# Patient Record
Sex: FEMALE | Race: WHITE | NOT HISPANIC OR LATINO | Employment: FULL TIME | URBAN - METROPOLITAN AREA
[De-identification: names, ages, dates, MRNs, and addresses within clinical notes are randomized per-mention and may not be internally consistent; named-entity substitution may affect disease eponyms.]

---

## 2017-07-06 ENCOUNTER — HOSPITAL ENCOUNTER (OUTPATIENT)
Dept: FAMILY MEDICINE CLINIC | Facility: CLINIC | Age: 62
Setting detail: SPECIMEN
Discharge: HOME | End: 2017-07-06
Attending: INTERNAL MEDICINE | Admitting: INTERNAL MEDICINE

## 2017-07-06 LAB
ALBUMIN SERPL-MCNC: 3.6 G/DL (ref 3.5–4.8)
ALBUMIN/GLOB SERPL: 1.2 {RATIO} (ref 1–1.7)
ALP SERPL-CCNC: 65 IU/L (ref 32–91)
ALT SERPL-CCNC: 25 IU/L (ref 14–54)
ANION GAP SERPL CALC-SCNC: 13.3 MMOL/L (ref 10–20)
AST SERPL-CCNC: 22 IU/L (ref 15–41)
BILIRUB SERPL-MCNC: 0.5 MG/DL (ref 0.3–1.2)
BUN SERPL-MCNC: 10 MG/DL (ref 8–20)
BUN/CREAT SERPL: 14.3 (ref 5.4–26.2)
CALCIUM SERPL-MCNC: 9.2 MG/DL (ref 8.9–10.3)
CHLORIDE SERPL-SCNC: 106 MMOL/L (ref 101–111)
CHOLEST SERPL-MCNC: 204 MG/DL
CHOLEST/HDLC SERPL: 4.2 {RATIO}
CONV CO2: 27 MMOL/L (ref 22–32)
CONV LDL CHOLESTEROL DIRECT: 133 MG/DL (ref 0–100)
CONV TOTAL PROTEIN: 6.6 G/DL (ref 6.1–7.9)
CREAT UR-MCNC: 0.7 MG/DL (ref 0.4–1)
GLOBULIN UR ELPH-MCNC: 3 G/DL (ref 2.5–3.8)
GLUCOSE SERPL-MCNC: 102 MG/DL (ref 65–99)
HDLC SERPL-MCNC: 49 MG/DL
LDLC/HDLC SERPL: 2.7 {RATIO}
LIPID INTERPRETATION: ABNORMAL
POTASSIUM SERPL-SCNC: 4.3 MMOL/L (ref 3.6–5.1)
SODIUM SERPL-SCNC: 142 MMOL/L (ref 136–144)
TRIGL SERPL-MCNC: 106 MG/DL
TSH SERPL-ACNC: 3.91 UIU/ML (ref 0.34–5.6)
VLDLC SERPL CALC-MCNC: 23 MG/DL

## 2017-11-29 ENCOUNTER — APPOINTMENT (OUTPATIENT)
Dept: WOMENS IMAGING | Facility: HOSPITAL | Age: 62
End: 2017-11-29

## 2017-11-29 PROCEDURE — 77063 BREAST TOMOSYNTHESIS BI: CPT | Performed by: RADIOLOGY

## 2017-11-29 PROCEDURE — 77067 SCR MAMMO BI INCL CAD: CPT | Performed by: RADIOLOGY

## 2017-12-28 ENCOUNTER — HOSPITAL ENCOUNTER (OUTPATIENT)
Dept: FAMILY MEDICINE CLINIC | Facility: CLINIC | Age: 62
Setting detail: SPECIMEN
Discharge: HOME | End: 2017-12-28
Attending: INTERNAL MEDICINE | Admitting: INTERNAL MEDICINE

## 2017-12-28 LAB
ALBUMIN SERPL-MCNC: 3.7 G/DL (ref 3.5–4.8)
ALBUMIN/GLOB SERPL: 1.3 {RATIO} (ref 1–1.7)
ALP SERPL-CCNC: 71 IU/L (ref 32–91)
ALT SERPL-CCNC: 26 IU/L (ref 14–54)
ANION GAP SERPL CALC-SCNC: 11.5 MMOL/L (ref 10–20)
AST SERPL-CCNC: 21 IU/L (ref 15–41)
BILIRUB SERPL-MCNC: 0.4 MG/DL (ref 0.3–1.2)
BUN SERPL-MCNC: 12 MG/DL (ref 8–20)
BUN/CREAT SERPL: 17.1 (ref 5.4–26.2)
CALCIUM SERPL-MCNC: 9 MG/DL (ref 8.9–10.3)
CHLORIDE SERPL-SCNC: 103 MMOL/L (ref 101–111)
CHOLEST SERPL-MCNC: 233 MG/DL
CHOLEST/HDLC SERPL: 3.9 {RATIO}
CONV CO2: 26 MMOL/L (ref 22–32)
CONV LDL CHOLESTEROL DIRECT: 159 MG/DL (ref 0–100)
CONV TOTAL PROTEIN: 6.5 G/DL (ref 6.1–7.9)
CREAT UR-MCNC: 0.7 MG/DL (ref 0.4–1)
GLOBULIN UR ELPH-MCNC: 2.8 G/DL (ref 2.5–3.8)
GLUCOSE SERPL-MCNC: 96 MG/DL (ref 65–99)
HDLC SERPL-MCNC: 60 MG/DL
LDLC/HDLC SERPL: 2.6 {RATIO}
LIPID INTERPRETATION: ABNORMAL
POTASSIUM SERPL-SCNC: 4.5 MMOL/L (ref 3.6–5.1)
SODIUM SERPL-SCNC: 136 MMOL/L (ref 136–144)
TRIGL SERPL-MCNC: 65 MG/DL
VLDLC SERPL CALC-MCNC: 14.1 MG/DL

## 2018-04-26 ENCOUNTER — HOSPITAL ENCOUNTER (OUTPATIENT)
Dept: FAMILY MEDICINE CLINIC | Facility: CLINIC | Age: 63
Setting detail: SPECIMEN
Discharge: HOME | End: 2018-04-26
Attending: INTERNAL MEDICINE | Admitting: INTERNAL MEDICINE

## 2018-04-26 LAB
ALBUMIN SERPL-MCNC: 3.8 G/DL (ref 3.5–4.8)
ALBUMIN/GLOB SERPL: 1.4 {RATIO} (ref 1–1.7)
ALP SERPL-CCNC: 58 IU/L (ref 32–91)
ALT SERPL-CCNC: 28 IU/L (ref 14–54)
ANION GAP SERPL CALC-SCNC: 10.2 MMOL/L (ref 10–20)
AST SERPL-CCNC: 18 IU/L (ref 15–41)
BASOPHILS # BLD AUTO: 0 10*3/UL (ref 0–0.2)
BASOPHILS NFR BLD AUTO: 1 % (ref 0–2)
BILIRUB SERPL-MCNC: 0.4 MG/DL (ref 0.3–1.2)
BUN SERPL-MCNC: 10 MG/DL (ref 8–20)
BUN/CREAT SERPL: 14.3 (ref 5.4–26.2)
CALCIUM SERPL-MCNC: 9 MG/DL (ref 8.9–10.3)
CHLORIDE SERPL-SCNC: 109 MMOL/L (ref 101–111)
CONV CO2: 26 MMOL/L (ref 22–32)
CONV TOTAL PROTEIN: 6.6 G/DL (ref 6.1–7.9)
CREAT UR-MCNC: 0.7 MG/DL (ref 0.4–1)
DIFFERENTIAL METHOD BLD: (no result)
EOSINOPHIL # BLD AUTO: 0.1 10*3/UL (ref 0–0.3)
EOSINOPHIL # BLD AUTO: 3 % (ref 0–3)
ERYTHROCYTE [DISTWIDTH] IN BLOOD BY AUTOMATED COUNT: 14 % (ref 11.5–14.5)
GLOBULIN UR ELPH-MCNC: 2.8 G/DL (ref 2.5–3.8)
GLUCOSE SERPL-MCNC: 99 MG/DL (ref 65–99)
HCT VFR BLD AUTO: 37.8 % (ref 35–49)
HGB BLD-MCNC: 12.5 G/DL (ref 12–15)
LYMPHOCYTES # BLD AUTO: 1.4 10*3/UL (ref 0.8–4.8)
LYMPHOCYTES NFR BLD AUTO: 28 % (ref 18–42)
MCH RBC QN AUTO: 28 PG (ref 26–32)
MCHC RBC AUTO-ENTMCNC: 33 G/DL (ref 32–36)
MCV RBC AUTO: 84.8 FL (ref 80–94)
MONOCYTES # BLD AUTO: 0.4 10*3/UL (ref 0.1–1.3)
MONOCYTES NFR BLD AUTO: 8 % (ref 2–11)
NEUTROPHILS # BLD AUTO: 3.1 10*3/UL (ref 2.3–8.6)
NEUTROPHILS NFR BLD AUTO: 60 % (ref 50–75)
NRBC BLD AUTO-RTO: 0 /100{WBCS}
NRBC/RBC NFR BLD MANUAL: 0 10*3/UL
PLATELET # BLD AUTO: (no result) 10*3/UL (ref 150–450)
PMV BLD AUTO: 8.4 FL (ref 7.4–10.4)
POTASSIUM SERPL-SCNC: 4.2 MMOL/L (ref 3.6–5.1)
RBC # BLD AUTO: 4.46 10*6/UL (ref 4–5.4)
SODIUM SERPL-SCNC: 141 MMOL/L (ref 136–144)
WBC # BLD AUTO: 5 10*3/UL (ref 4.5–11.5)

## 2018-06-25 ENCOUNTER — HOSPITAL ENCOUNTER (OUTPATIENT)
Dept: FAMILY MEDICINE CLINIC | Facility: CLINIC | Age: 63
Setting detail: SPECIMEN
Discharge: HOME | End: 2018-06-25
Attending: INTERNAL MEDICINE | Admitting: INTERNAL MEDICINE

## 2018-06-25 LAB
ALBUMIN SERPL-MCNC: 4 G/DL (ref 3.5–4.8)
ALBUMIN/GLOB SERPL: 1.4 {RATIO} (ref 1–1.7)
ALP SERPL-CCNC: 75 IU/L (ref 32–91)
ALT SERPL-CCNC: 18 IU/L (ref 14–54)
ANION GAP SERPL CALC-SCNC: 9.1 MMOL/L (ref 10–20)
AST SERPL-CCNC: 20 IU/L (ref 15–41)
BASOPHILS # BLD AUTO: 0 10*3/UL (ref 0–0.2)
BASOPHILS NFR BLD AUTO: 1 % (ref 0–2)
BILIRUB SERPL-MCNC: 0.7 MG/DL (ref 0.3–1.2)
BUN SERPL-MCNC: 13 MG/DL (ref 8–20)
BUN/CREAT SERPL: 16.3 (ref 5.4–26.2)
CALCIUM SERPL-MCNC: 9.1 MG/DL (ref 8.9–10.3)
CHLORIDE SERPL-SCNC: 108 MMOL/L (ref 101–111)
CHOLEST SERPL-MCNC: 251 MG/DL
CHOLEST/HDLC SERPL: 5.1 {RATIO}
CONV CO2: 27 MMOL/L (ref 22–32)
CONV LDL CHOLESTEROL DIRECT: 182 MG/DL (ref 0–100)
CONV TOTAL PROTEIN: 6.8 G/DL (ref 6.1–7.9)
CREAT UR-MCNC: 0.8 MG/DL (ref 0.4–1)
DIFFERENTIAL METHOD BLD: (no result)
EOSINOPHIL # BLD AUTO: 0.1 10*3/UL (ref 0–0.3)
EOSINOPHIL # BLD AUTO: 3 % (ref 0–3)
ERYTHROCYTE [DISTWIDTH] IN BLOOD BY AUTOMATED COUNT: 14 % (ref 11.5–14.5)
GLOBULIN UR ELPH-MCNC: 2.8 G/DL (ref 2.5–3.8)
GLUCOSE SERPL-MCNC: 97 MG/DL (ref 65–99)
HCT VFR BLD AUTO: 41.4 % (ref 35–49)
HDLC SERPL-MCNC: 49 MG/DL
HGB BLD-MCNC: 13.4 G/DL (ref 12–15)
LDLC/HDLC SERPL: 3.7 {RATIO}
LIPID INTERPRETATION: ABNORMAL
LYMPHOCYTES # BLD AUTO: 1.3 10*3/UL (ref 0.8–4.8)
LYMPHOCYTES NFR BLD AUTO: 26 % (ref 18–42)
MCH RBC QN AUTO: 27.4 PG (ref 26–32)
MCHC RBC AUTO-ENTMCNC: 32.4 G/DL (ref 32–36)
MCV RBC AUTO: 84.4 FL (ref 80–94)
MONOCYTES # BLD AUTO: 0.4 10*3/UL (ref 0.1–1.3)
MONOCYTES NFR BLD AUTO: 9 % (ref 2–11)
NEUTROPHILS # BLD AUTO: 3 10*3/UL (ref 2.3–8.6)
NEUTROPHILS NFR BLD AUTO: 61 % (ref 50–75)
NRBC BLD AUTO-RTO: 0 /100{WBCS}
NRBC/RBC NFR BLD MANUAL: 0 10*3/UL
PLATELET # BLD AUTO: 246 10*3/UL (ref 150–450)
PMV BLD AUTO: 8.7 FL (ref 7.4–10.4)
POTASSIUM SERPL-SCNC: 4.1 MMOL/L (ref 3.6–5.1)
RBC # BLD AUTO: 4.9 10*6/UL (ref 4–5.4)
SODIUM SERPL-SCNC: 140 MMOL/L (ref 136–144)
TRIGL SERPL-MCNC: 109 MG/DL
VLDLC SERPL CALC-MCNC: 20.5 MG/DL
WBC # BLD AUTO: 4.8 10*3/UL (ref 4.5–11.5)

## 2018-10-18 ENCOUNTER — HOSPITAL ENCOUNTER (OUTPATIENT)
Dept: OTHER | Facility: HOSPITAL | Age: 63
Setting detail: SPECIMEN
Discharge: HOME | End: 2018-10-18
Attending: INTERNAL MEDICINE | Admitting: INTERNAL MEDICINE

## 2018-10-18 LAB
ALBUMIN SERPL-MCNC: 4.1 G/DL (ref 3.5–4.8)
ALBUMIN/GLOB SERPL: 1.4 {RATIO} (ref 1–1.7)
ALP SERPL-CCNC: 86 IU/L (ref 32–91)
ALT SERPL-CCNC: 19 IU/L (ref 14–54)
ANION GAP SERPL CALC-SCNC: 12.7 MMOL/L (ref 10–20)
AST SERPL-CCNC: 19 IU/L (ref 15–41)
BASOPHILS # BLD AUTO: 0 10*3/UL (ref 0–0.2)
BASOPHILS NFR BLD AUTO: 1 % (ref 0–2)
BILIRUB SERPL-MCNC: 0.9 MG/DL (ref 0.3–1.2)
BUN SERPL-MCNC: 16 MG/DL (ref 8–20)
BUN/CREAT SERPL: 20 (ref 5.4–26.2)
CALCIUM SERPL-MCNC: 8.8 MG/DL (ref 8.9–10.3)
CHLORIDE SERPL-SCNC: 102 MMOL/L (ref 101–111)
CHOLEST SERPL-MCNC: 172 MG/DL
CHOLEST/HDLC SERPL: 2.9 {RATIO}
CONV CO2: 27 MMOL/L (ref 22–32)
CONV LDL CHOLESTEROL DIRECT: 104 MG/DL (ref 0–100)
CONV TOTAL PROTEIN: 7.1 G/DL (ref 6.1–7.9)
CREAT UR-MCNC: 0.8 MG/DL (ref 0.4–1)
DIFFERENTIAL METHOD BLD: (no result)
EOSINOPHIL # BLD AUTO: 0.1 10*3/UL (ref 0–0.3)
EOSINOPHIL # BLD AUTO: 2 % (ref 0–3)
ERYTHROCYTE [DISTWIDTH] IN BLOOD BY AUTOMATED COUNT: 14.5 % (ref 11.5–14.5)
GLOBULIN UR ELPH-MCNC: 3 G/DL (ref 2.5–3.8)
GLUCOSE SERPL-MCNC: 93 MG/DL (ref 65–99)
HCT VFR BLD AUTO: 38.4 % (ref 35–49)
HDLC SERPL-MCNC: 59 MG/DL
HGB BLD-MCNC: 12.8 G/DL (ref 12–15)
LDLC/HDLC SERPL: 1.8 {RATIO}
LIPID INTERPRETATION: ABNORMAL
LYMPHOCYTES # BLD AUTO: 1.4 10*3/UL (ref 0.8–4.8)
LYMPHOCYTES NFR BLD AUTO: 29 % (ref 18–42)
MCH RBC QN AUTO: 28.2 PG (ref 26–32)
MCHC RBC AUTO-ENTMCNC: 33.3 G/DL (ref 32–36)
MCV RBC AUTO: 84.5 FL (ref 80–94)
MONOCYTES # BLD AUTO: 0.4 10*3/UL (ref 0.1–1.3)
MONOCYTES NFR BLD AUTO: 8 % (ref 2–11)
NEUTROPHILS # BLD AUTO: 2.8 10*3/UL (ref 2.3–8.6)
NEUTROPHILS NFR BLD AUTO: 60 % (ref 50–75)
NRBC BLD AUTO-RTO: 0 /100{WBCS}
NRBC/RBC NFR BLD MANUAL: 0 10*3/UL
PLATELET # BLD AUTO: 261 10*3/UL (ref 150–450)
PMV BLD AUTO: 8.9 FL (ref 7.4–10.4)
POTASSIUM SERPL-SCNC: 3.7 MMOL/L (ref 3.6–5.1)
RBC # BLD AUTO: 4.54 10*6/UL (ref 4–5.4)
SODIUM SERPL-SCNC: 138 MMOL/L (ref 136–144)
TRIGL SERPL-MCNC: 73 MG/DL
VLDLC SERPL CALC-MCNC: 8.5 MG/DL
WBC # BLD AUTO: 4.7 10*3/UL (ref 4.5–11.5)

## 2018-12-05 ENCOUNTER — APPOINTMENT (OUTPATIENT)
Dept: WOMENS IMAGING | Facility: HOSPITAL | Age: 63
End: 2018-12-05

## 2018-12-05 PROCEDURE — 77067 SCR MAMMO BI INCL CAD: CPT | Performed by: RADIOLOGY

## 2018-12-05 PROCEDURE — 77063 BREAST TOMOSYNTHESIS BI: CPT | Performed by: RADIOLOGY

## 2019-02-25 ENCOUNTER — HOSPITAL ENCOUNTER (OUTPATIENT)
Dept: FAMILY MEDICINE CLINIC | Facility: CLINIC | Age: 64
Setting detail: SPECIMEN
Discharge: HOME | End: 2019-02-25
Attending: INTERNAL MEDICINE | Admitting: INTERNAL MEDICINE

## 2019-02-25 LAB
ALBUMIN SERPL-MCNC: 3.9 G/DL (ref 3.5–4.8)
ALBUMIN/GLOB SERPL: 1.4 {RATIO} (ref 1–1.7)
ALP SERPL-CCNC: 69 IU/L (ref 32–91)
ALT SERPL-CCNC: 16 IU/L (ref 14–54)
ANION GAP SERPL CALC-SCNC: 15 MMOL/L (ref 10–20)
AST SERPL-CCNC: 21 IU/L (ref 15–41)
BASOPHILS # BLD AUTO: 0.1 10*3/UL (ref 0–0.2)
BASOPHILS NFR BLD AUTO: 1 % (ref 0–2)
BILIRUB SERPL-MCNC: 0.5 MG/DL (ref 0.3–1.2)
BUN SERPL-MCNC: 16 MG/DL (ref 8–20)
BUN/CREAT SERPL: 22.9 (ref 5.4–26.2)
CALCIUM SERPL-MCNC: 8.9 MG/DL (ref 8.9–10.3)
CHLORIDE SERPL-SCNC: 101 MMOL/L (ref 101–111)
CHOLEST SERPL-MCNC: 198 MG/DL
CHOLEST/HDLC SERPL: 4.7 {RATIO}
CONV CO2: 23 MMOL/L (ref 22–32)
CONV LDL CHOLESTEROL DIRECT: 145 MG/DL (ref 0–100)
CONV TOTAL PROTEIN: 6.6 G/DL (ref 6.1–7.9)
CREAT UR-MCNC: 0.7 MG/DL (ref 0.4–1)
DIFFERENTIAL METHOD BLD: (no result)
EOSINOPHIL # BLD AUTO: 0.1 10*3/UL (ref 0–0.3)
EOSINOPHIL # BLD AUTO: 2 % (ref 0–3)
ERYTHROCYTE [DISTWIDTH] IN BLOOD BY AUTOMATED COUNT: 14.2 % (ref 11.5–14.5)
GLOBULIN UR ELPH-MCNC: 2.7 G/DL (ref 2.5–3.8)
GLUCOSE SERPL-MCNC: 94 MG/DL (ref 65–99)
HCT VFR BLD AUTO: 41 % (ref 35–49)
HDLC SERPL-MCNC: 42 MG/DL
HGB BLD-MCNC: 13.7 G/DL (ref 12–15)
LDLC/HDLC SERPL: 3.4 {RATIO}
LIPID INTERPRETATION: ABNORMAL
LYMPHOCYTES # BLD AUTO: 1.5 10*3/UL (ref 0.8–4.8)
LYMPHOCYTES NFR BLD AUTO: 31 % (ref 18–42)
MCH RBC QN AUTO: 29.2 PG (ref 26–32)
MCHC RBC AUTO-ENTMCNC: 33.5 G/DL (ref 32–36)
MCV RBC AUTO: 87.2 FL (ref 80–94)
MONOCYTES # BLD AUTO: 0.4 10*3/UL (ref 0.1–1.3)
MONOCYTES NFR BLD AUTO: 8 % (ref 2–11)
NEUTROPHILS # BLD AUTO: 2.8 10*3/UL (ref 2.3–8.6)
NEUTROPHILS NFR BLD AUTO: 58 % (ref 50–75)
NRBC BLD AUTO-RTO: 0 /100{WBCS}
NRBC/RBC NFR BLD MANUAL: 0 10*3/UL
PLATELET # BLD AUTO: 223 10*3/UL (ref 150–450)
PMV BLD AUTO: 9.6 FL (ref 7.4–10.4)
POTASSIUM SERPL-SCNC: 4 MMOL/L (ref 3.6–5.1)
RBC # BLD AUTO: 4.7 10*6/UL (ref 4–5.4)
SODIUM SERPL-SCNC: 135 MMOL/L (ref 136–144)
TRIGL SERPL-MCNC: 90 MG/DL
VLDLC SERPL CALC-MCNC: 11.1 MG/DL
WBC # BLD AUTO: 4.8 10*3/UL (ref 4.5–11.5)

## 2019-03-12 ENCOUNTER — CONVERSION ENCOUNTER (OUTPATIENT)
Dept: FAMILY MEDICINE CLINIC | Facility: CLINIC | Age: 64
End: 2019-03-12

## 2019-04-09 ENCOUNTER — CONVERSION ENCOUNTER (OUTPATIENT)
Dept: FAMILY MEDICINE CLINIC | Facility: CLINIC | Age: 64
End: 2019-04-09

## 2019-05-29 ENCOUNTER — HOSPITAL ENCOUNTER (OUTPATIENT)
Dept: FAMILY MEDICINE CLINIC | Facility: CLINIC | Age: 64
Setting detail: SPECIMEN
Discharge: HOME | End: 2019-05-29
Attending: INTERNAL MEDICINE | Admitting: INTERNAL MEDICINE

## 2019-05-29 LAB
ALBUMIN SERPL-MCNC: 3.8 G/DL (ref 3.5–4.8)
ALBUMIN/GLOB SERPL: 1.6 {RATIO} (ref 1–1.7)
ALP SERPL-CCNC: 62 IU/L (ref 32–91)
ALT SERPL-CCNC: 26 IU/L (ref 14–54)
ANION GAP SERPL CALC-SCNC: 11.3 MMOL/L (ref 10–20)
AST SERPL-CCNC: 21 IU/L (ref 15–41)
BILIRUB SERPL-MCNC: 0.6 MG/DL (ref 0.3–1.2)
BUN SERPL-MCNC: 12 MG/DL (ref 8–20)
BUN/CREAT SERPL: 17.1 (ref 5.4–26.2)
CALCIUM SERPL-MCNC: 8.9 MG/DL (ref 8.9–10.3)
CHLORIDE SERPL-SCNC: 106 MMOL/L (ref 101–111)
CHOLEST SERPL-MCNC: 191 MG/DL
CHOLEST/HDLC SERPL: 4.8 {RATIO}
CONV CO2: 26 MMOL/L (ref 22–32)
CONV LDL CHOLESTEROL DIRECT: 135 MG/DL (ref 0–100)
CONV TOTAL PROTEIN: 6.2 G/DL (ref 6.1–7.9)
CREAT UR-MCNC: 0.7 MG/DL (ref 0.4–1)
GLOBULIN UR ELPH-MCNC: 2.4 G/DL (ref 2.5–3.8)
GLUCOSE SERPL-MCNC: 93 MG/DL (ref 65–99)
HDLC SERPL-MCNC: 40 MG/DL
LDLC/HDLC SERPL: 3.4 {RATIO}
LIPID INTERPRETATION: ABNORMAL
POTASSIUM SERPL-SCNC: 4.3 MMOL/L (ref 3.6–5.1)
SODIUM SERPL-SCNC: 139 MMOL/L (ref 136–144)
TRIGL SERPL-MCNC: 85 MG/DL
VLDLC SERPL CALC-MCNC: 15.6 MG/DL

## 2019-06-03 ENCOUNTER — HOSPITAL ENCOUNTER (OUTPATIENT)
Dept: FAMILY MEDICINE CLINIC | Facility: CLINIC | Age: 64
Setting detail: SPECIMEN
Discharge: HOME | End: 2019-06-03
Attending: INTERNAL MEDICINE | Admitting: INTERNAL MEDICINE

## 2019-06-03 VITALS — BODY MASS INDEX: 30.69 KG/M2 | WEIGHT: 169.5 LBS | BODY MASS INDEX: 29.55 KG/M2 | WEIGHT: 176 LBS

## 2019-06-03 LAB
ANION GAP SERPL CALC-SCNC: 15.3 MMOL/L (ref 10–20)
BASOPHILS # BLD AUTO: 0 10*3/UL (ref 0–0.2)
BASOPHILS NFR BLD AUTO: 1 % (ref 0–2)
BUN SERPL-MCNC: 11 MG/DL (ref 8–20)
BUN/CREAT SERPL: 13.8 (ref 5.4–26.2)
CALCIUM SERPL-MCNC: 9.1 MG/DL (ref 8.9–10.3)
CHLORIDE SERPL-SCNC: 99 MMOL/L (ref 101–111)
CONV CO2: 24 MMOL/L (ref 22–32)
CREAT UR-MCNC: 0.8 MG/DL (ref 0.4–1)
DIFFERENTIAL METHOD BLD: (no result)
EOSINOPHIL # BLD AUTO: 0.1 10*3/UL (ref 0–0.3)
EOSINOPHIL # BLD AUTO: 2 % (ref 0–3)
ERYTHROCYTE [DISTWIDTH] IN BLOOD BY AUTOMATED COUNT: 13.9 % (ref 11.5–14.5)
FERRITIN SERPL-MCNC: 30 NG/ML (ref 11–307)
FOLATE SERPL-MCNC: >24.8 NG/ML (ref 5.9–24.8)
GLUCOSE SERPL-MCNC: 99 MG/DL (ref 65–99)
HCT VFR BLD AUTO: 40.8 % (ref 35–49)
HGB BLD-MCNC: 14 G/DL (ref 12–15)
LYMPHOCYTES # BLD AUTO: 1.7 10*3/UL (ref 0.8–4.8)
LYMPHOCYTES NFR BLD AUTO: 28 % (ref 18–42)
MAGNESIUM SERPL-MCNC: 1.9 MG/DL (ref 1.8–2.5)
MCH RBC QN AUTO: 30 PG (ref 26–32)
MCHC RBC AUTO-ENTMCNC: 34.4 G/DL (ref 32–36)
MCV RBC AUTO: 87.4 FL (ref 80–94)
MONOCYTES # BLD AUTO: 0.5 10*3/UL (ref 0.1–1.3)
MONOCYTES NFR BLD AUTO: 8 % (ref 2–11)
NEUTROPHILS # BLD AUTO: 3.8 10*3/UL (ref 2.3–8.6)
NEUTROPHILS NFR BLD AUTO: 61 % (ref 50–75)
NRBC BLD AUTO-RTO: 0 /100{WBCS}
NRBC/RBC NFR BLD MANUAL: 0 10*3/UL
PLATELET # BLD AUTO: 244 10*3/UL (ref 150–450)
PMV BLD AUTO: 8.9 FL (ref 7.4–10.4)
POTASSIUM SERPL-SCNC: 4.3 MMOL/L (ref 3.6–5.1)
RBC # BLD AUTO: 4.67 10*6/UL (ref 4–5.4)
SODIUM SERPL-SCNC: 134 MMOL/L (ref 136–144)
T4 FREE SERPL-MCNC: 1.29 NG/DL (ref 0.58–1.64)
TSH SERPL-ACNC: 2.03 UIU/ML (ref 0.34–5.6)
VIT B12 SERPL-MCNC: 1160 PG/ML (ref 180–914)
WBC # BLD AUTO: 6.1 10*3/UL (ref 4.5–11.5)

## 2019-06-06 LAB — HBA1C MFR BLD: 5.3 % (ref 0–5.6)

## 2019-07-31 ENCOUNTER — TELEPHONE (OUTPATIENT)
Dept: FAMILY MEDICINE CLINIC | Facility: CLINIC | Age: 64
End: 2019-07-31

## 2019-07-31 DIAGNOSIS — R55 SYNCOPE, UNSPECIFIED SYNCOPE TYPE: Primary | ICD-10-CM

## 2019-08-13 ENCOUNTER — HOSPITAL ENCOUNTER (OUTPATIENT)
Dept: NUCLEAR MEDICINE | Facility: HOSPITAL | Age: 64
Discharge: HOME | End: 2019-08-13

## 2019-08-13 ENCOUNTER — APPOINTMENT (OUTPATIENT)
Dept: RESPIRATORY THERAPY | Facility: HOSPITAL | Age: 64
End: 2019-08-13

## 2019-08-13 ENCOUNTER — HOSPITAL ENCOUNTER (OUTPATIENT)
Dept: RESPIRATORY THERAPY | Facility: HOSPITAL | Age: 64
Discharge: HOME | End: 2019-08-13

## 2019-08-13 ENCOUNTER — HOSPITAL ENCOUNTER (OUTPATIENT)
Dept: CARDIOLOGY | Facility: HOSPITAL | Age: 64
Discharge: HOME | End: 2019-08-13
Admitting: INTERNAL MEDICINE

## 2019-08-13 VITALS
RESPIRATION RATE: 20 BRPM | HEART RATE: 75 BPM | SYSTOLIC BLOOD PRESSURE: 135 MMHG | WEIGHT: 155 LBS | BODY MASS INDEX: 27.46 KG/M2 | HEIGHT: 63 IN | DIASTOLIC BLOOD PRESSURE: 86 MMHG

## 2019-08-13 DIAGNOSIS — R55 SYNCOPE, UNSPECIFIED SYNCOPE TYPE: ICD-10-CM

## 2019-08-13 PROCEDURE — 0399T HC MYOCARDL STRAIN IMAG QUAN ASSMT PER SESS: CPT

## 2019-08-13 PROCEDURE — 93306 TTE W/DOPPLER COMPLETE: CPT | Performed by: INTERNAL MEDICINE

## 2019-08-13 PROCEDURE — 93225 XTRNL ECG REC<48 HRS REC: CPT

## 2019-08-13 PROCEDURE — 93306 TTE W/DOPPLER COMPLETE: CPT

## 2019-08-13 PROCEDURE — 0 TECHNETIUM SESTAMIBI: Performed by: INTERNAL MEDICINE

## 2019-08-13 PROCEDURE — 93016 CV STRESS TEST SUPVJ ONLY: CPT | Performed by: NURSE PRACTITIONER

## 2019-08-13 PROCEDURE — 78452 HT MUSCLE IMAGE SPECT MULT: CPT

## 2019-08-13 PROCEDURE — A9500 TC99M SESTAMIBI: HCPCS | Performed by: INTERNAL MEDICINE

## 2019-08-13 PROCEDURE — 93017 CV STRESS TEST TRACING ONLY: CPT

## 2019-08-13 RX ADMIN — TECHNETIUM TC 99M SESTAMIBI 1 DOSE: 1 INJECTION INTRAVENOUS at 07:30

## 2019-08-13 RX ADMIN — TECHNETIUM TC 99M SESTAMIBI 1 DOSE: 1 INJECTION INTRAVENOUS at 09:45

## 2019-08-14 LAB
BH CV ECHO MEAS - % IVS THICK: 26.4 %
BH CV ECHO MEAS - % LVPW THICK: -12.2 %
BH CV ECHO MEAS - ACS: 1.9 CM
BH CV ECHO MEAS - AO MAX PG (FULL): 3.3 MMHG
BH CV ECHO MEAS - AO MAX PG: 7.3 MMHG
BH CV ECHO MEAS - AO MEAN PG (FULL): 2.5 MMHG
BH CV ECHO MEAS - AO MEAN PG: 4.2 MMHG
BH CV ECHO MEAS - AO ROOT AREA (BSA CORRECTED): 1.6
BH CV ECHO MEAS - AO ROOT AREA: 5.8 CM^2
BH CV ECHO MEAS - AO ROOT DIAM: 2.7 CM
BH CV ECHO MEAS - AO V2 MAX: 135.3 CM/SEC
BH CV ECHO MEAS - AO V2 MEAN: 98.1 CM/SEC
BH CV ECHO MEAS - AO V2 VTI: 29 CM
BH CV ECHO MEAS - ASC AORTA: 2.8 CM
BH CV ECHO MEAS - AVA(I,A): 1.5 CM^2
BH CV ECHO MEAS - AVA(I,D): 1.5 CM^2
BH CV ECHO MEAS - AVA(V,A): 1.7 CM^2
BH CV ECHO MEAS - AVA(V,D): 1.7 CM^2
BH CV ECHO MEAS - BSA(HAYCOCK): 1.8 M^2
BH CV ECHO MEAS - BSA: 1.7 M^2
BH CV ECHO MEAS - BZI_BMI: 27.5 KILOGRAMS/M^2
BH CV ECHO MEAS - BZI_METRIC_HEIGHT: 160 CM
BH CV ECHO MEAS - BZI_METRIC_WEIGHT: 70.3 KG
BH CV ECHO MEAS - EDV(CUBED): 87.4 ML
BH CV ECHO MEAS - EDV(MOD-SP2): 65.1 ML
BH CV ECHO MEAS - EDV(MOD-SP4): 82.8 ML
BH CV ECHO MEAS - EDV(TEICH): 89.5 ML
BH CV ECHO MEAS - EF(CUBED): 69 %
BH CV ECHO MEAS - EF(MOD-BP): 69 %
BH CV ECHO MEAS - EF(MOD-SP2): 72.2 %
BH CV ECHO MEAS - EF(MOD-SP4): 66.7 %
BH CV ECHO MEAS - EF(TEICH): 60.8 %
BH CV ECHO MEAS - ESV(CUBED): 27.1 ML
BH CV ECHO MEAS - ESV(MOD-SP2): 18.1 ML
BH CV ECHO MEAS - ESV(MOD-SP4): 27.6 ML
BH CV ECHO MEAS - ESV(TEICH): 35.1 ML
BH CV ECHO MEAS - FS: 32.3 %
BH CV ECHO MEAS - IVS/LVPW: 1
BH CV ECHO MEAS - IVSD: 1.2 CM
BH CV ECHO MEAS - IVSS: 1.5 CM
BH CV ECHO MEAS - LA DIMENSION(2D): 3.5 CM
BH CV ECHO MEAS - LV DIASTOLIC VOL/BSA (35-75): 47.7 ML/M^2
BH CV ECHO MEAS - LV MASS(C)D: 182.3 GRAMS
BH CV ECHO MEAS - LV MASS(C)DI: 105 GRAMS/M^2
BH CV ECHO MEAS - LV MASS(C)S: 114 GRAMS
BH CV ECHO MEAS - LV MASS(C)SI: 65.7 GRAMS/M^2
BH CV ECHO MEAS - LV MAX PG: 4.1 MMHG
BH CV ECHO MEAS - LV MEAN PG: 1.6 MMHG
BH CV ECHO MEAS - LV SYSTOLIC VOL/BSA (12-30): 15.9 ML/M^2
BH CV ECHO MEAS - LV V1 MAX: 100.7 CM/SEC
BH CV ECHO MEAS - LV V1 MEAN: 58.5 CM/SEC
BH CV ECHO MEAS - LV V1 VTI: 18.9 CM
BH CV ECHO MEAS - LVIDD: 4.4 CM
BH CV ECHO MEAS - LVIDS: 3 CM
BH CV ECHO MEAS - LVOT AREA: 2.3 CM^2
BH CV ECHO MEAS - LVOT DIAM: 1.7 CM
BH CV ECHO MEAS - LVPWD: 1.1 CM
BH CV ECHO MEAS - LVPWS: 1 CM
BH CV ECHO MEAS - MV A MAX VEL: 70.2 CM/SEC
BH CV ECHO MEAS - MV DEC SLOPE: 290.8 CM/SEC^2
BH CV ECHO MEAS - MV DEC TIME: 0.21 SEC
BH CV ECHO MEAS - MV E MAX VEL: 60.5 CM/SEC
BH CV ECHO MEAS - MV E/A: 0.86
BH CV ECHO MEAS - MV MAX PG: 2.5 MMHG
BH CV ECHO MEAS - MV MEAN PG: 1.3 MMHG
BH CV ECHO MEAS - MV V2 MAX: 79.5 CM/SEC
BH CV ECHO MEAS - MV V2 MEAN: 54.8 CM/SEC
BH CV ECHO MEAS - MV V2 VTI: 18.5 CM
BH CV ECHO MEAS - MVA(VTI): 2.4 CM^2
BH CV ECHO MEAS - PA MAX PG: 2.6 MMHG
BH CV ECHO MEAS - PA MEAN PG: 1.6 MMHG
BH CV ECHO MEAS - PA V2 MAX: 80.1 CM/SEC
BH CV ECHO MEAS - PA V2 MEAN: 61.5 CM/SEC
BH CV ECHO MEAS - PA V2 VTI: 20.4 CM
BH CV ECHO MEAS - PI END-D VEL: 111.5 CM/SEC
BH CV ECHO MEAS - PI MAX PG: 8.4 MMHG
BH CV ECHO MEAS - PI MAX VEL: 145.1 CM/SEC
BH CV ECHO MEAS - PULM DIAS VEL: 61 CM/SEC
BH CV ECHO MEAS - PULM S/D: 0.98
BH CV ECHO MEAS - PULM SYS VEL: 59.8 CM/SEC
BH CV ECHO MEAS - RAP SYSTOLE: 5 MMHG
BH CV ECHO MEAS - RVDD: 3.1 CM
BH CV ECHO MEAS - RVOT AREA: 9.9 CM^2
BH CV ECHO MEAS - RVOT DIAM: 3.5 CM
BH CV ECHO MEAS - RVSP: 21.4 MMHG
BH CV ECHO MEAS - SI(AO): 96.4 ML/M^2
BH CV ECHO MEAS - SI(CUBED): 34.7 ML/M^2
BH CV ECHO MEAS - SI(LVOT): 25.1 ML/M^2
BH CV ECHO MEAS - SI(MOD-SP2): 27.1 ML/M^2
BH CV ECHO MEAS - SI(MOD-SP4): 31.8 ML/M^2
BH CV ECHO MEAS - SI(TEICH): 31.3 ML/M^2
BH CV ECHO MEAS - SV(AO): 167.2 ML
BH CV ECHO MEAS - SV(CUBED): 60.3 ML
BH CV ECHO MEAS - SV(LVOT): 43.5 ML
BH CV ECHO MEAS - SV(MOD-SP2): 47 ML
BH CV ECHO MEAS - SV(MOD-SP4): 55.2 ML
BH CV ECHO MEAS - SV(TEICH): 54.4 ML
BH CV ECHO MEAS - TR MAX VEL: 202.6 CM/SEC
BH CV NUCLEAR PRIOR STUDY: 3
BH CV STRESS BP STAGE 1: NORMAL
BH CV STRESS BP STAGE 2: NORMAL
BH CV STRESS BP STAGE 3: NORMAL
BH CV STRESS DURATION MIN STAGE 1: 3
BH CV STRESS DURATION MIN STAGE 2: 3
BH CV STRESS DURATION MIN STAGE 3: 3
BH CV STRESS DURATION SEC STAGE 1: 0
BH CV STRESS DURATION SEC STAGE 2: 0
BH CV STRESS DURATION SEC STAGE 3: 0
BH CV STRESS GRADE STAGE 1: 10
BH CV STRESS GRADE STAGE 2: 12
BH CV STRESS GRADE STAGE 3: 14
BH CV STRESS HR STAGE 1: 94
BH CV STRESS HR STAGE 2: 105
BH CV STRESS HR STAGE 3: 136
BH CV STRESS METS STAGE 1: 5
BH CV STRESS METS STAGE 2: 7.5
BH CV STRESS METS STAGE 3: 10
BH CV STRESS PROTOCOL 1: NORMAL
BH CV STRESS RECOVERY BP: NORMAL MMHG
BH CV STRESS RECOVERY HR: 95 BPM
BH CV STRESS SPEED STAGE 1: 1.7
BH CV STRESS SPEED STAGE 2: 2.5
BH CV STRESS SPEED STAGE 3: 3.4
BH CV STRESS STAGE 1: 1
BH CV STRESS STAGE 2: 2
BH CV STRESS STAGE 3: 3
BH CV VAS BP RIGHT ARM: NORMAL MMHG
LV EF NUC BP: 64 %
MAXIMAL PREDICTED HEART RATE: 156 BPM
PERCENT MAX PREDICTED HR: 87.18 %
STRESS BASELINE BP: NORMAL MMHG
STRESS BASELINE HR: 83 BPM
STRESS PERCENT HR: 103 %
STRESS POST ESTIMATED WORKLOAD: 10.1 METS
STRESS POST EXERCISE DUR MIN: 8 MIN
STRESS POST EXERCISE DUR SEC: 17 SEC
STRESS POST PEAK BP: NORMAL MMHG
STRESS POST PEAK HR: 136 BPM
STRESS TARGET HR: 133 BPM

## 2019-08-14 PROCEDURE — 93018 CV STRESS TEST I&R ONLY: CPT | Performed by: INTERNAL MEDICINE

## 2019-08-14 PROCEDURE — 78452 HT MUSCLE IMAGE SPECT MULT: CPT | Performed by: INTERNAL MEDICINE

## 2019-08-19 DIAGNOSIS — R94.39 ABNORMAL STRESS TEST: Primary | ICD-10-CM

## 2019-08-21 PROCEDURE — 93227 XTRNL ECG REC<48 HR R&I: CPT | Performed by: INTERNAL MEDICINE

## 2019-08-23 DIAGNOSIS — R94.39 ABNORMAL STRESS TEST: ICD-10-CM

## 2019-08-23 DIAGNOSIS — I48.92 ATRIAL FLUTTER, UNSPECIFIED TYPE (HCC): Primary | ICD-10-CM

## 2019-08-26 ENCOUNTER — TELEPHONE (OUTPATIENT)
Dept: FAMILY MEDICINE CLINIC | Facility: CLINIC | Age: 64
End: 2019-08-26

## 2019-08-26 NOTE — TELEPHONE ENCOUNTER
VM MESSAGE.  DR. FUNEZ'S OFFICE CALLED AND THEY WERE ABLE TO GET PT'S APPT. MOVED UP TO 9/13/19 9:15 AM. IF THIS DOESN'T WORK WITH PATIENT, PLEASE CALL THEM BACK. THE GIRL THERE DID NOT LEAVE HER NAME. THANK YOU.

## 2019-08-29 ENCOUNTER — TELEPHONE (OUTPATIENT)
Dept: FAMILY MEDICINE CLINIC | Facility: CLINIC | Age: 64
End: 2019-08-29

## 2019-09-13 ENCOUNTER — PREP FOR SURGERY (OUTPATIENT)
Dept: OTHER | Facility: HOSPITAL | Age: 64
End: 2019-09-13

## 2019-09-13 ENCOUNTER — OFFICE VISIT (OUTPATIENT)
Dept: CARDIOLOGY | Facility: CLINIC | Age: 64
End: 2019-09-13

## 2019-09-13 VITALS
DIASTOLIC BLOOD PRESSURE: 54 MMHG | SYSTOLIC BLOOD PRESSURE: 153 MMHG | BODY MASS INDEX: 27.07 KG/M2 | WEIGHT: 152.8 LBS | HEART RATE: 79 BPM | OXYGEN SATURATION: 99 % | RESPIRATION RATE: 18 BRPM

## 2019-09-13 DIAGNOSIS — R61 DIAPHORESIS: ICD-10-CM

## 2019-09-13 DIAGNOSIS — R94.39 ABNORMAL STRESS TEST: Primary | ICD-10-CM

## 2019-09-13 DIAGNOSIS — E78.2 MIXED HYPERLIPIDEMIA: ICD-10-CM

## 2019-09-13 DIAGNOSIS — R94.39 ABNORMAL STRESS TEST: ICD-10-CM

## 2019-09-13 DIAGNOSIS — I10 ESSENTIAL HYPERTENSION: ICD-10-CM

## 2019-09-13 DIAGNOSIS — R55 SYNCOPE, UNSPECIFIED SYNCOPE TYPE: Primary | ICD-10-CM

## 2019-09-13 PROBLEM — R53.83 FATIGUE: Status: ACTIVE | Noted: 2019-06-03

## 2019-09-13 PROBLEM — E78.5 DYSLIPIDEMIA: Status: ACTIVE | Noted: 2019-09-13

## 2019-09-13 PROCEDURE — 99204 OFFICE O/P NEW MOD 45 MIN: CPT | Performed by: INTERNAL MEDICINE

## 2019-09-13 RX ORDER — IBUPROFEN 200 MG
1200 CAPSULE ORAL DAILY
COMMUNITY
Start: 2018-04-24 | End: 2021-08-30

## 2019-09-13 RX ORDER — IRON HEME POLYPEPTIDE/FOLIC AC 12-1MG
1 TABLET ORAL DAILY
COMMUNITY
Start: 2011-09-08

## 2019-09-13 RX ORDER — AMLODIPINE BESYLATE 5 MG/1
1 TABLET ORAL EVERY 24 HOURS
COMMUNITY
Start: 2017-09-10 | End: 2019-10-30 | Stop reason: SDUPTHER

## 2019-09-13 RX ORDER — VENLAFAXINE HYDROCHLORIDE 150 MG/1
1 CAPSULE, EXTENDED RELEASE ORAL DAILY
COMMUNITY
Start: 2019-07-30 | End: 2020-10-27

## 2019-09-13 RX ORDER — BIOTIN 1 MG
1000 TABLET ORAL EVERY 24 HOURS
COMMUNITY
Start: 2018-04-24 | End: 2021-04-16

## 2019-09-13 RX ORDER — MINOXIDIL 2 %
2400 SOLUTION, NON-ORAL TOPICAL DAILY
COMMUNITY
Start: 2018-04-24

## 2019-09-13 RX ORDER — CHOLECALCIFEROL (VITAMIN D3) 10 MCG
1 CAPSULE ORAL DAILY
COMMUNITY
Start: 2018-04-24

## 2019-09-13 RX ORDER — LEVOTHYROXINE SODIUM 112 UG/1
1 TABLET ORAL DAILY
COMMUNITY
Start: 2019-08-19 | End: 2019-10-13 | Stop reason: SDUPTHER

## 2019-09-13 RX ORDER — MV-MIN/FOLIC/VIT K/LYCOP/COQ10 200-100MCG
1 CAPSULE ORAL DAILY
COMMUNITY
Start: 2018-04-24

## 2019-09-13 NOTE — PROGRESS NOTES
Cc--recurrent near syncope    SUB    64-year-old very pleasant patient with history of mild essential hypertension, hyperlipidemia, hypothyroidism started having recurrent symptoms of near syncope  She has lost nearly 50 pounds of weight in the last few months  Since last few months he has at least had 3 episodes of near syncope preceded by significant cold diaphoresis  Episode of near syncope was preceded by  abdominal cramping and followed by significant fatigue lasting for several hours  Denied any confusion or urinary incontinence or any unusual angina apart from cold diaphoresis  Underwent noninvasive cardiac work-up showing normal EF and moderate to severe amount of ischemia on stress test and Holter revealing nonsustained atrial arrhythmias  She does complain of taking excessive caffeine  Patient has strong family history of malignant coronary artery disease        Past Medical History:   Diagnosis Date   • Hypertension      Past Surgical History:   Procedure Laterality Date   • HERNIA REPAIR     • TONSILLECTOMY       Family History   Problem Relation Age of Onset   • Supraventricular tachycardia Mother    • Heart attack Father      Social History     Tobacco Use   • Smoking status: Never Smoker   • Smokeless tobacco: Never Used   Substance Use Topics   • Alcohol use: No     Frequency: Never   • Drug use: No       (Not in a hospital admission)  Allergies:  Ace inhibitors    Review of Systems   General: Denies any fatigue  and tiredness  Eyes: No redness  Cardiovascular: No chest pain, no palpitations  Respiratory:   NO shortness of breath  Gastrointestinal: No nausea or vomiting, bleeding  Genitourinary: no hematuria or dysuria  Musculoskeletal: No arthralgia or myalgia  Skin: No rash  Neurologic: No numbness, tingling, positive for syncope  Hematologic/Lymphatic: No abnormal bleeding      Physical Exam  VITALS REVIEWED--blood pressure 153/54, pulse rate 79, respiration 12 times a minute patient is  afebrile    EKG shows sinus rhythm with right axis deviation nonspecific ST-T wave changes heart rate of 76 NV interval 138 QRS of 100 QTc interval of 421 and no prior EKG to compare with an EKG indication includes recurrent syncope    General:      well developed, well nourished, in no acute distress.    Head:      normocephalic and atraumatic.    Eyes:      PERRL/EOM intact, conjunctiva and sclera clear with out nystagmus.    Neck:      no masses, thyromegaly,  trachea central with normal respiratory effort and PMI NON displaced   Lungs:      clear bilaterally to auscultation.    Heart:       Sinus rhythm  without any murmurs gallops or rubs  Msk:      no deformity or scoliosis noted of thoracic or lumbar spine.    Pulses:      pulses normal in all 4 extremities.    Extremities:       no cyanosis or clubbing--trace left pedal edema and trace right pedal edema.    Neurologic:      no focal deficits.   alert oriented x3  Skin:      intact without lesions or rashes.    Psych:      alert and cooperative; normal mood and affect; normal attention span and concentration.          Assessment plan    Recurrent near syncope with syncope  Normal cardiovascular exam and normal ECG  Abnormal stress test suggestive of moderate to large area of ischemia with a very strong family history of coronary artery disease with intermittent cold diaphoresis  Hypertension  Hypothyroidism  Hyperlipidemia      Recommendations    Start aspirin 325 mg a day  Daily patient for left heart catheterization to exclude any significant obstructive coronary artery disease  Symptoms of near syncope are highly consistent with vasovagal symptoms--patient educated about reduction of caffeine intake and increasing oral hydration and nonpharmacological clues to abort vasovagal symptoms educated  Nonsustained atrial arrhythmias could be related to excessive caffeine patient was told to reduce caffeine intake  Follow-up in 4 weeks and left heart  catheterization scheduled

## 2019-09-17 ENCOUNTER — LAB (OUTPATIENT)
Dept: LAB | Facility: HOSPITAL | Age: 64
End: 2019-09-17

## 2019-09-17 ENCOUNTER — HOSPITAL ENCOUNTER (OUTPATIENT)
Dept: GENERAL RADIOLOGY | Facility: HOSPITAL | Age: 64
Discharge: HOME | End: 2019-09-17

## 2019-09-17 ENCOUNTER — HOSPITAL ENCOUNTER (OUTPATIENT)
Dept: CARDIOLOGY | Facility: HOSPITAL | Age: 64
Discharge: HOME | End: 2019-09-17
Admitting: INTERNAL MEDICINE

## 2019-09-17 DIAGNOSIS — R94.39 ABNORMAL STRESS TEST: ICD-10-CM

## 2019-09-17 DIAGNOSIS — R55 SYNCOPE, UNSPECIFIED SYNCOPE TYPE: ICD-10-CM

## 2019-09-17 DIAGNOSIS — R61 DIAPHORESIS: ICD-10-CM

## 2019-09-17 DIAGNOSIS — I10 ESSENTIAL HYPERTENSION: ICD-10-CM

## 2019-09-17 LAB
ALBUMIN SERPL-MCNC: 3.8 G/DL (ref 3.5–4.8)
ALBUMIN/GLOB SERPL: 1.5 G/DL (ref 1–1.7)
ALP SERPL-CCNC: 62 U/L (ref 32–91)
ALT SERPL W P-5'-P-CCNC: 16 U/L (ref 14–54)
ANION GAP SERPL CALCULATED.3IONS-SCNC: 11.4 MMOL/L (ref 5–15)
AST SERPL-CCNC: 18 U/L (ref 15–41)
BASOPHILS # BLD AUTO: 0 10*3/MM3 (ref 0–0.2)
BASOPHILS NFR BLD AUTO: 1.1 % (ref 0–1.5)
BILIRUB SERPL-MCNC: 0.6 MG/DL (ref 0.3–1.2)
BUN BLD-MCNC: 10 MG/DL (ref 8–20)
BUN/CREAT SERPL: 14.3 (ref 5.4–26.2)
CALCIUM SPEC-SCNC: 8.9 MG/DL (ref 8.9–10.3)
CHLORIDE SERPL-SCNC: 104 MMOL/L (ref 101–111)
CO2 SERPL-SCNC: 29 MMOL/L (ref 22–32)
CREAT BLD-MCNC: 0.7 MG/DL (ref 0.4–1)
DEPRECATED RDW RBC AUTO: 41.6 FL (ref 37–54)
EOSINOPHIL # BLD AUTO: 0.1 10*3/MM3 (ref 0–0.4)
EOSINOPHIL NFR BLD AUTO: 2.6 % (ref 0.3–6.2)
ERYTHROCYTE [DISTWIDTH] IN BLOOD BY AUTOMATED COUNT: 13.5 % (ref 12.3–15.4)
GFR SERPL CREATININE-BSD FRML MDRD: 84 ML/MIN/1.73
GLOBULIN UR ELPH-MCNC: 2.6 GM/DL (ref 2.5–3.8)
GLUCOSE BLD-MCNC: 66 MG/DL (ref 65–99)
HCT VFR BLD AUTO: 40.2 % (ref 34–46.6)
HGB BLD-MCNC: 13.5 G/DL (ref 12–15.9)
INR PPP: 0.99 (ref 0.9–1.1)
LYMPHOCYTES # BLD AUTO: 1.3 10*3/MM3 (ref 0.7–3.1)
LYMPHOCYTES NFR BLD AUTO: 31.2 % (ref 19.6–45.3)
MAGNESIUM SERPL-MCNC: 1.9 MG/DL (ref 1.8–2.5)
MCH RBC QN AUTO: 29.4 PG (ref 26.6–33)
MCHC RBC AUTO-ENTMCNC: 33.5 G/DL (ref 31.5–35.7)
MCV RBC AUTO: 87.7 FL (ref 79–97)
MONOCYTES # BLD AUTO: 0.4 10*3/MM3 (ref 0.1–0.9)
MONOCYTES NFR BLD AUTO: 10.2 % (ref 5–12)
NEUTROPHILS # BLD AUTO: 2.3 10*3/MM3 (ref 1.7–7)
NEUTROPHILS NFR BLD AUTO: 54.9 % (ref 42.7–76)
NRBC BLD AUTO-RTO: 0.1 /100 WBC (ref 0–0.2)
PLATELET # BLD AUTO: 231 10*3/MM3 (ref 140–450)
PMV BLD AUTO: 8.6 FL (ref 6–12)
POTASSIUM BLD-SCNC: 4.4 MMOL/L (ref 3.6–5.1)
PROT SERPL-MCNC: 6.4 G/DL (ref 6.1–7.9)
PROTHROMBIN TIME: 10.2 SECONDS (ref 9.6–11.7)
RBC # BLD AUTO: 4.58 10*6/MM3 (ref 3.77–5.28)
SODIUM BLD-SCNC: 140 MMOL/L (ref 136–144)
WBC NRBC COR # BLD: 4.1 10*3/MM3 (ref 3.4–10.8)

## 2019-09-17 PROCEDURE — 80053 COMPREHEN METABOLIC PANEL: CPT

## 2019-09-17 PROCEDURE — 85025 COMPLETE CBC W/AUTO DIFF WBC: CPT

## 2019-09-17 PROCEDURE — 85610 PROTHROMBIN TIME: CPT

## 2019-09-17 PROCEDURE — 36415 COLL VENOUS BLD VENIPUNCTURE: CPT

## 2019-09-17 PROCEDURE — 83735 ASSAY OF MAGNESIUM: CPT

## 2019-09-17 PROCEDURE — 71046 X-RAY EXAM CHEST 2 VIEWS: CPT

## 2019-09-17 PROCEDURE — 93005 ELECTROCARDIOGRAM TRACING: CPT | Performed by: INTERNAL MEDICINE

## 2019-09-18 ENCOUNTER — HOSPITAL ENCOUNTER (OUTPATIENT)
Facility: HOSPITAL | Age: 64
Setting detail: HOSPITAL OUTPATIENT SURGERY
Discharge: HOME | End: 2019-09-18
Attending: INTERNAL MEDICINE | Admitting: INTERNAL MEDICINE

## 2019-09-18 VITALS
WEIGHT: 149.69 LBS | OXYGEN SATURATION: 100 % | RESPIRATION RATE: 16 BRPM | TEMPERATURE: 98.3 F | SYSTOLIC BLOOD PRESSURE: 119 MMHG | BODY MASS INDEX: 25.56 KG/M2 | HEART RATE: 93 BPM | HEIGHT: 64 IN | DIASTOLIC BLOOD PRESSURE: 75 MMHG

## 2019-09-18 DIAGNOSIS — R55 SYNCOPE, UNSPECIFIED SYNCOPE TYPE: ICD-10-CM

## 2019-09-18 DIAGNOSIS — R94.39 ABNORMAL STRESS TEST: ICD-10-CM

## 2019-09-18 DIAGNOSIS — R61 DIAPHORESIS: ICD-10-CM

## 2019-09-18 PROCEDURE — C1894 INTRO/SHEATH, NON-LASER: HCPCS | Performed by: INTERNAL MEDICINE

## 2019-09-18 PROCEDURE — C1769 GUIDE WIRE: HCPCS | Performed by: INTERNAL MEDICINE

## 2019-09-18 PROCEDURE — C1760 CLOSURE DEV, VASC: HCPCS | Performed by: INTERNAL MEDICINE

## 2019-09-18 PROCEDURE — 25010000002 FENTANYL CITRATE (PF) 100 MCG/2ML SOLUTION: Performed by: INTERNAL MEDICINE

## 2019-09-18 PROCEDURE — 93458 L HRT ARTERY/VENTRICLE ANGIO: CPT | Performed by: INTERNAL MEDICINE

## 2019-09-18 PROCEDURE — 0 IOPAMIDOL PER 1 ML: Performed by: INTERNAL MEDICINE

## 2019-09-18 PROCEDURE — 25010000002 MIDAZOLAM PER 1 MG: Performed by: INTERNAL MEDICINE

## 2019-09-18 RX ORDER — FENTANYL CITRATE 50 UG/ML
INJECTION, SOLUTION INTRAMUSCULAR; INTRAVENOUS AS NEEDED
Status: DISCONTINUED | OUTPATIENT
Start: 2019-09-18 | End: 2019-09-18 | Stop reason: HOSPADM

## 2019-09-18 RX ORDER — LIDOCAINE HYDROCHLORIDE 20 MG/ML
INJECTION, SOLUTION INFILTRATION; PERINEURAL AS NEEDED
Status: DISCONTINUED | OUTPATIENT
Start: 2019-09-18 | End: 2019-09-18 | Stop reason: HOSPADM

## 2019-09-18 RX ORDER — SODIUM CHLORIDE 9 MG/ML
30 INJECTION, SOLUTION INTRAVENOUS CONTINUOUS
Status: DISCONTINUED | OUTPATIENT
Start: 2019-09-18 | End: 2019-09-18 | Stop reason: HOSPADM

## 2019-09-18 RX ORDER — MIDAZOLAM HYDROCHLORIDE 1 MG/ML
INJECTION INTRAMUSCULAR; INTRAVENOUS AS NEEDED
Status: DISCONTINUED | OUTPATIENT
Start: 2019-09-18 | End: 2019-09-18 | Stop reason: HOSPADM

## 2019-09-18 RX ORDER — ACETAMINOPHEN 325 MG/1
650 TABLET ORAL EVERY 4 HOURS PRN
Status: DISCONTINUED | OUTPATIENT
Start: 2019-09-18 | End: 2019-09-18 | Stop reason: HOSPADM

## 2019-09-18 RX ADMIN — SODIUM CHLORIDE 30 ML/HR: 900 INJECTION, SOLUTION INTRAVENOUS at 06:52

## 2019-09-20 PROCEDURE — 93010 ELECTROCARDIOGRAM REPORT: CPT | Performed by: INTERNAL MEDICINE

## 2019-10-10 ENCOUNTER — OFFICE VISIT (OUTPATIENT)
Dept: CARDIOLOGY | Facility: CLINIC | Age: 64
End: 2019-10-10

## 2019-10-10 VITALS
WEIGHT: 152.4 LBS | SYSTOLIC BLOOD PRESSURE: 132 MMHG | BODY MASS INDEX: 26.02 KG/M2 | HEIGHT: 64 IN | OXYGEN SATURATION: 98 % | HEART RATE: 78 BPM | RESPIRATION RATE: 18 BRPM | DIASTOLIC BLOOD PRESSURE: 83 MMHG

## 2019-10-10 DIAGNOSIS — R55 SYNCOPE AND COLLAPSE: ICD-10-CM

## 2019-10-10 DIAGNOSIS — E78.2 MIXED HYPERLIPIDEMIA: ICD-10-CM

## 2019-10-10 DIAGNOSIS — I10 ESSENTIAL HYPERTENSION: ICD-10-CM

## 2019-10-10 DIAGNOSIS — I25.10 CORONARY ARTERY DISEASE INVOLVING NATIVE CORONARY ARTERY OF NATIVE HEART WITHOUT ANGINA PECTORIS: Primary | ICD-10-CM

## 2019-10-10 PROCEDURE — 99214 OFFICE O/P EST MOD 30 MIN: CPT | Performed by: INTERNAL MEDICINE

## 2019-10-10 NOTE — PROGRESS NOTES
"Cardiology Office Visit      Encounter Date:  10/10/2019    Patient ID:   Nallely Figueroa is a 64 y.o. female.    Reason For Followup:  Syncope  Coronary artery disease  Hypertension  Hyperlipidemia    Brief Clinical History:  Dear Dr. Colon, Michelle Elmore MD    I had the pleasure of seeing Nallely Figueroa today. As you are well aware, this is a 64 y.o. female presented with recurrent syncope and is scheduled for a cardiac catheterization for abnormal stress test    FINDINGS: Cardiac catheterization September 2019    1. HEMODYNAMICS:  LVEDP 12, /70.     2. LEFT VENTRICULOGRAPHY: EF 60%, no mitral regurgitation.     3. CORONARY ANGIOGRAPHY:      Left main: Normal     LAD: Mid 30 to 40% stenosis after the diagonal bifurcation  Mid to distal long segment  40% stenosis with some intramyocardial bridging     Left circumflex artery: Normal     RCA: Normal     SUMMARY:   Nonobstructive coronary artery disease  LAD with moderate disease with intramyocardial bridging in the mid segment  Normal LV systolic function  Normal wall motion  Normal left side filling pressures     RECOMMENDATIONS:   Aggressive risk factor modification  Medical management for obstructive coronary artery disease    Interval History:  Patient denies any symptoms of chest pain shortness of breath dizziness or syncope    Assessment & Plan    Impressions:  Syncope  Coronary artery disease  Hypertension  Hyperlipidemia  Significant weight loss intentional    Recommendations:  Recheck lipids in 2 months with goal LDL less than 70  Continue aggressive risk factor modification  Cardiac cath results discussed with patient  Continued aggressive risk factor modification  Follow up in office in 3 months  Objective:    Vitals:  Vitals:    10/10/19 1417   BP: 132/83   BP Location: Left arm   Pulse: 78   Resp: 18   SpO2: 98%   Weight: 69.1 kg (152 lb 6.4 oz)   Height: 162.6 cm (64\")       Physical Exam:    General: Alert, cooperative, no distress, appears stated " age  Head:  Normocephalic, atraumatic, mucous membranes moist  Eyes:  Conjunctiva/corneas clear, EOM's intact     Neck:  Supple,  no adenopathy;      Lungs: Clear to auscultation bilaterally, no wheezes rhonchi rales are noted  Chest wall: No tenderness  Heart::  Regular rate and rhythm, S1 and S2 normal, no murmur, rub or gallop  Abdomen: Soft, non-tender, nondistended bowel sounds active  Extremities: No cyanosis, clubbing, or edema  Pulses: 2+ and symmetric all extremities  Skin:  No rashes or lesions  Neuro/psych: A&O x3. CN II through XII are grossly intact with appropriate affect      Allergies:  Allergies   Allergen Reactions   • Ace Inhibitors Cough       Medication Review:     Current Outpatient Medications:   •  amLODIPine (NORVASC) 5 MG tablet, Take 1 tablet by mouth Daily., Disp: , Rfl:   •  aspirin (ECOTRIN) 325 MG EC tablet, Take 1 tablet by mouth Daily., Disp: 90 tablet, Rfl: 3  •  Biotin 1000 MCG tablet, Take 10,000 mcg by mouth Daily., Disp: , Rfl:   •  Calcium 500 MG tablet, Take 1,200 mg by mouth 2 (Two) Times a Day., Disp: , Rfl:   •  levothyroxine (SYNTHROID, LEVOTHROID) 112 MCG tablet, Take 1 tablet by mouth Daily., Disp: , Rfl:   •  Multiple Vitamins-Minerals (DAILY MULTIVITAMIN) capsule, Take 1 tablet by mouth Daily., Disp: , Rfl:   •  Omega-3 Fatty Acids ([employer] FISH OIL) 1000 MG capsule, Take 2,400 mg by mouth Daily., Disp: , Rfl:   •  Probiotic Product ([employer]S PROBIOTIC PEARLS EX ST) capsule, Take 1 tablet by mouth Daily., Disp: , Rfl:   •  venlafaxine XR (EFFEXOR-XR) 150 MG 24 hr capsule, Take 1 capsule by mouth Daily., Disp: , Rfl:   •  vitamin d (DIALYVITE VITAMIN D 5000) 5000 units capsule, Take 1 tablet by mouth Daily., Disp: , Rfl:     Family History:  Family History   Problem Relation Age of Onset   • Supraventricular tachycardia Mother    • Heart attack Father        Past Medical History:  Past Medical History:   Diagnosis Date   • Hypertension        Past surgical History:  Past  Surgical History:   Procedure Laterality Date   • CARDIAC CATHETERIZATION N/A 9/18/2019    Procedure: Left Heart Cath;  Surgeon: Jeronimo Kuhn MD;  Location: [facility] CATH INVASIVE LOCATION;  Service: Cardiovascular   • CARDIAC CATHETERIZATION N/A 9/18/2019    Procedure: Coronary angiography;  Surgeon: Jeronimo Kuhn MD;  Location: [facility] CATH INVASIVE LOCATION;  Service: Cardiovascular   • HERNIA REPAIR     • TONSILLECTOMY         Social History:  Social History     Socioeconomic History   • Marital status:      Spouse name: Not on file   • Number of children: Not on file   • Years of education: Not on file   • Highest education level: Not on file   Tobacco Use   • Smoking status: Never Smoker   • Smokeless tobacco: Never Used   Substance and Sexual Activity   • Alcohol use: No     Frequency: Never   • Drug use: No       Review of Systems:  The following systems were reviewed as they relate to the cardiovascular system: Constitutional, Eyes, ENT, Cardiovascular, Respiratory, Gastrointestinal, Integumentary, Neurological, Psychiatric, Hematologic, Endocrine, Musculoskeletal, and Genitourinary. The pertinent cardiovascular findings are reported above with all other cardiovascular points within those systems being negative.    Diagnostic Study Review:     Current Electrocardiogram:  Procedures      NOTE: The following portions of the patient's history were reviewed and updated this visit as appropriate: allergies, current medications, past family history, past medical history, past social history, past surgical history and problem list.

## 2019-10-14 RX ORDER — LEVOTHYROXINE SODIUM 112 UG/1
TABLET ORAL DAILY
Qty: 90 TABLET | Refills: 1 | Status: SHIPPED | OUTPATIENT
Start: 2019-10-14 | End: 2019-11-26

## 2019-10-31 RX ORDER — AMLODIPINE BESYLATE 5 MG/1
TABLET ORAL EVERY 24 HOURS
Qty: 90 TABLET | Refills: 2 | Status: SHIPPED | OUTPATIENT
Start: 2019-10-31 | End: 2019-11-26

## 2019-11-05 ENCOUNTER — ON CAMPUS - OUTPATIENT (AMBULATORY)
Dept: URBAN - METROPOLITAN AREA HOSPITAL 2 | Facility: HOSPITAL | Age: 64
End: 2019-11-05
Payer: COMMERCIAL

## 2019-11-05 VITALS
SYSTOLIC BLOOD PRESSURE: 138 MMHG | DIASTOLIC BLOOD PRESSURE: 81 MMHG | RESPIRATION RATE: 18 BRPM | OXYGEN SATURATION: 99 % | DIASTOLIC BLOOD PRESSURE: 82 MMHG | DIASTOLIC BLOOD PRESSURE: 77 MMHG | SYSTOLIC BLOOD PRESSURE: 132 MMHG | SYSTOLIC BLOOD PRESSURE: 128 MMHG | SYSTOLIC BLOOD PRESSURE: 130 MMHG | HEART RATE: 81 BPM | SYSTOLIC BLOOD PRESSURE: 141 MMHG | OXYGEN SATURATION: 87 % | HEART RATE: 76 BPM | OXYGEN SATURATION: 100 % | HEART RATE: 80 BPM | HEIGHT: 63 IN | DIASTOLIC BLOOD PRESSURE: 78 MMHG | RESPIRATION RATE: 16 BRPM | HEART RATE: 82 BPM | WEIGHT: 147 LBS | OXYGEN SATURATION: 98 % | SYSTOLIC BLOOD PRESSURE: 143 MMHG | DIASTOLIC BLOOD PRESSURE: 75 MMHG | TEMPERATURE: 98.9 F | SYSTOLIC BLOOD PRESSURE: 125 MMHG

## 2019-11-05 DIAGNOSIS — K64.8 OTHER HEMORRHOIDS: ICD-10-CM

## 2019-11-05 DIAGNOSIS — Z86.010 PERSONAL HISTORY OF COLONIC POLYPS: ICD-10-CM

## 2019-11-05 DIAGNOSIS — K57.30 DIVERTICULOSIS OF LARGE INTESTINE WITHOUT PERFORATION OR ABS: ICD-10-CM

## 2019-11-05 PROCEDURE — 45378 DIAGNOSTIC COLONOSCOPY: CPT | Performed by: INTERNAL MEDICINE

## 2019-11-22 DIAGNOSIS — Z00.00 PREVENTATIVE HEALTH CARE: Primary | ICD-10-CM

## 2019-11-25 ENCOUNTER — LAB (OUTPATIENT)
Dept: FAMILY MEDICINE CLINIC | Facility: CLINIC | Age: 64
End: 2019-11-25

## 2019-11-25 DIAGNOSIS — Z00.00 PREVENTATIVE HEALTH CARE: ICD-10-CM

## 2019-11-25 LAB
25(OH)D3 SERPL-MCNC: 77.8 NG/ML (ref 30–100)
ALBUMIN SERPL-MCNC: 4.3 G/DL (ref 3.5–5.2)
ALBUMIN/GLOB SERPL: 1.6 G/DL
ALP SERPL-CCNC: 71 U/L (ref 39–117)
ALT SERPL W P-5'-P-CCNC: 13 U/L (ref 1–33)
ANION GAP SERPL CALCULATED.3IONS-SCNC: 12 MMOL/L (ref 5–15)
AST SERPL-CCNC: 15 U/L (ref 1–32)
BASOPHILS # BLD AUTO: 0.06 10*3/MM3 (ref 0–0.2)
BASOPHILS NFR BLD AUTO: 1.3 % (ref 0–1.5)
BILIRUB SERPL-MCNC: 0.3 MG/DL (ref 0.2–1.2)
BUN BLD-MCNC: 13 MG/DL (ref 8–23)
BUN/CREAT SERPL: 16.7 (ref 7–25)
CALCIUM SPEC-SCNC: 9.3 MG/DL (ref 8.6–10.5)
CHLORIDE SERPL-SCNC: 106 MMOL/L (ref 98–107)
CHOLEST SERPL-MCNC: 206 MG/DL (ref 0–200)
CO2 SERPL-SCNC: 26 MMOL/L (ref 22–29)
CREAT BLD-MCNC: 0.78 MG/DL (ref 0.57–1)
DEPRECATED RDW RBC AUTO: 39.9 FL (ref 37–54)
EOSINOPHIL # BLD AUTO: 0.12 10*3/MM3 (ref 0–0.4)
EOSINOPHIL NFR BLD AUTO: 2.7 % (ref 0.3–6.2)
ERYTHROCYTE [DISTWIDTH] IN BLOOD BY AUTOMATED COUNT: 12.6 % (ref 12.3–15.4)
GFR SERPL CREATININE-BSD FRML MDRD: 74 ML/MIN/1.73
GLOBULIN UR ELPH-MCNC: 2.7 GM/DL
GLUCOSE BLD-MCNC: 98 MG/DL (ref 65–99)
HCT VFR BLD AUTO: 39.1 % (ref 34–46.6)
HDLC SERPL-MCNC: 52 MG/DL (ref 40–60)
HGB BLD-MCNC: 13.2 G/DL (ref 12–15.9)
IMM GRANULOCYTES # BLD AUTO: 0.01 10*3/MM3 (ref 0–0.05)
IMM GRANULOCYTES NFR BLD AUTO: 0.2 % (ref 0–0.5)
LDLC SERPL CALC-MCNC: 141 MG/DL (ref 0–100)
LDLC/HDLC SERPL: 2.7 {RATIO}
LYMPHOCYTES # BLD AUTO: 1.45 10*3/MM3 (ref 0.7–3.1)
LYMPHOCYTES NFR BLD AUTO: 32.2 % (ref 19.6–45.3)
MCH RBC QN AUTO: 29.4 PG (ref 26.6–33)
MCHC RBC AUTO-ENTMCNC: 33.8 G/DL (ref 31.5–35.7)
MCV RBC AUTO: 87.1 FL (ref 79–97)
MONOCYTES # BLD AUTO: 0.42 10*3/MM3 (ref 0.1–0.9)
MONOCYTES NFR BLD AUTO: 9.3 % (ref 5–12)
NEUTROPHILS # BLD AUTO: 2.45 10*3/MM3 (ref 1.7–7)
NEUTROPHILS NFR BLD AUTO: 54.3 % (ref 42.7–76)
NRBC BLD AUTO-RTO: 0 /100 WBC (ref 0–0.2)
PLATELET # BLD AUTO: 251 10*3/MM3 (ref 140–450)
PMV BLD AUTO: 10.9 FL (ref 6–12)
POTASSIUM BLD-SCNC: 4.4 MMOL/L (ref 3.5–5.2)
PROT SERPL-MCNC: 7 G/DL (ref 6–8.5)
RBC # BLD AUTO: 4.49 10*6/MM3 (ref 3.77–5.28)
SODIUM BLD-SCNC: 144 MMOL/L (ref 136–145)
T4 FREE SERPL-MCNC: 1.43 NG/DL (ref 0.93–1.7)
TRIGL SERPL-MCNC: 67 MG/DL (ref 0–150)
TSH SERPL DL<=0.05 MIU/L-ACNC: 2.22 UIU/ML (ref 0.27–4.2)
VLDLC SERPL-MCNC: 13.4 MG/DL (ref 5–40)
WBC NRBC COR # BLD: 4.51 10*3/MM3 (ref 3.4–10.8)

## 2019-11-25 PROCEDURE — 85025 COMPLETE CBC W/AUTO DIFF WBC: CPT | Performed by: INTERNAL MEDICINE

## 2019-11-25 PROCEDURE — 84439 ASSAY OF FREE THYROXINE: CPT | Performed by: INTERNAL MEDICINE

## 2019-11-25 PROCEDURE — 80053 COMPREHEN METABOLIC PANEL: CPT | Performed by: INTERNAL MEDICINE

## 2019-11-25 PROCEDURE — 80061 LIPID PANEL: CPT | Performed by: INTERNAL MEDICINE

## 2019-11-25 PROCEDURE — 82306 VITAMIN D 25 HYDROXY: CPT | Performed by: INTERNAL MEDICINE

## 2019-11-25 PROCEDURE — 84443 ASSAY THYROID STIM HORMONE: CPT | Performed by: INTERNAL MEDICINE

## 2019-11-26 ENCOUNTER — APPOINTMENT (OUTPATIENT)
Dept: CT IMAGING | Facility: HOSPITAL | Age: 64
End: 2019-11-26

## 2019-11-26 ENCOUNTER — APPOINTMENT (OUTPATIENT)
Dept: GENERAL RADIOLOGY | Facility: HOSPITAL | Age: 64
End: 2019-11-26

## 2019-11-26 ENCOUNTER — HOSPITAL ENCOUNTER (INPATIENT)
Facility: HOSPITAL | Age: 64
LOS: 9 days | Discharge: HOME HEALTH | End: 2019-12-05
Attending: EMERGENCY MEDICINE | Admitting: SURGERY

## 2019-11-26 DIAGNOSIS — K45.8 INTERNAL HERNIA: Primary | ICD-10-CM

## 2019-11-26 DIAGNOSIS — R10.30 LOWER ABDOMINAL PAIN: ICD-10-CM

## 2019-11-26 DIAGNOSIS — Z43.2 ATTENTION TO ILEOSTOMY (HCC): ICD-10-CM

## 2019-11-26 LAB
ALBUMIN SERPL-MCNC: 4.5 G/DL (ref 3.5–5.2)
ALBUMIN/GLOB SERPL: 1.7 G/DL
ALP SERPL-CCNC: 73 U/L (ref 39–117)
ALT SERPL W P-5'-P-CCNC: 14 U/L (ref 1–33)
ANION GAP SERPL CALCULATED.3IONS-SCNC: 12 MMOL/L (ref 5–15)
AST SERPL-CCNC: 16 U/L (ref 1–32)
BACTERIA UR QL AUTO: ABNORMAL /HPF
BASOPHILS # BLD AUTO: 0 10*3/MM3 (ref 0–0.2)
BASOPHILS NFR BLD AUTO: 0.4 % (ref 0–1.5)
BILIRUB SERPL-MCNC: 0.3 MG/DL (ref 0.2–1.2)
BILIRUB UR QL STRIP: NEGATIVE
BUN BLD-MCNC: 11 MG/DL (ref 8–23)
BUN/CREAT SERPL: 15.9 (ref 7–25)
CALCIUM SPEC-SCNC: 9.1 MG/DL (ref 8.6–10.5)
CHLORIDE SERPL-SCNC: 95 MMOL/L (ref 98–107)
CLARITY UR: CLEAR
CO2 SERPL-SCNC: 25 MMOL/L (ref 22–29)
COLOR UR: YELLOW
CREAT BLD-MCNC: 0.69 MG/DL (ref 0.57–1)
DEPRECATED RDW RBC AUTO: 41.1 FL (ref 37–54)
EOSINOPHIL # BLD AUTO: 0 10*3/MM3 (ref 0–0.4)
EOSINOPHIL NFR BLD AUTO: 0.5 % (ref 0.3–6.2)
ERYTHROCYTE [DISTWIDTH] IN BLOOD BY AUTOMATED COUNT: 13.5 % (ref 12.3–15.4)
GFR SERPL CREATININE-BSD FRML MDRD: 86 ML/MIN/1.73
GLOBULIN UR ELPH-MCNC: 2.6 GM/DL
GLUCOSE BLD-MCNC: 135 MG/DL (ref 65–99)
GLUCOSE UR STRIP-MCNC: NEGATIVE MG/DL
HCT VFR BLD AUTO: 36.5 % (ref 34–46.6)
HGB BLD-MCNC: 12.5 G/DL (ref 12–15.9)
HGB UR QL STRIP.AUTO: ABNORMAL
HOLD SPECIMEN: NORMAL
HYALINE CASTS UR QL AUTO: ABNORMAL /LPF
KETONES UR QL STRIP: NEGATIVE
LEUKOCYTE ESTERASE UR QL STRIP.AUTO: ABNORMAL
LIPASE SERPL-CCNC: 20 U/L (ref 13–60)
LYMPHOCYTES # BLD AUTO: 1.2 10*3/MM3 (ref 0.7–3.1)
LYMPHOCYTES NFR BLD AUTO: 14.2 % (ref 19.6–45.3)
MCH RBC QN AUTO: 30 PG (ref 26.6–33)
MCHC RBC AUTO-ENTMCNC: 34.2 G/DL (ref 31.5–35.7)
MCV RBC AUTO: 87.6 FL (ref 79–97)
MONOCYTES # BLD AUTO: 0.6 10*3/MM3 (ref 0.1–0.9)
MONOCYTES NFR BLD AUTO: 6.8 % (ref 5–12)
NEUTROPHILS # BLD AUTO: 6.4 10*3/MM3 (ref 1.7–7)
NEUTROPHILS NFR BLD AUTO: 78.1 % (ref 42.7–76)
NITRITE UR QL STRIP: NEGATIVE
NRBC BLD AUTO-RTO: 0 /100 WBC (ref 0–0.2)
PH UR STRIP.AUTO: 8 [PH] (ref 5–8)
PLATELET # BLD AUTO: 206 10*3/MM3 (ref 140–450)
PMV BLD AUTO: 8.4 FL (ref 6–12)
POTASSIUM BLD-SCNC: 3.5 MMOL/L (ref 3.5–5.2)
PROT SERPL-MCNC: 7.1 G/DL (ref 6–8.5)
PROT UR QL STRIP: NEGATIVE
RBC # BLD AUTO: 4.17 10*6/MM3 (ref 3.77–5.28)
RBC # UR: ABNORMAL /HPF
REF LAB TEST METHOD: ABNORMAL
SODIUM BLD-SCNC: 132 MMOL/L (ref 136–145)
SP GR UR STRIP: 1.01 (ref 1–1.03)
SQUAMOUS #/AREA URNS HPF: ABNORMAL /HPF
UROBILINOGEN UR QL STRIP: ABNORMAL
WBC NRBC COR # BLD: 8.2 10*3/MM3 (ref 3.4–10.8)
WBC UR QL AUTO: ABNORMAL /HPF

## 2019-11-26 PROCEDURE — 25010000002 ONDANSETRON PER 1 MG: Performed by: SURGERY

## 2019-11-26 PROCEDURE — 25010000002 MORPHINE PER 10 MG: Performed by: SURGERY

## 2019-11-26 PROCEDURE — 71045 X-RAY EXAM CHEST 1 VIEW: CPT

## 2019-11-26 PROCEDURE — P9612 CATHETERIZE FOR URINE SPEC: HCPCS

## 2019-11-26 PROCEDURE — 83690 ASSAY OF LIPASE: CPT | Performed by: EMERGENCY MEDICINE

## 2019-11-26 PROCEDURE — 81001 URINALYSIS AUTO W/SCOPE: CPT | Performed by: EMERGENCY MEDICINE

## 2019-11-26 PROCEDURE — 99284 EMERGENCY DEPT VISIT MOD MDM: CPT

## 2019-11-26 PROCEDURE — G0378 HOSPITAL OBSERVATION PER HR: HCPCS

## 2019-11-26 PROCEDURE — 25010000002 ONDANSETRON PER 1 MG: Performed by: EMERGENCY MEDICINE

## 2019-11-26 PROCEDURE — 25010000002 HYDROMORPHONE PER 4 MG: Performed by: EMERGENCY MEDICINE

## 2019-11-26 PROCEDURE — 74176 CT ABD & PELVIS W/O CONTRAST: CPT

## 2019-11-26 PROCEDURE — 85025 COMPLETE CBC W/AUTO DIFF WBC: CPT | Performed by: EMERGENCY MEDICINE

## 2019-11-26 PROCEDURE — 80053 COMPREHEN METABOLIC PANEL: CPT | Performed by: EMERGENCY MEDICINE

## 2019-11-26 RX ORDER — POLYETHYLENE GLYCOL 3350 17 G/17G
17 POWDER, FOR SOLUTION ORAL DAILY
COMMUNITY

## 2019-11-26 RX ORDER — MORPHINE SULFATE 4 MG/ML
4 INJECTION, SOLUTION INTRAMUSCULAR; INTRAVENOUS EVERY 4 HOURS PRN
Status: DISCONTINUED | OUTPATIENT
Start: 2019-11-26 | End: 2019-11-30

## 2019-11-26 RX ORDER — LEVOTHYROXINE SODIUM 112 UG/1
112 TABLET ORAL DAILY
COMMUNITY
End: 2020-04-27 | Stop reason: SDUPTHER

## 2019-11-26 RX ORDER — HYDROMORPHONE HCL 110MG/55ML
0.5 PATIENT CONTROLLED ANALGESIA SYRINGE INTRAVENOUS EVERY 4 HOURS PRN
Status: DISPENSED | OUTPATIENT
Start: 2019-11-26 | End: 2019-11-27

## 2019-11-26 RX ORDER — ASPIRIN 81 MG/1
81 TABLET ORAL DAILY
COMMUNITY

## 2019-11-26 RX ORDER — AMLODIPINE BESYLATE 5 MG/1
5 TABLET ORAL DAILY
COMMUNITY
End: 2020-04-27 | Stop reason: SDUPTHER

## 2019-11-26 RX ORDER — ONDANSETRON 2 MG/ML
4 INJECTION INTRAMUSCULAR; INTRAVENOUS ONCE
Status: COMPLETED | OUTPATIENT
Start: 2019-11-26 | End: 2019-11-26

## 2019-11-26 RX ORDER — ONDANSETRON 2 MG/ML
8 INJECTION INTRAMUSCULAR; INTRAVENOUS EVERY 6 HOURS PRN
Status: DISCONTINUED | OUTPATIENT
Start: 2019-11-26 | End: 2019-12-05 | Stop reason: HOSPADM

## 2019-11-26 RX ORDER — HYDROMORPHONE HCL 110MG/55ML
0.5 PATIENT CONTROLLED ANALGESIA SYRINGE INTRAVENOUS ONCE
Status: COMPLETED | OUTPATIENT
Start: 2019-11-26 | End: 2019-11-26

## 2019-11-26 RX ORDER — DEXTROSE AND SODIUM CHLORIDE 5; .9 G/100ML; G/100ML
125 INJECTION, SOLUTION INTRAVENOUS CONTINUOUS
Status: DISCONTINUED | OUTPATIENT
Start: 2019-11-26 | End: 2019-12-02

## 2019-11-26 RX ORDER — SODIUM CHLORIDE, SODIUM LACTATE, POTASSIUM CHLORIDE, CALCIUM CHLORIDE 600; 310; 30; 20 MG/100ML; MG/100ML; MG/100ML; MG/100ML
150 INJECTION, SOLUTION INTRAVENOUS CONTINUOUS
Status: DISPENSED | OUTPATIENT
Start: 2019-11-26 | End: 2019-11-27

## 2019-11-26 RX ORDER — SODIUM CHLORIDE 0.9 % (FLUSH) 0.9 %
10 SYRINGE (ML) INJECTION AS NEEDED
Status: DISCONTINUED | OUTPATIENT
Start: 2019-11-26 | End: 2019-12-05 | Stop reason: HOSPADM

## 2019-11-26 RX ADMIN — SODIUM CHLORIDE 500 ML: 900 INJECTION, SOLUTION INTRAVENOUS at 20:18

## 2019-11-26 RX ADMIN — SODIUM CHLORIDE, SODIUM LACTATE, POTASSIUM CHLORIDE, AND CALCIUM CHLORIDE 150 ML/HR: .6; .31; .03; .02 INJECTION, SOLUTION INTRAVENOUS at 22:49

## 2019-11-26 RX ADMIN — MORPHINE SULFATE 4 MG: 4 INJECTION INTRAVENOUS at 22:50

## 2019-11-26 RX ADMIN — DEXTROSE MONOHYDRATE AND SODIUM CHLORIDE 125 ML/HR: 5; .9 INJECTION, SOLUTION INTRAVENOUS at 23:41

## 2019-11-26 RX ADMIN — HYDROMORPHONE HYDROCHLORIDE 0.5 MG: 2 INJECTION, SOLUTION INTRAMUSCULAR; INTRAVENOUS; SUBCUTANEOUS at 20:17

## 2019-11-26 RX ADMIN — ONDANSETRON 8 MG: 2 INJECTION INTRAMUSCULAR; INTRAVENOUS at 22:50

## 2019-11-26 RX ADMIN — ONDANSETRON 4 MG: 2 INJECTION INTRAMUSCULAR; INTRAVENOUS at 20:17

## 2019-11-26 RX ADMIN — HYDROMORPHONE HYDROCHLORIDE 0.5 MG: 2 INJECTION, SOLUTION INTRAMUSCULAR; INTRAVENOUS; SUBCUTANEOUS at 21:51

## 2019-11-27 ENCOUNTER — APPOINTMENT (OUTPATIENT)
Dept: CT IMAGING | Facility: HOSPITAL | Age: 64
End: 2019-11-27

## 2019-11-27 LAB
ABO GROUP BLD: NORMAL
ALBUMIN SERPL-MCNC: 4 G/DL (ref 3.5–5.2)
ALBUMIN/GLOB SERPL: 1.6 G/DL
ALP SERPL-CCNC: 68 U/L (ref 39–117)
ALT SERPL W P-5'-P-CCNC: 13 U/L (ref 1–33)
ANION GAP SERPL CALCULATED.3IONS-SCNC: 11 MMOL/L (ref 5–15)
APTT PPP: 27.7 SECONDS (ref 24–31)
AST SERPL-CCNC: 17 U/L (ref 1–32)
BASOPHILS # BLD AUTO: 0 10*3/MM3 (ref 0–0.2)
BASOPHILS NFR BLD AUTO: 0.3 % (ref 0–1.5)
BILIRUB SERPL-MCNC: 0.4 MG/DL (ref 0.2–1.2)
BLD GP AB SCN SERPL QL: NEGATIVE
BUN BLD-MCNC: 6 MG/DL (ref 8–23)
BUN/CREAT SERPL: 11.8 (ref 7–25)
CALCIUM SPEC-SCNC: 8.4 MG/DL (ref 8.6–10.5)
CHLORIDE SERPL-SCNC: 100 MMOL/L (ref 98–107)
CO2 SERPL-SCNC: 26 MMOL/L (ref 22–29)
CREAT BLD-MCNC: 0.51 MG/DL (ref 0.57–1)
DEPRECATED RDW RBC AUTO: 42 FL (ref 37–54)
EOSINOPHIL # BLD AUTO: 0 10*3/MM3 (ref 0–0.4)
EOSINOPHIL NFR BLD AUTO: 0.4 % (ref 0.3–6.2)
ERYTHROCYTE [DISTWIDTH] IN BLOOD BY AUTOMATED COUNT: 13.7 % (ref 12.3–15.4)
GFR SERPL CREATININE-BSD FRML MDRD: 121 ML/MIN/1.73
GLOBULIN UR ELPH-MCNC: 2.5 GM/DL
GLUCOSE BLD-MCNC: 160 MG/DL (ref 65–99)
HCT VFR BLD AUTO: 36 % (ref 34–46.6)
HGB BLD-MCNC: 12.6 G/DL (ref 12–15.9)
INR PPP: 0.99 (ref 0.9–1.1)
LYMPHOCYTES # BLD AUTO: 0.8 10*3/MM3 (ref 0.7–3.1)
LYMPHOCYTES NFR BLD AUTO: 12.4 % (ref 19.6–45.3)
MCH RBC QN AUTO: 30.8 PG (ref 26.6–33)
MCHC RBC AUTO-ENTMCNC: 34.9 G/DL (ref 31.5–35.7)
MCV RBC AUTO: 88.2 FL (ref 79–97)
MONOCYTES # BLD AUTO: 0.3 10*3/MM3 (ref 0.1–0.9)
MONOCYTES NFR BLD AUTO: 5.5 % (ref 5–12)
NEUTROPHILS # BLD AUTO: 5.1 10*3/MM3 (ref 1.7–7)
NEUTROPHILS NFR BLD AUTO: 81.4 % (ref 42.7–76)
NRBC BLD AUTO-RTO: 0 /100 WBC (ref 0–0.2)
PLATELET # BLD AUTO: 191 10*3/MM3 (ref 140–450)
PMV BLD AUTO: 9.5 FL (ref 6–12)
POTASSIUM BLD-SCNC: 3.2 MMOL/L (ref 3.5–5.2)
POTASSIUM BLD-SCNC: 4.2 MMOL/L (ref 3.5–5.2)
PROT SERPL-MCNC: 6.5 G/DL (ref 6–8.5)
PROTHROMBIN TIME: 10.4 SECONDS (ref 9.6–11.7)
RBC # BLD AUTO: 4.08 10*6/MM3 (ref 3.77–5.28)
RH BLD: POSITIVE
SODIUM BLD-SCNC: 137 MMOL/L (ref 136–145)
T&S EXPIRATION DATE: NORMAL
WBC NRBC COR # BLD: 6.3 10*3/MM3 (ref 3.4–10.8)

## 2019-11-27 PROCEDURE — 85610 PROTHROMBIN TIME: CPT | Performed by: SURGERY

## 2019-11-27 PROCEDURE — 0 IOPAMIDOL PER 1 ML: Performed by: SURGERY

## 2019-11-27 PROCEDURE — 86850 RBC ANTIBODY SCREEN: CPT | Performed by: SURGERY

## 2019-11-27 PROCEDURE — 86901 BLOOD TYPING SEROLOGIC RH(D): CPT

## 2019-11-27 PROCEDURE — 85025 COMPLETE CBC W/AUTO DIFF WBC: CPT | Performed by: SURGERY

## 2019-11-27 PROCEDURE — 86900 BLOOD TYPING SEROLOGIC ABO: CPT | Performed by: SURGERY

## 2019-11-27 PROCEDURE — 99223 1ST HOSP IP/OBS HIGH 75: CPT | Performed by: SURGERY

## 2019-11-27 PROCEDURE — 25010000002 HYDROMORPHONE PER 4 MG: Performed by: EMERGENCY MEDICINE

## 2019-11-27 PROCEDURE — 25010000003 POTASSIUM CHLORIDE 10 MEQ/100ML SOLUTION: Performed by: SURGERY

## 2019-11-27 PROCEDURE — 86900 BLOOD TYPING SEROLOGIC ABO: CPT

## 2019-11-27 PROCEDURE — 25010000002 MORPHINE PER 10 MG: Performed by: SURGERY

## 2019-11-27 PROCEDURE — 85730 THROMBOPLASTIN TIME PARTIAL: CPT | Performed by: SURGERY

## 2019-11-27 PROCEDURE — 80053 COMPREHEN METABOLIC PANEL: CPT | Performed by: SURGERY

## 2019-11-27 PROCEDURE — 74177 CT ABD & PELVIS W/CONTRAST: CPT

## 2019-11-27 PROCEDURE — 84132 ASSAY OF SERUM POTASSIUM: CPT | Performed by: SURGERY

## 2019-11-27 PROCEDURE — 86901 BLOOD TYPING SEROLOGIC RH(D): CPT | Performed by: SURGERY

## 2019-11-27 PROCEDURE — G0378 HOSPITAL OBSERVATION PER HR: HCPCS

## 2019-11-27 RX ORDER — VENLAFAXINE HYDROCHLORIDE 75 MG/1
150 CAPSULE, EXTENDED RELEASE ORAL DAILY
Status: DISCONTINUED | OUTPATIENT
Start: 2019-11-27 | End: 2019-12-02

## 2019-11-27 RX ORDER — POTASSIUM CHLORIDE 7.45 MG/ML
10 INJECTION INTRAVENOUS
Status: DISCONTINUED | OUTPATIENT
Start: 2019-11-27 | End: 2019-11-30

## 2019-11-27 RX ORDER — L.ACID,PARA/B.BIFIDUM/S.THERM 8B CELL
1 CAPSULE ORAL DAILY
Status: DISCONTINUED | OUTPATIENT
Start: 2019-11-27 | End: 2019-12-02

## 2019-11-27 RX ORDER — POLYETHYLENE GLYCOL 3350 17 G/17G
17 POWDER, FOR SOLUTION ORAL DAILY
Status: DISCONTINUED | OUTPATIENT
Start: 2019-11-27 | End: 2019-11-27 | Stop reason: SDUPTHER

## 2019-11-27 RX ORDER — POLYETHYLENE GLYCOL 3350 17 G/17G
17 POWDER, FOR SOLUTION ORAL DAILY
Status: DISCONTINUED | OUTPATIENT
Start: 2019-11-27 | End: 2019-12-02

## 2019-11-27 RX ORDER — AMLODIPINE BESYLATE 5 MG/1
5 TABLET ORAL DAILY
Status: DISCONTINUED | OUTPATIENT
Start: 2019-11-27 | End: 2019-12-05 | Stop reason: HOSPADM

## 2019-11-27 RX ORDER — LEVOTHYROXINE SODIUM 112 UG/1
112 TABLET ORAL DAILY
Status: DISCONTINUED | OUTPATIENT
Start: 2019-11-27 | End: 2019-12-02

## 2019-11-27 RX ADMIN — IOPAMIDOL 100 ML: 755 INJECTION, SOLUTION INTRAVENOUS at 23:15

## 2019-11-27 RX ADMIN — VENLAFAXINE HYDROCHLORIDE 150 MG: 75 CAPSULE, EXTENDED RELEASE ORAL at 09:27

## 2019-11-27 RX ADMIN — POTASSIUM CHLORIDE 10 MEQ: 7.46 INJECTION, SOLUTION INTRAVENOUS at 08:24

## 2019-11-27 RX ADMIN — MORPHINE SULFATE 4 MG: 4 INJECTION INTRAVENOUS at 13:33

## 2019-11-27 RX ADMIN — HYDROMORPHONE HYDROCHLORIDE 0.5 MG: 2 INJECTION, SOLUTION INTRAMUSCULAR; INTRAVENOUS; SUBCUTANEOUS at 05:36

## 2019-11-27 RX ADMIN — POLYETHYLENE GLYCOL 3350 17 G: 17 POWDER, FOR SOLUTION ORAL at 09:28

## 2019-11-27 RX ADMIN — POTASSIUM CHLORIDE 10 MEQ: 7.46 INJECTION, SOLUTION INTRAVENOUS at 10:35

## 2019-11-27 RX ADMIN — DEXTROSE MONOHYDRATE AND SODIUM CHLORIDE 125 ML/HR: 5; .9 INJECTION, SOLUTION INTRAVENOUS at 05:39

## 2019-11-27 RX ADMIN — LEVOTHYROXINE SODIUM 112 MCG: 112 TABLET ORAL at 09:27

## 2019-11-27 RX ADMIN — Medication 1 CAPSULE: at 09:27

## 2019-11-27 RX ADMIN — MORPHINE SULFATE 4 MG: 4 INJECTION INTRAVENOUS at 09:27

## 2019-11-27 RX ADMIN — POTASSIUM CHLORIDE 10 MEQ: 7.46 INJECTION, SOLUTION INTRAVENOUS at 09:28

## 2019-11-27 RX ADMIN — AMLODIPINE BESYLATE 5 MG: 5 TABLET ORAL at 09:27

## 2019-11-27 RX ADMIN — MORPHINE SULFATE 4 MG: 4 INJECTION INTRAVENOUS at 02:52

## 2019-11-27 RX ADMIN — DEXTROSE MONOHYDRATE AND SODIUM CHLORIDE 125 ML/HR: 5; .9 INJECTION, SOLUTION INTRAVENOUS at 13:33

## 2019-11-27 RX ADMIN — MORPHINE SULFATE 4 MG: 4 INJECTION INTRAVENOUS at 21:49

## 2019-11-27 RX ADMIN — MORPHINE SULFATE 4 MG: 4 INJECTION INTRAVENOUS at 17:53

## 2019-11-27 RX ADMIN — POTASSIUM CHLORIDE 10 MEQ: 7.46 INJECTION, SOLUTION INTRAVENOUS at 07:03

## 2019-11-28 PROCEDURE — 99232 SBSQ HOSP IP/OBS MODERATE 35: CPT | Performed by: SURGERY

## 2019-11-28 PROCEDURE — 25010000002 MORPHINE PER 10 MG: Performed by: SURGERY

## 2019-11-28 PROCEDURE — G0378 HOSPITAL OBSERVATION PER HR: HCPCS

## 2019-11-28 RX ORDER — OXYCODONE HYDROCHLORIDE 5 MG/1
5 TABLET ORAL EVERY 4 HOURS PRN
Status: DISCONTINUED | OUTPATIENT
Start: 2019-11-28 | End: 2019-11-30

## 2019-11-28 RX ORDER — MAGNESIUM CARB/ALUMINUM HYDROX 105-160MG
296 TABLET,CHEWABLE ORAL ONCE
Status: COMPLETED | OUTPATIENT
Start: 2019-11-28 | End: 2019-11-28

## 2019-11-28 RX ADMIN — Medication 1 CAPSULE: at 09:01

## 2019-11-28 RX ADMIN — AMLODIPINE BESYLATE 5 MG: 5 TABLET ORAL at 09:01

## 2019-11-28 RX ADMIN — OXYCODONE HYDROCHLORIDE 5 MG: 5 TABLET ORAL at 11:27

## 2019-11-28 RX ADMIN — DEXTROSE MONOHYDRATE AND SODIUM CHLORIDE 125 ML/HR: 5; .9 INJECTION, SOLUTION INTRAVENOUS at 16:24

## 2019-11-28 RX ADMIN — MORPHINE SULFATE 4 MG: 4 INJECTION INTRAVENOUS at 05:16

## 2019-11-28 RX ADMIN — Medication 296 ML: at 11:27

## 2019-11-28 RX ADMIN — OXYCODONE HYDROCHLORIDE 5 MG: 5 TABLET ORAL at 20:03

## 2019-11-28 RX ADMIN — OXYCODONE HYDROCHLORIDE 5 MG: 5 TABLET ORAL at 15:58

## 2019-11-28 RX ADMIN — VENLAFAXINE HYDROCHLORIDE 150 MG: 75 CAPSULE, EXTENDED RELEASE ORAL at 09:01

## 2019-11-28 RX ADMIN — MORPHINE SULFATE 4 MG: 4 INJECTION INTRAVENOUS at 16:33

## 2019-11-28 RX ADMIN — DEXTROSE MONOHYDRATE AND SODIUM CHLORIDE 125 ML/HR: 5; .9 INJECTION, SOLUTION INTRAVENOUS at 09:09

## 2019-11-28 RX ADMIN — DEXTROSE MONOHYDRATE AND SODIUM CHLORIDE 125 ML/HR: 5; .9 INJECTION, SOLUTION INTRAVENOUS at 01:14

## 2019-11-28 RX ADMIN — MORPHINE SULFATE 4 MG: 4 INJECTION INTRAVENOUS at 09:00

## 2019-11-28 RX ADMIN — LEVOTHYROXINE SODIUM 112 MCG: 112 TABLET ORAL at 09:01

## 2019-11-28 RX ADMIN — POLYETHYLENE GLYCOL 3350 17 G: 17 POWDER, FOR SOLUTION ORAL at 09:01

## 2019-11-28 RX ADMIN — MORPHINE SULFATE 4 MG: 4 INJECTION INTRAVENOUS at 21:27

## 2019-11-28 RX ADMIN — Medication 10 ML: at 09:01

## 2019-11-29 ENCOUNTER — APPOINTMENT (OUTPATIENT)
Dept: GENERAL RADIOLOGY | Facility: HOSPITAL | Age: 64
End: 2019-11-29

## 2019-11-29 PROCEDURE — G0378 HOSPITAL OBSERVATION PER HR: HCPCS

## 2019-11-29 PROCEDURE — 74018 RADEX ABDOMEN 1 VIEW: CPT

## 2019-11-29 PROCEDURE — 99232 SBSQ HOSP IP/OBS MODERATE 35: CPT | Performed by: SURGERY

## 2019-11-29 PROCEDURE — 25010000002 MORPHINE PER 10 MG: Performed by: SURGERY

## 2019-11-29 RX ORDER — SIMETHICONE 80 MG
80 TABLET,CHEWABLE ORAL
Status: DISCONTINUED | OUTPATIENT
Start: 2019-11-29 | End: 2019-12-02

## 2019-11-29 RX ADMIN — MORPHINE SULFATE 4 MG: 4 INJECTION INTRAVENOUS at 04:08

## 2019-11-29 RX ADMIN — Medication 1 CAPSULE: at 09:24

## 2019-11-29 RX ADMIN — SIMETHICONE CHEW TAB 80 MG 80 MG: 80 TABLET ORAL at 12:48

## 2019-11-29 RX ADMIN — OXYCODONE HYDROCHLORIDE 5 MG: 5 TABLET ORAL at 00:24

## 2019-11-29 RX ADMIN — SIMETHICONE CHEW TAB 80 MG 80 MG: 80 TABLET ORAL at 17:03

## 2019-11-29 RX ADMIN — DEXTROSE MONOHYDRATE AND SODIUM CHLORIDE 125 ML/HR: 5; .9 INJECTION, SOLUTION INTRAVENOUS at 16:08

## 2019-11-29 RX ADMIN — MORPHINE SULFATE 4 MG: 4 INJECTION INTRAVENOUS at 10:19

## 2019-11-29 RX ADMIN — MORPHINE SULFATE 4 MG: 4 INJECTION INTRAVENOUS at 15:32

## 2019-11-29 RX ADMIN — LEVOTHYROXINE SODIUM 112 MCG: 112 TABLET ORAL at 09:24

## 2019-11-29 RX ADMIN — DEXTROSE MONOHYDRATE AND SODIUM CHLORIDE 125 ML/HR: 5; .9 INJECTION, SOLUTION INTRAVENOUS at 23:24

## 2019-11-29 RX ADMIN — SIMETHICONE CHEW TAB 80 MG 80 MG: 80 TABLET ORAL at 20:31

## 2019-11-29 RX ADMIN — VENLAFAXINE HYDROCHLORIDE 150 MG: 75 CAPSULE, EXTENDED RELEASE ORAL at 09:24

## 2019-11-29 RX ADMIN — DEXTROSE MONOHYDRATE AND SODIUM CHLORIDE 125 ML/HR: 5; .9 INJECTION, SOLUTION INTRAVENOUS at 00:24

## 2019-11-29 RX ADMIN — AMLODIPINE BESYLATE 5 MG: 5 TABLET ORAL at 09:24

## 2019-11-30 ENCOUNTER — APPOINTMENT (OUTPATIENT)
Dept: GENERAL RADIOLOGY | Facility: HOSPITAL | Age: 64
End: 2019-11-30

## 2019-11-30 LAB
ANION GAP SERPL CALCULATED.3IONS-SCNC: 8 MMOL/L (ref 5–15)
BASOPHILS # BLD AUTO: 0 10*3/MM3 (ref 0–0.2)
BASOPHILS NFR BLD AUTO: 0.4 % (ref 0–1.5)
BUN BLD-MCNC: 5 MG/DL (ref 8–23)
BUN/CREAT SERPL: 10.9 (ref 7–25)
CALCIUM SPEC-SCNC: 7.4 MG/DL (ref 8.6–10.5)
CHLORIDE SERPL-SCNC: 103 MMOL/L (ref 98–107)
CO2 SERPL-SCNC: 28 MMOL/L (ref 22–29)
CREAT BLD-MCNC: 0.46 MG/DL (ref 0.57–1)
DEPRECATED RDW RBC AUTO: 40.3 FL (ref 37–54)
EOSINOPHIL # BLD AUTO: 0 10*3/MM3 (ref 0–0.4)
EOSINOPHIL NFR BLD AUTO: 0.9 % (ref 0.3–6.2)
ERYTHROCYTE [DISTWIDTH] IN BLOOD BY AUTOMATED COUNT: 13.3 % (ref 12.3–15.4)
GFR SERPL CREATININE-BSD FRML MDRD: 137 ML/MIN/1.73
GLUCOSE BLD-MCNC: 153 MG/DL (ref 65–99)
HCT VFR BLD AUTO: 32.1 % (ref 34–46.6)
HGB BLD-MCNC: 11 G/DL (ref 12–15.9)
LYMPHOCYTES # BLD AUTO: 0.9 10*3/MM3 (ref 0.7–3.1)
LYMPHOCYTES NFR BLD AUTO: 16.2 % (ref 19.6–45.3)
MAGNESIUM SERPL-MCNC: 2 MG/DL (ref 1.6–2.4)
MCH RBC QN AUTO: 29.8 PG (ref 26.6–33)
MCHC RBC AUTO-ENTMCNC: 34.3 G/DL (ref 31.5–35.7)
MCV RBC AUTO: 86.9 FL (ref 79–97)
MONOCYTES # BLD AUTO: 0.6 10*3/MM3 (ref 0.1–0.9)
MONOCYTES NFR BLD AUTO: 10.6 % (ref 5–12)
NEUTROPHILS # BLD AUTO: 4 10*3/MM3 (ref 1.7–7)
NEUTROPHILS NFR BLD AUTO: 71.9 % (ref 42.7–76)
NRBC BLD AUTO-RTO: 0 /100 WBC (ref 0–0.2)
PLATELET # BLD AUTO: 182 10*3/MM3 (ref 140–450)
PMV BLD AUTO: 8.3 FL (ref 6–12)
POTASSIUM BLD-SCNC: 2.9 MMOL/L (ref 3.5–5.2)
RBC # BLD AUTO: 3.69 10*6/MM3 (ref 3.77–5.28)
SODIUM BLD-SCNC: 139 MMOL/L (ref 136–145)
WBC NRBC COR # BLD: 5.6 10*3/MM3 (ref 3.4–10.8)

## 2019-11-30 PROCEDURE — 74018 RADEX ABDOMEN 1 VIEW: CPT

## 2019-11-30 PROCEDURE — 83735 ASSAY OF MAGNESIUM: CPT | Performed by: SURGERY

## 2019-11-30 PROCEDURE — G0378 HOSPITAL OBSERVATION PER HR: HCPCS

## 2019-11-30 PROCEDURE — 99232 SBSQ HOSP IP/OBS MODERATE 35: CPT | Performed by: SURGERY

## 2019-11-30 PROCEDURE — 85025 COMPLETE CBC W/AUTO DIFF WBC: CPT | Performed by: SURGERY

## 2019-11-30 PROCEDURE — 25010000002 METHYLNALTREXONE 12 MG/0.6ML SOLUTION: Performed by: INTERNAL MEDICINE

## 2019-11-30 PROCEDURE — 25010000002 MORPHINE PER 10 MG: Performed by: SURGERY

## 2019-11-30 PROCEDURE — 80048 BASIC METABOLIC PNL TOTAL CA: CPT | Performed by: SURGERY

## 2019-11-30 RX ORDER — POTASSIUM CHLORIDE 1.5 G/1.77G
40 POWDER, FOR SOLUTION ORAL AS NEEDED
Status: DISCONTINUED | OUTPATIENT
Start: 2019-11-30 | End: 2019-12-05 | Stop reason: HOSPADM

## 2019-11-30 RX ORDER — POTASSIUM CHLORIDE 7.45 MG/ML
10 INJECTION INTRAVENOUS
Status: DISCONTINUED | OUTPATIENT
Start: 2019-11-30 | End: 2019-12-05 | Stop reason: HOSPADM

## 2019-11-30 RX ORDER — HYDROCODONE BITARTRATE AND ACETAMINOPHEN 5; 325 MG/1; MG/1
1 TABLET ORAL EVERY 4 HOURS PRN
Status: DISCONTINUED | OUTPATIENT
Start: 2019-11-30 | End: 2019-12-05 | Stop reason: HOSPADM

## 2019-11-30 RX ORDER — BISACODYL 10 MG
10 SUPPOSITORY, RECTAL RECTAL ONCE
Status: COMPLETED | OUTPATIENT
Start: 2019-11-30 | End: 2019-11-30

## 2019-11-30 RX ORDER — POTASSIUM CHLORIDE 20 MEQ/1
40 TABLET, EXTENDED RELEASE ORAL AS NEEDED
Status: DISCONTINUED | OUTPATIENT
Start: 2019-11-30 | End: 2019-12-05 | Stop reason: HOSPADM

## 2019-11-30 RX ADMIN — OXYCODONE HYDROCHLORIDE 5 MG: 5 TABLET ORAL at 14:23

## 2019-11-30 RX ADMIN — DEXTROSE MONOHYDRATE AND SODIUM CHLORIDE 125 ML/HR: 5; .9 INJECTION, SOLUTION INTRAVENOUS at 05:50

## 2019-11-30 RX ADMIN — VENLAFAXINE HYDROCHLORIDE 150 MG: 75 CAPSULE, EXTENDED RELEASE ORAL at 10:21

## 2019-11-30 RX ADMIN — POTASSIUM CHLORIDE 40 MEQ: 1500 TABLET, EXTENDED RELEASE ORAL at 20:43

## 2019-11-30 RX ADMIN — HYDROCODONE BITARTRATE AND ACETAMINOPHEN 1 TABLET: 5; 325 TABLET ORAL at 19:25

## 2019-11-30 RX ADMIN — AMLODIPINE BESYLATE 5 MG: 5 TABLET ORAL at 10:20

## 2019-11-30 RX ADMIN — MORPHINE SULFATE 4 MG: 4 INJECTION INTRAVENOUS at 04:07

## 2019-11-30 RX ADMIN — SIMETHICONE CHEW TAB 80 MG 80 MG: 80 TABLET ORAL at 17:00

## 2019-11-30 RX ADMIN — BISACODYL 10 MG: 10 SUPPOSITORY RECTAL at 17:00

## 2019-11-30 RX ADMIN — METHYLNALTREXONE BROMIDE 12 MG: 12 INJECTION, SOLUTION SUBCUTANEOUS at 18:21

## 2019-11-30 RX ADMIN — Medication 1 CAPSULE: at 10:21

## 2019-11-30 RX ADMIN — POTASSIUM CHLORIDE 40 MEQ: 1500 TABLET, EXTENDED RELEASE ORAL at 17:00

## 2019-11-30 RX ADMIN — SIMETHICONE CHEW TAB 80 MG 80 MG: 80 TABLET ORAL at 20:43

## 2019-11-30 RX ADMIN — SIMETHICONE CHEW TAB 80 MG 80 MG: 80 TABLET ORAL at 13:09

## 2019-11-30 RX ADMIN — SIMETHICONE CHEW TAB 80 MG 80 MG: 80 TABLET ORAL at 07:15

## 2019-11-30 RX ADMIN — OXYCODONE HYDROCHLORIDE 5 MG: 5 TABLET ORAL at 07:14

## 2019-11-30 RX ADMIN — Medication 10 ML: at 10:21

## 2019-11-30 RX ADMIN — DEXTROSE MONOHYDRATE AND SODIUM CHLORIDE 125 ML/HR: 5; .9 INJECTION, SOLUTION INTRAVENOUS at 20:48

## 2019-11-30 RX ADMIN — LEVOTHYROXINE SODIUM 112 MCG: 112 TABLET ORAL at 10:20

## 2019-12-01 ENCOUNTER — INPATIENT HOSPITAL (AMBULATORY)
Dept: URBAN - METROPOLITAN AREA HOSPITAL 84 | Facility: HOSPITAL | Age: 64
End: 2019-12-01
Payer: COMMERCIAL

## 2019-12-01 DIAGNOSIS — K43.9 VENTRAL HERNIA WITHOUT OBSTRUCTION OR GANGRENE: ICD-10-CM

## 2019-12-01 DIAGNOSIS — R10.30 LOWER ABDOMINAL PAIN, UNSPECIFIED: ICD-10-CM

## 2019-12-01 DIAGNOSIS — K59.00 CONSTIPATION, UNSPECIFIED: ICD-10-CM

## 2019-12-01 LAB — POTASSIUM BLD-SCNC: 3.7 MMOL/L (ref 3.5–5.2)

## 2019-12-01 PROCEDURE — 99232 SBSQ HOSP IP/OBS MODERATE 35: CPT | Performed by: SURGERY

## 2019-12-01 PROCEDURE — 25010000002 METHYLNALTREXONE 12 MG/0.6ML SOLUTION: Performed by: INTERNAL MEDICINE

## 2019-12-01 PROCEDURE — 84132 ASSAY OF SERUM POTASSIUM: CPT | Performed by: SURGERY

## 2019-12-01 PROCEDURE — 99222 1ST HOSP IP/OBS MODERATE 55: CPT | Performed by: INTERNAL MEDICINE

## 2019-12-01 RX ORDER — MAGNESIUM CARB/ALUMINUM HYDROX 105-160MG
296 TABLET,CHEWABLE ORAL ONCE
Status: COMPLETED | OUTPATIENT
Start: 2019-12-01 | End: 2019-12-01

## 2019-12-01 RX ORDER — LUBIPROSTONE 24 UG/1
24 CAPSULE ORAL 2 TIMES DAILY WITH MEALS
Status: DISCONTINUED | OUTPATIENT
Start: 2019-12-01 | End: 2019-12-02

## 2019-12-01 RX ADMIN — POTASSIUM CHLORIDE 40 MEQ: 1500 TABLET, EXTENDED RELEASE ORAL at 02:30

## 2019-12-01 RX ADMIN — Medication 296 ML: at 11:24

## 2019-12-01 RX ADMIN — LUBIPROSTONE 24 MCG: 24 CAPSULE, GELATIN COATED ORAL at 11:24

## 2019-12-01 RX ADMIN — HYDROCODONE BITARTRATE AND ACETAMINOPHEN 1 TABLET: 5; 325 TABLET ORAL at 17:52

## 2019-12-01 RX ADMIN — LEVOTHYROXINE SODIUM 112 MCG: 112 TABLET ORAL at 08:13

## 2019-12-01 RX ADMIN — SIMETHICONE CHEW TAB 80 MG 80 MG: 80 TABLET ORAL at 17:49

## 2019-12-01 RX ADMIN — AMLODIPINE BESYLATE 5 MG: 5 TABLET ORAL at 08:13

## 2019-12-01 RX ADMIN — SIMETHICONE CHEW TAB 80 MG 80 MG: 80 TABLET ORAL at 08:13

## 2019-12-01 RX ADMIN — HYDROCODONE BITARTRATE AND ACETAMINOPHEN 1 TABLET: 5; 325 TABLET ORAL at 21:36

## 2019-12-01 RX ADMIN — POLYETHYLENE GLYCOL 3350 17 G: 17 POWDER, FOR SOLUTION ORAL at 08:13

## 2019-12-01 RX ADMIN — LUBIPROSTONE 24 MCG: 24 CAPSULE, GELATIN COATED ORAL at 17:49

## 2019-12-01 RX ADMIN — HYDROCODONE BITARTRATE AND ACETAMINOPHEN 1 TABLET: 5; 325 TABLET ORAL at 11:27

## 2019-12-01 RX ADMIN — DEXTROSE MONOHYDRATE AND SODIUM CHLORIDE 125 ML/HR: 5; .9 INJECTION, SOLUTION INTRAVENOUS at 12:04

## 2019-12-01 RX ADMIN — SIMETHICONE CHEW TAB 80 MG 80 MG: 80 TABLET ORAL at 21:36

## 2019-12-01 RX ADMIN — SIMETHICONE CHEW TAB 80 MG 80 MG: 80 TABLET ORAL at 11:24

## 2019-12-01 RX ADMIN — HYDROCODONE BITARTRATE AND ACETAMINOPHEN 1 TABLET: 5; 325 TABLET ORAL at 04:48

## 2019-12-01 RX ADMIN — VENLAFAXINE HYDROCHLORIDE 150 MG: 75 CAPSULE, EXTENDED RELEASE ORAL at 08:13

## 2019-12-01 RX ADMIN — METHYLNALTREXONE BROMIDE 12 MG: 12 INJECTION, SOLUTION SUBCUTANEOUS at 12:02

## 2019-12-01 RX ADMIN — DEXTROSE MONOHYDRATE AND SODIUM CHLORIDE 125 ML/HR: 5; .9 INJECTION, SOLUTION INTRAVENOUS at 04:17

## 2019-12-01 RX ADMIN — Medication 1 CAPSULE: at 08:12

## 2019-12-01 NOTE — PROGRESS NOTES
GENERAL SURGERY PROGRESS NOTE    12/1/2019   LOS: 0 days   Patient Care Team:  Michelle Colon MD as PCP - General (Internal Medicine)    HPI: Patient admitted with abdominal cramping, and a CT scan which demonstrated ventral incisional hernias x 2 which were fat-containing and a possible internal hernia in the left lower quadrant.      Subjective     Interval History:   Patient reports increased cramping with bowel regimen as prescribed by GI.  Mild nausea without vomiting.    Objective     Vital Signs  Temp:  [98.8 °F (37.1 °C)-99 °F (37.2 °C)] 98.9 °F (37.2 °C)  Heart Rate:  [73-81] 73  Resp:  [16-18] 16  BP: ()/(54-73) 125/73    Physical Exam   Constitutional: She is oriented to person, place, and time. She appears well-developed and well-nourished.   Eyes: EOM are normal. Pupils are equal, round, and reactive to light.   Cardiovascular: Normal rate and regular rhythm.   Pulmonary/Chest: Effort normal and breath sounds normal.   Abdominal: Soft. She exhibits no distension. There is tenderness.   Mildly tender bilateral lower quadrants, no rebound or guarding.   Musculoskeletal: Normal range of motion.   Neurological: She is alert and oriented to person, place, and time.   Skin: Skin is warm and dry.   Psychiatric: She has a normal mood and affect. Her behavior is normal.   Vitals reviewed.       Results Review:    Lab Results (last 24 hours)     Procedure Component Value Units Date/Time    Potassium [[id number]]  (Normal) Collected:  12/01/19 0327    Specimen:  Blood Updated:  12/01/19 0442     Potassium 3.7 mmol/L     Basic Metabolic Panel [[id number]]  (Abnormal) Collected:  11/30/19 1407    Specimen:  Blood Updated:  11/30/19 1501     Glucose 153 mg/dL      BUN 5 mg/dL      Creatinine 0.46 mg/dL      Sodium 139 mmol/L      Potassium 2.9 mmol/L      Comment: Result checked to Eleni        Chloride 103 mmol/L      CO2 28.0 mmol/L      Calcium 7.4 mg/dL      eGFR Non African Amer 137 mL/min/1.73       BUN/Creatinine Ratio 10.9     Anion Gap 8.0 mmol/L     Narrative:       GFR Normal >60  Chronic Kidney Disease <60  Kidney Failure <15    Magnesium [[id number]]  (Normal) Collected:  11/30/19 1407    Specimen:  Blood Updated:  11/30/19 1452     Magnesium 2.0 mg/dL         Imaging Results (Last 24 Hours)     ** No results found for the last 24 hours. **           I have reviewed the above results and noted them below    Medication Review:    Current Facility-Administered Medications:   •  amLODIPine (NORVASC) tablet 5 mg, 5 mg, Oral, Daily, Emily Lr MD, 5 mg at 12/01/19 0813  •  dextrose 5 % and sodium chloride 0.9 % infusion, 125 mL/hr, Intravenous, Continuous, Emily Lr MD, Last Rate: 125 mL/hr at 12/01/19 1204, 125 mL/hr at 12/01/19 1204  •  HYDROcodone-acetaminophen (NORCO) 5-325 MG per tablet 1 tablet, 1 tablet, Oral, Q4H PRN, Omega Tmo MD, 1 tablet at 12/01/19 1127  •  lactobacillus acidophilus (RISAQUAD) capsule 1 capsule, 1 capsule, Oral, Daily, Emily Lr MD, 1 capsule at 12/01/19 0812  •  levothyroxine (SYNTHROID, LEVOTHROID) tablet 112 mcg, 112 mcg, Oral, Daily, Emily Lr MD, 112 mcg at 12/01/19 0813  •  lubiprostone (AMITIZA) capsule 24 mcg, 24 mcg, Oral, BID With Meals, Omega Tom MD, 24 mcg at 12/01/19 1124  •  methylnaltrexone (RELISTOR) injection 12 mg, 12 mg, Subcutaneous, Every Other Day, Omega Tom MD, 12 mg at 12/01/19 1202  •  ondansetron (ZOFRAN) injection 8 mg, 8 mg, Intravenous, Q6H PRN, Emily Lr MD, 8 mg at 11/26/19 2250  •  polyethylene glycol (MIRALAX) powder 17 g, 17 g, Oral, Daily, Emily Lr MD, 17 g at 12/01/19 0813  •  potassium chloride (K-DUR,KLOR-CON) CR tablet 40 mEq, 40 mEq, Oral, PRN, 40 mEq at 12/01/19 0230 **OR** potassium chloride (KLOR-CON) packet 40 mEq, 40 mEq, Oral, PRN **OR** potassium chloride 10 mEq in 100 mL IVPB, 10 mEq, Intravenous, Q1H PRN, Emily Lr MD  •  simethicone (MYLICON) chewable tablet 80 mg, 80 mg,  Oral, 4x Daily AC & at Bedtime, Deandre Mckeon MD, 80 mg at 12/01/19 1124  •  [COMPLETED] Insert peripheral IV, , , Once **AND** sodium chloride 0.9 % flush 10 mL, 10 mL, Intravenous, PRN, Richard Lao MD, 10 mL at 11/30/19 1021  •  venlafaxine XR (EFFEXOR-XR) 24 hr capsule 150 mg, 150 mg, Oral, Daily, Emily Lr MD, 150 mg at 12/01/19 0813    Assessment/Plan     Active Problems:    Internal hernia    GI consult for colonic dysmotility - appreciate recommendations.  Continue bowel regimen  Clear liquid diet  We will discuss case with Dr. Lr, the patient may be able to proceed with surgery once her colon has been prepped.    Plan for disposition: Home once abdominal pain/distention improved and having bowel function.    Deandre Mckeon MD  12/01/19  2:29 PM

## 2019-12-01 NOTE — CONSULTS
"GI CONSULT  NOTE:    Referring Provider: Linda    Chief complaint: Abdominal pain and constipation    Subjective .     History of present illness: 64-year-old woman admitted to the hospital with abdominal pain and constipation.  The patient has a history of ventral hernia repair with mesh.  She has a large upper abdominal ventral hernia which cannot be reduced.  She was admitted to the hospital with suprapubic abdominal pain and cramping which was fairly severe.  She did not have any nausea or vomiting.  She did not have any fever.  She has constipation and reports having approximately 2 bowel movements per week.  She typically takes MiraLAX.  She was admitted to the hospital by Dr. Lr and there was concern that she had adhesions with partial obstruction.  She has been treated conservatively.  However her pain has persisted.  She has been seen by Dr. Mckeon over the weekend.  Gastroenterology was asked to evaluate her for \"colonic dysmotility\".  The patient apparently drank a bottle of magnesium citrate without any significant results.  When the nursing staff contacted me yesterday I ordered Relistor 12 mg subcu x1.  She reports having a small bowel movement with some passage of gas after that.      Endo History:  Colonoscopy Dr. Copeland November 2019    Past Medical History:  Past Medical History:   Diagnosis Date   • Hypertension        Past Surgical History:  Past Surgical History:   Procedure Laterality Date   • CARDIAC CATHETERIZATION N/A 9/18/2019    Procedure: Left Heart Cath;  Surgeon: Jeronimo Kuhn MD;  Location: [facility] INVASIVE LOCATION;  Service: Cardiovascular   • CARDIAC CATHETERIZATION N/A 9/18/2019    Procedure: Coronary angiography;  Surgeon: Jeronimo Kuhn MD;  Location: [facility] INVASIVE LOCATION;  Service: Cardiovascular   • HERNIA REPAIR     • TONSILLECTOMY         Social History:  Social History     Tobacco Use   • Smoking status: Never Smoker   • Smokeless " tobacco: Never Used   Substance Use Topics   • Alcohol use: No     Frequency: Never   • Drug use: No       Family History:  Family History   Problem Relation Age of Onset   • Supraventricular tachycardia Mother    • Heart attack Father        Medications:  Medications Prior to Admission   Medication Sig Dispense Refill Last Dose   • amLODIPine (NORVASC) 5 MG tablet Take 5 mg by mouth Daily.      • aspirin 81 MG EC tablet Take 81 mg by mouth Daily.      • Biotin 1000 MCG tablet Take 1,000 mcg by mouth Daily.   Taking   • calcium (OS-SONNY) 600 MG tablet Take 1,200 mg by mouth Daily.   Taking   • levothyroxine (SYNTHROID, LEVOTHROID) 112 MCG tablet Take 112 mcg by mouth Daily.      • Multiple Vitamins-Minerals (DAILY MULTIVITAMIN) capsule Take 1 tablet by mouth Daily.   Taking   • Omega-3 Fatty Acids ([employer] FISH OIL) 1200 MG capsule Take 2,400 mg by mouth Daily.   Taking   • polyethylene glycol (MIRALAX) packet Take 17 g by mouth Daily.      • Probiotic Product ([employer]S PROBIOTIC PEARLS EX ST) capsule Take 1 tablet by mouth Daily.   Taking   • venlafaxine XR (EFFEXOR-XR) 150 MG 24 hr capsule Take 1 capsule by mouth Daily.   Taking   • vitamin d (DIALYVITE VITAMIN D 5000) 5000 units capsule Take 1 tablet by mouth Daily.   Taking       Scheduled Meds:  amLODIPine 5 mg Oral Daily   lactobacillus acidophilus 1 capsule Oral Daily   levothyroxine 112 mcg Oral Daily   lubiprostone 24 mcg Oral BID With Meals   magnesium citrate 296 mL Oral Once   methylnaltrexone 12 mg Subcutaneous Every Other Day   polyethylene glycol 17 g Oral Daily   simethicone 80 mg Oral 4x Daily AC & at Bedtime   venlafaxine  mg Oral Daily     Continuous Infusions:  dextrose 5 % and sodium chloride 0.9 % 125 mL/hr Last Rate: 125 mL/hr (12/01/19 0410)     PRN Meds:.HYDROcodone-acetaminophen  •  ondansetron  •  potassium chloride **OR** potassium chloride **OR** potassium chloride  •  [COMPLETED] Insert peripheral IV **AND** sodium  chloride    ALLERGIES:  Ace inhibitors    ROS:  Review of Systems    Objective     Vital Signs:   Temp:  [98.8 °F (37.1 °C)-99 °F (37.2 °C)] 98.9 °F (37.2 °C)  Heart Rate:  [73-81] 73  Resp:  [16-18] 16  BP: ()/(54-73) 125/73    Physical Exam:      General Appearance:    Awake and alert, in no acute distress   Head:    Normocephalic, without obvious abnormality, atraumatic   Eyes:            Conjunctivae normal, anicteric sclera   Ears:    Ears appear intact with no abnormalities noted   Throat:   No oral lesions, no thrush, oral mucosa moist   Neck:   No adenopathy, supple, no thyromegaly, no JVD   Lungs:     Clear to auscultation bilaterally, respirations regular, even and unlabored    Heart:    Regular rhythm and normal rate, normal S1 and S2, no            murmur, no gallop, no rub   Chest Wall:    No abnormalities observed   Abdomen:     Tympanitic bowel sounds, soft, non-tender, no rebound or guarding, non-distended, no hepatosplenomegaly; large upper abdomen midline ventral hernia which could not be reduced.  No peritoneal signs   Rectal:     Deferred   Extremities:   Moves all extremities well, no edema, no cyanosis, no             redness   Pulses:   Pulses palpable and equal bilaterally   Skin:   No bleeding, bruising or rash, no jaundice   Lymph nodes:   No palpable adenopathy   Neurologic:   Cranial nerves 2 - 12 grossly intact, no asterixis, sensation intact       Results Review:   I reviewed the patient's labs and imaging.  Lab Results (last 24 hours)     Procedure Component Value Units Date/Time    Potassium [[id number]]  (Normal) Collected:  12/01/19 0327    Specimen:  Blood Updated:  12/01/19 0442     Potassium 3.7 mmol/L     Basic Metabolic Panel [[id number]]  (Abnormal) Collected:  11/30/19 1407    Specimen:  Blood Updated:  11/30/19 1501     Glucose 153 mg/dL      BUN 5 mg/dL      Creatinine 0.46 mg/dL      Sodium 139 mmol/L      Potassium 2.9 mmol/L      Comment: Result checked to Eleni         Chloride 103 mmol/L      CO2 28.0 mmol/L      Calcium 7.4 mg/dL      eGFR Non African Amer 137 mL/min/1.73      BUN/Creatinine Ratio 10.9     Anion Gap 8.0 mmol/L     Narrative:       GFR Normal >60  Chronic Kidney Disease <60  Kidney Failure <15    Magnesium [[id number]]  (Normal) Collected:  11/30/19 1407    Specimen:  Blood Updated:  11/30/19 1452     Magnesium 2.0 mg/dL     CBC & Differential [[id number]] Collected:  11/30/19 1407    Specimen:  Blood Updated:  11/30/19 1427    Narrative:       The following orders were created for panel order CBC & Differential.  Procedure                               Abnormality         Status                     ---------                               -----------         ------                     CBC Auto Differential[[id number]]        Abnormal            Final result                 Please view results for these tests on the individual orders.    CBC Auto Differential [[id number]]  (Abnormal) Collected:  11/30/19 1407    Specimen:  Blood Updated:  11/30/19 1427     WBC 5.60 10*3/mm3      RBC 3.69 10*6/mm3      Hemoglobin 11.0 g/dL      Hematocrit 32.1 %      MCV 86.9 fL      MCH 29.8 pg      MCHC 34.3 g/dL      RDW 13.3 %      RDW-SD 40.3 fl      MPV 8.3 fL      Platelets 182 10*3/mm3      Neutrophil % 71.9 %      Lymphocyte % 16.2 %      Monocyte % 10.6 %      Eosinophil % 0.9 %      Basophil % 0.4 %      Neutrophils, Absolute 4.00 10*3/mm3      Lymphocytes, Absolute 0.90 10*3/mm3      Monocytes, Absolute 0.60 10*3/mm3      Eosinophils, Absolute 0.00 10*3/mm3      Basophils, Absolute 0.00 10*3/mm3      nRBC 0.0 /100 WBC           Imaging Results (Last 24 Hours)     ** No results found for the last 24 hours. **             ASSESSMENT:  1.  Large ventral hernia 2.  Lower abdominal pain possibly secondary to adhesions 3.  Constipation    Active Problems:    Internal hernia       PLAN:  Clear liquid diet.  Relistor.  Amitiza 24 mcg twice daily.  MiraLAX daily.  Magnesium  citrate today.  She will likely need surgery to relieve her problems.    I discussed the patients findings and my recommendations with the patient.  Omega Tom MD  12/01/19  9:50 AM

## 2019-12-01 NOTE — PLAN OF CARE
Problem: Patient Care Overview  Goal: Plan of Care Review  Outcome: Ongoing (interventions implemented as appropriate)    Goal: Individualization and Mutuality  Outcome: Ongoing (interventions implemented as appropriate)    Goal: Discharge Needs Assessment  Outcome: Ongoing (interventions implemented as appropriate)    Goal: Interprofessional Rounds/Family Conf  Outcome: Ongoing (interventions implemented as appropriate)      Problem: Pain, Acute (Adult)  Goal: Acceptable Pain Control/Comfort Level  Outcome: Ongoing (interventions implemented as appropriate)

## 2019-12-01 NOTE — PLAN OF CARE
Problem: Patient Care Overview  Goal: Plan of Care Review  Outcome: Ongoing (interventions implemented as appropriate)   12/01/19 3630   Coping/Psychosocial   Plan of Care Reviewed With patient   Plan of Care Review   Progress no change   OTHER   Outcome Summary patient states she is not feeling well today at all. GI consulted        Problem: Pain, Acute (Adult)  Goal: Acceptable Pain Control/Comfort Level  Outcome: Ongoing (interventions implemented as appropriate)

## 2019-12-02 ENCOUNTER — APPOINTMENT (OUTPATIENT)
Dept: GENERAL RADIOLOGY | Facility: HOSPITAL | Age: 64
End: 2019-12-02

## 2019-12-02 ENCOUNTER — ANESTHESIA EVENT (OUTPATIENT)
Dept: PERIOP | Facility: HOSPITAL | Age: 64
End: 2019-12-02

## 2019-12-02 ENCOUNTER — INPATIENT HOSPITAL (AMBULATORY)
Dept: URBAN - METROPOLITAN AREA HOSPITAL 84 | Facility: HOSPITAL | Age: 64
End: 2019-12-02
Payer: COMMERCIAL

## 2019-12-02 ENCOUNTER — ANESTHESIA (OUTPATIENT)
Dept: PERIOP | Facility: HOSPITAL | Age: 64
End: 2019-12-02

## 2019-12-02 DIAGNOSIS — K59.00 CONSTIPATION, UNSPECIFIED: ICD-10-CM

## 2019-12-02 DIAGNOSIS — R10.30 LOWER ABDOMINAL PAIN, UNSPECIFIED: ICD-10-CM

## 2019-12-02 DIAGNOSIS — K43.9 VENTRAL HERNIA WITHOUT OBSTRUCTION OR GANGRENE: ICD-10-CM

## 2019-12-02 LAB
ABO GROUP BLD: NORMAL
ALBUMIN SERPL-MCNC: 2.9 G/DL (ref 3.5–5.2)
ALBUMIN SERPL-MCNC: 3.4 G/DL (ref 3.5–5.2)
ALBUMIN/GLOB SERPL: 1.4 G/DL
ALBUMIN/GLOB SERPL: 1.5 G/DL
ALP SERPL-CCNC: 44 U/L (ref 39–117)
ALP SERPL-CCNC: 53 U/L (ref 39–117)
ALT SERPL W P-5'-P-CCNC: 9 U/L (ref 1–33)
ALT SERPL W P-5'-P-CCNC: 9 U/L (ref 1–33)
ANION GAP SERPL CALCULATED.3IONS-SCNC: 9 MMOL/L (ref 5–15)
ANION GAP SERPL CALCULATED.3IONS-SCNC: 9 MMOL/L (ref 5–15)
AST SERPL-CCNC: 14 U/L (ref 1–32)
AST SERPL-CCNC: 14 U/L (ref 1–32)
BASOPHILS # BLD AUTO: 0 10*3/MM3 (ref 0–0.2)
BASOPHILS # BLD AUTO: 0 10*3/MM3 (ref 0–0.2)
BASOPHILS NFR BLD AUTO: 0.2 % (ref 0–1.5)
BASOPHILS NFR BLD AUTO: 0.7 % (ref 0–1.5)
BILIRUB SERPL-MCNC: 0.3 MG/DL (ref 0.2–1.2)
BILIRUB SERPL-MCNC: 0.4 MG/DL (ref 0.2–1.2)
BLD GP AB SCN SERPL QL: NEGATIVE
BUN BLD-MCNC: 5 MG/DL (ref 8–23)
BUN BLD-MCNC: 7 MG/DL (ref 8–23)
BUN/CREAT SERPL: 12.8 (ref 7–25)
BUN/CREAT SERPL: 15.9 (ref 7–25)
CALCIUM SPEC-SCNC: 7.7 MG/DL (ref 8.6–10.5)
CALCIUM SPEC-SCNC: 8.1 MG/DL (ref 8.6–10.5)
CHLORIDE SERPL-SCNC: 103 MMOL/L (ref 98–107)
CHLORIDE SERPL-SCNC: 103 MMOL/L (ref 98–107)
CO2 SERPL-SCNC: 26 MMOL/L (ref 22–29)
CO2 SERPL-SCNC: 26 MMOL/L (ref 22–29)
CREAT BLD-MCNC: 0.39 MG/DL (ref 0.57–1)
CREAT BLD-MCNC: 0.44 MG/DL (ref 0.57–1)
D-LACTATE SERPL-SCNC: 0.6 MMOL/L (ref 0.5–2)
DEPRECATED RDW RBC AUTO: 40.7 FL (ref 37–54)
DEPRECATED RDW RBC AUTO: 40.7 FL (ref 37–54)
EOSINOPHIL # BLD AUTO: 0 10*3/MM3 (ref 0–0.4)
EOSINOPHIL # BLD AUTO: 0.1 10*3/MM3 (ref 0–0.4)
EOSINOPHIL NFR BLD AUTO: 0.1 % (ref 0.3–6.2)
EOSINOPHIL NFR BLD AUTO: 1.9 % (ref 0.3–6.2)
ERYTHROCYTE [DISTWIDTH] IN BLOOD BY AUTOMATED COUNT: 13.4 % (ref 12.3–15.4)
ERYTHROCYTE [DISTWIDTH] IN BLOOD BY AUTOMATED COUNT: 13.5 % (ref 12.3–15.4)
GFR SERPL CREATININE-BSD FRML MDRD: 144 ML/MIN/1.73
GFR SERPL CREATININE-BSD FRML MDRD: >150 ML/MIN/1.73
GLOBULIN UR ELPH-MCNC: 1.9 GM/DL
GLOBULIN UR ELPH-MCNC: 2.4 GM/DL
GLUCOSE BLD-MCNC: 107 MG/DL (ref 65–99)
GLUCOSE BLD-MCNC: 149 MG/DL (ref 65–99)
HCT VFR BLD AUTO: 33.6 % (ref 34–46.6)
HCT VFR BLD AUTO: 33.7 % (ref 34–46.6)
HGB BLD-MCNC: 11.7 G/DL (ref 12–15.9)
HGB BLD-MCNC: 11.9 G/DL (ref 12–15.9)
LYMPHOCYTES # BLD AUTO: 0.1 10*3/MM3 (ref 0.7–3.1)
LYMPHOCYTES # BLD AUTO: 0.8 10*3/MM3 (ref 0.7–3.1)
LYMPHOCYTES NFR BLD AUTO: 21.4 % (ref 19.6–45.3)
LYMPHOCYTES NFR BLD AUTO: 6.5 % (ref 19.6–45.3)
MCH RBC QN AUTO: 30.2 PG (ref 26.6–33)
MCH RBC QN AUTO: 30.4 PG (ref 26.6–33)
MCHC RBC AUTO-ENTMCNC: 34.9 G/DL (ref 31.5–35.7)
MCHC RBC AUTO-ENTMCNC: 35.3 G/DL (ref 31.5–35.7)
MCV RBC AUTO: 85.9 FL (ref 79–97)
MCV RBC AUTO: 86.5 FL (ref 79–97)
MONOCYTES # BLD AUTO: 0.2 10*3/MM3 (ref 0.1–0.9)
MONOCYTES # BLD AUTO: 0.4 10*3/MM3 (ref 0.1–0.9)
MONOCYTES NFR BLD AUTO: 10.2 % (ref 5–12)
MONOCYTES NFR BLD AUTO: 11.8 % (ref 5–12)
NEUTROPHILS # BLD AUTO: 1.8 10*3/MM3 (ref 1.7–7)
NEUTROPHILS # BLD AUTO: 2.3 10*3/MM3 (ref 1.7–7)
NEUTROPHILS NFR BLD AUTO: 64.2 % (ref 42.7–76)
NEUTROPHILS NFR BLD AUTO: 83 % (ref 42.7–76)
NRBC BLD AUTO-RTO: 0 /100 WBC (ref 0–0.2)
NRBC BLD AUTO-RTO: 0.1 /100 WBC (ref 0–0.2)
PLATELET # BLD AUTO: 232 10*3/MM3 (ref 140–450)
PLATELET # BLD AUTO: 253 10*3/MM3 (ref 140–450)
PMV BLD AUTO: 8.3 FL (ref 6–12)
PMV BLD AUTO: 8.3 FL (ref 6–12)
POTASSIUM BLD-SCNC: 3 MMOL/L (ref 3.5–5.2)
POTASSIUM BLD-SCNC: 3.4 MMOL/L (ref 3.5–5.2)
PROT SERPL-MCNC: 4.8 G/DL (ref 6–8.5)
PROT SERPL-MCNC: 5.8 G/DL (ref 6–8.5)
RBC # BLD AUTO: 3.89 10*6/MM3 (ref 3.77–5.28)
RBC # BLD AUTO: 3.93 10*6/MM3 (ref 3.77–5.28)
RH BLD: POSITIVE
SODIUM BLD-SCNC: 138 MMOL/L (ref 136–145)
SODIUM BLD-SCNC: 138 MMOL/L (ref 136–145)
T&S EXPIRATION DATE: NORMAL
WBC NRBC COR # BLD: 2.1 10*3/MM3 (ref 3.4–10.8)
WBC NRBC COR # BLD: 3.5 10*3/MM3 (ref 3.4–10.8)

## 2019-12-02 PROCEDURE — 88307 TISSUE EXAM BY PATHOLOGIST: CPT | Performed by: SURGERY

## 2019-12-02 PROCEDURE — 86850 RBC ANTIBODY SCREEN: CPT | Performed by: SURGERY

## 2019-12-02 PROCEDURE — 74018 RADEX ABDOMEN 1 VIEW: CPT

## 2019-12-02 PROCEDURE — 0D1B0Z4 BYPASS ILEUM TO CUTANEOUS, OPEN APPROACH: ICD-10-PCS | Performed by: SURGERY

## 2019-12-02 PROCEDURE — 25010000002 PROPOFOL 10 MG/ML EMULSION: Performed by: ANESTHESIOLOGY

## 2019-12-02 PROCEDURE — 25010000002 MORPHINE PER 10 MG: Performed by: ANESTHESIOLOGY

## 2019-12-02 PROCEDURE — 25010000002 PIPERACILLIN SOD-TAZOBACTAM PER 1 G: Performed by: SURGERY

## 2019-12-02 PROCEDURE — 25010000002 FENTANYL CITRATE (PF) 100 MCG/2ML SOLUTION: Performed by: ANESTHESIOLOGY

## 2019-12-02 PROCEDURE — 25010000002 ONDANSETRON PER 1 MG: Performed by: ANESTHESIOLOGY

## 2019-12-02 PROCEDURE — 25010000002 POTASSIUM PER 2 MEQ: Performed by: ANESTHESIOLOGY

## 2019-12-02 PROCEDURE — 25010000002 NEOSTIGMINE 10 MG/10ML SOLUTION: Performed by: ANESTHESIOLOGY

## 2019-12-02 PROCEDURE — 99233 SBSQ HOSP IP/OBS HIGH 50: CPT | Performed by: SURGERY

## 2019-12-02 PROCEDURE — 25010000002 KETOROLAC TROMETHAMINE PER 15 MG: Performed by: SURGERY

## 2019-12-02 PROCEDURE — 25010000002 HYDROMORPHONE PER 4 MG: Performed by: ANESTHESIOLOGY

## 2019-12-02 PROCEDURE — 86900 BLOOD TYPING SEROLOGIC ABO: CPT | Performed by: SURGERY

## 2019-12-02 PROCEDURE — 71045 X-RAY EXAM CHEST 1 VIEW: CPT

## 2019-12-02 PROCEDURE — 99232 SBSQ HOSP IP/OBS MODERATE 35: CPT | Performed by: NURSE PRACTITIONER

## 2019-12-02 PROCEDURE — 83605 ASSAY OF LACTIC ACID: CPT | Performed by: SURGERY

## 2019-12-02 PROCEDURE — 44141 PARTIAL REMOVAL OF COLON: CPT | Performed by: REGISTERED NURSE

## 2019-12-02 PROCEDURE — 0DTN0ZZ RESECTION OF SIGMOID COLON, OPEN APPROACH: ICD-10-PCS | Performed by: SURGERY

## 2019-12-02 PROCEDURE — 85025 COMPLETE CBC W/AUTO DIFF WBC: CPT | Performed by: SURGERY

## 2019-12-02 PROCEDURE — 80053 COMPREHEN METABOLIC PANEL: CPT | Performed by: SURGERY

## 2019-12-02 PROCEDURE — 44141 PARTIAL REMOVAL OF COLON: CPT | Performed by: SURGERY

## 2019-12-02 PROCEDURE — 25010000003 CEFAZOLIN PER 500 MG: Performed by: ANESTHESIOLOGY

## 2019-12-02 PROCEDURE — 25010000002 DEXAMETHASONE PER 1 MG: Performed by: ANESTHESIOLOGY

## 2019-12-02 PROCEDURE — 86901 BLOOD TYPING SEROLOGIC RH(D): CPT | Performed by: SURGERY

## 2019-12-02 PROCEDURE — 25010000002 MORPHINE PER 10 MG: Performed by: SURGERY

## 2019-12-02 DEVICE — PROXIMATE LINEAR CUTTER RELOAD, BLUE, 75MM
Type: IMPLANTABLE DEVICE | Site: ABDOMEN | Status: FUNCTIONAL
Brand: PROXIMATE

## 2019-12-02 DEVICE — CIRCULAR STAPLER WITH DST SERIES TECHNOLOGY
Type: IMPLANTABLE DEVICE | Site: ABDOMEN | Status: FUNCTIONAL
Brand: EEA

## 2019-12-02 DEVICE — PROXIMATE RELOADABLE LINEAR CUTTER WITH SAFETY LOCK-OUT, 75MM
Type: IMPLANTABLE DEVICE | Site: ABDOMEN | Status: FUNCTIONAL
Brand: PROXIMATE

## 2019-12-02 DEVICE — CONTOUR CURVED CUTTER STAPLER
Type: IMPLANTABLE DEVICE | Site: ABDOMEN | Status: FUNCTIONAL
Brand: CONTOUR

## 2019-12-02 RX ORDER — DEXTROSE MONOHYDRATE 25 G/50ML
12.5 INJECTION, SOLUTION INTRAVENOUS AS NEEDED
Status: DISCONTINUED | OUTPATIENT
Start: 2019-12-02 | End: 2019-12-02

## 2019-12-02 RX ORDER — ONDANSETRON 4 MG/1
4 TABLET, FILM COATED ORAL EVERY 6 HOURS PRN
Status: DISCONTINUED | OUTPATIENT
Start: 2019-12-02 | End: 2019-12-05 | Stop reason: HOSPADM

## 2019-12-02 RX ORDER — SODIUM CHLORIDE 9 MG/ML
100 INJECTION, SOLUTION INTRAVENOUS CONTINUOUS
Status: DISCONTINUED | OUTPATIENT
Start: 2019-12-02 | End: 2019-12-03

## 2019-12-02 RX ORDER — DEXAMETHASONE SODIUM PHOSPHATE 4 MG/ML
INJECTION, SOLUTION INTRA-ARTICULAR; INTRALESIONAL; INTRAMUSCULAR; INTRAVENOUS; SOFT TISSUE AS NEEDED
Status: DISCONTINUED | OUTPATIENT
Start: 2019-12-02 | End: 2019-12-02 | Stop reason: SURG

## 2019-12-02 RX ORDER — HYDROMORPHONE HCL 110MG/55ML
0.5 PATIENT CONTROLLED ANALGESIA SYRINGE INTRAVENOUS
Status: DISCONTINUED | OUTPATIENT
Start: 2019-12-02 | End: 2019-12-04

## 2019-12-02 RX ORDER — LORAZEPAM 2 MG/ML
1 INJECTION INTRAMUSCULAR
Status: DISCONTINUED | OUTPATIENT
Start: 2019-12-02 | End: 2019-12-02

## 2019-12-02 RX ORDER — GLYCOPYRROLATE 0.2 MG/ML
INJECTION INTRAMUSCULAR; INTRAVENOUS AS NEEDED
Status: DISCONTINUED | OUTPATIENT
Start: 2019-12-02 | End: 2019-12-02 | Stop reason: SURG

## 2019-12-02 RX ORDER — POTASSIUM CHLORIDE 400 MEQ/1000ML
INJECTION, SOLUTION INTRAVENOUS AS NEEDED
Status: DISCONTINUED | OUTPATIENT
Start: 2019-12-02 | End: 2019-12-02 | Stop reason: SURG

## 2019-12-02 RX ORDER — ROCURONIUM BROMIDE 10 MG/ML
INJECTION, SOLUTION INTRAVENOUS AS NEEDED
Status: DISCONTINUED | OUTPATIENT
Start: 2019-12-02 | End: 2019-12-02 | Stop reason: SURG

## 2019-12-02 RX ORDER — ONDANSETRON 2 MG/ML
4 INJECTION INTRAMUSCULAR; INTRAVENOUS ONCE AS NEEDED
Status: DISCONTINUED | OUTPATIENT
Start: 2019-12-02 | End: 2019-12-02

## 2019-12-02 RX ORDER — SODIUM CHLORIDE 9 MG/ML
9 INJECTION, SOLUTION INTRAVENOUS CONTINUOUS PRN
Status: DISCONTINUED | OUTPATIENT
Start: 2019-12-02 | End: 2019-12-05 | Stop reason: HOSPADM

## 2019-12-02 RX ORDER — MORPHINE SULFATE 4 MG/ML
4 INJECTION, SOLUTION INTRAMUSCULAR; INTRAVENOUS ONCE
Status: COMPLETED | OUTPATIENT
Start: 2019-12-02 | End: 2019-12-02

## 2019-12-02 RX ORDER — SODIUM CHLORIDE 0.9 % (FLUSH) 0.9 %
3 SYRINGE (ML) INJECTION EVERY 12 HOURS SCHEDULED
Status: DISCONTINUED | OUTPATIENT
Start: 2019-12-02 | End: 2019-12-02

## 2019-12-02 RX ORDER — MORPHINE SULFATE 4 MG/ML
4 INJECTION, SOLUTION INTRAMUSCULAR; INTRAVENOUS
Status: DISCONTINUED | OUTPATIENT
Start: 2019-12-02 | End: 2019-12-04

## 2019-12-02 RX ORDER — NALOXONE HCL 0.4 MG/ML
0.1 VIAL (ML) INJECTION
Status: DISCONTINUED | OUTPATIENT
Start: 2019-12-02 | End: 2019-12-05 | Stop reason: HOSPADM

## 2019-12-02 RX ORDER — NEOSTIGMINE METHYLSULFATE 1 MG/ML
INJECTION, SOLUTION INTRAVENOUS AS NEEDED
Status: DISCONTINUED | OUTPATIENT
Start: 2019-12-02 | End: 2019-12-02 | Stop reason: SURG

## 2019-12-02 RX ORDER — HYDROMORPHONE HCL 110MG/55ML
0.5 PATIENT CONTROLLED ANALGESIA SYRINGE INTRAVENOUS
Status: COMPLETED | OUTPATIENT
Start: 2019-12-02 | End: 2019-12-02

## 2019-12-02 RX ORDER — POTASSIUM CHLORIDE 29.8 MG/ML
20 INJECTION INTRAVENOUS ONCE
Status: COMPLETED | OUTPATIENT
Start: 2019-12-02 | End: 2019-12-02

## 2019-12-02 RX ORDER — SODIUM CHLORIDE 0.9 % (FLUSH) 0.9 %
3-10 SYRINGE (ML) INJECTION AS NEEDED
Status: DISCONTINUED | OUTPATIENT
Start: 2019-12-02 | End: 2019-12-02

## 2019-12-02 RX ORDER — ONDANSETRON 2 MG/ML
INJECTION INTRAMUSCULAR; INTRAVENOUS AS NEEDED
Status: DISCONTINUED | OUTPATIENT
Start: 2019-12-02 | End: 2019-12-02 | Stop reason: SURG

## 2019-12-02 RX ORDER — SODIUM CHLORIDE, SODIUM LACTATE, POTASSIUM CHLORIDE, CALCIUM CHLORIDE 600; 310; 30; 20 MG/100ML; MG/100ML; MG/100ML; MG/100ML
100 INJECTION, SOLUTION INTRAVENOUS CONTINUOUS
Status: DISCONTINUED | OUTPATIENT
Start: 2019-12-02 | End: 2019-12-03

## 2019-12-02 RX ORDER — CEFAZOLIN SODIUM 1 G/3ML
INJECTION, POWDER, FOR SOLUTION INTRAMUSCULAR; INTRAVENOUS AS NEEDED
Status: DISCONTINUED | OUTPATIENT
Start: 2019-12-02 | End: 2019-12-02 | Stop reason: SURG

## 2019-12-02 RX ORDER — KETOROLAC TROMETHAMINE 30 MG/ML
30 INJECTION, SOLUTION INTRAMUSCULAR; INTRAVENOUS EVERY 6 HOURS
Status: DISCONTINUED | OUTPATIENT
Start: 2019-12-02 | End: 2019-12-03

## 2019-12-02 RX ORDER — EPHEDRINE SULFATE/0.9% NACL/PF 25 MG/5 ML
SYRINGE (ML) INTRAVENOUS AS NEEDED
Status: DISCONTINUED | OUTPATIENT
Start: 2019-12-02 | End: 2019-12-02 | Stop reason: SURG

## 2019-12-02 RX ORDER — FAMOTIDINE 20 MG/1
20 TABLET, FILM COATED ORAL 2 TIMES DAILY
Status: DISCONTINUED | OUTPATIENT
Start: 2019-12-02 | End: 2019-12-05 | Stop reason: HOSPADM

## 2019-12-02 RX ORDER — SODIUM CHLORIDE 9 MG/ML
INJECTION, SOLUTION INTRAVENOUS CONTINUOUS PRN
Status: DISCONTINUED | OUTPATIENT
Start: 2019-12-02 | End: 2019-12-02 | Stop reason: SURG

## 2019-12-02 RX ORDER — PHENYLEPHRINE HCL IN 0.9% NACL 0.5 MG/5ML
.5-3 SYRINGE (ML) INTRAVENOUS
Status: DISCONTINUED | OUTPATIENT
Start: 2019-12-02 | End: 2019-12-02

## 2019-12-02 RX ORDER — EPHEDRINE SULFATE 50 MG/ML
5 INJECTION, SOLUTION INTRAVENOUS ONCE AS NEEDED
Status: DISCONTINUED | OUTPATIENT
Start: 2019-12-02 | End: 2019-12-02

## 2019-12-02 RX ORDER — POTASSIUM CHLORIDE 7.45 MG/ML
10 INJECTION INTRAVENOUS
Status: DISCONTINUED | OUTPATIENT
Start: 2019-12-02 | End: 2019-12-02

## 2019-12-02 RX ORDER — PROPOFOL 10 MG/ML
VIAL (ML) INTRAVENOUS AS NEEDED
Status: DISCONTINUED | OUTPATIENT
Start: 2019-12-02 | End: 2019-12-02 | Stop reason: SURG

## 2019-12-02 RX ORDER — ONDANSETRON 2 MG/ML
4 INJECTION INTRAMUSCULAR; INTRAVENOUS EVERY 6 HOURS PRN
Status: DISCONTINUED | OUTPATIENT
Start: 2019-12-02 | End: 2019-12-05 | Stop reason: HOSPADM

## 2019-12-02 RX ORDER — KETAMINE HYDROCHLORIDE 10 MG/ML
INJECTION INTRAMUSCULAR; INTRAVENOUS AS NEEDED
Status: DISCONTINUED | OUTPATIENT
Start: 2019-12-02 | End: 2019-12-02 | Stop reason: SURG

## 2019-12-02 RX ORDER — MORPHINE SULFATE 4 MG/ML
1 INJECTION, SOLUTION INTRAMUSCULAR; INTRAVENOUS
Status: DISCONTINUED | OUTPATIENT
Start: 2019-12-02 | End: 2019-12-02

## 2019-12-02 RX ORDER — HYDROMORPHONE HCL 110MG/55ML
1 PATIENT CONTROLLED ANALGESIA SYRINGE INTRAVENOUS EVERY 4 HOURS PRN
Status: DISCONTINUED | OUTPATIENT
Start: 2019-12-02 | End: 2019-12-04

## 2019-12-02 RX ORDER — FENTANYL CITRATE 50 UG/ML
INJECTION, SOLUTION INTRAMUSCULAR; INTRAVENOUS AS NEEDED
Status: DISCONTINUED | OUTPATIENT
Start: 2019-12-02 | End: 2019-12-02 | Stop reason: SURG

## 2019-12-02 RX ADMIN — Medication 10 ML: at 20:47

## 2019-12-02 RX ADMIN — CEFAZOLIN SODIUM 2 G: 1 INJECTION, POWDER, FOR SOLUTION INTRAMUSCULAR; INTRAVENOUS at 11:50

## 2019-12-02 RX ADMIN — FENTANYL CITRATE 100 MCG: 50 INJECTION, SOLUTION INTRAMUSCULAR; INTRAVENOUS at 11:40

## 2019-12-02 RX ADMIN — KETOROLAC TROMETHAMINE 30 MG: 30 INJECTION, SOLUTION INTRAMUSCULAR at 16:31

## 2019-12-02 RX ADMIN — FENTANYL CITRATE 50 MCG: 50 INJECTION, SOLUTION INTRAMUSCULAR; INTRAVENOUS at 13:20

## 2019-12-02 RX ADMIN — MORPHINE SULFATE 4 MG: 4 INJECTION INTRAVENOUS at 15:44

## 2019-12-02 RX ADMIN — DEXAMETHASONE SODIUM PHOSPHATE 4 MG: 4 INJECTION, SOLUTION INTRAMUSCULAR; INTRAVENOUS at 12:05

## 2019-12-02 RX ADMIN — NEOSTIGMINE METHYLSULFATE 4 MG: 1 INJECTION, SOLUTION INTRAVENOUS at 13:30

## 2019-12-02 RX ADMIN — FAMOTIDINE 20 MG: 20 TABLET, FILM COATED ORAL at 20:46

## 2019-12-02 RX ADMIN — SODIUM CHLORIDE: 0.9 INJECTION, SOLUTION INTRAVENOUS at 11:52

## 2019-12-02 RX ADMIN — PROPOFOL 100 MG: 10 INJECTION, EMULSION INTRAVENOUS at 11:40

## 2019-12-02 RX ADMIN — SODIUM CHLORIDE 100 ML/HR: 900 INJECTION, SOLUTION INTRAVENOUS at 16:20

## 2019-12-02 RX ADMIN — ONDANSETRON 4 MG: 2 INJECTION INTRAMUSCULAR; INTRAVENOUS at 13:05

## 2019-12-02 RX ADMIN — HYDROCODONE BITARTRATE AND ACETAMINOPHEN 1 TABLET: 5; 325 TABLET ORAL at 20:46

## 2019-12-02 RX ADMIN — POTASSIUM CHLORIDE 20 MEQ: 400 INJECTION, SOLUTION INTRAVENOUS at 17:06

## 2019-12-02 RX ADMIN — SODIUM CHLORIDE, SODIUM LACTATE, POTASSIUM CHLORIDE, AND CALCIUM CHLORIDE 150 ML/HR: 600; 310; 30; 20 INJECTION, SOLUTION INTRAVENOUS at 10:28

## 2019-12-02 RX ADMIN — HYDROCODONE BITARTRATE AND ACETAMINOPHEN 1 TABLET: 5; 325 TABLET ORAL at 03:13

## 2019-12-02 RX ADMIN — ROCURONIUM BROMIDE 50 MG: 10 INJECTION, SOLUTION INTRAVENOUS at 11:40

## 2019-12-02 RX ADMIN — Medication 5 MG: at 13:05

## 2019-12-02 RX ADMIN — HYDROMORPHONE HYDROCHLORIDE 0.5 MG: 2 INJECTION, SOLUTION INTRAMUSCULAR; INTRAVENOUS; SUBCUTANEOUS at 14:09

## 2019-12-02 RX ADMIN — HYDROMORPHONE HYDROCHLORIDE 0.5 MG: 2 INJECTION, SOLUTION INTRAMUSCULAR; INTRAVENOUS; SUBCUTANEOUS at 15:00

## 2019-12-02 RX ADMIN — DEXTROSE MONOHYDRATE AND SODIUM CHLORIDE 125 ML/HR: 5; .9 INJECTION, SOLUTION INTRAVENOUS at 02:50

## 2019-12-02 RX ADMIN — KETAMINE HYDROCHLORIDE 10 MG: 10 INJECTION INTRAMUSCULAR; INTRAVENOUS at 12:35

## 2019-12-02 RX ADMIN — MORPHINE SULFATE 4 MG: 4 INJECTION INTRAVENOUS at 09:58

## 2019-12-02 RX ADMIN — KETOROLAC TROMETHAMINE 30 MG: 30 INJECTION, SOLUTION INTRAMUSCULAR at 20:45

## 2019-12-02 RX ADMIN — HYDROMORPHONE HYDROCHLORIDE 0.5 MG: 2 INJECTION, SOLUTION INTRAMUSCULAR; INTRAVENOUS; SUBCUTANEOUS at 14:30

## 2019-12-02 RX ADMIN — Medication 5 MG: at 13:10

## 2019-12-02 RX ADMIN — FENTANYL CITRATE 50 MCG: 50 INJECTION, SOLUTION INTRAMUSCULAR; INTRAVENOUS at 13:35

## 2019-12-02 RX ADMIN — PIPERACILLIN AND TAZOBACTAM 3.38 G: 3; .375 INJECTION, POWDER, LYOPHILIZED, FOR SOLUTION INTRAVENOUS at 20:47

## 2019-12-02 RX ADMIN — SODIUM CHLORIDE, SODIUM LACTATE, POTASSIUM CHLORIDE, AND CALCIUM CHLORIDE: 600; 310; 30; 20 INJECTION, SOLUTION INTRAVENOUS at 13:10

## 2019-12-02 RX ADMIN — ROCURONIUM BROMIDE 20 MG: 10 INJECTION, SOLUTION INTRAVENOUS at 12:35

## 2019-12-02 RX ADMIN — Medication 5 MG: at 13:03

## 2019-12-02 RX ADMIN — POTASSIUM CHLORIDE 20 MEQ: 400 INJECTION, SOLUTION INTRAVENOUS at 11:59

## 2019-12-02 RX ADMIN — HYDROMORPHONE HYDROCHLORIDE 0.5 MG: 2 INJECTION, SOLUTION INTRAMUSCULAR; INTRAVENOUS; SUBCUTANEOUS at 14:45

## 2019-12-02 RX ADMIN — GLYCOPYRROLATE 0.8 MG: 0.2 INJECTION, SOLUTION INTRAMUSCULAR; INTRAVENOUS at 13:30

## 2019-12-02 RX ADMIN — DEXTROSE MONOHYDRATE AND SODIUM CHLORIDE 125 ML/HR: 5; .9 INJECTION, SOLUTION INTRAVENOUS at 03:28

## 2019-12-02 RX ADMIN — KETAMINE HYDROCHLORIDE 20 MG: 10 INJECTION INTRAMUSCULAR; INTRAVENOUS at 11:57

## 2019-12-02 NOTE — PROGRESS NOTES
LOS: 1 day   Patient Care Team:  Michelle Colon MD as PCP - General (Internal Medicine)      Subjective     Subjective: Patient has not had a bowel movement in many days.  Passing small amounts of gas.  Is nauseated especially when she tries drinking liquids.  Denies vomiting.      ROS:   Review of Systems   Constitutional: Negative for chills and fever.   HENT: Negative for congestion and trouble swallowing.    Respiratory: Negative for cough and shortness of breath.    Cardiovascular: Negative for chest pain and palpitations.   Gastrointestinal: Positive for abdominal pain and nausea. Negative for vomiting.   Genitourinary: Negative for dysuria and hematuria.   Musculoskeletal: Positive for back pain. Negative for arthralgias.   Neurological: Positive for weakness. Negative for dizziness.   Psychiatric/Behavioral: Negative for agitation and confusion.        Medication Review:   Scheduled Meds:  amLODIPine 5 mg Oral Daily   methylnaltrexone 12 mg Subcutaneous Every Other Day     Continuous Infusions:  lactated ringers 150 mL/hr     PRN Meds:.HYDROcodone-acetaminophen  •  ondansetron  •  potassium chloride **OR** potassium chloride **OR** potassium chloride  •  [COMPLETED] Insert peripheral IV **AND** sodium chloride      Objective     Vital Signs  Temp:  [98.5 °F (36.9 °C)-98.8 °F (37.1 °C)] 98.5 °F (36.9 °C)  Heart Rate:  [75-87] 87  Resp:  [12-16] 16  BP: (111-137)/(63-75) 137/75    Physical Exam:    General Appearance:    Awake and alert, in no acute distress   Head:    Normocephalic, without obvious abnormality   Eyes:          Conjunctivae normal, anicteric sclera   Ears:    Hearing intact   Throat:   No oral lesions, no thrush, oral mucosa moist   Neck:   No adenopathy, supple, no JVD   Lungs:     Respirations regular, even and unlabored       Abdomen:     Large ventral hernia that is not reducible, pain surrounding hernia site, no rebound or guarding, no hepatosplenomegaly   Rectal:     Deferred    Extremities:   No edema, no cyanosis, no redness   Skin:   No bleeding, bruising or rash, no jaundice   Neurologic:   Cranial nerves 2 - 12 grossly intact, no asterixis, sensation   intact        Results Review:    Lab Results (last 24 hours)     Procedure Component Value Units Date/Time    Comprehensive Metabolic Panel [[id number]]  (Abnormal) Collected:  12/02/19 0832    Specimen:  Blood Updated:  12/02/19 0915     Glucose 107 mg/dL      BUN 5 mg/dL      Creatinine 0.39 mg/dL      Sodium 138 mmol/L      Potassium 3.0 mmol/L      Chloride 103 mmol/L      CO2 26.0 mmol/L      Calcium 8.1 mg/dL      Total Protein 5.8 g/dL      Albumin 3.40 g/dL      ALT (SGPT) 9 U/L      AST (SGOT) 14 U/L      Alkaline Phosphatase 53 U/L      Total Bilirubin 0.3 mg/dL      eGFR Non African Amer >150 mL/min/1.73      Globulin 2.4 gm/dL      A/G Ratio 1.4 g/dL      BUN/Creatinine Ratio 12.8     Anion Gap 9.0 mmol/L     Narrative:       GFR Normal >60  Chronic Kidney Disease <60  Kidney Failure <15    Lactic Acid, Plasma [[id number]]  (Normal) Collected:  12/02/19 0832    Specimen:  Blood Updated:  12/02/19 0906     Lactate 0.6 mmol/L     CBC & Differential [[id number]] Collected:  12/02/19 0832    Specimen:  Blood Updated:  12/02/19 0842    Narrative:       The following orders were created for panel order CBC & Differential.  Procedure                               Abnormality         Status                     ---------                               -----------         ------                     CBC Auto Differential[[id number]]        Abnormal            Final result                 Please view results for these tests on the individual orders.    CBC Auto Differential [[id number]]  (Abnormal) Collected:  12/02/19 0832    Specimen:  Blood Updated:  12/02/19 0842     WBC 3.50 10*3/mm3      RBC 3.93 10*6/mm3      Hemoglobin 11.9 g/dL      Hematocrit 33.7 %      MCV 85.9 fL      MCH 30.4 pg      MCHC 35.3 g/dL      RDW 13.5 %      RDW-SD  40.7 fl      MPV 8.3 fL      Platelets 232 10*3/mm3      Neutrophil % 64.2 %      Lymphocyte % 21.4 %      Monocyte % 11.8 %      Eosinophil % 1.9 %      Basophil % 0.7 %      Neutrophils, Absolute 2.30 10*3/mm3      Lymphocytes, Absolute 0.80 10*3/mm3      Monocytes, Absolute 0.40 10*3/mm3      Eosinophils, Absolute 0.10 10*3/mm3      Basophils, Absolute 0.00 10*3/mm3      nRBC 0.0 /100 WBC           Imaging Results (Last 24 Hours)     ** No results found for the last 24 hours. **            Assessment/Plan     Large Ventral hernia  Hypokalemia  Constipation    Plan:  Patient has not had a bowel movement in many days.  Large ventral hernia is not reducible.  She has been given laxatives and still had no bowel movement.  Has been on clear liquids for many days and is nauseated.  Lactate is normal.  She is scheduled for surgery later this afternoon with Dr. Lr.  Plan to place NG tube to low wall intermittent suction.  Continue IV fluids and supportive care.  Replace potassium.    Rozina Alas, APRN  12/02/19  9:28 AM

## 2019-12-02 NOTE — CONSULTS
Nutrition Services    Patient Name:  Nallely Figueroa  YOB: 1955  MRN: [id number]  Admit Date:  11/26/2019    Comment:    Pt has been NPO/CLD x 4 days. Pt currently NPO for sx. Will cont to monitor for diet advancement and add interventions as clinically indicated.       Reason for Assessment     Row Name           Reason for Assessment    Reason For Assessment NPO/CLD x 4 days        Diagnosis H&P: HTN, hernia repair, constipation     Current Problems: Per Gen sx: incarcerated ventral hernia and no bm, even with bowel prep. She has small bowel obstruction. Will check labs and plan to proceed to OR today for ex lap         Nutrition/Diet History     Row Name           Nutrition/Diet History    Typical Food/Fluid Intake Unable to speak with pt as she was in sx at time of visit      Functional Status Unknown baseline        Food Allergies  NKFA        Factors Affecting Nutritional Intake  SBO          Anthropometrics             Anthropometrics    Height 63 inches     Weight 69.2 kg (152 lb 8.9 oz)    11/26/19- No new wt since admit        Admit Weight    Admit Weight 68.9 kg (151 lb 14.4 oz)    11/26/19        Ideal Body Weight (IBW)    Ideal Body Weight (IBW) (kg) 115#     % Ideal Body Weight 76%        Usual Body Weight (UBW)    Usual Body Weight Unable to determine     % Usual Body Weight Unable to determine     Weight Hx  152# (9/13/19)  155# (8/13/19)   167# (6/3/19)  169# (4/9/19)   176# (3/12/19)  194# (12/3/18)    Noted Significant wt loss of 21% x 1 year        Body Mass Index (BMI)    BMI (kg/m2) 27.02            Labs/Tests/Procedures/Meds                Labs    Pertinent Lab Results Comments Gluc 107 H, K+ 3.0 L, Crt 0.39 L, BUN 5 L, Ca 8.1 L, Alb 3.4 L, Hgb 11.9 L, Hct 33.7 L         Medications    Pertinent Medications Comments  Relistor, LR @ 150 ml/hr, norco prn, diludid prn          Physical Findings                Physical Findings    Overall Physical Appearance Unable to visualize pt at  visit      Gastrointestinal No BM since admit      Tubes NG for decompression      Oral/Mouth Cavity No known chewing or swallowing issues      Skin abd sx incision          Estimated/Assessed Needs              Calculation Measurements    Weight Used For Calculations      Height         KCAL/KG    KCAL/KG               Protein Requirements    Weight Used For Protein Calculations      Est Protein Requirement Amount (gms/kg)      Estimated Protein Requirements (gms/day)         Fluid Requirements    Estimated Fluid Requirements (mL/day)          Nutrition Prescription Ordered                Nutrition Prescription PO    Current PO Diet  NPO     Has been NPO/CLD x 4 days continuously (6 days total of NPO/liquid diet since admission)      Supplement  -- -     Supplement Frequency  -- -        Nutrition Prescription EN    Enteral Route  -- -     Product  -- -     TF Delivery Method  -----     Continuous TF Goal Rate (mL/hr)  -- -     Water flush (mL)   -- -     Water Flush Frequency  -- -        Nutrition Prescription PN    PN Route  -- -     PN Goal Volume (mL)  -- -     Dextrose (Kcal)  -- -     Amino Acid (gm)  -- -     Lipid Concentration (%)  -- -     Lipid Volume (mL)  -- -     Lipid Frequency  -- -         Evaluation of Received Nutrient/Fluid Intake                PO Evaluation    % PO Intake Meals recorded would reflect CLD         EN Evaluation    TF Changes  -- -     TF Residual  -- -     TF Tolerance  -- -        PN Evaluation    Number of Days PN Evaluated  -- -           Problem/Interventions:  Problem 1                Nutrition Diagnoses Problem 1    Problem 1 Inadequate oral intakes r/t SBO and ventral hernia AEB NPO/CLD x 6 days total of admission.                Intervention Goal                Intervention Goal    General Diet to advance          Nutrition Intervention                Nutrition Intervention    RD/Tech Action Continue to monitor and add interventions as clinically indicated           Nutrition Prescription     Row Name           Nutrition Prescription PO    Supplement --     Supplement Frequency --         Nutrition Prescription EN    Enteral Prescription  -- -        Nutrition Prescription PN    Parenteral Prescription  -- -         Education/Evaluation                Monitor/Evaluation    Monitor  diet advancement, PO intakes, labs, BM and skin                    Electronically signed by:  Marley Issa RD  12/02/19 2:11 PM

## 2019-12-02 NOTE — ANESTHESIA PREPROCEDURE EVALUATION
Anesthesia Evaluation     Patient summary reviewed and Nursing notes reviewed   NPO Solid Status: > 8 hours  NPO Liquid Status: > 8 hours           Airway   Mallampati: I  TM distance: >3 FB  Neck ROM: full  No difficulty expected  Dental - normal exam     Pulmonary - negative pulmonary ROS and normal exam   Cardiovascular - normal exam    (+) hypertension, CAD,     ROS comment: Nonobstructive CAD    Neuro/Psych- negative ROS  GI/Hepatic/Renal/Endo    (+)   thyroid problem hypothyroidism    Musculoskeletal (-) negative ROS    Abdominal  - normal exam    Bowel sounds: normal.   Substance History - negative use     OB/GYN negative ob/gyn ROS         Other                      Anesthesia Plan    ASA 4     general     intravenous induction     Anesthetic plan, all risks, benefits, and alternatives have been provided, discussed and informed consent has been obtained with: patient.  Use of blood products discussed with patient  Consented to blood products.

## 2019-12-02 NOTE — PROGRESS NOTES
Continued Stay Note  [facility]     Patient Name: Nallely Figueroa  MRN: [id number]  Today's Date: 12/2/2019    Admit Date: 11/26/2019    Discharge Plan     Row Name 12/02/19 1245       Plan    Plan Comments   Barrier to dc: Patient in surgery today            Estephania Chavez RN

## 2019-12-02 NOTE — PROGRESS NOTES
LOS: 1 day   Patient Care Team:  Michelle Colon MD as PCP - General (Internal Medicine)    Chief Complaint: no bm even with bowel prep, increasing distention  Interval History:   Patient Denies: Vomiting  History taken from: Patient      Objective     Vital Signs  Temp:  [98.5 °F (36.9 °C)-98.8 °F (37.1 °C)] 98.5 °F (36.9 °C)  Heart Rate:  [75-87] 87  Resp:  [12-16] 16  BP: (111-137)/(63-75) 137/75      Physical Exam:   Heart: RR   Lungs:  CTA B   Abd: ventral hernia is larger and incarcerated, abd distention worse   Ext:  No clubbing, cyanosis, edema     Results Review:     I reviewed the patient's new clinical results.    Lab Results (last 24 hours)     ** No results found for the last 24 hours. **          Assessment/Plan:    Internal hernia      64 y.o. female, with incarcerated ventral hernia and no bm, even with bowel prep. She has small bowel obstruction. Will check labs and plan to proceed to OR today for ex lap. I explained with risk of anastomotic leak and bleeding, and small possibility of temporary stoma.       Emily Lr MD  General Surgeon  12/02/19

## 2019-12-02 NOTE — ANESTHESIA PROCEDURE NOTES
Airway  Urgency: elective    Date/Time: 12/2/2019 11:41 AM    General Information and Staff    Patient location during procedure: OR    Indications and Patient Condition  Indications for airway management: airway protection    Preoxygenated: yes  MILS maintained throughout  Mask difficulty assessment: 0 - not attempted    Final Airway Details  Final airway type: endotracheal airway        Successful intubation technique: direct laryngoscopy  Facilitating devices/methods: intubating stylet  Endotracheal tube insertion site: oral  Blade: Kim  Blade size: 2  Cormack-Lehane Classification: grade IIb - view of arytenoids or posterior of glottis only  Placement verified by: chest auscultation and capnometry   Measured from: gums  ETT/EBT to gums (cm): 19  Number of attempts at approach: 1  Assessment: lips, teeth, and gum same as pre-op and atraumatic intubation

## 2019-12-02 NOTE — OP NOTE
LAPAROTOMY EXPLORATORY  Procedure Report    Patient Name:  Nallely Figueroa  YOB: 1955    Date of Surgery:  12/2/2019     Indications: 64-year-old lady has been in the hospital under observation and and has had increasing signs of bowel obstruction.    Pre-op Diagnosis:   Internal hernia [K45.8]       Post-Op Diagnosis Codes:     Closed-loop large bowel obstruction due to adhesions of the left lower quadrant    Procedure/CPT® Codes:      Procedure(s):  sigmoid colectomy with placement of loop ileostomy    Staff:  Surgeon(s):  Emily Lr MD    Assistant: Sheila Blount RNFA    Anesthesia: General    Estimated Blood Loss: <500ml    Implants:    Implant Name Type Inv. Item Serial No.  Lot No. LRB No. Used   RELOAD STPLR LNR CUT PROX 75MM YOMAIRA TCR75 - [id number] Implant RELOAD STPLR LNR CUT PROX 75MM YOMAIRA TCR75  [employer]  DIV OF J AND J [id number] N/A 1   STPLR LNR CUT PROX 75MM YOMAIRA TLC75 - [id number] Implant STPLR LNR CUT PROX 75MM YOMAIRA TLC75  [employer]  DIV OF J AND J [id number] N/A 1   circular stapler      [id number] N/A 1   STPLR CRV CUT CONTRD 3MM YOMAIRA CS40B - [id number] Implant STPLR CRV CUT CONTRD 3MM YOMAIRA [id number]  [employer]  DIV OF J AND J [id number] N/A 1       Specimen:          Specimens     ID Source Type Tests Collected By Collected At Frozen?      A Abdomen, Left Tissue · TISSUE PATHOLOGY EXAM   Emily Lr MD 12/2/19 1240      Description: sigmoid colon               Findings: Closed-loop large bowel obstruction and also very large floppy redundant sigmoid colon or a future volvulus therefore it was resected.  There was also some adhesions of the small bowel to the same location in the left lower quadrant but this was not actually a cause of obstruction.    Complications: None    Description of Procedure: General endotracheal anesthesia was provided and a Alexander catheter was placed.  A midline incision was made.  I did encounter the incarcerated omentum and  the ventral hernia of the epigastric region.  The peritoneal sac was opened sharply and there was no bowel in this hernia.  There was another smaller hernia just inferior to this that contained omentum as well.  I incised through the midline incision peritoneum and I did have to go through some mesh.  I extended the incision down to the suprapubic area.  I was able to locate a loop of small bowel densely adhesed to some mesh in the left lower quadrant.  This was taken down with sharp dissection and there was no enterotomy.  Was also a segment of sigmoid that was adhesed in the left lower quadrant and another segment of sigmoid about 1-1/2 feet distal to this location of the sigmoid adhesed here at the same location and this was causing a closed loop type of obstruction of the sigmoid colon.  Is all taken down with sharp dissection and cautery.  I felt the sigmoid colon was very redundant and also I did see that the cecum was quite dilated likely because of chronic obstruction.  I felt that this long redundant loop of sigmoid colon was at risk of future volvulus and felt like it needed to be resected.  I first made an intentional colotomy in an area of the sigmoid colon first area of obstruction and was able to place a EEA stapling device anvil at this location and brought it out of the colon and then stapled off the colon at this location with a 75 ALANIS stapling device.  I then opened the sigmoid distally to this and inserted the EEA stapling device and was able to create an end-to-end anastomosis with the EEA stapling device.  Then stapled off this portion of the colon with a 75 ALANIS stapling device and then took down the mesentery with the Enseal device.  There were a couple of deserosalizations and I closed these with interrupted 3-0 silk sutures.  During this process there was some spillage of stool from the colon in the field and this was suctioned up as best that we could.    Felt that due to the fact there was  still an enlarged cecum and this was an unprepped colon and this was contaminated that it would be best to perform a diverting loop ileostomy.  Some lysis of adhesions were performed to free up some of the colon and the small bowel and I brought up the loop of ileum in the right lower quadrant through the abdominal wall.  We irrigated the peritoneum with chlorhexidine solution and normal saline.  We changed gown and gloves and closed the fascia with running 1 PDS.  I also closed some of the subcutaneous defect with a running 2-0 Vicryl.  Just before this I irrigated the wound with chlorhexidine solution.  I closed the skin with staples and then later after covering the incision matured the ileostomy with 3-0 Vicryl.      Emily Lr MD     Date: 12/2/2019  Time: 3:59 PM

## 2019-12-03 PROBLEM — Z43.2 ATTENTION TO ILEOSTOMY (HCC): Status: ACTIVE | Noted: 2019-12-03

## 2019-12-03 LAB
ALBUMIN SERPL-MCNC: 2.5 G/DL (ref 3.5–5.2)
ALBUMIN/GLOB SERPL: 1.2 G/DL
ALP SERPL-CCNC: 40 U/L (ref 39–117)
ALT SERPL W P-5'-P-CCNC: 9 U/L (ref 1–33)
ANION GAP SERPL CALCULATED.3IONS-SCNC: 8 MMOL/L (ref 5–15)
AST SERPL-CCNC: 20 U/L (ref 1–32)
BASOPHILS # BLD AUTO: 0 10*3/MM3 (ref 0–0.2)
BASOPHILS NFR BLD AUTO: 0.2 % (ref 0–1.5)
BILIRUB SERPL-MCNC: 0.4 MG/DL (ref 0.2–1.2)
BUN BLD-MCNC: 11 MG/DL (ref 8–23)
BUN/CREAT SERPL: 20.4 (ref 7–25)
CALCIUM SPEC-SCNC: 7.6 MG/DL (ref 8.6–10.5)
CHLORIDE SERPL-SCNC: 100 MMOL/L (ref 98–107)
CO2 SERPL-SCNC: 25 MMOL/L (ref 22–29)
CREAT BLD-MCNC: 0.54 MG/DL (ref 0.57–1)
DEPRECATED RDW RBC AUTO: 42 FL (ref 37–54)
EOSINOPHIL # BLD AUTO: 0 10*3/MM3 (ref 0–0.4)
EOSINOPHIL NFR BLD AUTO: 0.1 % (ref 0.3–6.2)
ERYTHROCYTE [DISTWIDTH] IN BLOOD BY AUTOMATED COUNT: 13.4 % (ref 12.3–15.4)
GFR SERPL CREATININE-BSD FRML MDRD: 114 ML/MIN/1.73
GLOBULIN UR ELPH-MCNC: 2.1 GM/DL
GLUCOSE BLD-MCNC: 103 MG/DL (ref 65–99)
HCT VFR BLD AUTO: 31.6 % (ref 34–46.6)
HGB BLD-MCNC: 10.7 G/DL (ref 12–15.9)
LYMPHOCYTES # BLD AUTO: 0.4 10*3/MM3 (ref 0.7–3.1)
LYMPHOCYTES NFR BLD AUTO: 7.5 % (ref 19.6–45.3)
MCH RBC QN AUTO: 29.9 PG (ref 26.6–33)
MCHC RBC AUTO-ENTMCNC: 33.9 G/DL (ref 31.5–35.7)
MCV RBC AUTO: 88.2 FL (ref 79–97)
MONOCYTES # BLD AUTO: 0.4 10*3/MM3 (ref 0.1–0.9)
MONOCYTES NFR BLD AUTO: 7.3 % (ref 5–12)
NEUTROPHILS # BLD AUTO: 4.8 10*3/MM3 (ref 1.7–7)
NEUTROPHILS NFR BLD AUTO: 84.9 % (ref 42.7–76)
NRBC BLD AUTO-RTO: 0 /100 WBC (ref 0–0.2)
PLATELET # BLD AUTO: 226 10*3/MM3 (ref 140–450)
PMV BLD AUTO: 9 FL (ref 6–12)
POTASSIUM BLD-SCNC: 4.1 MMOL/L (ref 3.5–5.2)
PROT SERPL-MCNC: 4.6 G/DL (ref 6–8.5)
RBC # BLD AUTO: 3.58 10*6/MM3 (ref 3.77–5.28)
SODIUM BLD-SCNC: 133 MMOL/L (ref 136–145)
WBC NRBC COR # BLD: 5.7 10*3/MM3 (ref 3.4–10.8)

## 2019-12-03 PROCEDURE — 99232 SBSQ HOSP IP/OBS MODERATE 35: CPT | Performed by: NURSE PRACTITIONER

## 2019-12-03 PROCEDURE — 80053 COMPREHEN METABOLIC PANEL: CPT | Performed by: SURGERY

## 2019-12-03 PROCEDURE — 25010000002 PIPERACILLIN SOD-TAZOBACTAM PER 1 G: Performed by: SURGERY

## 2019-12-03 PROCEDURE — 25010000002 METHYLNALTREXONE 12 MG/0.6ML SOLUTION: Performed by: SURGERY

## 2019-12-03 PROCEDURE — 85025 COMPLETE CBC W/AUTO DIFF WBC: CPT | Performed by: SURGERY

## 2019-12-03 PROCEDURE — 25010000002 KETOROLAC TROMETHAMINE PER 15 MG: Performed by: SURGERY

## 2019-12-03 PROCEDURE — 25010000002 ENOXAPARIN PER 10 MG: Performed by: SURGERY

## 2019-12-03 PROCEDURE — 99024 POSTOP FOLLOW-UP VISIT: CPT | Performed by: SURGERY

## 2019-12-03 RX ORDER — KETOROLAC TROMETHAMINE 30 MG/ML
15 INJECTION, SOLUTION INTRAMUSCULAR; INTRAVENOUS EVERY 6 HOURS
Status: DISCONTINUED | OUTPATIENT
Start: 2019-12-03 | End: 2019-12-05 | Stop reason: HOSPADM

## 2019-12-03 RX ADMIN — METHYLNALTREXONE BROMIDE 12 MG: 12 INJECTION, SOLUTION SUBCUTANEOUS at 11:22

## 2019-12-03 RX ADMIN — Medication 10 ML: at 10:04

## 2019-12-03 RX ADMIN — SODIUM CHLORIDE 100 ML/HR: 900 INJECTION, SOLUTION INTRAVENOUS at 06:53

## 2019-12-03 RX ADMIN — AMLODIPINE BESYLATE 5 MG: 5 TABLET ORAL at 10:04

## 2019-12-03 RX ADMIN — KETOROLAC TROMETHAMINE 15 MG: 30 INJECTION, SOLUTION INTRAMUSCULAR at 15:56

## 2019-12-03 RX ADMIN — FAMOTIDINE 20 MG: 20 TABLET, FILM COATED ORAL at 20:40

## 2019-12-03 RX ADMIN — KETOROLAC TROMETHAMINE 30 MG: 30 INJECTION, SOLUTION INTRAMUSCULAR at 10:04

## 2019-12-03 RX ADMIN — ENOXAPARIN SODIUM 40 MG: 40 INJECTION SUBCUTANEOUS at 15:56

## 2019-12-03 RX ADMIN — HYDROCODONE BITARTRATE AND ACETAMINOPHEN 1 TABLET: 5; 325 TABLET ORAL at 11:22

## 2019-12-03 RX ADMIN — HYDROCODONE BITARTRATE AND ACETAMINOPHEN 1 TABLET: 5; 325 TABLET ORAL at 20:42

## 2019-12-03 RX ADMIN — HYDROCODONE BITARTRATE AND ACETAMINOPHEN 1 TABLET: 5; 325 TABLET ORAL at 06:54

## 2019-12-03 RX ADMIN — PIPERACILLIN AND TAZOBACTAM 3.38 G: 3; .375 INJECTION, POWDER, LYOPHILIZED, FOR SOLUTION INTRAVENOUS at 05:00

## 2019-12-03 RX ADMIN — HYDROCODONE BITARTRATE AND ACETAMINOPHEN 1 TABLET: 5; 325 TABLET ORAL at 16:05

## 2019-12-03 RX ADMIN — KETOROLAC TROMETHAMINE 30 MG: 30 INJECTION, SOLUTION INTRAMUSCULAR at 05:00

## 2019-12-03 RX ADMIN — FAMOTIDINE 20 MG: 20 TABLET, FILM COATED ORAL at 10:04

## 2019-12-03 NOTE — PLAN OF CARE
Problem: Patient Care Overview  Goal: Plan of Care Review  Outcome: Ongoing (interventions implemented as appropriate)   12/03/19 0039   Coping/Psychosocial   Plan of Care Reviewed With patient   Plan of Care Review   Progress improving       Problem: Pain, Acute (Adult)  Goal: Acceptable Pain Control/Comfort Level  Outcome: Ongoing (interventions implemented as appropriate)   12/03/19 0039   Pain, Acute (Adult)   Acceptable Pain Control/Comfort Level making progress toward outcome

## 2019-12-03 NOTE — PROGRESS NOTES
Ostomy Note  [facility]     Patient Name: Nallely Figueroa  : 1955  MRN: [id number]  Today's Date: 12/3/2019 Room Number: 4132/1      Admit Date: 2019  Attending: Emily Lr MD    Consult Requested By: Dr Lr    Reason For Consult: New ileostomy    Chief Complaint: Patient is postop day 1 status post colectomy with formation of loop ileostomy.    Visit Dx:    ICD-10-CM ICD-9-CM   1. Internal hernia K45.8 553.8   2. Lower abdominal pain R10.30 789.09     Patient Active Problem List   Diagnosis   • Essential hypertension   • Dyslipidemia   • Fatigue   • Syncope   • Abnormal stress test   • Diaphoresis   • Internal hernia   • Attention to ileostomy (CMS/LTAC, located within [facility])       History:   Past Medical History:   Diagnosis Date   • Hypertension      Past Surgical History:   Procedure Laterality Date   • CARDIAC CATHETERIZATION N/A 2019    Procedure: Left Heart Cath;  Surgeon: Jeronimo Kuhn MD;  Location: [facility] CATH INVASIVE LOCATION;  Service: Cardiovascular   • CARDIAC CATHETERIZATION N/A 2019    Procedure: Coronary angiography;  Surgeon: Jeronimo Kuhn MD;  Location: [facility] CATH INVASIVE LOCATION;  Service: Cardiovascular   • HERNIA REPAIR     • TONSILLECTOMY       Social History     Socioeconomic History   • Marital status:      Spouse name: Not on file   • Number of children: Not on file   • Years of education: Not on file   • Highest education level: Not on file   Tobacco Use   • Smoking status: Never Smoker   • Smokeless tobacco: Never Used   Substance and Sexual Activity   • Alcohol use: No     Frequency: Never   • Drug use: No       Allergies:  Allergies   Allergen Reactions   • Ace Inhibitors Cough       Medications:    Current Facility-Administered Medications:   •  amLODIPine (NORVASC) tablet 5 mg, 5 mg, Oral, Daily, Emily Lr MD, 5 mg at 19 1004  •  famotidine (PEPCID) tablet 20 mg, 20 mg, Oral, BID, Emily Lr MD, 20 mg at 19 1004  •   HYDROcodone-acetaminophen (NORCO) 5-325 MG per tablet 1 tablet, 1 tablet, Oral, Q4H PRN, Omega Tom MD, 1 tablet at 12/03/19 0654  •  HYDROmorphone (DILAUDID) injection 0.5 mg, 0.5 mg, Intravenous, Q2H PRN **AND** naloxone (NARCAN) injection 0.1 mg, 0.1 mg, Intravenous, Q5 Min PRN, Emily Lr MD  •  HYDROmorphone (DILAUDID) injection 1 mg, 1 mg, Intravenous, Q4H PRN, Emily Lr MD  •  ketorolac (TORADOL) injection 30 mg, 30 mg, Intravenous, Q6H, Emily Lr MD, 30 mg at 12/03/19 1004  •  lactated ringers infusion, 100 mL/hr, Intravenous, Continuous, Emily Lr MD, Stopped at 12/02/19 1621  •  methylnaltrexone (RELISTOR) injection 12 mg, 12 mg, Subcutaneous, Every Other Day, Emily Lr MD  •  Morphine sulfate (PF) injection 4 mg, 4 mg, Intravenous, Q2H PRN, Emily Lr MD, 4 mg at 12/02/19 0958  •  ondansetron (ZOFRAN) tablet 4 mg, 4 mg, Oral, Q6H PRN **OR** ondansetron (ZOFRAN) injection 4 mg, 4 mg, Intravenous, Q6H PRN, Emily Lr MD  •  ondansetron (ZOFRAN) injection 8 mg, 8 mg, Intravenous, Q6H PRN, Emily Lr MD, 8 mg at 11/26/19 2250  •  potassium chloride (K-DUR,KLOR-CON) CR tablet 40 mEq, 40 mEq, Oral, PRN, 40 mEq at 12/01/19 0230 **OR** potassium chloride (KLOR-CON) packet 40 mEq, 40 mEq, Oral, PRN **OR** potassium chloride 10 mEq in 100 mL IVPB, 10 mEq, Intravenous, Q1H PRN, Emily Lr MD  •  [COMPLETED] Insert peripheral IV, , , Once **AND** sodium chloride 0.9 % flush 10 mL, 10 mL, Intravenous, PRN, Richard Lao MD, 10 mL at 12/03/19 1004  •  sodium chloride 0.9 % infusion, 9 mL/hr, Intravenous, Continuous PRN, Darrin Tubbs MD  •  sodium chloride 0.9 % infusion, 100 mL/hr, Intravenous, Continuous, Emily Lr MD, Last Rate: 100 mL/hr at 12/03/19 0653, 100 mL/hr at 12/03/19 0653    Results Review:  Lab Results (last 48 hours)     Procedure Component Value Units Date/Time    Tissue Pathology Exam [[id number]] Collected:  12/02/19 1240    Specimen:  Tissue from Colon  Updated:  12/03/19 0744    Comprehensive Metabolic Panel [[id number]]  (Abnormal) Collected:  12/03/19 0250    Specimen:  Blood Updated:  12/03/19 0454     Glucose 103 mg/dL      BUN 11 mg/dL      Comment: Result checked         Creatinine 0.54 mg/dL      Sodium 133 mmol/L      Potassium 4.1 mmol/L      Chloride 100 mmol/L      CO2 25.0 mmol/L      Calcium 7.6 mg/dL      Total Protein 4.6 g/dL      Albumin 2.50 g/dL      ALT (SGPT) 9 U/L      AST (SGOT) 20 U/L      Alkaline Phosphatase 40 U/L      Total Bilirubin 0.4 mg/dL      eGFR Non African Amer 114 mL/min/1.73      Globulin 2.1 gm/dL      A/G Ratio 1.2 g/dL      BUN/Creatinine Ratio 20.4     Anion Gap 8.0 mmol/L     Narrative:       GFR Normal >60  Chronic Kidney Disease <60  Kidney Failure <15    CBC & Differential [[id number]] Collected:  12/03/19 0250    Specimen:  Blood Updated:  12/03/19 0426    Narrative:       The following orders were created for panel order CBC & Differential.  Procedure                               Abnormality         Status                     ---------                               -----------         ------                     CBC Auto Differential[[id number]]        Abnormal            Final result                 Please view results for these tests on the individual orders.    CBC Auto Differential [[id number]]  (Abnormal) Collected:  12/03/19 0250    Specimen:  Blood Updated:  12/03/19 0426     WBC 5.70 10*3/mm3      RBC 3.58 10*6/mm3      Hemoglobin 10.7 g/dL      Hematocrit 31.6 %      MCV 88.2 fL      MCH 29.9 pg      MCHC 33.9 g/dL      RDW 13.4 %      RDW-SD 42.0 fl      MPV 9.0 fL      Platelets 226 10*3/mm3      Neutrophil % 84.9 %      Lymphocyte % 7.5 %      Monocyte % 7.3 %      Eosinophil % 0.1 %      Basophil % 0.2 %      Neutrophils, Absolute 4.80 10*3/mm3      Lymphocytes, Absolute 0.40 10*3/mm3      Monocytes, Absolute 0.40 10*3/mm3      Eosinophils, Absolute 0.00 10*3/mm3      Basophils, Absolute 0.00 10*3/mm3       nRBC 0.0 /100 WBC     CBC & Differential [[id number]] Collected:  12/02/19 1653    Specimen:  Blood Updated:  12/02/19 1723    Narrative:       The following orders were created for panel order CBC & Differential.  Procedure                               Abnormality         Status                     ---------                               -----------         ------                     CBC Auto Differential[[id number]]        Abnormal            Final result                 Please view results for these tests on the individual orders.    Comprehensive Metabolic Panel [[id number]]  (Abnormal) Collected:  12/02/19 1653    Specimen:  Blood Updated:  12/02/19 1723     Glucose 149 mg/dL      BUN 7 mg/dL      Creatinine 0.44 mg/dL      Sodium 138 mmol/L      Potassium 3.4 mmol/L      Chloride 103 mmol/L      CO2 26.0 mmol/L      Calcium 7.7 mg/dL      Total Protein 4.8 g/dL      Albumin 2.90 g/dL      ALT (SGPT) 9 U/L      AST (SGOT) 14 U/L      Alkaline Phosphatase 44 U/L      Total Bilirubin 0.4 mg/dL      eGFR Non African Amer 144 mL/min/1.73      Globulin 1.9 gm/dL      A/G Ratio 1.5 g/dL      BUN/Creatinine Ratio 15.9     Anion Gap 9.0 mmol/L     Narrative:       GFR Normal >60  Chronic Kidney Disease <60  Kidney Failure <15    CBC Auto Differential [[id number]]  (Abnormal) Collected:  12/02/19 1653    Specimen:  Blood Updated:  12/02/19 1723     WBC 2.10 10*3/mm3      RBC 3.89 10*6/mm3      Hemoglobin 11.7 g/dL      Hematocrit 33.6 %      MCV 86.5 fL      MCH 30.2 pg      MCHC 34.9 g/dL      RDW 13.4 %      RDW-SD 40.7 fl      MPV 8.3 fL      Platelets 253 10*3/mm3      Neutrophil % 83.0 %      Lymphocyte % 6.5 %      Monocyte % 10.2 %      Eosinophil % 0.1 %      Basophil % 0.2 %      Neutrophils, Absolute 1.80 10*3/mm3      Lymphocytes, Absolute 0.10 10*3/mm3      Monocytes, Absolute 0.20 10*3/mm3      Eosinophils, Absolute 0.00 10*3/mm3      Basophils, Absolute 0.00 10*3/mm3      nRBC 0.1 /100 WBC     Comprehensive  Metabolic Panel [[id number]]  (Abnormal) Collected:  12/02/19 0832    Specimen:  Blood Updated:  12/02/19 0915     Glucose 107 mg/dL      BUN 5 mg/dL      Creatinine 0.39 mg/dL      Sodium 138 mmol/L      Potassium 3.0 mmol/L      Chloride 103 mmol/L      CO2 26.0 mmol/L      Calcium 8.1 mg/dL      Total Protein 5.8 g/dL      Albumin 3.40 g/dL      ALT (SGPT) 9 U/L      AST (SGOT) 14 U/L      Alkaline Phosphatase 53 U/L      Total Bilirubin 0.3 mg/dL      eGFR Non African Amer >150 mL/min/1.73      Globulin 2.4 gm/dL      A/G Ratio 1.4 g/dL      BUN/Creatinine Ratio 12.8     Anion Gap 9.0 mmol/L     Narrative:       GFR Normal >60  Chronic Kidney Disease <60  Kidney Failure <15    Lactic Acid, Plasma [[id number]]  (Normal) Collected:  12/02/19 0832    Specimen:  Blood Updated:  12/02/19 0906     Lactate 0.6 mmol/L     CBC & Differential [[id number]] Collected:  12/02/19 0832    Specimen:  Blood Updated:  12/02/19 0842    Narrative:       The following orders were created for panel order CBC & Differential.  Procedure                               Abnormality         Status                     ---------                               -----------         ------                     CBC Auto Differential[[id number]]        Abnormal            Final result                 Please view results for these tests on the individual orders.    CBC Auto Differential [[id number]]  (Abnormal) Collected:  12/02/19 0832    Specimen:  Blood Updated:  12/02/19 0842     WBC 3.50 10*3/mm3      RBC 3.93 10*6/mm3      Hemoglobin 11.9 g/dL      Hematocrit 33.7 %      MCV 85.9 fL      MCH 30.4 pg      MCHC 35.3 g/dL      RDW 13.5 %      RDW-SD 40.7 fl      MPV 8.3 fL      Platelets 232 10*3/mm3      Neutrophil % 64.2 %      Lymphocyte % 21.4 %      Monocyte % 11.8 %      Eosinophil % 1.9 %      Basophil % 0.7 %      Neutrophils, Absolute 2.30 10*3/mm3      Lymphocytes, Absolute 0.80 10*3/mm3      Monocytes, Absolute 0.40 10*3/mm3      Eosinophils,  Absolute 0.10 10*3/mm3      Basophils, Absolute 0.00 10*3/mm3      nRBC 0.0 /100 WBC         Imaging Results (Last 72 Hours)     Procedure Component Value Units Date/Time    XR Chest 1 View [[id number]] Collected:  12/02/19 1145     Updated:  12/02/19 1148    Narrative:       DATE OF EXAM:  12/2/2019 11:25 AM     PROCEDURE:  XR CHEST 1 VW-     INDICATIONS:  ng tube placement /pre-op; K45.8-Other specified abdominal hernia  without obstruction or gangrene; R10.30-Lower abdominal pain,  unspecified     COMPARISON:  Portable chest x-ray 11/26/2019, KUB 12/02/2019.     TECHNIQUE:   Single radiographic AP view of the chest was obtained.     FINDINGS:  The entire enteric tube is not included, but the tip is included  directed cranially towards the gastroesophageal junction.     There are new patchy bibasilar opacities. No pneumothorax is identified.  Cardiomediastinal contours are likely stable, allowing for differences  in technique.        Impression:       Tip of the enteric tube is directed cranially towards the  gastroesophageal junction. I called the result to the patient's nurse  Amalia at 11:45 AM on 12/02/2019.     New patchy bibasilar opacities, likely subsegmental atelectasis.     Electronically Signed By-Isabel Banda On:12/2/2019 11:46 AM  This report was finalized on [id number] by  Isabel Banda, .    XR Abdomen KUB [[id number]] Collected:  12/02/19 1140     Updated:  12/02/19 1147    Narrative:       DATE OF EXAM:  12/2/2019 11:24 AM     PROCEDURE:  XR ABDOMEN KUB-     INDICATIONS:  ng tube placement; K45.8-Other specified abdominal hernia without  obstruction or gangrene; R10.30-Lower abdominal pain, unspecified     COMPARISON:  KUB 11/30/2019, CT abdomen and pelvis 11/27/2019     TECHNIQUE:   Radiographic view(s) of the abdomen obtained.     FINDINGS:  The distal enteric tube is looped in the stomach with the tip directed  cranially towards the gastroesophageal junction. Included upper abdomen  shows  persistently distended colon.       Impression:       1.The distal enteric tube is looped in the stomach with the tip directed  cranially towards the gastroesophageal junction. I called the result to  the patient's nurse Amalia at 11:45 AM on 12/02/2019.  2.Persistently distended colon in the included upper abdomen.     Electronically Signed By-Isabel Banda On:12/2/2019 11:45 AM  This report was finalized on [id number] by  Isabel Banda, .          Review of Systems:  Review of Systems   Constitutional: Negative for chills and fever.   Respiratory: Negative for cough and shortness of breath.    Cardiovascular: Negative for chest pain.   Gastrointestinal: Positive for diarrhea. Negative for nausea and vomiting.        Ostomy   Genitourinary: Negative for dysuria.   Psychiatric/Behavioral: Negative.        Physical Assessment:  Right lower quadrant ileostomy, the stoma is red moist and budded with a ConvaTec bar in place.  The pouch is intact.  There is a large amount of thin green stool in the pouch.    Comprehensive teaching initiated with patient regarding the ileostomy.  Written information left to the bedside as well as ostomy products left to the bedside.      Recommendation and Plan  Right lower quadrant ileostomy stoma is viable and functioning.  We will plan to change the appliance on postop day 2 and will continue to work with patient on education and management.    MARAL Escobedo   12/3/2019   11:13 AM

## 2019-12-03 NOTE — PROGRESS NOTES
Tolerating pain on oral pain meds  Alexander out    Vitals:    12/03/19 0300 12/03/19 0600 12/03/19 1055 12/03/19 1507   BP: 109/66 135/73 99/65 112/72   BP Location:   Right arm Right arm   Patient Position:   Sitting Lying   Pulse: 94 92 90 90   Resp: 20 18 15 14   Temp: 97.6 °F (36.4 °C) 99.1 °F (37.3 °C) 98.5 °F (36.9 °C) 98.3 °F (36.8 °C)   TempSrc:   Oral Oral   SpO2: 93% 95% 93% 97%   Weight:       Height:           No distress  Lungs clear  Stoma with good output, YOMI SS  Ext no edema      Labs reviewed      A/p    Pod 1 sigmoid colectomy with anastomosis and diverting loop ileostomy  -doing well, has small rectal bm and good ileostomy output  -will start lovenox, pepcid

## 2019-12-04 LAB
ALBUMIN SERPL-MCNC: 2.5 G/DL (ref 3.5–5.2)
ALBUMIN/GLOB SERPL: 1.1 G/DL
ALP SERPL-CCNC: 59 U/L (ref 39–117)
ALT SERPL W P-5'-P-CCNC: 11 U/L (ref 1–33)
ANION GAP SERPL CALCULATED.3IONS-SCNC: 8 MMOL/L (ref 5–15)
AST SERPL-CCNC: 15 U/L (ref 1–32)
BASOPHILS # BLD AUTO: 0 10*3/MM3 (ref 0–0.2)
BASOPHILS NFR BLD AUTO: 0.5 % (ref 0–1.5)
BILIRUB SERPL-MCNC: 0.4 MG/DL (ref 0.2–1.2)
BUN BLD-MCNC: 14 MG/DL (ref 8–23)
BUN/CREAT SERPL: 20 (ref 7–25)
CALCIUM SPEC-SCNC: 7.9 MG/DL (ref 8.6–10.5)
CHLORIDE SERPL-SCNC: 98 MMOL/L (ref 98–107)
CO2 SERPL-SCNC: 24 MMOL/L (ref 22–29)
CREAT BLD-MCNC: 0.7 MG/DL (ref 0.57–1)
DEPRECATED RDW RBC AUTO: 40.3 FL (ref 37–54)
EOSINOPHIL # BLD AUTO: 0.1 10*3/MM3 (ref 0–0.4)
EOSINOPHIL NFR BLD AUTO: 1.9 % (ref 0.3–6.2)
ERYTHROCYTE [DISTWIDTH] IN BLOOD BY AUTOMATED COUNT: 13.3 % (ref 12.3–15.4)
GFR SERPL CREATININE-BSD FRML MDRD: 84 ML/MIN/1.73
GLOBULIN UR ELPH-MCNC: 2.2 GM/DL
GLUCOSE BLD-MCNC: 99 MG/DL (ref 65–99)
HCT VFR BLD AUTO: 28.9 % (ref 34–46.6)
HGB BLD-MCNC: 9.9 G/DL (ref 12–15.9)
LAB AP CASE REPORT: NORMAL
LYMPHOCYTES # BLD AUTO: 0.5 10*3/MM3 (ref 0.7–3.1)
LYMPHOCYTES NFR BLD AUTO: 9.8 % (ref 19.6–45.3)
MCH RBC QN AUTO: 29.6 PG (ref 26.6–33)
MCHC RBC AUTO-ENTMCNC: 34.4 G/DL (ref 31.5–35.7)
MCV RBC AUTO: 86 FL (ref 79–97)
MONOCYTES # BLD AUTO: 0.3 10*3/MM3 (ref 0.1–0.9)
MONOCYTES NFR BLD AUTO: 5.9 % (ref 5–12)
NEUTROPHILS # BLD AUTO: 4 10*3/MM3 (ref 1.7–7)
NEUTROPHILS NFR BLD AUTO: 81.9 % (ref 42.7–76)
NRBC BLD AUTO-RTO: 0 /100 WBC (ref 0–0.2)
PATH REPORT.FINAL DX SPEC: NORMAL
PATH REPORT.GROSS SPEC: NORMAL
PLATELET # BLD AUTO: 238 10*3/MM3 (ref 140–450)
PMV BLD AUTO: 8.7 FL (ref 6–12)
POTASSIUM BLD-SCNC: 4.1 MMOL/L (ref 3.5–5.2)
PROT SERPL-MCNC: 4.7 G/DL (ref 6–8.5)
RBC # BLD AUTO: 3.36 10*6/MM3 (ref 3.77–5.28)
SODIUM BLD-SCNC: 130 MMOL/L (ref 136–145)
WBC NRBC COR # BLD: 4.9 10*3/MM3 (ref 3.4–10.8)

## 2019-12-04 PROCEDURE — 25010000002 ENOXAPARIN PER 10 MG: Performed by: SURGERY

## 2019-12-04 PROCEDURE — 80053 COMPREHEN METABOLIC PANEL: CPT | Performed by: SURGERY

## 2019-12-04 PROCEDURE — 85025 COMPLETE CBC W/AUTO DIFF WBC: CPT | Performed by: SURGERY

## 2019-12-04 PROCEDURE — 25010000002 KETOROLAC TROMETHAMINE PER 15 MG: Performed by: SURGERY

## 2019-12-04 RX ORDER — MORPHINE SULFATE 4 MG/ML
2 INJECTION, SOLUTION INTRAMUSCULAR; INTRAVENOUS EVERY 4 HOURS PRN
Status: DISCONTINUED | OUTPATIENT
Start: 2019-12-04 | End: 2019-12-05 | Stop reason: HOSPADM

## 2019-12-04 RX ADMIN — KETOROLAC TROMETHAMINE 15 MG: 30 INJECTION, SOLUTION INTRAMUSCULAR at 15:46

## 2019-12-04 RX ADMIN — ENOXAPARIN SODIUM 40 MG: 40 INJECTION SUBCUTANEOUS at 15:47

## 2019-12-04 RX ADMIN — FAMOTIDINE 20 MG: 20 TABLET, FILM COATED ORAL at 20:56

## 2019-12-04 RX ADMIN — KETOROLAC TROMETHAMINE 15 MG: 30 INJECTION, SOLUTION INTRAMUSCULAR at 03:57

## 2019-12-04 RX ADMIN — HYDROCODONE BITARTRATE AND ACETAMINOPHEN 1 TABLET: 5; 325 TABLET ORAL at 15:55

## 2019-12-04 RX ADMIN — KETOROLAC TROMETHAMINE 15 MG: 30 INJECTION, SOLUTION INTRAMUSCULAR at 21:37

## 2019-12-04 RX ADMIN — FAMOTIDINE 20 MG: 20 TABLET, FILM COATED ORAL at 08:44

## 2019-12-04 RX ADMIN — HYDROCODONE BITARTRATE AND ACETAMINOPHEN 1 TABLET: 5; 325 TABLET ORAL at 20:56

## 2019-12-04 RX ADMIN — AMLODIPINE BESYLATE 5 MG: 5 TABLET ORAL at 08:43

## 2019-12-04 RX ADMIN — HYDROCODONE BITARTRATE AND ACETAMINOPHEN 1 TABLET: 5; 325 TABLET ORAL at 08:51

## 2019-12-04 RX ADMIN — KETOROLAC TROMETHAMINE 15 MG: 30 INJECTION, SOLUTION INTRAMUSCULAR at 10:05

## 2019-12-04 RX ADMIN — HYDROCODONE BITARTRATE AND ACETAMINOPHEN 1 TABLET: 5; 325 TABLET ORAL at 03:58

## 2019-12-04 NOTE — ANESTHESIA POSTPROCEDURE EVALUATION
Patient: Nallely Figueroa    Procedure Summary     Date:  12/02/19 Room / Location:  [facility] OR [address] [place] [address] MAIN OR    Anesthesia Start:  1136 Anesthesia Stop:  1355    Procedure:  sigmoid colectomy with placement of loop ileostomy (N/A Abdomen) Diagnosis:       Internal hernia      (Internal hernia [K45.8])    Surgeon:  Emily Lr MD Provider:  Eloy Young MD    Anesthesia Type:  general ASA Status:  4          Anesthesia Type: general  Last vitals  BP   107/65 (12/03/19 2220)   Temp   98.5 °F (36.9 °C) (12/03/19 2220)   Pulse   97 (12/03/19 2220)   Resp   14 (12/03/19 2220)     SpO2   95 % (12/03/19 2220)     Post Anesthesia Care and Evaluation    Patient location during evaluation: bedside  Patient participation: complete - patient participated  Level of consciousness: sleepy but conscious  Pain score: 0  Pain management: adequate  Airway patency: patent  Anesthetic complications: No anesthetic complications  PONV Status: none  Cardiovascular status: acceptable  Respiratory status: acceptable  Hydration status: acceptable

## 2019-12-04 NOTE — NURSING NOTE
Wound/ostomy nurse saw patient today for ileostomy teaching.  ( see LDA for stoma assessment) Pouch removed and placed in 1 piece appliance with adapt barrier ring.  Pt has been emptying bag on her own per report.  She was engaged in teaching - no family at bedside.  Discussed basic care including emptying/changing bag and also discussed diet/ fluids.  Also discussed complications such as dehydration and food blockage.

## 2019-12-04 NOTE — CONSULTS
Nutrition Services    Patient Name:  Nallely Figueroa  YOB: 1955  MRN: [id number]  Admit Date:  11/26/2019    Comment:    Ordering one boost plus daily post sx to promote calorie/protein intakes.     Reason for Assessment     Row Name           Reason for Assessment    Reason For Assessment 12/4: Follow up protocol     12/2: NPO/CLD x 4 days        Diagnosis H&P: HTN, hernia repair, constipation     Current Problems: Per Gen sx: incarcerated ventral hernia and no bm, even with bowel prep. She has small bowel obstruction.     POD #2 sigmoid colectomy with anastomosis and diverting loop ileostomy         Nutrition/Diet History     Row Name           Nutrition/Diet History    Typical Food/Fluid Intake 12/4: Visited pt who reports an intentional weight loss of 60# x 1 year. Pt has been using the HMR program. Pt is happy about the weight loss and states has been maintaining her CBW of 145-150# for 5-6 months.     Post sx pt reports her appetite is slow to return. Her diet was just upgraded to solids today. She is interested in ONS until she is fully able to eat again.     12/2: Unable to speak with pt as she was in sx at time of visit        Functional Status Unknown baseline        Food Allergies  NKFA        Factors Affecting Nutritional Intake  recent bowel sx          Anthropometrics             Anthropometrics    Height 63 inches       Weight 69.2 kg (152 lb 8.9 oz)    11/26/19- No new wt since admit        Admit Weight    Admit Weight 68.9 kg (151 lb 14.4 oz)    11/26/19        Ideal Body Weight (IBW)    Ideal Body Weight (IBW) (kg) 115#     % Ideal Body Weight 76%        Usual Body Weight (UBW)    Usual Body Weight Unable to determine     % Usual Body Weight Unable to determine     Weight Hx  152# (9/13/19)  155# (8/13/19)   167# (6/3/19)  169# (4/9/19)   176# (3/12/19)  194# (12/3/18)    Noted Significant wt loss of 21% x 1 year        Body Mass Index (BMI)    BMI (kg/m2) 27.02             Labs/Tests/Procedures/Meds                Labs    Pertinent Lab Results Comments Gluc 130 L, Ca 7.9 L, Alb 2.9 L, Hgb 9.9 L, Hct 28.9 L         Medications    Pertinent Medications Comments Pepcid, Relistor          Physical Findings                Physical Findings    Overall Physical Appearance 12/4: Visually pt appears appropriate for her reported wt loss. Noted lots of loose skin on upper arms. NFPE not completed as pt states her wt loss was intentional.     12/2: Unable to visualize pt at visit        Gastrointestinal Ileostomy output 1775 ml 12/3       Tubes None at this time        Oral/Mouth Cavity No known chewing or swallowing issues        Skin abd sx incision          Estimated/Assessed Needs              Calculation Measurements    Weight Used For Calculations      Height         KCAL/KG    KCAL/KG               Protein Requirements    Weight Used For Protein Calculations      Est Protein Requirement Amount (gms/kg)      Estimated Protein Requirements (gms/day)         Fluid Requirements    Estimated Fluid Requirements (mL/day)          Nutrition Prescription Ordered                Nutrition Prescription PO    Current PO Diet  Regular      Supplement  -- -     Supplement Frequency  -- -        Nutrition Prescription EN    Enteral Route  -- -     Product  -- -     TF Delivery Method  -----     Continuous TF Goal Rate (mL/hr)  -- -     Water flush (mL)   -- -     Water Flush Frequency  -- -        Nutrition Prescription PN    PN Route  -- -     PN Goal Volume (mL)  -- -     Dextrose (Kcal)  -- -     Amino Acid (gm)  -- -     Lipid Concentration (%)  -- -     Lipid Volume (mL)  -- -     Lipid Frequency  -- -         Evaluation of Received Nutrient/Fluid Intake                PO Evaluation    % PO Intake No solid meals documented yet as diet was just upgraded     Previously NPO/Liquid diet x 5 days          EN Evaluation    TF Changes  -- -     TF Residual  -- -     TF Tolerance  -- -        PN  Evaluation    Number of Days PN Evaluated  -- -           Problem/Interventions:  Problem 1                Nutrition Diagnoses Problem 1    Problem 1 Inadequate oral intakes r/t SBO and ventral hernia AEB NPO/CLD x 5 days total of admission.                Intervention Goal                Intervention Goal    General Pt will eat >50% of meal and accept ONS          Nutrition Intervention                Nutrition Intervention    RD/Tech Action Starting ONS          Nutrition Prescription     Row Name           Nutrition Prescription PO    Supplement Boost Plus (likes a variety of flavors)      Supplement Frequency q daily         Nutrition Prescription EN    Enteral Prescription  -- -        Nutrition Prescription PN    Parenteral Prescription  -- -         Education/Evaluation                Monitor/Evaluation    Monitor  diet advancement, PO intakes, labs, BM and skin                    Electronically signed by:  Marley Issa RD  12/04/19 2:42 PM

## 2019-12-04 NOTE — PROGRESS NOTES
Continued Stay Note   [facility]     Patient Name: Nallely Figueroa  MRN: [id number]  Today's Date: 12/4/2019    Admit Date: 11/26/2019    Discharge Plan     Row Name 12/04/19 1302       Plan    Plan  [facility] home [facility] accepted patient.     Row Name 12/04/19 1013       Plan    Plan  Home with [facility]-referral pending acceptance    Provided post acute provider list?  Yes    Post Acute Provider Lists  Home Health    Post Acuite Provider List  Delivered    Delivered To  Patient    Method of Delivery  In person    Patient/Family in Agreement with Plan  yes    Plan Comments  Spoke with patient at bedside. List of home healths given and CMS compare given to patient. [facility] chosen by patient and referral made to [facility] at [facility]. DCP sent and orders received     [phone]  Estephania Chavez RN

## 2019-12-04 NOTE — DISCHARGE PLACEMENT REQUEST
"Nallely Jones (64 y.o. Female)     Date of Birth Social Security Number Address Home Phone MRN    1955  [address]  [place] [address] [phone] [id number]    Anabaptist Marital Status          Sabianist        Admission Date Admission Type Admitting Provider Attending Provider Department, Room/Bed    11/26/19 Emergency Emily Lr MD Gore, Lanny, MD [facility] SURGICAL INPATIENT, 4132/1    Discharge Date Discharge Disposition Discharge Destination                       Attending Provider:  Emily Lr MD    Allergies:  Ace Inhibitors    Isolation:  None   Infection:  None   Code Status:  CPR    Ht:  160 cm (63\")   Wt:  69.2 kg (152 lb 8.9 oz)    Admission Cmt:  None   Principal Problem:  None                Active Insurance as of 11/26/2019     Primary Coverage     Payor Plan Insurance Group Employer/Plan Group    [facility] [id number]     Payor Plan Address Payor Plan Phone Number Payor Plan Fax Number Effective Dates    [address]   11/1/2018 - None Entered    [facility] [id number]       Subscriber Name Subscriber Birth Date Member ID       NALLELY JONES 1955 [id number]                 Emergency Contacts      (Rel.) Home Phone Work Phone Mobile Phone    TONYA JONES (Son) [phone] -- --              "

## 2019-12-05 ENCOUNTER — TELEPHONE (OUTPATIENT)
Dept: FAMILY MEDICINE CLINIC | Facility: CLINIC | Age: 64
End: 2019-12-05

## 2019-12-05 VITALS
WEIGHT: 152.56 LBS | RESPIRATION RATE: 20 BRPM | SYSTOLIC BLOOD PRESSURE: 125 MMHG | OXYGEN SATURATION: 95 % | DIASTOLIC BLOOD PRESSURE: 69 MMHG | HEART RATE: 78 BPM | HEIGHT: 63 IN | TEMPERATURE: 98.7 F | BODY MASS INDEX: 27.03 KG/M2

## 2019-12-05 LAB
ALBUMIN SERPL-MCNC: 2.3 G/DL (ref 3.5–5.2)
ALBUMIN/GLOB SERPL: 0.9 G/DL
ALP SERPL-CCNC: 59 U/L (ref 39–117)
ALT SERPL W P-5'-P-CCNC: 14 U/L (ref 1–33)
ANION GAP SERPL CALCULATED.3IONS-SCNC: 10 MMOL/L (ref 5–15)
AST SERPL-CCNC: 14 U/L (ref 1–32)
BASOPHILS # BLD AUTO: 0 10*3/MM3 (ref 0–0.2)
BASOPHILS NFR BLD AUTO: 0.4 % (ref 0–1.5)
BILIRUB SERPL-MCNC: 0.2 MG/DL (ref 0.2–1.2)
BUN BLD-MCNC: 10 MG/DL (ref 8–23)
BUN/CREAT SERPL: 18.2 (ref 7–25)
CALCIUM SPEC-SCNC: 8.1 MG/DL (ref 8.6–10.5)
CHLORIDE SERPL-SCNC: 102 MMOL/L (ref 98–107)
CO2 SERPL-SCNC: 25 MMOL/L (ref 22–29)
CREAT BLD-MCNC: 0.55 MG/DL (ref 0.57–1)
DEPRECATED RDW RBC AUTO: 41.6 FL (ref 37–54)
EOSINOPHIL # BLD AUTO: 0.1 10*3/MM3 (ref 0–0.4)
EOSINOPHIL NFR BLD AUTO: 3 % (ref 0.3–6.2)
ERYTHROCYTE [DISTWIDTH] IN BLOOD BY AUTOMATED COUNT: 13.5 % (ref 12.3–15.4)
GFR SERPL CREATININE-BSD FRML MDRD: 111 ML/MIN/1.73
GLOBULIN UR ELPH-MCNC: 2.7 GM/DL
GLUCOSE BLD-MCNC: 121 MG/DL (ref 65–99)
HCT VFR BLD AUTO: 29.1 % (ref 34–46.6)
HGB BLD-MCNC: 9.8 G/DL (ref 12–15.9)
LYMPHOCYTES # BLD AUTO: 0.8 10*3/MM3 (ref 0.7–3.1)
LYMPHOCYTES NFR BLD AUTO: 15.9 % (ref 19.6–45.3)
MCH RBC QN AUTO: 29.7 PG (ref 26.6–33)
MCHC RBC AUTO-ENTMCNC: 33.8 G/DL (ref 31.5–35.7)
MCV RBC AUTO: 87.9 FL (ref 79–97)
MONOCYTES # BLD AUTO: 0.5 10*3/MM3 (ref 0.1–0.9)
MONOCYTES NFR BLD AUTO: 9.3 % (ref 5–12)
NEUTROPHILS # BLD AUTO: 3.5 10*3/MM3 (ref 1.7–7)
NEUTROPHILS NFR BLD AUTO: 71.4 % (ref 42.7–76)
NRBC BLD AUTO-RTO: 0 /100 WBC (ref 0–0.2)
PLATELET # BLD AUTO: 243 10*3/MM3 (ref 140–450)
PMV BLD AUTO: 8.5 FL (ref 6–12)
POTASSIUM BLD-SCNC: 4.6 MMOL/L (ref 3.5–5.2)
PROT SERPL-MCNC: 5 G/DL (ref 6–8.5)
RBC # BLD AUTO: 3.3 10*6/MM3 (ref 3.77–5.28)
SODIUM BLD-SCNC: 137 MMOL/L (ref 136–145)
WBC NRBC COR # BLD: 4.9 10*3/MM3 (ref 3.4–10.8)

## 2019-12-05 PROCEDURE — 25010000002 KETOROLAC TROMETHAMINE PER 15 MG: Performed by: SURGERY

## 2019-12-05 PROCEDURE — 80053 COMPREHEN METABOLIC PANEL: CPT | Performed by: SURGERY

## 2019-12-05 PROCEDURE — 99024 POSTOP FOLLOW-UP VISIT: CPT | Performed by: SURGERY

## 2019-12-05 PROCEDURE — 25010000002 METHYLNALTREXONE 12 MG/0.6ML SOLUTION: Performed by: SURGERY

## 2019-12-05 PROCEDURE — 85025 COMPLETE CBC W/AUTO DIFF WBC: CPT | Performed by: SURGERY

## 2019-12-05 PROCEDURE — 25010000002 ENOXAPARIN PER 10 MG: Performed by: SURGERY

## 2019-12-05 RX ORDER — ONDANSETRON 8 MG/1
8 TABLET, ORALLY DISINTEGRATING ORAL EVERY 8 HOURS PRN
Qty: 30 TABLET | Refills: 5 | Status: SHIPPED | OUTPATIENT
Start: 2019-12-05 | End: 2020-02-10 | Stop reason: SDUPTHER

## 2019-12-05 RX ORDER — HYDROCODONE BITARTRATE AND ACETAMINOPHEN 5; 325 MG/1; MG/1
1 TABLET ORAL EVERY 4 HOURS PRN
Qty: 30 TABLET | Refills: 0 | Status: SHIPPED | OUTPATIENT
Start: 2019-12-05 | End: 2019-12-12

## 2019-12-05 RX ORDER — VENLAFAXINE HYDROCHLORIDE 75 MG/1
150 CAPSULE, EXTENDED RELEASE ORAL DAILY
Status: DISCONTINUED | OUTPATIENT
Start: 2019-12-05 | End: 2019-12-05 | Stop reason: HOSPADM

## 2019-12-05 RX ORDER — LEVOTHYROXINE SODIUM 112 UG/1
112 TABLET ORAL
Status: DISCONTINUED | OUTPATIENT
Start: 2019-12-05 | End: 2019-12-05 | Stop reason: HOSPADM

## 2019-12-05 RX ADMIN — HYDROCODONE BITARTRATE AND ACETAMINOPHEN 1 TABLET: 5; 325 TABLET ORAL at 03:02

## 2019-12-05 RX ADMIN — LEVOTHYROXINE SODIUM 112 MCG: 112 TABLET ORAL at 09:00

## 2019-12-05 RX ADMIN — ENOXAPARIN SODIUM 40 MG: 40 INJECTION SUBCUTANEOUS at 15:19

## 2019-12-05 RX ADMIN — HYDROCODONE BITARTRATE AND ACETAMINOPHEN 1 TABLET: 5; 325 TABLET ORAL at 09:15

## 2019-12-05 RX ADMIN — KETOROLAC TROMETHAMINE 15 MG: 30 INJECTION, SOLUTION INTRAMUSCULAR at 09:00

## 2019-12-05 RX ADMIN — AMLODIPINE BESYLATE 5 MG: 5 TABLET ORAL at 09:00

## 2019-12-05 RX ADMIN — FAMOTIDINE 20 MG: 20 TABLET, FILM COATED ORAL at 09:00

## 2019-12-05 RX ADMIN — KETOROLAC TROMETHAMINE 15 MG: 30 INJECTION, SOLUTION INTRAMUSCULAR at 03:02

## 2019-12-05 RX ADMIN — METHYLNALTREXONE BROMIDE 12 MG: 12 INJECTION, SOLUTION SUBCUTANEOUS at 09:00

## 2019-12-05 RX ADMIN — HYDROCODONE BITARTRATE AND ACETAMINOPHEN 1 TABLET: 5; 325 TABLET ORAL at 15:19

## 2019-12-05 RX ADMIN — VENLAFAXINE HYDROCHLORIDE 150 MG: 75 CAPSULE, EXTENDED RELEASE ORAL at 09:00

## 2019-12-05 RX ADMIN — KETOROLAC TROMETHAMINE 15 MG: 30 INJECTION, SOLUTION INTRAMUSCULAR at 15:19

## 2019-12-05 NOTE — PLAN OF CARE
Problem: Patient Care Overview  Goal: Individualization and Mutuality  Outcome: Outcome(s) achieved Date Met: 12/05/19    Goal: Discharge Needs Assessment  Outcome: Outcome(s) achieved Date Met: 12/05/19    Goal: Interprofessional Rounds/Family Conf  Outcome: Outcome(s) achieved Date Met: 12/05/19

## 2019-12-05 NOTE — DISCHARGE INSTRUCTIONS
Ok to shower Saturday    Dc drain prior to dc home    Call for any increasing pain or signs of infection    Home health to assist with ostomy bag care    My office one week from Monday

## 2019-12-05 NOTE — NURSING NOTE
Seen by ostomy nurse today for teaching. Patient doing well- sitting up in chair states she has just walked the hallway.  She reports poor apetite but is eating.  No problems with ileostomy through the night except for question about sounds of flatus.   Also reports Bowel movement through rectum.  Pt was reassured that this is normal and  Okay.  Education today focused on diet and fluids and potential risk and complication of dehydration.

## 2019-12-06 ENCOUNTER — READMISSION MANAGEMENT (OUTPATIENT)
Dept: CALL CENTER | Facility: HOSPITAL | Age: 64
End: 2019-12-06

## 2019-12-06 ENCOUNTER — TRANSITIONAL CARE MANAGEMENT TELEPHONE ENCOUNTER (OUTPATIENT)
Dept: FAMILY MEDICINE CLINIC | Facility: CLINIC | Age: 64
End: 2019-12-06

## 2019-12-06 NOTE — OUTREACH NOTE
Prep Survey      Responses   Facility patient discharged from?  [facility]   Is patient eligible?  Yes   Discharge diagnosis  abd pain and distention, sp sigmoid colectomy w/ anastomsis and diverting loop ileosomy   Does the patient have one of the following disease processes/diagnoses(primary or secondary)?  General Surgery   Does the patient have Home health ordered?  Yes   What is the Home health agency?   [facility]   Is there a DME ordered?  No   Prep survey completed?  Yes          Marielos Gardner RN

## 2019-12-06 NOTE — PROGRESS NOTES
Case Management Discharge Note      Final Note: home with A [facility]    Provided post acute provider list?: Yes  Post Acute Provider Lists: Home Health  Post Acuite Provider List: Delivered  Delivered To: Patient  Method of Delivery: In person          Home Medical Care - Selection Complete      Service Provider Request Status Selected Services Address Phone Number Fax Number    [facility] Services [address] [address] [phone]       Estephania Chavez RN 12/4/2019 1005    New stoma.                   Final Discharge Disposition Code: 06 - home with home health care

## 2019-12-06 NOTE — DISCHARGE SUMMARY
Date of Discharge:  12/5/2019    Discharge Diagnosis: large bowel obstruction    Presenting Problem/History of Present Illness  Active Hospital Problems    Diagnosis  POA   • Attention to ileostomy (CMS/[facility]) [Z43.2]  Not Applicable   • Internal hernia [K45.8]  Yes      Resolved Hospital Problems   No resolved problems to display.           Hospital Course  Patient is a 64 y.o. female presented with increasing abdominal pain and distention. She had a history of multiple ventral hernia repairs. She was initially observed and thought to have a component of constipation due to colonic inertia but then it became evident she had mechanical obstruction. She was taken to the OR and a sigmoid colectomy with anastomosis and diverting loop ileostomy was performed. She recovered well after surgery and was dc'd home on POD 3.      Procedures Performed    Procedure(s):  sigmoid colectomy with placement of loop ileostomy  -------------------       Consults:   Consults     Date and Time Order Name Status Description    11/30/2019 1509 Inpatient Gastroenterology Consult Completed     11/26/2019 1332 Surgery (on-call MD unless specified) Completed           Pertinent Test Results:     Condition on Discharge:      Vital Signs  Temp:  [98.7 °F (37.1 °C)-98.9 °F (37.2 °C)] 98.7 °F (37.1 °C)  Heart Rate:  [78-90] 78  Resp:  [16-20] 20  BP: (125-129)/(69-74) 125/69    Physical Exam:     General Appearance:    Alert, cooperative, in no acute distress   Head:    Normocephalic, without obvious abnormality, atraumatic   Eyes:            Lids and lashes normal, conjunctivae and sclerae normal, no   icterus, no pallor, corneas clear, PERRLA   Ears:    Ears appear intact with no abnormalities noted   Throat:   No oral lesions, no thrush, oral mucosa moist   Neck:   No adenopathy, supple, trachea midline, no thyromegaly, no     carotid bruit, no JVD   Back:     No kyphosis present, no scoliosis present, no skin lesions,       erythema or scars,  no tenderness to percussion or                   palpation,   range of motion normal   Lungs:     Clear to auscultation,respirations regular, even and                   unlabored    Heart:    Regular rhythm and normal rate, normal S1 and S2, no            murmur, no gallop, no rub, no click   Breast Exam:    Deferred   Abdomen:     Normal bowel sounds, no masses, no organomegaly, soft        non-tender, non-distended, no guarding, no rebound                 tenderness   Genitalia:    Deferred   Extremities:   Moves all extremities well, no edema, no cyanosis, no              redness   Pulses:   Pulses palpable and equal bilaterally   Skin:   No bleeding, bruising or rash   Lymph nodes:   No palpable adenopathy   Neurologic:   Cranial nerves 2 - 12 grossly intact, sensation intact, DTR        present and equal bilaterally       Discharge Disposition  Home or Self Care    Discharge Medications     Discharge Medications      New Medications      Instructions Start Date   HYDROcodone-acetaminophen 5-325 MG per tablet  Commonly known as:  NORCO   1 tablet, Oral, Every 4 Hours PRN      ondansetron ODT 8 MG disintegrating tablet  Commonly known as:  ZOFRAN ODT   8 mg, Oral, Every 8 Hours PRN         Continue These Medications      Instructions Start Date   amLODIPine 5 MG tablet  Commonly known as:  NORVASC   5 mg, Oral, Daily      aspirin 81 MG EC tablet   81 mg, Oral, Daily      Biotin 1000 MCG tablet   1,000 mcg, Oral, Every 24 Hours      calcium 600 MG tablet  Commonly known as:  OS-SONNY   1,200 mg, Oral, Daily      [employer] FISH OIL 1200 MG capsule   2,400 mg, Oral, Daily      [employer] PROBIOTIC PEARLS EX ST capsule   1 tablet, Oral, Daily      DAILY MULTIVITAMIN capsule   1 tablet, Oral, Daily      DIALYVITE VITAMIN D 5000 125 MCG (5000 UT) capsule  Generic drug:  vitamin d   1 tablet, Oral, Daily      levothyroxine 112 MCG tablet  Commonly known as:  SYNTHROID, LEVOTHROID   112 mcg, Oral, Daily      polyethylene glycol  packet  Commonly known as:  MIRALAX   17 g, Oral, Daily      venlafaxine  MG 24 hr capsule  Commonly known as:  EFFEXOR-XR   1 capsule, Oral, Daily             Discharge Diet:     Activity at Discharge:     Follow-up Appointments  Future Appointments   Date Time Provider Department Center   12/16/2019  9:30 AM Oli Lr MD [facility] None     Additional Instructions for the Follow-ups that You Need to Schedule     Ambulatory Referral to Home Health   As directed      Face to Face Visit Date:  12/4/2019    Follow-up provider for Plan of Care?:  I will be treating the patient on an ongoing basis.  Please send me the Plan of Care for signature.    Follow-up provider:  OLI LR [[id number]]    Reason/Clinical Findings:  new stoma    Describe mobility limitations that make leaving home difficult:  cant leave home without taxing effort    Nursing/Therapeutic Services Requested:  Other ([facility] to treat and eval. New stoma)    Frequency:  1 Week 1               Test Results Pending at Discharge       Oli Lr MD  12/06/19  5:46 AM    Time:

## 2019-12-09 ENCOUNTER — READMISSION MANAGEMENT (OUTPATIENT)
Dept: CALL CENTER | Facility: HOSPITAL | Age: 64
End: 2019-12-09

## 2019-12-09 NOTE — OUTREACH NOTE
General Surgery Week 1 Survey      Responses   Facility patient discharged from?  [facility]   Does the patient have one of the following disease processes/diagnoses(primary or secondary)?  General Surgery   Is there a successful TCM telephone encounter documented?  Yes          Bhavana Mcleod LPN

## 2019-12-11 DIAGNOSIS — R52 PAIN: Primary | ICD-10-CM

## 2019-12-11 RX ORDER — HYDROCODONE BITARTRATE AND ACETAMINOPHEN 5; 325 MG/1; MG/1
1 TABLET ORAL EVERY 8 HOURS PRN
Qty: 15 TABLET | Refills: 0 | Status: SHIPPED | OUTPATIENT
Start: 2019-12-11 | End: 2019-12-12

## 2019-12-12 ENCOUNTER — READMISSION MANAGEMENT (OUTPATIENT)
Dept: CALL CENTER | Facility: HOSPITAL | Age: 64
End: 2019-12-12

## 2019-12-12 DIAGNOSIS — R52 PAIN: Primary | ICD-10-CM

## 2019-12-12 RX ORDER — HYDROCODONE BITARTRATE AND ACETAMINOPHEN 5; 325 MG/1; MG/1
1 TABLET ORAL EVERY 8 HOURS PRN
Qty: 15 TABLET | Refills: 0 | Status: SHIPPED | OUTPATIENT
Start: 2019-12-12 | End: 2020-02-10 | Stop reason: SDUPTHER

## 2019-12-12 NOTE — OUTREACH NOTE
General Surgery Week 2 Survey      Responses   Facility patient discharged from?  [facility]   Does the patient have one of the following disease processes/diagnoses(primary or secondary)?  General Surgery   Week 2 attempt successful?  No   Rescheduled  Revoked   Revoke  Decline to participate [NO ANSWER, LEFT VM]          Gracie Guerrero LPN

## 2019-12-13 ENCOUNTER — HOSPITAL ENCOUNTER (INPATIENT)
Facility: HOSPITAL | Age: 64
LOS: 3 days | Discharge: HOME HEALTH | End: 2019-12-16
Attending: SURGERY | Admitting: SURGERY

## 2019-12-13 ENCOUNTER — OFFICE VISIT (OUTPATIENT)
Dept: BARIATRICS/WEIGHT MGMT | Facility: CLINIC | Age: 64
End: 2019-12-13

## 2019-12-13 ENCOUNTER — ANESTHESIA EVENT (OUTPATIENT)
Dept: PERIOP | Facility: HOSPITAL | Age: 64
End: 2019-12-13

## 2019-12-13 ENCOUNTER — ANESTHESIA (OUTPATIENT)
Dept: PERIOP | Facility: HOSPITAL | Age: 64
End: 2019-12-13

## 2019-12-13 ENCOUNTER — HOSPITAL ENCOUNTER (OUTPATIENT)
Dept: CT IMAGING | Facility: HOSPITAL | Age: 64
Discharge: HOME | End: 2019-12-13

## 2019-12-13 VITALS
HEART RATE: 93 BPM | DIASTOLIC BLOOD PRESSURE: 71 MMHG | BODY MASS INDEX: 25.27 KG/M2 | HEIGHT: 63 IN | WEIGHT: 142.6 LBS | TEMPERATURE: 98 F | OXYGEN SATURATION: 100 % | SYSTOLIC BLOOD PRESSURE: 147 MMHG

## 2019-12-13 DIAGNOSIS — K43.9 VENTRAL HERNIA WITHOUT OBSTRUCTION OR GANGRENE: Primary | ICD-10-CM

## 2019-12-13 DIAGNOSIS — L02.91 ABSCESS: ICD-10-CM

## 2019-12-13 DIAGNOSIS — Z98.890 H/O ILEOSTOMY: ICD-10-CM

## 2019-12-13 DIAGNOSIS — K43.9 VENTRAL HERNIA WITHOUT OBSTRUCTION OR GANGRENE: ICD-10-CM

## 2019-12-13 DIAGNOSIS — L08.9 WOUND INFECTION: Primary | ICD-10-CM

## 2019-12-13 DIAGNOSIS — T14.8XXA WOUND INFECTION: Primary | ICD-10-CM

## 2019-12-13 LAB
ABO GROUP BLD: NORMAL
ALBUMIN SERPL-MCNC: 3.2 G/DL (ref 3.5–5.2)
ALBUMIN SERPL-MCNC: 3.6 G/DL (ref 3.5–5.2)
ALBUMIN/GLOB SERPL: 1 G/DL
ALBUMIN/GLOB SERPL: 1.1 G/DL
ALP SERPL-CCNC: 61 U/L (ref 39–117)
ALP SERPL-CCNC: 66 U/L (ref 39–117)
ALT SERPL W P-5'-P-CCNC: 13 U/L (ref 1–33)
ALT SERPL W P-5'-P-CCNC: 17 U/L (ref 1–33)
ANION GAP SERPL CALCULATED.3IONS-SCNC: 12 MMOL/L (ref 5–15)
ANION GAP SERPL CALCULATED.3IONS-SCNC: 15 MMOL/L (ref 5–15)
AST SERPL-CCNC: 14 U/L (ref 1–32)
AST SERPL-CCNC: 14 U/L (ref 1–32)
BASOPHILS # BLD AUTO: 0.1 10*3/MM3 (ref 0–0.2)
BASOPHILS # BLD AUTO: 0.1 10*3/MM3 (ref 0–0.2)
BASOPHILS NFR BLD AUTO: 0.4 % (ref 0–1.5)
BASOPHILS NFR BLD AUTO: 0.4 % (ref 0–1.5)
BILIRUB SERPL-MCNC: 0.2 MG/DL (ref 0.2–1.2)
BILIRUB SERPL-MCNC: <0.2 MG/DL (ref 0.2–1.2)
BLD GP AB SCN SERPL QL: NEGATIVE
BUN BLD-MCNC: 6 MG/DL (ref 8–23)
BUN BLD-MCNC: 6 MG/DL (ref 8–23)
BUN/CREAT SERPL: 12.2 (ref 7–25)
BUN/CREAT SERPL: 12.2 (ref 7–25)
CALCIUM SPEC-SCNC: 8.4 MG/DL (ref 8.6–10.5)
CALCIUM SPEC-SCNC: 8.9 MG/DL (ref 8.6–10.5)
CHLORIDE SERPL-SCNC: 95 MMOL/L (ref 98–107)
CHLORIDE SERPL-SCNC: 99 MMOL/L (ref 98–107)
CO2 SERPL-SCNC: 24 MMOL/L (ref 22–29)
CO2 SERPL-SCNC: 24 MMOL/L (ref 22–29)
CREAT BLD-MCNC: 0.49 MG/DL (ref 0.57–1)
CREAT BLD-MCNC: 0.49 MG/DL (ref 0.57–1)
DEPRECATED RDW RBC AUTO: 42 FL (ref 37–54)
DEPRECATED RDW RBC AUTO: 42.4 FL (ref 37–54)
EOSINOPHIL # BLD AUTO: 0.1 10*3/MM3 (ref 0–0.4)
EOSINOPHIL # BLD AUTO: 0.1 10*3/MM3 (ref 0–0.4)
EOSINOPHIL NFR BLD AUTO: 0.5 % (ref 0.3–6.2)
EOSINOPHIL NFR BLD AUTO: 0.8 % (ref 0.3–6.2)
ERYTHROCYTE [DISTWIDTH] IN BLOOD BY AUTOMATED COUNT: 13.9 % (ref 12.3–15.4)
ERYTHROCYTE [DISTWIDTH] IN BLOOD BY AUTOMATED COUNT: 14.2 % (ref 12.3–15.4)
GFR SERPL CREATININE-BSD FRML MDRD: 127 ML/MIN/1.73
GFR SERPL CREATININE-BSD FRML MDRD: 127 ML/MIN/1.73
GLOBULIN UR ELPH-MCNC: 3.2 GM/DL
GLOBULIN UR ELPH-MCNC: 3.2 GM/DL
GLUCOSE BLD-MCNC: 106 MG/DL (ref 65–99)
GLUCOSE BLD-MCNC: 114 MG/DL (ref 65–99)
HCT VFR BLD AUTO: 28.8 % (ref 34–46.6)
HCT VFR BLD AUTO: 30.5 % (ref 34–46.6)
HGB BLD-MCNC: 10.1 G/DL (ref 12–15.9)
HGB BLD-MCNC: 9.6 G/DL (ref 12–15.9)
HOLD SPECIMEN: NORMAL
LYMPHOCYTES # BLD AUTO: 0.6 10*3/MM3 (ref 0.7–3.1)
LYMPHOCYTES # BLD AUTO: 1.3 10*3/MM3 (ref 0.7–3.1)
LYMPHOCYTES NFR BLD AUTO: 11.5 % (ref 19.6–45.3)
LYMPHOCYTES NFR BLD AUTO: 3.9 % (ref 19.6–45.3)
MCH RBC QN AUTO: 28 PG (ref 26.6–33)
MCH RBC QN AUTO: 28.2 PG (ref 26.6–33)
MCHC RBC AUTO-ENTMCNC: 33.2 G/DL (ref 31.5–35.7)
MCHC RBC AUTO-ENTMCNC: 33.3 G/DL (ref 31.5–35.7)
MCV RBC AUTO: 84.3 FL (ref 79–97)
MCV RBC AUTO: 84.7 FL (ref 79–97)
MONOCYTES # BLD AUTO: 0.4 10*3/MM3 (ref 0.1–0.9)
MONOCYTES # BLD AUTO: 0.9 10*3/MM3 (ref 0.1–0.9)
MONOCYTES NFR BLD AUTO: 2.9 % (ref 5–12)
MONOCYTES NFR BLD AUTO: 7.6 % (ref 5–12)
NEUTROPHILS # BLD AUTO: 13.1 10*3/MM3 (ref 1.7–7)
NEUTROPHILS # BLD AUTO: 9.1 10*3/MM3 (ref 1.7–7)
NEUTROPHILS NFR BLD AUTO: 79.7 % (ref 42.7–76)
NEUTROPHILS NFR BLD AUTO: 92.3 % (ref 42.7–76)
NRBC BLD AUTO-RTO: 0 /100 WBC (ref 0–0.2)
NRBC BLD AUTO-RTO: 0 /100 WBC (ref 0–0.2)
PLATELET # BLD AUTO: 828 10*3/MM3 (ref 140–450)
PLATELET # BLD AUTO: 896 10*3/MM3 (ref 140–450)
PMV BLD AUTO: 6.7 FL (ref 6–12)
PMV BLD AUTO: 6.7 FL (ref 6–12)
POTASSIUM BLD-SCNC: 3.9 MMOL/L (ref 3.5–5.2)
POTASSIUM BLD-SCNC: 4 MMOL/L (ref 3.5–5.2)
PROT SERPL-MCNC: 6.4 G/DL (ref 6–8.5)
PROT SERPL-MCNC: 6.8 G/DL (ref 6–8.5)
RBC # BLD AUTO: 3.41 10*6/MM3 (ref 3.77–5.28)
RBC # BLD AUTO: 3.6 10*6/MM3 (ref 3.77–5.28)
RH BLD: POSITIVE
SODIUM BLD-SCNC: 134 MMOL/L (ref 136–145)
SODIUM BLD-SCNC: 135 MMOL/L (ref 136–145)
T&S EXPIRATION DATE: NORMAL
WBC NRBC COR # BLD: 11.5 10*3/MM3 (ref 3.4–10.8)
WBC NRBC COR # BLD: 14.1 10*3/MM3 (ref 3.4–10.8)

## 2019-12-13 PROCEDURE — 87075 CULTR BACTERIA EXCEPT BLOOD: CPT | Performed by: SURGERY

## 2019-12-13 PROCEDURE — 86850 RBC ANTIBODY SCREEN: CPT | Performed by: SURGERY

## 2019-12-13 PROCEDURE — 74176 CT ABD & PELVIS W/O CONTRAST: CPT

## 2019-12-13 PROCEDURE — 49002 REOPENING OF ABDOMEN: CPT | Performed by: SURGERY

## 2019-12-13 PROCEDURE — 25010000002 LEVOFLOXACIN PER 250 MG: Performed by: SURGERY

## 2019-12-13 PROCEDURE — S0260 H&P FOR SURGERY: HCPCS | Performed by: SURGERY

## 2019-12-13 PROCEDURE — 25010000002 DEXAMETHASONE PER 1 MG: Performed by: ANESTHESIOLOGY

## 2019-12-13 PROCEDURE — 87186 SC STD MICRODIL/AGAR DIL: CPT | Performed by: SURGERY

## 2019-12-13 PROCEDURE — 87070 CULTURE OTHR SPECIMN AEROBIC: CPT | Performed by: SURGERY

## 2019-12-13 PROCEDURE — 85025 COMPLETE CBC W/AUTO DIFF WBC: CPT | Performed by: SURGERY

## 2019-12-13 PROCEDURE — 0DJ60ZZ INSPECTION OF STOMACH, OPEN APPROACH: ICD-10-PCS | Performed by: SURGERY

## 2019-12-13 PROCEDURE — 99024 POSTOP FOLLOW-UP VISIT: CPT | Performed by: SURGERY

## 2019-12-13 PROCEDURE — 86901 BLOOD TYPING SEROLOGIC RH(D): CPT | Performed by: SURGERY

## 2019-12-13 PROCEDURE — 25010000002 ONDANSETRON PER 1 MG: Performed by: ANESTHESIOLOGY

## 2019-12-13 PROCEDURE — 80053 COMPREHEN METABOLIC PANEL: CPT | Performed by: SURGERY

## 2019-12-13 PROCEDURE — 86900 BLOOD TYPING SEROLOGIC ABO: CPT | Performed by: SURGERY

## 2019-12-13 PROCEDURE — 87205 SMEAR GRAM STAIN: CPT | Performed by: SURGERY

## 2019-12-13 PROCEDURE — 25010000002 FENTANYL CITRATE (PF) 100 MCG/2ML SOLUTION: Performed by: ANESTHESIOLOGY

## 2019-12-13 PROCEDURE — 25010000002 PROPOFOL 10 MG/ML EMULSION: Performed by: ANESTHESIOLOGY

## 2019-12-13 RX ORDER — HYDROCODONE BITARTRATE AND ACETAMINOPHEN 5; 325 MG/1; MG/1
1 TABLET ORAL ONCE AS NEEDED
Status: DISCONTINUED | OUTPATIENT
Start: 2019-12-13 | End: 2019-12-13 | Stop reason: HOSPADM

## 2019-12-13 RX ORDER — ASPIRIN 81 MG/1
81 TABLET ORAL DAILY
Status: DISCONTINUED | OUTPATIENT
Start: 2019-12-14 | End: 2019-12-16 | Stop reason: HOSPADM

## 2019-12-13 RX ORDER — SODIUM CHLORIDE 9 MG/ML
100 INJECTION, SOLUTION INTRAVENOUS CONTINUOUS
Status: DISCONTINUED | OUTPATIENT
Start: 2019-12-13 | End: 2019-12-16 | Stop reason: HOSPADM

## 2019-12-13 RX ORDER — KETOROLAC TROMETHAMINE 15 MG/ML
15 INJECTION, SOLUTION INTRAMUSCULAR; INTRAVENOUS EVERY 6 HOURS PRN
Status: DISCONTINUED | OUTPATIENT
Start: 2019-12-13 | End: 2019-12-13 | Stop reason: HOSPADM

## 2019-12-13 RX ORDER — PROMETHAZINE HYDROCHLORIDE 25 MG/ML
12.5 INJECTION, SOLUTION INTRAMUSCULAR; INTRAVENOUS ONCE AS NEEDED
Status: DISCONTINUED | OUTPATIENT
Start: 2019-12-13 | End: 2019-12-13 | Stop reason: HOSPADM

## 2019-12-13 RX ORDER — ONDANSETRON 2 MG/ML
4 INJECTION INTRAMUSCULAR; INTRAVENOUS EVERY 6 HOURS PRN
Status: DISCONTINUED | OUTPATIENT
Start: 2019-12-13 | End: 2019-12-16 | Stop reason: HOSPADM

## 2019-12-13 RX ORDER — AMLODIPINE BESYLATE 5 MG/1
5 TABLET ORAL DAILY
Status: DISCONTINUED | OUTPATIENT
Start: 2019-12-14 | End: 2019-12-16 | Stop reason: HOSPADM

## 2019-12-13 RX ORDER — ONDANSETRON 4 MG/1
8 TABLET, ORALLY DISINTEGRATING ORAL EVERY 8 HOURS PRN
Status: DISCONTINUED | OUTPATIENT
Start: 2019-12-13 | End: 2019-12-16 | Stop reason: HOSPADM

## 2019-12-13 RX ORDER — LIDOCAINE HYDROCHLORIDE 20 MG/ML
INJECTION, SOLUTION EPIDURAL; INFILTRATION; INTRACAUDAL; PERINEURAL AS NEEDED
Status: DISCONTINUED | OUTPATIENT
Start: 2019-12-13 | End: 2019-12-13

## 2019-12-13 RX ORDER — ONDANSETRON 2 MG/ML
4 INJECTION INTRAMUSCULAR; INTRAVENOUS ONCE AS NEEDED
Status: DISCONTINUED | OUTPATIENT
Start: 2019-12-13 | End: 2019-12-13 | Stop reason: HOSPADM

## 2019-12-13 RX ORDER — ACETAMINOPHEN 325 MG/1
650 TABLET ORAL ONCE AS NEEDED
Status: DISCONTINUED | OUTPATIENT
Start: 2019-12-13 | End: 2019-12-13 | Stop reason: HOSPADM

## 2019-12-13 RX ORDER — HYDROCODONE BITARTRATE AND ACETAMINOPHEN 5; 325 MG/1; MG/1
1 TABLET ORAL EVERY 8 HOURS PRN
Status: DISCONTINUED | OUTPATIENT
Start: 2019-12-13 | End: 2019-12-16 | Stop reason: HOSPADM

## 2019-12-13 RX ORDER — VENLAFAXINE HYDROCHLORIDE 75 MG/1
150 CAPSULE, EXTENDED RELEASE ORAL DAILY
Status: DISCONTINUED | OUTPATIENT
Start: 2019-12-14 | End: 2019-12-16 | Stop reason: HOSPADM

## 2019-12-13 RX ORDER — SODIUM CHLORIDE 0.9 % (FLUSH) 0.9 %
10 SYRINGE (ML) INJECTION EVERY 12 HOURS SCHEDULED
Status: DISCONTINUED | OUTPATIENT
Start: 2019-12-13 | End: 2019-12-13 | Stop reason: HOSPADM

## 2019-12-13 RX ORDER — MEPERIDINE HYDROCHLORIDE 25 MG/ML
12.5 INJECTION INTRAMUSCULAR; INTRAVENOUS; SUBCUTANEOUS
Status: DISCONTINUED | OUTPATIENT
Start: 2019-12-13 | End: 2019-12-13 | Stop reason: HOSPADM

## 2019-12-13 RX ORDER — L.ACID,PARA/B.BIFIDUM/S.THERM 8B CELL
1 CAPSULE ORAL DAILY
Status: DISCONTINUED | OUTPATIENT
Start: 2019-12-14 | End: 2019-12-16 | Stop reason: HOSPADM

## 2019-12-13 RX ORDER — LIDOCAINE HYDROCHLORIDE 20 MG/ML
INJECTION, SOLUTION EPIDURAL; INFILTRATION; INTRACAUDAL; PERINEURAL AS NEEDED
Status: DISCONTINUED | OUTPATIENT
Start: 2019-12-13 | End: 2019-12-13 | Stop reason: SURG

## 2019-12-13 RX ORDER — POLYETHYLENE GLYCOL 3350 17 G/17G
17 POWDER, FOR SOLUTION ORAL DAILY
Status: DISCONTINUED | OUTPATIENT
Start: 2019-12-14 | End: 2019-12-16 | Stop reason: HOSPADM

## 2019-12-13 RX ORDER — PHENYLEPHRINE HCL IN 0.9% NACL 0.5 MG/5ML
SYRINGE (ML) INTRAVENOUS AS NEEDED
Status: DISCONTINUED | OUTPATIENT
Start: 2019-12-13 | End: 2019-12-13 | Stop reason: SURG

## 2019-12-13 RX ORDER — NEOSTIGMINE METHYLSULFATE 5 MG/5 ML
SYRINGE (ML) INTRAVENOUS AS NEEDED
Status: DISCONTINUED | OUTPATIENT
Start: 2019-12-13 | End: 2019-12-13 | Stop reason: SURG

## 2019-12-13 RX ORDER — LEVOTHYROXINE SODIUM 112 UG/1
112 TABLET ORAL DAILY
Status: DISCONTINUED | OUTPATIENT
Start: 2019-12-14 | End: 2019-12-16 | Stop reason: HOSPADM

## 2019-12-13 RX ORDER — HYDROMORPHONE HCL 110MG/55ML
0.5 PATIENT CONTROLLED ANALGESIA SYRINGE INTRAVENOUS
Status: DISCONTINUED | OUTPATIENT
Start: 2019-12-13 | End: 2019-12-16 | Stop reason: HOSPADM

## 2019-12-13 RX ORDER — DEXAMETHASONE SODIUM PHOSPHATE 4 MG/ML
INJECTION, SOLUTION INTRA-ARTICULAR; INTRALESIONAL; INTRAMUSCULAR; INTRAVENOUS; SOFT TISSUE AS NEEDED
Status: DISCONTINUED | OUTPATIENT
Start: 2019-12-13 | End: 2019-12-13 | Stop reason: SURG

## 2019-12-13 RX ORDER — MORPHINE SULFATE 4 MG/ML
4 INJECTION, SOLUTION INTRAMUSCULAR; INTRAVENOUS
Status: DISCONTINUED | OUTPATIENT
Start: 2019-12-13 | End: 2019-12-16 | Stop reason: HOSPADM

## 2019-12-13 RX ORDER — PROPOFOL 10 MG/ML
VIAL (ML) INTRAVENOUS AS NEEDED
Status: DISCONTINUED | OUTPATIENT
Start: 2019-12-13 | End: 2019-12-13 | Stop reason: SURG

## 2019-12-13 RX ORDER — GLYCOPYRROLATE 1 MG/5 ML
SYRINGE (ML) INTRAVENOUS AS NEEDED
Status: DISCONTINUED | OUTPATIENT
Start: 2019-12-13 | End: 2019-12-13 | Stop reason: SURG

## 2019-12-13 RX ORDER — HYDROMORPHONE HCL 110MG/55ML
0.5 PATIENT CONTROLLED ANALGESIA SYRINGE INTRAVENOUS
Status: DISCONTINUED | OUTPATIENT
Start: 2019-12-13 | End: 2019-12-13 | Stop reason: HOSPADM

## 2019-12-13 RX ORDER — MORPHINE SULFATE 4 MG/ML
2 INJECTION, SOLUTION INTRAMUSCULAR; INTRAVENOUS
Status: DISCONTINUED | OUTPATIENT
Start: 2019-12-13 | End: 2019-12-13 | Stop reason: HOSPADM

## 2019-12-13 RX ORDER — FENTANYL CITRATE 50 UG/ML
INJECTION, SOLUTION INTRAMUSCULAR; INTRAVENOUS AS NEEDED
Status: DISCONTINUED | OUTPATIENT
Start: 2019-12-13 | End: 2019-12-13 | Stop reason: SURG

## 2019-12-13 RX ORDER — LABETALOL HYDROCHLORIDE 5 MG/ML
5 INJECTION, SOLUTION INTRAVENOUS
Status: DISCONTINUED | OUTPATIENT
Start: 2019-12-13 | End: 2019-12-13 | Stop reason: HOSPADM

## 2019-12-13 RX ORDER — ONDANSETRON 2 MG/ML
INJECTION INTRAMUSCULAR; INTRAVENOUS AS NEEDED
Status: DISCONTINUED | OUTPATIENT
Start: 2019-12-13 | End: 2019-12-13 | Stop reason: SURG

## 2019-12-13 RX ORDER — NALOXONE HCL 0.4 MG/ML
0.1 VIAL (ML) INJECTION
Status: DISCONTINUED | OUTPATIENT
Start: 2019-12-13 | End: 2019-12-16 | Stop reason: HOSPADM

## 2019-12-13 RX ORDER — PROMETHAZINE HYDROCHLORIDE 25 MG/1
25 TABLET ORAL ONCE AS NEEDED
Status: DISCONTINUED | OUTPATIENT
Start: 2019-12-13 | End: 2019-12-13 | Stop reason: HOSPADM

## 2019-12-13 RX ORDER — EPHEDRINE SULFATE 50 MG/ML
5 INJECTION, SOLUTION INTRAVENOUS ONCE AS NEEDED
Status: DISCONTINUED | OUTPATIENT
Start: 2019-12-13 | End: 2019-12-13 | Stop reason: HOSPADM

## 2019-12-13 RX ORDER — FLUMAZENIL 0.1 MG/ML
0.5 INJECTION INTRAVENOUS AS NEEDED
Status: DISCONTINUED | OUTPATIENT
Start: 2019-12-13 | End: 2019-12-13 | Stop reason: HOSPADM

## 2019-12-13 RX ORDER — HYDRALAZINE HYDROCHLORIDE 20 MG/ML
5 INJECTION INTRAMUSCULAR; INTRAVENOUS
Status: DISCONTINUED | OUTPATIENT
Start: 2019-12-13 | End: 2019-12-13 | Stop reason: HOSPADM

## 2019-12-13 RX ORDER — OXYCODONE HYDROCHLORIDE 5 MG/1
10 TABLET ORAL EVERY 4 HOURS PRN
Status: DISCONTINUED | OUTPATIENT
Start: 2019-12-13 | End: 2019-12-16 | Stop reason: HOSPADM

## 2019-12-13 RX ORDER — LORAZEPAM 2 MG/ML
0.5 INJECTION INTRAMUSCULAR
Status: DISCONTINUED | OUTPATIENT
Start: 2019-12-13 | End: 2019-12-13 | Stop reason: HOSPADM

## 2019-12-13 RX ORDER — LEVOFLOXACIN 5 MG/ML
750 INJECTION, SOLUTION INTRAVENOUS EVERY 24 HOURS
Status: DISCONTINUED | OUTPATIENT
Start: 2019-12-13 | End: 2019-12-16

## 2019-12-13 RX ORDER — ONDANSETRON 4 MG/1
4 TABLET, FILM COATED ORAL EVERY 6 HOURS PRN
Status: DISCONTINUED | OUTPATIENT
Start: 2019-12-13 | End: 2019-12-16 | Stop reason: HOSPADM

## 2019-12-13 RX ORDER — SODIUM CHLORIDE, SODIUM LACTATE, POTASSIUM CHLORIDE, CALCIUM CHLORIDE 600; 310; 30; 20 MG/100ML; MG/100ML; MG/100ML; MG/100ML
9 INJECTION, SOLUTION INTRAVENOUS CONTINUOUS PRN
Status: DISCONTINUED | OUTPATIENT
Start: 2019-12-13 | End: 2019-12-16 | Stop reason: HOSPADM

## 2019-12-13 RX ORDER — PROMETHAZINE HYDROCHLORIDE 25 MG/1
25 TABLET ORAL ONCE AS NEEDED
Status: DISCONTINUED | OUTPATIENT
Start: 2019-12-13 | End: 2019-12-13 | Stop reason: SDUPTHER

## 2019-12-13 RX ORDER — PROMETHAZINE HYDROCHLORIDE 25 MG/ML
6.25 INJECTION, SOLUTION INTRAMUSCULAR; INTRAVENOUS ONCE AS NEEDED
Status: DISCONTINUED | OUTPATIENT
Start: 2019-12-13 | End: 2019-12-13 | Stop reason: SDUPTHER

## 2019-12-13 RX ORDER — FAMOTIDINE 20 MG/1
20 TABLET, FILM COATED ORAL 2 TIMES DAILY
Status: DISCONTINUED | OUTPATIENT
Start: 2019-12-13 | End: 2019-12-16 | Stop reason: HOSPADM

## 2019-12-13 RX ORDER — ROCURONIUM BROMIDE 10 MG/ML
INJECTION, SOLUTION INTRAVENOUS AS NEEDED
Status: DISCONTINUED | OUTPATIENT
Start: 2019-12-13 | End: 2019-12-13 | Stop reason: SURG

## 2019-12-13 RX ORDER — ACETAMINOPHEN 650 MG/1
650 SUPPOSITORY RECTAL ONCE AS NEEDED
Status: DISCONTINUED | OUTPATIENT
Start: 2019-12-13 | End: 2019-12-13 | Stop reason: HOSPADM

## 2019-12-13 RX ORDER — IPRATROPIUM BROMIDE AND ALBUTEROL SULFATE 2.5; .5 MG/3ML; MG/3ML
3 SOLUTION RESPIRATORY (INHALATION) ONCE AS NEEDED
Status: DISCONTINUED | OUTPATIENT
Start: 2019-12-13 | End: 2019-12-13 | Stop reason: HOSPADM

## 2019-12-13 RX ORDER — NALOXONE HCL 0.4 MG/ML
0.4 VIAL (ML) INJECTION AS NEEDED
Status: DISCONTINUED | OUTPATIENT
Start: 2019-12-13 | End: 2019-12-13 | Stop reason: HOSPADM

## 2019-12-13 RX ORDER — PROMETHAZINE HYDROCHLORIDE 25 MG/1
25 SUPPOSITORY RECTAL ONCE AS NEEDED
Status: DISCONTINUED | OUTPATIENT
Start: 2019-12-13 | End: 2019-12-13 | Stop reason: SDUPTHER

## 2019-12-13 RX ORDER — SODIUM CHLORIDE 0.9 % (FLUSH) 0.9 %
10 SYRINGE (ML) INJECTION AS NEEDED
Status: DISCONTINUED | OUTPATIENT
Start: 2019-12-13 | End: 2019-12-13 | Stop reason: HOSPADM

## 2019-12-13 RX ORDER — DIPHENHYDRAMINE HYDROCHLORIDE 50 MG/ML
12.5 INJECTION INTRAMUSCULAR; INTRAVENOUS
Status: DISCONTINUED | OUTPATIENT
Start: 2019-12-13 | End: 2019-12-13 | Stop reason: HOSPADM

## 2019-12-13 RX ORDER — PROMETHAZINE HYDROCHLORIDE 25 MG/1
25 SUPPOSITORY RECTAL ONCE AS NEEDED
Status: DISCONTINUED | OUTPATIENT
Start: 2019-12-13 | End: 2019-12-13 | Stop reason: HOSPADM

## 2019-12-13 RX ORDER — NALOXONE HCL 0.4 MG/ML
0.4 VIAL (ML) INJECTION
Status: DISCONTINUED | OUTPATIENT
Start: 2019-12-13 | End: 2019-12-13 | Stop reason: SDUPTHER

## 2019-12-13 RX ADMIN — Medication 3 MG: at 14:58

## 2019-12-13 RX ADMIN — FENTANYL CITRATE 50 MCG: 50 INJECTION, SOLUTION INTRAMUSCULAR; INTRAVENOUS at 14:38

## 2019-12-13 RX ADMIN — ROCURONIUM BROMIDE 50 MG: 10 INJECTION, SOLUTION INTRAVENOUS at 14:16

## 2019-12-13 RX ADMIN — PHENYLEPHRINE HYDROCHLORIDE 100 MCG: 10 INJECTION INTRAVENOUS at 14:27

## 2019-12-13 RX ADMIN — CEFAZOLIN SODIUM 2 G: 1 INJECTION, POWDER, FOR SOLUTION INTRAMUSCULAR; INTRAVENOUS at 14:26

## 2019-12-13 RX ADMIN — DEXAMETHASONE SODIUM PHOSPHATE 4 MG: 4 INJECTION, SOLUTION INTRAMUSCULAR; INTRAVENOUS at 14:31

## 2019-12-13 RX ADMIN — ONDANSETRON 4 MG: 2 INJECTION INTRAMUSCULAR; INTRAVENOUS at 14:58

## 2019-12-13 RX ADMIN — LEVOFLOXACIN 750 MG: 5 INJECTION, SOLUTION INTRAVENOUS at 17:52

## 2019-12-13 RX ADMIN — Medication 0.6 MG: at 14:58

## 2019-12-13 RX ADMIN — LIDOCAINE HYDROCHLORIDE 50 MG: 20 INJECTION, SOLUTION EPIDURAL; INFILTRATION; INTRACAUDAL; PERINEURAL at 14:15

## 2019-12-13 RX ADMIN — FENTANYL CITRATE 50 MCG: 50 INJECTION, SOLUTION INTRAMUSCULAR; INTRAVENOUS at 14:13

## 2019-12-13 RX ADMIN — Medication 10 ML: at 15:30

## 2019-12-13 RX ADMIN — METRONIDAZOLE 500 MG: 500 INJECTION, SOLUTION INTRAVENOUS at 14:25

## 2019-12-13 RX ADMIN — SODIUM CHLORIDE 100 ML/HR: 900 INJECTION, SOLUTION INTRAVENOUS at 17:52

## 2019-12-13 RX ADMIN — FAMOTIDINE 20 MG: 20 TABLET, FILM COATED ORAL at 20:09

## 2019-12-13 RX ADMIN — HYDROCODONE BITARTRATE AND ACETAMINOPHEN 1 TABLET: 5; 325 TABLET ORAL at 19:52

## 2019-12-13 RX ADMIN — PROPOFOL 120 MG: 10 INJECTION, EMULSION INTRAVENOUS at 14:15

## 2019-12-13 NOTE — ANESTHESIA POSTPROCEDURE EVALUATION
Patient: Nallely Figueroa    Procedure Summary     Date:  12/13/19 Room / Location:  [facility] OR 08 / [facility] MAIN OR    Anesthesia Start:  1413 Anesthesia Stop:  1513    Procedure:  LAPAROTOMY EXPLORATORY (N/A Abdomen) Diagnosis:      Surgeon:  Emily Lr MD Provider:  Garfield Tsai DO    Anesthesia Type:  general ASA Status:  3 - Emergent          Anesthesia Type: general    Vitals  Vitals Value Taken Time   /66 12/13/2019  3:13 PM   Temp 97.8 °F (36.6 °C) 12/13/2019  3:13 PM   Pulse 100 12/13/2019  3:13 PM   Resp 10 12/13/2019  3:13 PM   SpO2 100 % 12/13/2019  3:13 PM           Post Anesthesia Care and Evaluation    Patient location during evaluation: PACU  Patient participation: complete - patient participated  Level of consciousness: awake  Pain scale: See nurse's notes for pain score.  Pain management: adequate  Airway patency: patent  Anesthetic complications: No anesthetic complications  PONV Status: none  Cardiovascular status: acceptable  Respiratory status: acceptable  Hydration status: acceptable    Comments: Patient seen and examined postoperatively; vital signs stable; SpO2 greater than or equal to 90%; cardiopulmonary status stable; nausea/vomiting adequately controlled; pain adequately controlled; no apparent anesthesia complications; patient discharged from anesthesia care when discharge criteria were met

## 2019-12-13 NOTE — NURSING NOTE
Wound care sent home wound vac paperwork to [facility].  RX placed on chart for MD signature.

## 2019-12-13 NOTE — OP NOTE
LAPAROTOMY EXPLORATORY  Procedure Report    Patient Name:  Nallely Figueroa  YOB: 1955    Date of Surgery:  12/13/2019     Indications: This is a 64-year-old lady who 2 weeks ago underwent sigmoid colectomy with diverting loop ileostomy for large bowel obstruction due to adhesions to her mesh..  She has a history of ventral hernia repair and has mesh that I did cut through during the procedure.  There was also some contamination during the procedure due to the large bowel obstruction.  Today she came to the office complaining of swelling and redness of her incision and CT scan demonstrated subcutaneous abscess of the midline wound.  There was a small bubble underneath the fascia on CT scan and I suspected a degree of subfascial abscess.    Pre-op Diagnosis:   Subcutaneous abscess and subfascial abscess       Post-Op Diagnosis Codes:  Same    Procedure/CPT® Codes:      Procedure(s):  LAPAROTOMY EXPLORATORY    Staff:  Surgeon(s):  Emily Lr MD         Anesthesia: General    Estimated Blood Loss: minimal    Implants:    Nothing was implanted during the procedure    Specimen:          Specimens     ID Source Type Tests Collected By Collected At Frozen?      1 Abdominal Wall Wound · ANAEROBIC CULTURE  · WOUND CULTURE   Emily Lr MD 12/13/19 [id number]      Description: abdominal wall abcess              Findings: Subcutaneous and subfascial abscess.  No evidence of stool in the wound.  Was not able to explore much in the peritoneal cavity due to the dense adhesions in this patient who is 2 weeks post laparotomy.    Complications: None    Description of Procedure: General no tracheal anesthesia was provided and a Alexander catheter was placed.  Abdomen was prepped and draped and the staples were removed.  The incision was opened and there was pus that was cultured and suctioned away.  I was able to remove the PDS suture of the fascia and midline mesh and I opened the wound about 7 cm.  I attempted to then dissect  towards the left side towards the anastomosis which was in the left lower quadrant however the tissues were so fused together that I felt that further dissection would only result in an enterotomy and I also felt that since there was no smell of stool or the appearance of stool that some kind of leak of the anastomosis was very unlikely.  The patient also already had a diverting loop ileostomy.  Therefore I quickly aborted this effort and decided to irrigate the wound thoroughly.  I used a Pulsavac with bacitracin irrigation and irrigated the wound and mesh and fascia very thoroughly.  I then reclosed the midline incision with 1 PDS.  I then partially closed some of the skin with staples but left it partially open and packed with normal saline gauze in 2 different areas one just above and one just below the umbilicus.      Emily Lr MD     Date: 12/13/2019  Time: 3:06 PM

## 2019-12-13 NOTE — PROGRESS NOTES
S/p recent laparotomy, now has incarcerated ventral hernia on exam      Will get stat CT scan, likely return to OR

## 2019-12-13 NOTE — PLAN OF CARE
Patient is resting in bed, family at bedside, pain is tolerable, Brittanie with wound care to place orders for wound vac, RN to place vac when delivered.

## 2019-12-13 NOTE — ANESTHESIA PREPROCEDURE EVALUATION
Anesthesia Evaluation     Patient summary reviewed and Nursing notes reviewed   NPO Solid Status: > 6 hours  NPO Liquid Status: > 6 hours           Airway   Mallampati: II  TM distance: >3 FB  Neck ROM: full  No difficulty expected  Dental - normal exam     Pulmonary - negative pulmonary ROS and normal exam    breath sounds clear to auscultation  Cardiovascular - normal exam    ECG reviewed  Rhythm: regular  Rate: normal    (+) hypertension,       Neuro/Psych  (+) syncope,     GI/Hepatic/Renal/Endo - negative ROS     Musculoskeletal (-) negative ROS    Abdominal  - normal exam    Abdomen: soft.  Bowel sounds: normal.   Substance History - negative use     OB/GYN negative ob/gyn ROS         Other - negative ROS                       Anesthesia Plan    ASA 3 - emergent     general     intravenous induction     Anesthetic plan, all risks, benefits, and alternatives have been provided, discussed and informed consent has been obtained with: patient.  Use of blood products discussed with patient .

## 2019-12-13 NOTE — ANESTHESIA PROCEDURE NOTES
Airway  Urgency: elective    Date/Time: 12/13/2019 2:18 PM  Difficult airway    General Information and Staff    Patient location during procedure: OR    Indications and Patient Condition    Preoxygenated: yes  MILS maintained throughout  Mask difficulty assessment: 1 - vent by mask    Final Airway Details  Final airway type: endotracheal airway      Successful airway: ETT    Successful intubation technique: direct laryngoscopy  Endotracheal tube insertion site: oral  Blade: Kim  Blade size: 3  ETT size (mm): 7.0  Cormack-Lehane Classification: grade I - full view of glottis  Placement verified by: chest auscultation and capnometry   Measured from: gums  ETT/EBT to gums (cm): 20  Number of attempts at approach: 1  Assessment: lips, teeth, and gum same as pre-op and atraumatic intubation

## 2019-12-13 NOTE — H&P
HISTORY AND PHYSICAL      Patient Care Team:  Michelle Colon MD as PCP - General (Internal Medicine)    Chief complaint wound pain and redness    Subjective     Patient is a 64 y.o. female presents with 1 day history of increasing pain and redness from her incision.  She is 2 weeks status post exploratory laparotomy with partial colectomy and anastomosis and diverting loop ileostomy.  She has a history of multiple abdominal wall hernias.  She does have mesh placed from previous operations..  I saw her in my office today and she had a palpable lump of her midline incision with some erythema and tenderness.  I ordered a stat CT scan and a CT scan shows that there is gas and fluid in the subcutaneous space of the midline incision.  There is also a few gas bubbles just beneath the fascia at that level.  She has had anastomosis of the descending colon on the left side and there may be some fluid just adjacent to it but it is difficult to tell for sure on the noncontrasted CT if there is any evidence of leak.    Review of Systems   Pertinent items are noted in HPI    History  Past Medical History:   Diagnosis Date   • Hypertension      Past Surgical History:   Procedure Laterality Date   • CARDIAC CATHETERIZATION N/A 9/18/2019    Procedure: Left Heart Cath;  Surgeon: Jeronimo Kuhn MD;  Location: [facility] CATH INVASIVE LOCATION;  Service: Cardiovascular   • CARDIAC CATHETERIZATION N/A 9/18/2019    Procedure: Coronary angiography;  Surgeon: Jeronimo Kuhn MD;  Location: [facility] CATH INVASIVE LOCATION;  Service: Cardiovascular   • COLON SURGERY  12/06/2019    sigmoid colectomy with placement of loop ileostomy   • HERNIA REPAIR     • TONSILLECTOMY       Family History   Problem Relation Age of Onset   • Supraventricular tachycardia Mother    • Heart attack Father      Social History     Tobacco Use   • Smoking status: Never Smoker   • Smokeless tobacco: Never Used   Substance Use Topics   • Alcohol use:  No     Frequency: Never   • Drug use: No     No medications prior to admission.     Allergies:  Ace inhibitors    Objective     Vital Signs  Temp:  [98 °F (36.7 °C)] 98 °F (36.7 °C)  Heart Rate:  [93] 93  BP: (147)/(71) 147/71    Physical Exam:      General Appearance:    Alert, cooperative, in no acute distress   Head:    Normocephalic, without obvious abnormality, atraumatic   Eyes:            Lids and lashes normal, conjunctivae and sclerae normal, no   icterus, no pallor, corneas clear, PERRLA   Ears:    Ears appear intact with no abnormalities noted   Throat:   No oral lesions, no thrush, oral mucosa moist   Neck:   No adenopathy, supple, trachea midline, no thyromegaly, no   carotid bruit, no JVD   Back:     No kyphosis present, no scoliosis present, no skin lesions,      erythema or scars, no tenderness to percussion or                   palpation,   range of motion normal   Lungs:     Clear to auscultation,respirations regular, even and                  unlabored    Heart:    Regular rhythm and normal rate, normal S1 and S2, no            murmur, no gallop, no rub, no click   Chest Wall:    No abnormalities observed   Abdomen:    Erythema of the periumbilical region and incision.  6 cm palpable lump of this midline incision and is very tender.   Rectal:     Deferred   Extremities:   Moves all extremities well, no edema, no cyanosis, no             redness   Pulses:   Pulses palpable and equal bilaterally   Skin:   No bleeding, bruising or rash   Lymph nodes:   No palpable adenopathy   Neurologic:   Cranial nerves 2 - 12 grossly intact, sensation intact, DTR       present and equal bilaterally     Lab Results (last 24 hours)     ** No results found for the last 24 hours. **          Imaging Results (Last 24 Hours)     ** No results found for the last 24 hours. **          Results Review:    I reviewed the patient's new clinical results.  I reviewed the patient's new imaging results and agree with the  interpretation.    Assessment/Plan     Subcutaneous abscess of the midline incision.  We will proceed to the operating room to open the skin and wash this area.  While I am there I will go ahead and perform exploratory laparotomy and see if I can double check the colonic anastomosis and staple lines to ensure there is no leaking from this area.  It may need to be drained.  Emily Lr MD  12/13/19  1:25 PM

## 2019-12-14 LAB
ALBUMIN SERPL-MCNC: 2.8 G/DL (ref 3.5–5.2)
ALBUMIN/GLOB SERPL: 0.9 G/DL
ALP SERPL-CCNC: 57 U/L (ref 39–117)
ALT SERPL W P-5'-P-CCNC: 13 U/L (ref 1–33)
ANION GAP SERPL CALCULATED.3IONS-SCNC: 10 MMOL/L (ref 5–15)
AST SERPL-CCNC: 16 U/L (ref 1–32)
BASOPHILS # BLD AUTO: 0 10*3/MM3 (ref 0–0.2)
BASOPHILS NFR BLD AUTO: 0.2 % (ref 0–1.5)
BILIRUB SERPL-MCNC: <0.2 MG/DL (ref 0.2–1.2)
BUN BLD-MCNC: 6 MG/DL (ref 8–23)
BUN/CREAT SERPL: 12.8 (ref 7–25)
CALCIUM SPEC-SCNC: 8.1 MG/DL (ref 8.6–10.5)
CHLORIDE SERPL-SCNC: 102 MMOL/L (ref 98–107)
CO2 SERPL-SCNC: 23 MMOL/L (ref 22–29)
CREAT BLD-MCNC: 0.47 MG/DL (ref 0.57–1)
DEPRECATED RDW RBC AUTO: 44.2 FL (ref 37–54)
EOSINOPHIL # BLD AUTO: 0 10*3/MM3 (ref 0–0.4)
EOSINOPHIL NFR BLD AUTO: 0.1 % (ref 0.3–6.2)
ERYTHROCYTE [DISTWIDTH] IN BLOOD BY AUTOMATED COUNT: 14.2 % (ref 12.3–15.4)
GFR SERPL CREATININE-BSD FRML MDRD: 133 ML/MIN/1.73
GLOBULIN UR ELPH-MCNC: 3.2 GM/DL
GLUCOSE BLD-MCNC: 132 MG/DL (ref 65–99)
HCT VFR BLD AUTO: 28.2 % (ref 34–46.6)
HGB BLD-MCNC: 9.3 G/DL (ref 12–15.9)
LYMPHOCYTES # BLD AUTO: 0.6 10*3/MM3 (ref 0.7–3.1)
LYMPHOCYTES NFR BLD AUTO: 5.8 % (ref 19.6–45.3)
MCH RBC QN AUTO: 28.6 PG (ref 26.6–33)
MCHC RBC AUTO-ENTMCNC: 32.9 G/DL (ref 31.5–35.7)
MCV RBC AUTO: 86.9 FL (ref 79–97)
MONOCYTES # BLD AUTO: 0.8 10*3/MM3 (ref 0.1–0.9)
MONOCYTES NFR BLD AUTO: 8.1 % (ref 5–12)
NEUTROPHILS # BLD AUTO: 8.3 10*3/MM3 (ref 1.7–7)
NEUTROPHILS NFR BLD AUTO: 85.8 % (ref 42.7–76)
NRBC BLD AUTO-RTO: 0.1 /100 WBC (ref 0–0.2)
PLATELET # BLD AUTO: 723 10*3/MM3 (ref 140–450)
PMV BLD AUTO: 6.5 FL (ref 6–12)
POTASSIUM BLD-SCNC: 4.7 MMOL/L (ref 3.5–5.2)
PROT SERPL-MCNC: 6 G/DL (ref 6–8.5)
RBC # BLD AUTO: 3.25 10*6/MM3 (ref 3.77–5.28)
SODIUM BLD-SCNC: 135 MMOL/L (ref 136–145)
WBC NRBC COR # BLD: 9.6 10*3/MM3 (ref 3.4–10.8)

## 2019-12-14 PROCEDURE — 85025 COMPLETE CBC W/AUTO DIFF WBC: CPT | Performed by: SURGERY

## 2019-12-14 PROCEDURE — 25010000002 ENOXAPARIN PER 10 MG: Performed by: SURGERY

## 2019-12-14 PROCEDURE — 25010000002 MORPHINE PER 10 MG: Performed by: SURGERY

## 2019-12-14 PROCEDURE — 80053 COMPREHEN METABOLIC PANEL: CPT | Performed by: SURGERY

## 2019-12-14 PROCEDURE — 25010000002 LEVOFLOXACIN PER 250 MG: Performed by: SURGERY

## 2019-12-14 RX ADMIN — ASPIRIN 81 MG: 81 TABLET, DELAYED RELEASE ORAL at 08:49

## 2019-12-14 RX ADMIN — METRONIDAZOLE 500 MG: 500 INJECTION, SOLUTION INTRAVENOUS at 16:33

## 2019-12-14 RX ADMIN — MORPHINE SULFATE 4 MG: 4 INJECTION INTRAVENOUS at 12:18

## 2019-12-14 RX ADMIN — AMLODIPINE BESYLATE 5 MG: 5 TABLET ORAL at 08:49

## 2019-12-14 RX ADMIN — LEVOTHYROXINE SODIUM 112 MCG: 112 TABLET ORAL at 05:54

## 2019-12-14 RX ADMIN — LEVOFLOXACIN 750 MG: 5 INJECTION, SOLUTION INTRAVENOUS at 18:15

## 2019-12-14 RX ADMIN — METRONIDAZOLE 500 MG: 500 INJECTION, SOLUTION INTRAVENOUS at 00:51

## 2019-12-14 RX ADMIN — HYDROCODONE BITARTRATE AND ACETAMINOPHEN 1 TABLET: 5; 325 TABLET ORAL at 07:14

## 2019-12-14 RX ADMIN — HYDROCODONE BITARTRATE AND ACETAMINOPHEN 1 TABLET: 5; 325 TABLET ORAL at 20:30

## 2019-12-14 RX ADMIN — SODIUM CHLORIDE 100 ML/HR: 900 INJECTION, SOLUTION INTRAVENOUS at 06:25

## 2019-12-14 RX ADMIN — SODIUM CHLORIDE 100 ML/HR: 900 INJECTION, SOLUTION INTRAVENOUS at 18:15

## 2019-12-14 RX ADMIN — METRONIDAZOLE 500 MG: 500 INJECTION, SOLUTION INTRAVENOUS at 07:14

## 2019-12-14 RX ADMIN — DAKIN'S SOLUTION 0.125% (QUARTER STRENGTH): 0.12 SOLUTION at 12:18

## 2019-12-14 RX ADMIN — FAMOTIDINE 20 MG: 20 TABLET, FILM COATED ORAL at 08:49

## 2019-12-14 RX ADMIN — FAMOTIDINE 20 MG: 20 TABLET, FILM COATED ORAL at 20:30

## 2019-12-14 RX ADMIN — ENOXAPARIN SODIUM 40 MG: 40 INJECTION SUBCUTANEOUS at 16:33

## 2019-12-14 RX ADMIN — Medication 1 CAPSULE: at 08:49

## 2019-12-14 RX ADMIN — VENLAFAXINE HYDROCHLORIDE 150 MG: 75 CAPSULE, EXTENDED RELEASE ORAL at 08:49

## 2019-12-14 RX ADMIN — POLYETHYLENE GLYCOL 3350 17 G: 17 POWDER, FOR SOLUTION ORAL at 08:48

## 2019-12-14 NOTE — PLAN OF CARE
Patient doing great, dressing change was done, no purulent drainage, wound vac should be delivered tomorrow per .

## 2019-12-14 NOTE — PROGRESS NOTES
General Surgery Progress Note     LOS: 1 day   Patient Care Team:  Michelle Colon MD as PCP - General (Internal Medicine)    Subjective     Chief complaint: Postoperative day 1 status post exploratory laparotomy and drainage of subcutaneous and subfascial abscess    Interval History:   No complaints    Objective     Vital Signs  Temp:  [97.8 °F (36.6 °C)-98.7 °F (37.1 °C)] 98.3 °F (36.8 °C)  Heart Rate:  [] 84  Resp:  [10-19] 16  BP: (108-142)/(54-95) 116/68    Physical Exam   Constitutional: She appears well-developed and well-nourished. No distress.   HENT:   Head: Normocephalic and atraumatic.   Eyes: EOM are normal.   Cardiovascular: Normal rate.   Pulmonary/Chest: Effort normal. No respiratory distress.   Abdominal:   Ostomy functioning.  2 midline abdominal wounds are without exudate or surrounding erythema.   Neurological: She is alert. No cranial nerve deficit.   Skin: Skin is warm and dry.   Psychiatric: She has a normal mood and affect. Her behavior is normal.   Vitals reviewed.           Results Review:    Lab Results (last 24 hours)     Procedure Component Value Units Date/Time    Wound Culture - Wound, Abdominal Wall [[id number]]  (Abnormal) Collected:  12/13/19 1438    Specimen:  Wound from Abdominal Wall Updated:  12/14/19 0917     Wound Culture Moderate growth (3+) Gram Negative Bacilli     Gram Stain Moderate (3+) WBCs observed - predominantly neutrophils      Few (2+) Gram positive bacilli    Comprehensive Metabolic Panel [[id number]]  (Abnormal) Collected:  12/14/19 0327    Specimen:  Blood Updated:  12/14/19 0414     Glucose 132 mg/dL      BUN 6 mg/dL      Creatinine 0.47 mg/dL      Sodium 135 mmol/L      Potassium 4.7 mmol/L      Chloride 102 mmol/L      CO2 23.0 mmol/L      Calcium 8.1 mg/dL      Total Protein 6.0 g/dL      Albumin 2.80 g/dL      ALT (SGPT) 13 U/L      AST (SGOT) 16 U/L      Alkaline Phosphatase 57 U/L      Total Bilirubin <0.2 mg/dL      eGFR Non  Amer 133  mL/min/1.73      Globulin 3.2 gm/dL      A/G Ratio 0.9 g/dL      BUN/Creatinine Ratio 12.8     Anion Gap 10.0 mmol/L     Narrative:       GFR Normal >60  Chronic Kidney Disease <60  Kidney Failure <15      CBC & Differential [[id number]] Collected:  12/14/19 0327    Specimen:  Blood Updated:  12/14/19 0401    Narrative:       The following orders were created for panel order CBC & Differential.  Procedure                               Abnormality         Status                     ---------                               -----------         ------                     CBC Auto Differential[[id number]]        Abnormal            Final result                 Please view results for these tests on the individual orders.    CBC Auto Differential [[id number]]  (Abnormal) Collected:  12/14/19 0327    Specimen:  Blood Updated:  12/14/19 0401     WBC 9.60 10*3/mm3      RBC 3.25 10*6/mm3      Hemoglobin 9.3 g/dL      Hematocrit 28.2 %      MCV 86.9 fL      MCH 28.6 pg      MCHC 32.9 g/dL      RDW 14.2 %      RDW-SD 44.2 fl      MPV 6.5 fL      Platelets 723 10*3/mm3      Neutrophil % 85.8 %      Lymphocyte % 5.8 %      Monocyte % 8.1 %      Eosinophil % 0.1 %      Basophil % 0.2 %      Neutrophils, Absolute 8.30 10*3/mm3      Lymphocytes, Absolute 0.60 10*3/mm3      Monocytes, Absolute 0.80 10*3/mm3      Eosinophils, Absolute 0.00 10*3/mm3      Basophils, Absolute 0.00 10*3/mm3      nRBC 0.1 /100 WBC     Comprehensive Metabolic Panel [[id number]]  (Abnormal) Collected:  12/13/19 1641    Specimen:  Blood Updated:  12/13/19 1751     Glucose 114 mg/dL      BUN 6 mg/dL      Creatinine 0.49 mg/dL      Sodium 135 mmol/L      Potassium 4.0 mmol/L      Chloride 99 mmol/L      CO2 24.0 mmol/L      Calcium 8.4 mg/dL      Total Protein 6.4 g/dL      Albumin 3.20 g/dL      ALT (SGPT) 13 U/L      AST (SGOT) 14 U/L      Alkaline Phosphatase 61 U/L      Total Bilirubin <0.2 mg/dL      eGFR Non African Amer 127 mL/min/1.73      Globulin 3.2 gm/dL       A/G Ratio 1.0 g/dL      BUN/Creatinine Ratio 12.2     Anion Gap 12.0 mmol/L     Narrative:       GFR Normal >60  Chronic Kidney Disease <60  Kidney Failure <15      CBC & Differential [[id number]] Collected:  12/13/19 1641    Specimen:  Blood Updated:  12/13/19 1725    Narrative:       The following orders were created for panel order CBC & Differential.  Procedure                               Abnormality         Status                     ---------                               -----------         ------                     CBC Auto Differential[[id number]]        Abnormal            Final result                 Please view results for these tests on the individual orders.    CBC Auto Differential [[id number]]  (Abnormal) Collected:  12/13/19 1641    Specimen:  Blood Updated:  12/13/19 1725     WBC 14.10 10*3/mm3      RBC 3.41 10*6/mm3      Hemoglobin 9.6 g/dL      Hematocrit 28.8 %      MCV 84.3 fL      MCH 28.0 pg      MCHC 33.3 g/dL      RDW 14.2 %      RDW-SD 42.4 fl      MPV 6.7 fL      Platelets 828 10*3/mm3      Neutrophil % 92.3 %      Lymphocyte % 3.9 %      Monocyte % 2.9 %      Eosinophil % 0.5 %      Basophil % 0.4 %      Neutrophils, Absolute 13.10 10*3/mm3      Lymphocytes, Absolute 0.60 10*3/mm3      Monocytes, Absolute 0.40 10*3/mm3      Eosinophils, Absolute 0.10 10*3/mm3      Basophils, Absolute 0.10 10*3/mm3      nRBC 0.0 /100 WBC     Anaerobic Culture - Wound, Abdominal Wall [[id number]] Collected:  12/13/19 1438    Specimen:  Wound from Abdominal Wall Updated:  12/13/19 1527    Extra Tubes [[id number]] Collected:  12/13/19 1408    Specimen:  Blood, Venous Line Updated:  12/13/19 1515    Narrative:       The following orders were created for panel order Extra Tubes.  Procedure                               Abnormality         Status                     ---------                               -----------         ------                     [facility][[id number]]                                    Final result                 Please view results for these tests on the individual orders.    [facility] [[id number]] Collected:  12/13/19 1408    Specimen:  Blood Updated:  12/13/19 1515     Extra Tube Hold for add-ons.     Comment: Auto resulted.       Comprehensive Metabolic Panel [[id number]]  (Abnormal) Collected:  12/13/19 1406    Specimen:  Blood Updated:  12/13/19 1459     Glucose 106 mg/dL      BUN 6 mg/dL      Creatinine 0.49 mg/dL      Sodium 134 mmol/L      Potassium 3.9 mmol/L      Chloride 95 mmol/L      CO2 24.0 mmol/L      Calcium 8.9 mg/dL      Total Protein 6.8 g/dL      Albumin 3.60 g/dL      ALT (SGPT) 17 U/L      AST (SGOT) 14 U/L      Alkaline Phosphatase 66 U/L      Total Bilirubin 0.2 mg/dL      eGFR Non African Amer 127 mL/min/1.73      Globulin 3.2 gm/dL      A/G Ratio 1.1 g/dL      BUN/Creatinine Ratio 12.2     Anion Gap 15.0 mmol/L     Narrative:       GFR Normal >60  Chronic Kidney Disease <60  Kidney Failure <15      CBC & Differential [[id number]] Collected:  12/13/19 1406    Specimen:  Blood Updated:  12/13/19 1412    Narrative:       The following orders were created for panel order CBC & Differential.  Procedure                               Abnormality         Status                     ---------                               -----------         ------                     CBC Auto Differential[[id number]]        Abnormal            Final result                 Please view results for these tests on the individual orders.    CBC Auto Differential [[id number]]  (Abnormal) Collected:  12/13/19 1406    Specimen:  Blood Updated:  12/13/19 1412     WBC 11.50 10*3/mm3      RBC 3.60 10*6/mm3      Hemoglobin 10.1 g/dL      Hematocrit 30.5 %      MCV 84.7 fL      MCH 28.2 pg      MCHC 33.2 g/dL      RDW 13.9 %      RDW-SD 42.0 fl      MPV 6.7 fL      Platelets 896 10*3/mm3      Neutrophil % 79.7 %      Lymphocyte % 11.5 %      Monocyte % 7.6 %      Eosinophil % 0.8 %      Basophil % 0.4 %       Neutrophils, Absolute 9.10 10*3/mm3      Lymphocytes, Absolute 1.30 10*3/mm3      Monocytes, Absolute 0.90 10*3/mm3      Eosinophils, Absolute 0.10 10*3/mm3      Basophils, Absolute 0.10 10*3/mm3      nRBC 0.0 /100 WBC         Imaging Results (Last 24 Hours)     ** No results found for the last 24 hours. **          I reviewed the patient's new clinical results.    Medication Review:    Current Facility-Administered Medications:   •  amLODIPine (NORVASC) tablet 5 mg, 5 mg, Oral, Daily, Emily Lr MD, 5 mg at 12/14/19 0849  •  aspirin EC tablet 81 mg, 81 mg, Oral, Daily, Emily Lr MD, 81 mg at 12/14/19 0849  •  enoxaparin (LOVENOX) syringe 40 mg, 40 mg, Subcutaneous, Daily, Emily Lr MD  •  famotidine (PEPCID) tablet 20 mg, 20 mg, Oral, BID, Emily Lr MD, 20 mg at 12/14/19 0849  •  HYDROcodone-acetaminophen (NORCO) 5-325 MG per tablet 1 tablet, 1 tablet, Oral, Q8H PRN, Emily Lr MD, 1 tablet at 12/14/19 0714  •  HYDROmorphone (DILAUDID) injection 0.5 mg, 0.5 mg, Intravenous, Q2H PRN **AND** naloxone (NARCAN) injection 0.1 mg, 0.1 mg, Intravenous, Q5 Min PRN, Emily Lr MD  •  lactated ringers infusion, 9 mL/hr, Intravenous, Continuous PRN, Emily Lr MD  •  lactobacillus acidophilus (RISAQUAD) capsule 1 capsule, 1 capsule, Oral, Daily, Emily Lr MD, 1 capsule at 12/14/19 0849  •  levoFLOXacin (LEVAQUIN) 750 mg/150 mL D5W (premix) (LEVAQUIN) 750 mg, 750 mg, Intravenous, Q24H, Emily Lr MD, 750 mg at 12/13/19 1752  •  levothyroxine (SYNTHROID, LEVOTHROID) tablet 112 mcg, 112 mcg, Oral, Daily, Emily Lr MD, 112 mcg at 12/14/19 0554  •  metroNIDAZOLE (FLAGYL) 500 mg/100mL IVPB, 500 mg, Intravenous, Q8H, Emily Lr MD, 500 mg at 12/14/19 0714  •  Morphine sulfate (PF) injection 4 mg, 4 mg, Intravenous, Q2H PRN **AND** [DISCONTINUED] naloxone (NARCAN) injection 0.4 mg, 0.4 mg, Intravenous, Q5 Min PRN, Emily Lr MD  •  ondansetron (ZOFRAN) tablet 4 mg, 4 mg, Oral, Q6H PRN **OR**  ondansetron (ZOFRAN) injection 4 mg, 4 mg, Intravenous, Q6H PRN, Emily Lr MD  •  ondansetron ODT (ZOFRAN-ODT) disintegrating tablet 8 mg, 8 mg, Oral, Q8H PRN, Emily Lr MD  •  oxyCODONE (ROXICODONE) immediate release tablet 10 mg, 10 mg, Oral, Q4H PRN, Emily Lr MD  •  polyethylene glycol (MIRALAX) powder 17 g, 17 g, Oral, Daily, Emily Lr MD, 17 g at 12/14/19 0848  •  sodium chloride 0.9 % infusion, 100 mL/hr, Intravenous, Continuous, Emily Lr MD, Last Rate: 100 mL/hr at 12/14/19 0625, 100 mL/hr at 12/14/19 0625  •  sodium hypochlorite (DAKIN'S) 0.125 % topical solution 0.125% (quarter strength), , Topical, Q24H, Emily Lr MD  •  venlafaxine XR (EFFEXOR-XR) 24 hr capsule 150 mg, 150 mg, Oral, Daily, Emily Lr MD, 150 mg at 12/14/19 0849    Assessment/Plan     Active Problems:    H/O ileostomy      Continue Dakin's dressings to two abdominal wounds today  Consider wound VAC tomorrow    Plan for disposition: Continue to monitor    Note scribed for Dr. Valerie Mayorga PA-C  12/14/19  11:50 AM

## 2019-12-14 NOTE — PROGRESS NOTES
Discharge Planning Assessment  [facility]     Patient Name: Nallely Figueroa  MRN: [id number]  Today's Date: 12/14/2019    Admit Date: 12/13/2019    Discharge Needs Assessment     Row Name 12/14/19 4837       Living Environment    Lives With  parent(s)    Current Living Arrangements  home/apartment/condo    Caregiving Concerns  pt states that she lives with her mother and does not have a caregiver until 12/16/19. Could find another family member to stay with if absolutely necessary    Family Caregiver if Needed  parent(s)    Quality of Family Relationships  helpful;involved;supportive       Resource/Environmental Concerns    Resource/Environmental Concerns  none    Transportation Concerns  car, none       Transition Planning    Patient/Family Anticipates Transition to  home with help/services    Patient/Family Anticipated Services at Transition  home health care Current with [facility] for wound/ostomy care    Transportation Anticipated  family or friend will provide       Discharge Needs Assessment    Readmission Within the Last 30 Days  other (see comments) post-op abscess    Equipment Currently Used at Home  none    Equipment Needed After Discharge  wound care supplies;other (see comments) wound vac    Outpatient/Agency/Support Group Needs  homecare agency    Discharge Facility/Level of Care Needs  home with home health    Discharge Coordination/Progress  DC plan: Current with [facility] for wound/ ostomy care. Resumption of care order entered. Order faxed to [facility] for home wound vac by CHRISTIANO. Awaiting delivery. Expected arrival of [employer] wound vac 12/15/19 per Kenneth.         Discharge Plan     Row Name 12/14/19 7957       Plan    Plan  DC plan: Current with [facility] for wound/ ostomy care. Resumption of care order entered. Order faxed to [facility] for home wound vac by CHRISTIANO. Awaiting delivery. Expected arrival of [employer] wound vac 12/15/19 per Kenneth.           Demographic Summary     Row Name 12/14/19 7101       General Information     Admission Type  inpatient    Arrived From  emergency department    Referral Source  nursing    Reason for Consult  discharge planning    Preferred Language  English       Contact Information    Contact Information Obtained for             Name,   Lacey ROJO RN    Phone,   [phone]          Lacey Castro RN

## 2019-12-15 LAB
ALBUMIN SERPL-MCNC: 2.9 G/DL (ref 3.5–5.2)
ALBUMIN/GLOB SERPL: 1 G/DL
ALP SERPL-CCNC: 51 U/L (ref 39–117)
ALT SERPL W P-5'-P-CCNC: 11 U/L (ref 1–33)
ANION GAP SERPL CALCULATED.3IONS-SCNC: 9 MMOL/L (ref 5–15)
AST SERPL-CCNC: 10 U/L (ref 1–32)
BACTERIA SPEC AEROBE CULT: ABNORMAL
BASOPHILS # BLD AUTO: 0.1 10*3/MM3 (ref 0–0.2)
BASOPHILS NFR BLD AUTO: 0.9 % (ref 0–1.5)
BILIRUB SERPL-MCNC: <0.2 MG/DL (ref 0.2–1.2)
BUN BLD-MCNC: 10 MG/DL (ref 8–23)
BUN/CREAT SERPL: 21.7 (ref 7–25)
CALCIUM SPEC-SCNC: 8 MG/DL (ref 8.6–10.5)
CHLORIDE SERPL-SCNC: 105 MMOL/L (ref 98–107)
CO2 SERPL-SCNC: 25 MMOL/L (ref 22–29)
CREAT BLD-MCNC: 0.46 MG/DL (ref 0.57–1)
DEPRECATED RDW RBC AUTO: 43.3 FL (ref 37–54)
EOSINOPHIL # BLD AUTO: 0.1 10*3/MM3 (ref 0–0.4)
EOSINOPHIL NFR BLD AUTO: 1.8 % (ref 0.3–6.2)
ERYTHROCYTE [DISTWIDTH] IN BLOOD BY AUTOMATED COUNT: 14.3 % (ref 12.3–15.4)
GFR SERPL CREATININE-BSD FRML MDRD: 137 ML/MIN/1.73
GLOBULIN UR ELPH-MCNC: 2.8 GM/DL
GLUCOSE BLD-MCNC: 114 MG/DL (ref 65–99)
GRAM STN SPEC: ABNORMAL
GRAM STN SPEC: ABNORMAL
HCT VFR BLD AUTO: 26.9 % (ref 34–46.6)
HGB BLD-MCNC: 9 G/DL (ref 12–15.9)
LYMPHOCYTES # BLD AUTO: 1.2 10*3/MM3 (ref 0.7–3.1)
LYMPHOCYTES NFR BLD AUTO: 16.2 % (ref 19.6–45.3)
MCH RBC QN AUTO: 28.1 PG (ref 26.6–33)
MCHC RBC AUTO-ENTMCNC: 33.3 G/DL (ref 31.5–35.7)
MCV RBC AUTO: 84.3 FL (ref 79–97)
MONOCYTES # BLD AUTO: 1 10*3/MM3 (ref 0.1–0.9)
MONOCYTES NFR BLD AUTO: 12.8 % (ref 5–12)
NEUTROPHILS # BLD AUTO: 5.2 10*3/MM3 (ref 1.7–7)
NEUTROPHILS NFR BLD AUTO: 68.3 % (ref 42.7–76)
NRBC BLD AUTO-RTO: 0 /100 WBC (ref 0–0.2)
PLATELET # BLD AUTO: 768 10*3/MM3 (ref 140–450)
PMV BLD AUTO: 6.4 FL (ref 6–12)
POTASSIUM BLD-SCNC: 4.2 MMOL/L (ref 3.5–5.2)
PROT SERPL-MCNC: 5.7 G/DL (ref 6–8.5)
RBC # BLD AUTO: 3.2 10*6/MM3 (ref 3.77–5.28)
SODIUM BLD-SCNC: 139 MMOL/L (ref 136–145)
WBC NRBC COR # BLD: 7.7 10*3/MM3 (ref 3.4–10.8)

## 2019-12-15 PROCEDURE — 80053 COMPREHEN METABOLIC PANEL: CPT | Performed by: SURGERY

## 2019-12-15 PROCEDURE — 25010000002 ENOXAPARIN PER 10 MG: Performed by: SURGERY

## 2019-12-15 PROCEDURE — 85025 COMPLETE CBC W/AUTO DIFF WBC: CPT | Performed by: SURGERY

## 2019-12-15 RX ADMIN — AMLODIPINE BESYLATE 5 MG: 5 TABLET ORAL at 08:35

## 2019-12-15 RX ADMIN — FAMOTIDINE 20 MG: 20 TABLET, FILM COATED ORAL at 21:04

## 2019-12-15 RX ADMIN — Medication 1 CAPSULE: at 08:35

## 2019-12-15 RX ADMIN — VENLAFAXINE HYDROCHLORIDE 150 MG: 75 CAPSULE, EXTENDED RELEASE ORAL at 08:35

## 2019-12-15 RX ADMIN — LEVOTHYROXINE SODIUM 112 MCG: 112 TABLET ORAL at 05:36

## 2019-12-15 RX ADMIN — ENOXAPARIN SODIUM 40 MG: 40 INJECTION SUBCUTANEOUS at 16:19

## 2019-12-15 RX ADMIN — FAMOTIDINE 20 MG: 20 TABLET, FILM COATED ORAL at 08:35

## 2019-12-15 RX ADMIN — ASPIRIN 81 MG: 81 TABLET, DELAYED RELEASE ORAL at 08:35

## 2019-12-15 RX ADMIN — HYDROCODONE BITARTRATE AND ACETAMINOPHEN 1 TABLET: 5; 325 TABLET ORAL at 08:35

## 2019-12-15 RX ADMIN — POLYETHYLENE GLYCOL 3350 17 G: 17 POWDER, FOR SOLUTION ORAL at 08:35

## 2019-12-15 RX ADMIN — HYDROCODONE BITARTRATE AND ACETAMINOPHEN 1 TABLET: 5; 325 TABLET ORAL at 16:18

## 2019-12-15 NOTE — PROGRESS NOTES
General Surgery Progress Note     LOS: 2 days   Patient Care Team:  Michelle Colon MD as PCP - General (Internal Medicine)    Subjective     Chief complaint: Status post exploratory laparotomy and drainage of subcutaneous and subfascial abscess    Interval History:   Has a low appetite today  Wound VAC is in place    Objective     Vital Signs  Temp:  [98 °F (36.7 °C)-99.2 °F (37.3 °C)] 98.2 °F (36.8 °C)  Heart Rate:  [78-93] 78  Resp:  [14-17] 17  BP: (103-139)/(57-79) 117/71    Physical Exam   Constitutional: She appears well-developed and well-nourished. No distress.   HENT:   Head: Normocephalic and atraumatic.   Eyes: EOM are normal.   Neck: Normal range of motion.   Cardiovascular: Normal rate.   Good color; no pallor or cyanosis   Pulmonary/Chest: Effort normal. No respiratory distress.   Neurological: She is alert. No cranial nerve deficit.   Skin: Skin is dry.   Psychiatric: She has a normal mood and affect. Her behavior is normal.   Vitals reviewed.           Results Review:    Lab Results (last 24 hours)     Procedure Component Value Units Date/Time    Wound Culture - Wound, Abdominal Wall [[id number]]  (Abnormal)  (Susceptibility) Collected:  12/13/19 1438    Specimen:  Wound from Abdominal Wall Updated:  12/15/19 0832     Wound Culture Moderate growth (3+) Klebsiella pneumoniae ssp pneumoniae     Gram Stain Moderate (3+) WBCs observed - predominantly neutrophils      Few (2+) Gram positive bacilli    Susceptibility      Klebsiella pneumoniae ssp pneumoniae     JAVI     Ampicillin Resistant     Ampicillin + Sulbactam Susceptible     Cefepime Susceptible     Ceftazidime Susceptible     Ceftriaxone Susceptible     Gentamicin Susceptible     Levofloxacin Susceptible     Piperacillin + Tazobactam Susceptible     Tetracycline Susceptible     Trimethoprim + Sulfamethoxazole Susceptible                Susceptibility Comments     Klebsiella pneumoniae ssp pneumoniae    Cefazolin sensitivity will not be  reported for Enterobacteriaceae in non-urine isolates. If cefazolin is preferred, please call the microbiology lab to request an E-test.             Comprehensive Metabolic Panel [[id number]]  (Abnormal) Collected:  12/15/19 0343    Specimen:  Blood Updated:  12/15/19 0417     Glucose 114 mg/dL      BUN 10 mg/dL      Creatinine 0.46 mg/dL      Sodium 139 mmol/L      Potassium 4.2 mmol/L      Chloride 105 mmol/L      CO2 25.0 mmol/L      Calcium 8.0 mg/dL      Total Protein 5.7 g/dL      Albumin 2.90 g/dL      ALT (SGPT) 11 U/L      AST (SGOT) 10 U/L      Alkaline Phosphatase 51 U/L      Total Bilirubin <0.2 mg/dL      eGFR Non African Amer 137 mL/min/1.73      Globulin 2.8 gm/dL      A/G Ratio 1.0 g/dL      BUN/Creatinine Ratio 21.7     Anion Gap 9.0 mmol/L     Narrative:       GFR Normal >60  Chronic Kidney Disease <60  Kidney Failure <15      CBC & Differential [[id number]] Collected:  12/15/19 0343    Specimen:  Blood Updated:  12/15/19 0359    Narrative:       The following orders were created for panel order CBC & Differential.  Procedure                               Abnormality         Status                     ---------                               -----------         ------                     CBC Auto Differential[[id number]]        Abnormal            Final result                 Please view results for these tests on the individual orders.    CBC Auto Differential [[id number]]  (Abnormal) Collected:  12/15/19 0343    Specimen:  Blood Updated:  12/15/19 0359     WBC 7.70 10*3/mm3      RBC 3.20 10*6/mm3      Hemoglobin 9.0 g/dL      Hematocrit 26.9 %      MCV 84.3 fL      MCH 28.1 pg      MCHC 33.3 g/dL      RDW 14.3 %      RDW-SD 43.3 fl      MPV 6.4 fL      Platelets 768 10*3/mm3      Neutrophil % 68.3 %      Lymphocyte % 16.2 %      Monocyte % 12.8 %      Eosinophil % 1.8 %      Basophil % 0.9 %      Neutrophils, Absolute 5.20 10*3/mm3      Lymphocytes, Absolute 1.20 10*3/mm3      Monocytes, Absolute 1.00  10*3/mm3      Eosinophils, Absolute 0.10 10*3/mm3      Basophils, Absolute 0.10 10*3/mm3      nRBC 0.0 /100 WBC         Imaging Results (Last 24 Hours)     ** No results found for the last 24 hours. **          I reviewed the patient's new clinical results.    Medication Review:    Current Facility-Administered Medications:   •  amLODIPine (NORVASC) tablet 5 mg, 5 mg, Oral, Daily, Emily Lr MD, 5 mg at 12/15/19 0835  •  aspirin EC tablet 81 mg, 81 mg, Oral, Daily, Emily Lr MD, 81 mg at 12/15/19 0835  •  enoxaparin (LOVENOX) syringe 40 mg, 40 mg, Subcutaneous, Daily, Emily Lr MD, 40 mg at 12/14/19 1633  •  famotidine (PEPCID) tablet 20 mg, 20 mg, Oral, BID, Emily Lr MD, 20 mg at 12/15/19 0835  •  HYDROcodone-acetaminophen (NORCO) 5-325 MG per tablet 1 tablet, 1 tablet, Oral, Q8H PRN, Emily Lr MD, 1 tablet at 12/15/19 0835  •  HYDROmorphone (DILAUDID) injection 0.5 mg, 0.5 mg, Intravenous, Q2H PRN **AND** naloxone (NARCAN) injection 0.1 mg, 0.1 mg, Intravenous, Q5 Min PRN, Emily Lr MD  •  lactated ringers infusion, 9 mL/hr, Intravenous, Continuous PRN, Emily Lr MD  •  lactobacillus acidophilus (RISAQUAD) capsule 1 capsule, 1 capsule, Oral, Daily, Emily Lr MD, 1 capsule at 12/15/19 0835  •  levoFLOXacin (LEVAQUIN) 750 mg/150 mL D5W (premix) (LEVAQUIN) 750 mg, 750 mg, Intravenous, Q24H, Emily Lr MD, 750 mg at 12/14/19 1815  •  levothyroxine (SYNTHROID, LEVOTHROID) tablet 112 mcg, 112 mcg, Oral, Daily, Emily Lr MD, 112 mcg at 12/15/19 0536  •  Morphine sulfate (PF) injection 4 mg, 4 mg, Intravenous, Q2H PRN, 4 mg at 12/14/19 1218 **AND** [DISCONTINUED] naloxone (NARCAN) injection 0.4 mg, 0.4 mg, Intravenous, Q5 Min PRN, Emily Lr MD  •  ondansetron (ZOFRAN) tablet 4 mg, 4 mg, Oral, Q6H PRN **OR** ondansetron (ZOFRAN) injection 4 mg, 4 mg, Intravenous, Q6H PRN, Emily Lr MD  •  ondansetron ODT (ZOFRAN-ODT) disintegrating tablet 8 mg, 8 mg, Oral, Q8H PRN, Emily Lr MD  •   oxyCODONE (ROXICODONE) immediate release tablet 10 mg, 10 mg, Oral, Q4H PRN, Emily Lr MD  •  polyethylene glycol (MIRALAX) powder 17 g, 17 g, Oral, Daily, Emily Lr MD, 17 g at 12/15/19 0835  •  sodium chloride 0.9 % infusion, 100 mL/hr, Intravenous, Continuous, Emily Lr MD, Last Rate: 100 mL/hr at 12/14/19 1815, 100 mL/hr at 12/14/19 1815  •  sodium hypochlorite (DAKIN'S) 0.125 % topical solution 0.125% (quarter strength), , Topical, Q24H, Emily Lr MD  •  venlafaxine XR (EFFEXOR-XR) 24 hr capsule 150 mg, 150 mg, Oral, Daily, Emily Lr MD, 150 mg at 12/15/19 0835    Assessment/Plan     Active Problems:    H/O ileostomy      Continue wound VAC  Surgically stable    Plan for disposition: Likely tomorrow per Dr. Lr    Note scribed for Dr. Valerie Mayorga PA-C  12/15/19  3:03 PM

## 2019-12-15 NOTE — PROGRESS NOTES
Case Management/Social Work    Patient Name:  Nallely Figueroa  YOB: 1955  MRN: [id number]  Admit Date:  12/13/2019      DC plan: Current with [facility] for wound/ostomy care. Resumption of care order entered. Order faxed to [facility] for home wound vac by WOCN.     Called [facility] for ETA on wound vac. Reference # [id number].  S/W delivery service at 1300.  currently delivering in [place]. Latest arrive window is 3-4 hours. Updated primary nurse on expected delivery time.      Electronically signed by:  Lacey Castro RN  12/15/19 5:33 PM

## 2019-12-16 ENCOUNTER — TELEPHONE (OUTPATIENT)
Dept: FAMILY MEDICINE CLINIC | Facility: CLINIC | Age: 64
End: 2019-12-16

## 2019-12-16 VITALS
RESPIRATION RATE: 14 BRPM | HEIGHT: 63 IN | OXYGEN SATURATION: 95 % | TEMPERATURE: 98.6 F | WEIGHT: 140.65 LBS | SYSTOLIC BLOOD PRESSURE: 127 MMHG | BODY MASS INDEX: 24.92 KG/M2 | DIASTOLIC BLOOD PRESSURE: 75 MMHG | HEART RATE: 98 BPM

## 2019-12-16 LAB
ALBUMIN SERPL-MCNC: 3.1 G/DL (ref 3.5–5.2)
ALBUMIN/GLOB SERPL: 1 G/DL
ALP SERPL-CCNC: 53 U/L (ref 39–117)
ALT SERPL W P-5'-P-CCNC: 7 U/L (ref 1–33)
ANION GAP SERPL CALCULATED.3IONS-SCNC: 11 MMOL/L (ref 5–15)
AST SERPL-CCNC: 13 U/L (ref 1–32)
BACTERIA SPEC ANAEROBE CULT: ABNORMAL
BASOPHILS # BLD AUTO: 0.1 10*3/MM3 (ref 0–0.2)
BASOPHILS NFR BLD AUTO: 1.2 % (ref 0–1.5)
BILIRUB SERPL-MCNC: <0.2 MG/DL (ref 0.2–1.2)
BUN BLD-MCNC: 11 MG/DL (ref 8–23)
BUN/CREAT SERPL: 20.8 (ref 7–25)
CALCIUM SPEC-SCNC: 8.9 MG/DL (ref 8.6–10.5)
CHLORIDE SERPL-SCNC: 102 MMOL/L (ref 98–107)
CO2 SERPL-SCNC: 27 MMOL/L (ref 22–29)
CREAT BLD-MCNC: 0.53 MG/DL (ref 0.57–1)
DEPRECATED RDW RBC AUTO: 44.6 FL (ref 37–54)
EOSINOPHIL # BLD AUTO: 0.2 10*3/MM3 (ref 0–0.4)
EOSINOPHIL NFR BLD AUTO: 2.9 % (ref 0.3–6.2)
ERYTHROCYTE [DISTWIDTH] IN BLOOD BY AUTOMATED COUNT: 14.5 % (ref 12.3–15.4)
GFR SERPL CREATININE-BSD FRML MDRD: 116 ML/MIN/1.73
GLOBULIN UR ELPH-MCNC: 3.1 GM/DL
GLUCOSE BLD-MCNC: 131 MG/DL (ref 65–99)
HCT VFR BLD AUTO: 29.1 % (ref 34–46.6)
HGB BLD-MCNC: 9.7 G/DL (ref 12–15.9)
LYMPHOCYTES # BLD AUTO: 1.1 10*3/MM3 (ref 0.7–3.1)
LYMPHOCYTES NFR BLD AUTO: 14.8 % (ref 19.6–45.3)
MCH RBC QN AUTO: 28.4 PG (ref 26.6–33)
MCHC RBC AUTO-ENTMCNC: 33.2 G/DL (ref 31.5–35.7)
MCV RBC AUTO: 85.4 FL (ref 79–97)
MONOCYTES # BLD AUTO: 1 10*3/MM3 (ref 0.1–0.9)
MONOCYTES NFR BLD AUTO: 13.6 % (ref 5–12)
NEUTROPHILS # BLD AUTO: 5 10*3/MM3 (ref 1.7–7)
NEUTROPHILS NFR BLD AUTO: 67.5 % (ref 42.7–76)
NRBC BLD AUTO-RTO: 0.1 /100 WBC (ref 0–0.2)
PLATELET # BLD AUTO: 865 10*3/MM3 (ref 140–450)
PMV BLD AUTO: 6.9 FL (ref 6–12)
POTASSIUM BLD-SCNC: 4.5 MMOL/L (ref 3.5–5.2)
PROT SERPL-MCNC: 6.2 G/DL (ref 6–8.5)
RBC # BLD AUTO: 3.41 10*6/MM3 (ref 3.77–5.28)
SODIUM BLD-SCNC: 140 MMOL/L (ref 136–145)
WBC NRBC COR # BLD: 7.4 10*3/MM3 (ref 3.4–10.8)

## 2019-12-16 PROCEDURE — 99024 POSTOP FOLLOW-UP VISIT: CPT | Performed by: SURGERY

## 2019-12-16 PROCEDURE — 80053 COMPREHEN METABOLIC PANEL: CPT | Performed by: SURGERY

## 2019-12-16 PROCEDURE — 85025 COMPLETE CBC W/AUTO DIFF WBC: CPT | Performed by: SURGERY

## 2019-12-16 RX ORDER — LEVOFLOXACIN 750 MG/1
750 TABLET ORAL EVERY 24 HOURS
Status: DISCONTINUED | OUTPATIENT
Start: 2019-12-16 | End: 2019-12-16 | Stop reason: HOSPADM

## 2019-12-16 RX ORDER — LEVOFLOXACIN 750 MG/1
750 TABLET ORAL DAILY
Qty: 21 TABLET | Refills: 3 | Status: SHIPPED | OUTPATIENT
Start: 2019-12-16 | End: 2020-02-11

## 2019-12-16 RX ADMIN — LEVOFLOXACIN 750 MG: 750 TABLET, FILM COATED ORAL at 10:27

## 2019-12-16 RX ADMIN — Medication 1 CAPSULE: at 09:04

## 2019-12-16 RX ADMIN — LEVOTHYROXINE SODIUM 112 MCG: 112 TABLET ORAL at 04:44

## 2019-12-16 RX ADMIN — POLYETHYLENE GLYCOL 3350 17 G: 17 POWDER, FOR SOLUTION ORAL at 09:05

## 2019-12-16 RX ADMIN — OXYCODONE HYDROCHLORIDE 10 MG: 5 TABLET ORAL at 11:39

## 2019-12-16 RX ADMIN — ASPIRIN 81 MG: 81 TABLET, DELAYED RELEASE ORAL at 09:05

## 2019-12-16 RX ADMIN — FAMOTIDINE 20 MG: 20 TABLET, FILM COATED ORAL at 09:05

## 2019-12-16 RX ADMIN — VENLAFAXINE HYDROCHLORIDE 150 MG: 75 CAPSULE, EXTENDED RELEASE ORAL at 09:05

## 2019-12-16 RX ADMIN — AMLODIPINE BESYLATE 5 MG: 5 TABLET ORAL at 09:05

## 2019-12-16 RX ADMIN — HYDROCODONE BITARTRATE AND ACETAMINOPHEN 1 TABLET: 5; 325 TABLET ORAL at 05:51

## 2019-12-16 NOTE — DISCHARGE INSTRUCTIONS
Home health: Change wound VAC 3 times per week. 125 mm Hg.  Use either Vaseline gauze or Mepitel or some other type of barrier between the sponge in the wound.  Call me for any concerns about wound infection.  Call me for any significant odor in the wound.    My office in 1 week.  Please bring supplies so that I may change the wound VAC myself.    Prescription for antibiotic has been called into the pharmacy.

## 2019-12-16 NOTE — NURSING NOTE
Valerie was called about abx needing changed to P.O   no new orders are present, will cont to monitor

## 2019-12-16 NOTE — NURSING NOTE
Bedside RN reports wound vac was placed yeserday.  Pt is most likely going home today. Home wound vac at bedside per chart/per nursing.

## 2019-12-16 NOTE — TELEPHONE ENCOUNTER
Spoke with pt, she states she is pretty wore out and can not come today, she states it will probably have to be after [place] before she comes in. She has appt with Dr. Lr on Monday. I asked if there was anything she needed at this time but she said she was fine.

## 2019-12-16 NOTE — TELEPHONE ENCOUNTER
Ok - please see if she can come in on 1/2 at 4pm. I know it's outside the 2 week window, but I don't see anything else right now. Thanks!

## 2019-12-16 NOTE — DISCHARGE SUMMARY
Date of Discharge:  12/16/2019    Discharge Diagnosis: Wound infection    Presenting Problem/History of Present Illness  Active Hospital Problems    Diagnosis  POA   • H/O ileostomy [Z98.890]  Not Applicable      Resolved Hospital Problems   No resolved problems to display.           Hospital Course  Patient is a 64 y.o. female presented with subcutaneous abscess.  She had had a CT scan that demonstrated this subcutaneous abscess.  She previously underwent sigmoid colectomy and during that operation she had a large bowel obstruction and there was contamination during the procedure.  She also had a loop ileostomy.  She was admitted and underwent exploratory laparotomy and I drain the abscess.  She was monitored over the next few days and a wound VAC was applied.  She be discharged home with wound VAC changes 3 times a week by [facility] health care.  Wound culture positive for Klebsiella that was sensitive to all antibiotics tested except for ampicillin.  To be discharged on Levaquin 750 mg for 3 weeks at least.  I plan to see her in my office in 1 week to assess the wound.  She has a ventral hernia mesh in the wound and this could present difficulty in getting the wound to completely heal..      Procedures Performed    Procedure(s):  LAPAROTOMY EXPLORATORY, DRAINAGE OF SUBCUTANEOUS AND SUBFASCIALABCESS  -------------------       Consults:   Consults     Date and Time Order Name Status Description    11/30/2019 1509 Inpatient Gastroenterology Consult Completed     11/26/2019 7052 Surgery (on-call MD unless specified) Completed           Pertinent Test Results: microbiology: wound culture: positive    Condition on Discharge:  good    Vital Signs  Temp:  [98 °F (36.7 °C)-98.6 °F (37 °C)] 98.6 °F (37 °C)  Heart Rate:  [78-98] 98  Resp:  [12-17] 14  BP: (110-139)/(69-76) 127/75    Physical Exam:   Wound vac in place, stoma viable    Discharge Disposition  Home-Health Care Svc    Discharge Medications     Discharge  Medications      New Medications      Instructions Start Date   levoFLOXacin 750 MG tablet  Commonly known as:  LEVAQUIN   750 mg, Oral, Daily         Continue These Medications      Instructions Start Date   amLODIPine 5 MG tablet  Commonly known as:  NORVASC   5 mg, Oral, Daily      aspirin 81 MG EC tablet   81 mg, Oral, Daily      Biotin 1000 MCG tablet   1,000 mcg, Oral, Every 24 Hours      calcium 600 MG tablet  Commonly known as:  OS-SONNY   1,200 mg, Oral, Daily      [employer] FISH OIL 1200 MG capsule   2,400 mg, Oral, Daily      [employer] PROBIOTIC PEARLS EX ST capsule   1 tablet, Oral, Daily      DAILY MULTIVITAMIN capsule   1 tablet, Oral, Daily      DIALYVITE VITAMIN D 5000 125 MCG (5000 UT) capsule  Generic drug:  vitamin d   1 tablet, Oral, Daily      HYDROcodone-acetaminophen 5-325 MG per tablet  Commonly known as:  NORCO   1 tablet, Oral, Every 8 Hours PRN      levothyroxine 112 MCG tablet  Commonly known as:  SYNTHROID, LEVOTHROID   112 mcg, Oral, Daily      ondansetron ODT 8 MG disintegrating tablet  Commonly known as:  ZOFRAN ODT   8 mg, Oral, Every 8 Hours PRN      polyethylene glycol packet  Commonly known as:  MIRALAX   17 g, Oral, Daily      venlafaxine  MG 24 hr capsule  Commonly known as:  EFFEXOR-XR   1 capsule, Oral, Daily             Discharge Diet:     Activity at Discharge:     Follow-up Appointments  No future appointments.  Additional Instructions for the Follow-ups that You Need to Schedule     Ambulatory Referral to Home Health   As directed      Pt current with [facility]. Resumption of care order    Order Comments:  Pt current with [facility]. Resumption of care order     Face to Face Visit Date:  12/14/2019    Follow-up provider for Plan of Care?:  I treated the patient in an acute care facility and will not continue treatment after discharge.    Follow-up provider:  CRIS DERAS [[id number]]    Reason/Clinical Findings:  wound vac, stoma care    Describe mobility limitations that make  leaving home difficult:  wound vac, weakness    Nursing/Therapeutic Services Requested:  Skilled Nursing    Skilled nursing orders:  Ostomy instruction Wound care dressing/changes    Frequency:  1 Week 1         Obtain informed consent   As directed      Informed Consent Given For:  Exploratory laparotomy         Chlorhexidine Skin Prep   Dec 18, 2019      Chlorhexidine Skin Prep and Instructions For All Patients Having A Procedure Requiring an Outward Incision if Not Allergic. If Allergic, Give Antibacterial Skin Wipes and Instructions. Do Not Use For Facial Cases or on Any Mucus Membranes.    Order Comments:  Chlorhexidine Skin Prep and Instructions For All Patients Having A Procedure Requiring an Outward Incision if Not Allergic. If Allergic, Give Antibacterial Skin Wipes and Instructions. Do Not Use For Facial Cases or on Any Mucus Membranes.                Test Results Pending at Discharge   Order Current Status    Anaerobic Culture - Wound, Abdominal Wall In process           Emily Lr MD  12/16/19  9:11 AM    Time:

## 2019-12-17 ENCOUNTER — READMISSION MANAGEMENT (OUTPATIENT)
Dept: CALL CENTER | Facility: HOSPITAL | Age: 64
End: 2019-12-17

## 2019-12-17 NOTE — OUTREACH NOTE
Prep Survey      Responses   Facility patient discharged from?  [facility]   Is patient eligible?  Yes   Discharge diagnosis  Wound infection,    LAPAROTOMY EXPLORATORY, DRAINAGE OF SUBCUTANEOUS AND SUBFASCIALABCESS   Does the patient have one of the following disease processes/diagnoses(primary or secondary)?  General Surgery   Does the patient have Home health ordered?  Yes   What is the Home health agency?   [facility]    Is there a DME ordered?  Yes   What DME was ordered?  [employer] for home wound vac   Prep survey completed?  Yes          Eladia Bell RN

## 2019-12-18 ENCOUNTER — READMISSION MANAGEMENT (OUTPATIENT)
Dept: CALL CENTER | Facility: HOSPITAL | Age: 64
End: 2019-12-18

## 2019-12-18 NOTE — OUTREACH NOTE
General Surgery Week 1 Survey      Responses   Facility patient discharged from?  [facility]   Does the patient have one of the following disease processes/diagnoses(primary or secondary)?  General Surgery   Is there a successful TCM telephone encounter documented?  No   Week 1 attempt successful?  Yes   Call start time  1623   Call end time  1626   Meds reviewed with patient/caregiver?  Yes   Is the patient having any side effects they believe may be caused by any medication additions or changes?  No   Does the patient have all medications related to this admission filled (includes all antibiotics, pain medications, etc.)  Yes   Is the patient taking all medications as directed (includes completed medication regime)?  Yes   Does the patient have a follow up appointment scheduled with their surgeon?  Yes   Has the patient kept scheduled appointments due by today?  N/A   What is the Home health agency?   [facility]    Has home health visited the patient within 72 hours of discharge?  Yes   Did the patient receive a copy of their discharge instructions?  Yes   Nursing interventions  Reviewed instructions with patient   What is the patient's perception of their health status since discharge?  Improving   Nursing interventions  Nurse provided patient education   Is the patient /caregiver able to teach back basic post-op care?  Continue use of incentive spirometry at least 1 week post discharge, Practice 'cough and deep breath', Drive as instructed by MD in discharge instructions, Take showers only when approved by MD-sponge bathe until then, No tub bath, swimming, or hot tub until instructed by MD, Keep incision areas clean,dry and protected, Do not remove steri-strips, Lifting as instructed by MD in discharge instructions   Is the patient/caregiver able to teach back signs and symptoms of incisional infection?  Increased redness, swelling or pain at the incisonal site, Increased drainage or bleeding, Incisional warmth, Pus or  odor from incision, Fever   Is the patient/caregiver able to teach back steps to recovery at home?  Set small, achievable goals for return to baseline health, Rest and rebuild strength, gradually increase activity, Make a list of questions for surgeon's appointment   Is the patient/caregiver able to teach back the hierarchy of who to call/visit for symptoms/problems? PCP, Specialist, Home health nurse, Urgent Care, ED, 911  Yes   Week 1 call completed?  Yes   Revoked  No further contact(revokes)-requires comment          Gracie Guerrero LPN

## 2019-12-20 PROBLEM — Z00.00 ENCOUNTER FOR GENERAL ADULT MEDICAL EXAMINATION WITHOUT ABNORMAL FINDINGS: Status: ACTIVE | Noted: 2019-12-20

## 2019-12-20 PROBLEM — G47.33 SLEEP APNEA, OBSTRUCTIVE: Status: ACTIVE | Noted: 2019-12-20

## 2019-12-20 PROBLEM — E55.9 VITAMIN D DEFICIENCY: Status: ACTIVE | Noted: 2019-12-20

## 2019-12-20 PROBLEM — R55 SYNCOPE: Status: ACTIVE | Noted: 2019-06-03

## 2019-12-20 PROBLEM — Z87.898 HISTORY OF BREAST PROBLEM: Status: ACTIVE | Noted: 2019-12-20

## 2019-12-20 PROBLEM — K43.9 VENTRAL HERNIA: Status: ACTIVE | Noted: 2018-04-30

## 2019-12-20 PROBLEM — F32.A DEPRESSION: Status: ACTIVE | Noted: 2019-12-20

## 2019-12-20 PROBLEM — M25.50 ARTHRALGIA: Status: ACTIVE | Noted: 2017-01-06

## 2019-12-20 PROBLEM — K56.609 SMALL BOWEL OBSTRUCTION (HCC): Status: ACTIVE | Noted: 2018-04-30

## 2019-12-20 PROBLEM — R73.9 HYPERGLYCEMIA: Status: ACTIVE | Noted: 2017-10-12

## 2019-12-20 PROBLEM — R63.5 WEIGHT GAIN: Status: ACTIVE | Noted: 2017-08-15

## 2019-12-20 PROBLEM — G56.00 CARPAL TUNNEL SYNDROME: Status: ACTIVE | Noted: 2017-01-06

## 2019-12-20 PROBLEM — E66.9 OBESITY WITH BODY MASS INDEX 30 OR GREATER: Status: ACTIVE | Noted: 2018-04-30

## 2019-12-23 ENCOUNTER — OFFICE VISIT (OUTPATIENT)
Dept: BARIATRICS/WEIGHT MGMT | Facility: CLINIC | Age: 64
End: 2019-12-23

## 2019-12-23 VITALS
BODY MASS INDEX: 24.63 KG/M2 | OXYGEN SATURATION: 98 % | SYSTOLIC BLOOD PRESSURE: 123 MMHG | DIASTOLIC BLOOD PRESSURE: 82 MMHG | RESPIRATION RATE: 16 BRPM | WEIGHT: 139 LBS | HEIGHT: 63 IN | HEART RATE: 88 BPM | TEMPERATURE: 98.6 F

## 2019-12-23 DIAGNOSIS — K43.9 VENTRAL HERNIA WITHOUT OBSTRUCTION OR GANGRENE: Primary | ICD-10-CM

## 2019-12-23 PROCEDURE — 99024 POSTOP FOLLOW-UP VISIT: CPT | Performed by: SURGERY

## 2019-12-23 RX ORDER — DIPHENOXYLATE HYDROCHLORIDE AND ATROPINE SULFATE 2.5; .025 MG/1; MG/1
1 TABLET ORAL 4 TIMES DAILY PRN
Qty: 60 TABLET | Refills: 5 | Status: SHIPPED | OUTPATIENT
Start: 2019-12-23 | End: 2020-02-08 | Stop reason: HOSPADM

## 2019-12-30 ENCOUNTER — LAB REQUISITION (OUTPATIENT)
Dept: LAB | Facility: HOSPITAL | Age: 64
End: 2019-12-30

## 2019-12-30 ENCOUNTER — OFFICE VISIT (OUTPATIENT)
Dept: BARIATRICS/WEIGHT MGMT | Facility: CLINIC | Age: 64
End: 2019-12-30

## 2019-12-30 VITALS
WEIGHT: 140.2 LBS | DIASTOLIC BLOOD PRESSURE: 66 MMHG | HEIGHT: 63 IN | SYSTOLIC BLOOD PRESSURE: 125 MMHG | HEART RATE: 96 BPM | BODY MASS INDEX: 24.84 KG/M2 | OXYGEN SATURATION: 99 % | RESPIRATION RATE: 16 BRPM | TEMPERATURE: 98.2 F

## 2019-12-30 DIAGNOSIS — K43.9 VENTRAL HERNIA WITHOUT OBSTRUCTION OR GANGRENE: Primary | ICD-10-CM

## 2019-12-30 DIAGNOSIS — K65.1 PERITONEAL ABSCESS (HCC): ICD-10-CM

## 2019-12-30 LAB
BASOPHILS # BLD AUTO: 0.07 10*3/MM3 (ref 0–0.2)
BASOPHILS NFR BLD AUTO: 1 % (ref 0–1.5)
DEPRECATED RDW RBC AUTO: 40.3 FL (ref 37–54)
EOSINOPHIL # BLD AUTO: 0.22 10*3/MM3 (ref 0–0.4)
EOSINOPHIL NFR BLD AUTO: 3.2 % (ref 0.3–6.2)
ERYTHROCYTE [DISTWIDTH] IN BLOOD BY AUTOMATED COUNT: 13.3 % (ref 12.3–15.4)
HCT VFR BLD AUTO: 31.2 % (ref 34–46.6)
HGB BLD-MCNC: 9.9 G/DL (ref 12–15.9)
IMM GRANULOCYTES # BLD AUTO: 0.03 10*3/MM3 (ref 0–0.05)
IMM GRANULOCYTES NFR BLD AUTO: 0.4 % (ref 0–0.5)
LYMPHOCYTES # BLD AUTO: 1.49 10*3/MM3 (ref 0.7–3.1)
LYMPHOCYTES NFR BLD AUTO: 21.8 % (ref 19.6–45.3)
MCH RBC QN AUTO: 26.5 PG (ref 26.6–33)
MCHC RBC AUTO-ENTMCNC: 31.7 G/DL (ref 31.5–35.7)
MCV RBC AUTO: 83.6 FL (ref 79–97)
MONOCYTES # BLD AUTO: 0.74 10*3/MM3 (ref 0.1–0.9)
MONOCYTES NFR BLD AUTO: 10.8 % (ref 5–12)
NEUTROPHILS # BLD AUTO: 4.29 10*3/MM3 (ref 1.7–7)
NEUTROPHILS NFR BLD AUTO: 62.8 % (ref 42.7–76)
NRBC BLD AUTO-RTO: 0 /100 WBC (ref 0–0.2)
PLATELET # BLD AUTO: 376 10*3/MM3 (ref 140–450)
PMV BLD AUTO: 9.8 FL (ref 6–12)
RBC # BLD AUTO: 3.73 10*6/MM3 (ref 3.77–5.28)
WBC NRBC COR # BLD: 6.84 10*3/MM3 (ref 3.4–10.8)

## 2019-12-30 PROCEDURE — 83540 ASSAY OF IRON: CPT | Performed by: SURGERY

## 2019-12-30 PROCEDURE — 83921 ORGANIC ACID SINGLE QUANT: CPT | Performed by: SURGERY

## 2019-12-30 PROCEDURE — 85025 COMPLETE CBC W/AUTO DIFF WBC: CPT | Performed by: SURGERY

## 2019-12-30 PROCEDURE — 99024 POSTOP FOLLOW-UP VISIT: CPT | Performed by: SURGERY

## 2019-12-30 PROCEDURE — 84134 ASSAY OF PREALBUMIN: CPT | Performed by: SURGERY

## 2019-12-30 PROCEDURE — 80053 COMPREHEN METABOLIC PANEL: CPT | Performed by: SURGERY

## 2019-12-31 LAB
ALBUMIN SERPL-MCNC: 3.9 G/DL (ref 3.5–5.2)
ALBUMIN/GLOB SERPL: 1.3 G/DL
ALP SERPL-CCNC: 66 U/L (ref 39–117)
ALT SERPL W P-5'-P-CCNC: 7 U/L (ref 1–33)
ANION GAP SERPL CALCULATED.3IONS-SCNC: 12.1 MMOL/L (ref 5–15)
AST SERPL-CCNC: 11 U/L (ref 1–32)
BILIRUB SERPL-MCNC: <0.2 MG/DL (ref 0.2–1.2)
BUN BLD-MCNC: 21 MG/DL (ref 8–23)
BUN/CREAT SERPL: 30.9 (ref 7–25)
CALCIUM SPEC-SCNC: 8.8 MG/DL (ref 8.6–10.5)
CHLORIDE SERPL-SCNC: 101 MMOL/L (ref 98–107)
CO2 SERPL-SCNC: 23.9 MMOL/L (ref 22–29)
CREAT BLD-MCNC: 0.68 MG/DL (ref 0.57–1)
GFR SERPL CREATININE-BSD FRML MDRD: 87 ML/MIN/1.73
GLOBULIN UR ELPH-MCNC: 3 GM/DL
GLUCOSE BLD-MCNC: 93 MG/DL (ref 65–99)
IRON 24H UR-MRATE: 34 MCG/DL (ref 37–145)
POTASSIUM BLD-SCNC: 4.3 MMOL/L (ref 3.5–5.2)
PREALB SERPL-MCNC: 24.8 MG/DL (ref 20–40)
PROT SERPL-MCNC: 6.9 G/DL (ref 6–8.5)
SODIUM BLD-SCNC: 137 MMOL/L (ref 136–145)

## 2020-01-02 ENCOUNTER — OFFICE VISIT (OUTPATIENT)
Dept: FAMILY MEDICINE CLINIC | Facility: CLINIC | Age: 65
End: 2020-01-02

## 2020-01-02 VITALS
TEMPERATURE: 98.5 F | RESPIRATION RATE: 20 BRPM | HEART RATE: 96 BPM | BODY MASS INDEX: 25.48 KG/M2 | WEIGHT: 143.8 LBS | OXYGEN SATURATION: 99 % | SYSTOLIC BLOOD PRESSURE: 120 MMHG | HEIGHT: 63 IN | DIASTOLIC BLOOD PRESSURE: 76 MMHG

## 2020-01-02 DIAGNOSIS — Z98.890 H/O ILEOSTOMY: ICD-10-CM

## 2020-01-02 DIAGNOSIS — K56.609 SMALL BOWEL OBSTRUCTION (HCC): Primary | ICD-10-CM

## 2020-01-02 DIAGNOSIS — L02.211 CUTANEOUS ABSCESS OF ABDOMINAL WALL: ICD-10-CM

## 2020-01-02 DIAGNOSIS — F32.9 REACTIVE DEPRESSION: ICD-10-CM

## 2020-01-02 PROCEDURE — 99215 OFFICE O/P EST HI 40 MIN: CPT | Performed by: INTERNAL MEDICINE

## 2020-01-02 RX ORDER — FERROUS SULFATE 325(65) MG
325 TABLET ORAL DAILY
Qty: 30 TABLET | Refills: 1 | Status: SHIPPED | OUTPATIENT
Start: 2020-01-02 | End: 2020-06-09

## 2020-01-02 NOTE — PROGRESS NOTES
Transitional Care Follow Up Visit    Chief Complaint   Patient presents with   • Transitional Care Management     Wound infection       Subjective     History of Present Illness     Nallely Figueroa is a 64 y.o. female who presents for a transitional care management visit from recent stay at [facility] .  Within 48 business hours after discharge our office contacted her via telephone to coordinate her care and needs.  I reviewed and discussed the details of that call along with the discharge summary, hospital problems, inpatient lab results, inpatient diagnostic studies, and consultation reports with Nallely.     Date of TCM Phone Call 12/6/2019   Hospital [facility]   Date of Discharge 12/5/2019   Discharge Disposition Home or Self Care   readmitted 12/13-12/16      Risk for Readmission (LACE) No data recorded    Course During Hospital Stay(Reviewed from Discharge Summary Note)      Admission Diagnosis/es: subcutaneous abscess of midline incision    Discharge Diagnosis/es: Wound infection    Hospital Course: Patient is a 64 y.o. female presented with subcutaneous abscess.  She had had a CT scan that demonstrated this subcutaneous abscess.  She previously underwent sigmoid colectomy and during that operation she had a large bowel obstruction and there was contamination during the procedure.  She also had a loop ileostomy.  She was admitted and underwent exploratory laparotomy and I drain the abscess.  She was monitored over the next few days and a wound VAC was applied.  She be discharged home with wound VAC changes 3 times a week by [facility].  Wound culture positive for Klebsiella that was sensitive to all antibiotics tested except for ampicillin.  To be discharged on Levaquin 750 mg for 3 weeks at least.  I plan to see her in my office in 1 week to assess the wound.  She has a ventral hernia mesh in the wound and this could present difficulty in getting the wound to completely heal..   "    Hospital Consultants: none    Scheduled  Or Suggested Follow ups: gen surg, pcp    Pending Test Results: none    Current outpatient and discharge medications have been reconciled for the patient.  Reviewed by: Michelle Colon MD      Review of Systems   Constitutional: Negative for activity change, fatigue and fever.   HENT: Negative for congestion, sinus pain and sore throat.    Eyes: Negative for pain and discharge.   Respiratory: Negative for cough and shortness of breath.    Cardiovascular: Negative for chest pain and palpitations.   Gastrointestinal: Negative for abdominal pain, diarrhea, nausea and vomiting.   Endocrine: Negative for polydipsia, polyphagia and polyuria.   Genitourinary: Negative for dysuria and hematuria.   Musculoskeletal: Negative for arthralgias and myalgias.   Skin: Negative for rash and wound.   Neurological: Negative for dizziness, weakness and headaches.   Hematological: Negative for adenopathy. Does not bruise/bleed easily.   Psychiatric/Behavioral: Positive for dysphoric mood. The patient is not nervous/anxious.        Objective     /76 (BP Location: Left arm, Patient Position: Sitting, Cuff Size: Adult)   Pulse 96   Temp 98.5 °F (36.9 °C) (Oral)   Resp 20   Ht 160 cm (63\")   Wt 65.2 kg (143 lb 12.8 oz)   SpO2 99%   BMI 25.47 kg/m²     Physical Exam   Constitutional: She is oriented to person, place, and time. She appears well-developed and well-nourished. No distress.   HENT:   Head: Normocephalic and atraumatic.   Mouth/Throat: Oropharynx is clear and moist.   Eyes: Conjunctivae and EOM are normal. No scleral icterus.   Neck: Normal range of motion. Neck supple.   Cardiovascular: Normal rate, regular rhythm and normal heart sounds. Exam reveals no gallop and no friction rub.   No murmur heard.  Pulmonary/Chest: Effort normal and breath sounds normal. No respiratory distress. She has no wheezes. She has no rales.   Abdominal: Soft. Bowel sounds are normal. There " "is no tenderness. There is no guarding.   Musculoskeletal: Normal range of motion. She exhibits no edema or deformity.   Lymphadenopathy:     She has no cervical adenopathy.   Neurological: She is alert and oriented to person, place, and time. No cranial nerve deficit. She exhibits normal muscle tone.   Skin: Skin is warm and dry. No rash noted. She is not diaphoretic.   Psychiatric: She has a normal mood and affect. Her behavior is normal.   Vitals reviewed.      Hospital Data Reviewed:    Lab Results   Component Value Date    BUN 21 12/30/2019    CREATININE 0.68 12/30/2019    EGFRIFNONA 87 12/30/2019     12/30/2019    K 4.3 12/30/2019     12/30/2019    CALCIUM 8.8 12/30/2019    ALBUMIN 3.90 12/30/2019    BILITOT <0.2 (L) 12/30/2019    ALKPHOS 66 12/30/2019    AST 11 12/30/2019    ALT 7 12/30/2019    TRIG 67 11/25/2019    HDL 52 11/25/2019    VLDL 13.4 11/25/2019     (H) 11/25/2019    LDLHDL 2.70 11/25/2019    WBC 6.84 12/30/2019    RBC 3.73 (L) 12/30/2019    HCT 31.2 (L) 12/30/2019    MCV 83.6 12/30/2019    MCH 26.5 (L) 12/30/2019    TSH 2.220 11/25/2019    FREET4 1.43 11/25/2019    INR 0.99 11/27/2019    XSLT70RN 77.8 11/25/2019      Assessment/Plan     Ct Abdomen Pelvis Without Contrast    Addendum Date: 12/13/2019    Addendum: There is a voice recognition/transcription air within the body of the report. The second line of the second paragraph should read, \"Lobulated air-fluid collections are seen in the anterior abdominal wall subcutaneous fat in the laparotomy bed.\"  Electronically Signed By-Dr. Kellen Reyes MD On:12/13/2019 2:05 PM This report was finalized on [id number] by Dr. Kellen Reyes MD.    Result Date: 12/13/2019  Impression:   1. Previous ventral abdominal hernia repair. The superior margin of the mesh material is now discontiguous. 2. Stable irregular air-fluid collections are seen in the subcutaneous fat of the ventral abdominal wall deep to the laparotomy bed. This could " represent post surgical seroma. Infected fluid not excluded. 3. Features suggestive of mild loss postoperative ileus. No evidence of high-grade bowel obstruction. 4. The right lower quadrant ileostomy has been created. There is a small parastomal hernia containing nonincarcerated bowel. 5. Previous left lower quadrant ventral hernia appears intact and unchanged. 6. Epigastric midline abdominal hernia containing a sidewall of the liver. The hernia sac size appears stable since the prior study.    Electronically Signed By-Dr. Kellen Reyes MD On:12/13/2019 12:41 PM This report was finalized on [id number] by Dr. Kellen Reyes MD.      Assessment/Plan     Diagnoses and all orders for this visit:    1. Small bowel obstruction (CMS/HCC) (Primary)    2. H/O ileostomy    3. Cutaneous abscess of abdominal wall    4. Reactive depression    Other orders  -     ferrous sulfate 325 (65 FE) MG tablet; Take 1 tablet by mouth Daily.  Dispense: 30 tablet; Refill: 1    overall pt is doing ok in terms of abd pain, n/v  Lost a lot of weight; appetite is coming back  Denies much pain  Does have some worsening depression because of being in and out of hospital  As well as having trouble with ileostomy bag - doesn't stay on  Plan for reanastomosis in feb  Labs drawn by gen surg show pt is anemic, presumably from surgery and poor nutrition from being npo, etc.  Reviewed and discussed records from hospitalization in early dec as well as most recent admission  Will send in Rx for iron - discussed that if that causes nausea, cannot tolerate, can try otc slow release iron instead      I spent 45 total minutes caring for Nallely today.  Greater than 50% of this time involved counseling and/or coordination of care as documented in the note above      No follow-ups on file.

## 2020-01-03 LAB
Lab: NORMAL
METHYLMALONATE SERPL-SCNC: 144 NMOL/L (ref 0–378)

## 2020-01-05 NOTE — PROGRESS NOTES
S/p sigmoid colectomy with ileostomy, still using vac for wound    Less leaking      Wound is now very small and superficial  Stoma viable, less skin inflammation      A/p    Will stop wound vac  Plan to reverse ileostomy in 5 weeks

## 2020-01-06 ENCOUNTER — OFFICE VISIT (OUTPATIENT)
Dept: BARIATRICS/WEIGHT MGMT | Facility: CLINIC | Age: 65
End: 2020-01-06

## 2020-01-06 VITALS
BODY MASS INDEX: 25.48 KG/M2 | SYSTOLIC BLOOD PRESSURE: 131 MMHG | WEIGHT: 143.8 LBS | HEIGHT: 63 IN | HEART RATE: 91 BPM | RESPIRATION RATE: 16 BRPM | DIASTOLIC BLOOD PRESSURE: 81 MMHG | OXYGEN SATURATION: 98 %

## 2020-01-06 DIAGNOSIS — Z98.890 H/O ILEOSTOMY: Primary | ICD-10-CM

## 2020-01-06 DIAGNOSIS — S31.109S OPEN WOUND OF ANTERIOR ABDOMINAL WALL, SEQUELA: ICD-10-CM

## 2020-01-06 PROCEDURE — 87070 CULTURE OTHR SPECIMN AEROBIC: CPT | Performed by: SURGERY

## 2020-01-06 PROCEDURE — 99024 POSTOP FOLLOW-UP VISIT: CPT | Performed by: SURGERY

## 2020-01-06 PROCEDURE — 87205 SMEAR GRAM STAIN: CPT | Performed by: SURGERY

## 2020-01-06 NOTE — PROGRESS NOTES
S/p ileostomy and sigmoid colectomy    Had drainage from wound      Exam    There is a sinus tract at superior incision, there is some small amount of purulent drainage and I did culture this.    Assessment and plan  We will resume wound VAC and await culture.  Based on culture will resume antibiotics.  She has mesh in the base of this wound and it could take several weeks of antibiotic therapy.  She will need her ileostomy reversed on February 6 and we will reassess the wound just prior to that to see if there is any wound debridement that needs to be done at the same time.   (Carlita Coma Scale) 4= 13-15

## 2020-01-10 LAB
BACTERIA SPEC AEROBE CULT: NORMAL
GRAM STN SPEC: NORMAL
GRAM STN SPEC: NORMAL

## 2020-01-27 ENCOUNTER — OFFICE VISIT (OUTPATIENT)
Dept: BARIATRICS/WEIGHT MGMT | Facility: CLINIC | Age: 65
End: 2020-01-27

## 2020-01-27 VITALS
RESPIRATION RATE: 16 BRPM | SYSTOLIC BLOOD PRESSURE: 120 MMHG | BODY MASS INDEX: 26.05 KG/M2 | HEART RATE: 93 BPM | TEMPERATURE: 97.5 F | WEIGHT: 147 LBS | HEIGHT: 63 IN | OXYGEN SATURATION: 97 % | DIASTOLIC BLOOD PRESSURE: 68 MMHG

## 2020-01-27 DIAGNOSIS — Z98.890 H/O ILEOSTOMY: Primary | ICD-10-CM

## 2020-01-27 DIAGNOSIS — S31.109S OPEN WOUND OF ANTERIOR ABDOMINAL WALL, SEQUELA: ICD-10-CM

## 2020-01-27 PROCEDURE — 87070 CULTURE OTHR SPECIMN AEROBIC: CPT | Performed by: SURGERY

## 2020-01-27 PROCEDURE — 87205 SMEAR GRAM STAIN: CPT | Performed by: SURGERY

## 2020-01-27 PROCEDURE — 99024 POSTOP FOLLOW-UP VISIT: CPT | Performed by: SURGERY

## 2020-01-27 RX ORDER — SODIUM HYPOCHLORITE 2.5 MG/ML
SOLUTION TOPICAL ONCE
Qty: 473 ML | Refills: 5 | Status: SHIPPED | OUTPATIENT
Start: 2020-01-27 | End: 2020-01-27

## 2020-01-27 RX ORDER — ASCORBIC ACID 500 MG
1000 TABLET ORAL DAILY
COMMUNITY

## 2020-01-29 LAB
BACTERIA SPEC AEROBE CULT: NORMAL
GRAM STN SPEC: NORMAL
GRAM STN SPEC: NORMAL

## 2020-01-30 ENCOUNTER — RESULTS ENCOUNTER (OUTPATIENT)
Dept: BARIATRICS/WEIGHT MGMT | Facility: CLINIC | Age: 65
End: 2020-01-30

## 2020-01-30 DIAGNOSIS — K43.9 VENTRAL HERNIA WITHOUT OBSTRUCTION OR GANGRENE: ICD-10-CM

## 2020-02-03 ENCOUNTER — APPOINTMENT (OUTPATIENT)
Dept: PREADMISSION TESTING | Facility: HOSPITAL | Age: 65
End: 2020-02-03

## 2020-02-03 VITALS
OXYGEN SATURATION: 99 % | DIASTOLIC BLOOD PRESSURE: 70 MMHG | HEIGHT: 63 IN | HEART RATE: 90 BPM | BODY MASS INDEX: 25.73 KG/M2 | SYSTOLIC BLOOD PRESSURE: 118 MMHG | WEIGHT: 145.2 LBS

## 2020-02-03 DIAGNOSIS — Z98.890 H/O ILEOSTOMY: ICD-10-CM

## 2020-02-03 DIAGNOSIS — K43.9 VENTRAL HERNIA WITHOUT OBSTRUCTION OR GANGRENE: ICD-10-CM

## 2020-02-03 LAB
ABO GROUP BLD: NORMAL
ALBUMIN SERPL-MCNC: 4.5 G/DL (ref 3.5–5.2)
ALBUMIN/GLOB SERPL: 1.6 G/DL
ALP SERPL-CCNC: 84 U/L (ref 39–117)
ALT SERPL W P-5'-P-CCNC: 8 U/L (ref 1–33)
ANION GAP SERPL CALCULATED.3IONS-SCNC: 15 MMOL/L (ref 5–15)
APTT PPP: 23.7 SECONDS (ref 24–31)
AST SERPL-CCNC: 14 U/L (ref 1–32)
BASOPHILS # BLD AUTO: 0 10*3/MM3 (ref 0–0.2)
BASOPHILS NFR BLD AUTO: 0.9 % (ref 0–1.5)
BILIRUB SERPL-MCNC: 0.3 MG/DL (ref 0.2–1.2)
BLD GP AB SCN SERPL QL: NEGATIVE
BUN BLD-MCNC: 15 MG/DL (ref 8–23)
BUN/CREAT SERPL: 21.7 (ref 7–25)
CALCIUM SPEC-SCNC: 9.3 MG/DL (ref 8.6–10.5)
CHLORIDE SERPL-SCNC: 99 MMOL/L (ref 98–107)
CO2 SERPL-SCNC: 23 MMOL/L (ref 22–29)
CREAT BLD-MCNC: 0.69 MG/DL (ref 0.57–1)
DEPRECATED RDW RBC AUTO: 45.5 FL (ref 37–54)
EOSINOPHIL # BLD AUTO: 0.2 10*3/MM3 (ref 0–0.4)
EOSINOPHIL NFR BLD AUTO: 2.8 % (ref 0.3–6.2)
ERYTHROCYTE [DISTWIDTH] IN BLOOD BY AUTOMATED COUNT: 15.4 % (ref 12.3–15.4)
GFR SERPL CREATININE-BSD FRML MDRD: 86 ML/MIN/1.73
GLOBULIN UR ELPH-MCNC: 2.8 GM/DL
GLUCOSE BLD-MCNC: 94 MG/DL (ref 65–99)
HCT VFR BLD AUTO: 37.5 % (ref 34–46.6)
HGB BLD-MCNC: 12 G/DL (ref 12–15.9)
INR PPP: 1.06 (ref 0.9–1.1)
IRON 24H UR-MRATE: 46 MCG/DL (ref 37–145)
LYMPHOCYTES # BLD AUTO: 1.6 10*3/MM3 (ref 0.7–3.1)
LYMPHOCYTES NFR BLD AUTO: 29.8 % (ref 19.6–45.3)
MCH RBC QN AUTO: 26.1 PG (ref 26.6–33)
MCHC RBC AUTO-ENTMCNC: 32.1 G/DL (ref 31.5–35.7)
MCV RBC AUTO: 81.3 FL (ref 79–97)
MONOCYTES # BLD AUTO: 0.4 10*3/MM3 (ref 0.1–0.9)
MONOCYTES NFR BLD AUTO: 8.2 % (ref 5–12)
NEUTROPHILS # BLD AUTO: 3.1 10*3/MM3 (ref 1.7–7)
NEUTROPHILS NFR BLD AUTO: 58.3 % (ref 42.7–76)
NRBC BLD AUTO-RTO: 0.2 /100 WBC (ref 0–0.2)
PLATELET # BLD AUTO: 235 10*3/MM3 (ref 140–450)
PMV BLD AUTO: 7.8 FL (ref 6–12)
POTASSIUM BLD-SCNC: 4.2 MMOL/L (ref 3.5–5.2)
PREALB SERPL-MCNC: 23.3 MG/DL (ref 20–40)
PROT SERPL-MCNC: 7.3 G/DL (ref 6–8.5)
PROTHROMBIN TIME: 11 SECONDS (ref 9.6–11.7)
RBC # BLD AUTO: 4.61 10*6/MM3 (ref 3.77–5.28)
RH BLD: POSITIVE
SODIUM BLD-SCNC: 137 MMOL/L (ref 136–145)
T&S EXPIRATION DATE: NORMAL
WBC NRBC COR # BLD: 5.3 10*3/MM3 (ref 3.4–10.8)

## 2020-02-03 PROCEDURE — 86850 RBC ANTIBODY SCREEN: CPT | Performed by: SURGERY

## 2020-02-03 PROCEDURE — 36415 COLL VENOUS BLD VENIPUNCTURE: CPT

## 2020-02-03 PROCEDURE — 86900 BLOOD TYPING SEROLOGIC ABO: CPT | Performed by: SURGERY

## 2020-02-03 PROCEDURE — 80053 COMPREHEN METABOLIC PANEL: CPT | Performed by: SURGERY

## 2020-02-03 PROCEDURE — 84425 ASSAY OF VITAMIN B-1: CPT | Performed by: SURGERY

## 2020-02-03 PROCEDURE — 85730 THROMBOPLASTIN TIME PARTIAL: CPT | Performed by: SURGERY

## 2020-02-03 PROCEDURE — 86901 BLOOD TYPING SEROLOGIC RH(D): CPT | Performed by: SURGERY

## 2020-02-03 PROCEDURE — 93010 ELECTROCARDIOGRAM REPORT: CPT | Performed by: INTERNAL MEDICINE

## 2020-02-03 PROCEDURE — 85610 PROTHROMBIN TIME: CPT | Performed by: SURGERY

## 2020-02-03 PROCEDURE — 83921 ORGANIC ACID SINGLE QUANT: CPT | Performed by: SURGERY

## 2020-02-03 PROCEDURE — 83540 ASSAY OF IRON: CPT | Performed by: SURGERY

## 2020-02-03 PROCEDURE — 85025 COMPLETE CBC W/AUTO DIFF WBC: CPT | Performed by: SURGERY

## 2020-02-03 PROCEDURE — 84134 ASSAY OF PREALBUMIN: CPT | Performed by: SURGERY

## 2020-02-03 PROCEDURE — 93005 ELECTROCARDIOGRAM TRACING: CPT

## 2020-02-05 ENCOUNTER — ANESTHESIA EVENT (OUTPATIENT)
Dept: PERIOP | Facility: HOSPITAL | Age: 65
End: 2020-02-05

## 2020-02-06 ENCOUNTER — ANESTHESIA (OUTPATIENT)
Dept: PERIOP | Facility: HOSPITAL | Age: 65
End: 2020-02-06

## 2020-02-06 ENCOUNTER — HOSPITAL ENCOUNTER (INPATIENT)
Facility: HOSPITAL | Age: 65
LOS: 2 days | Discharge: HOME HEALTH | End: 2020-02-08
Attending: SURGERY | Admitting: SURGERY

## 2020-02-06 DIAGNOSIS — K43.9 VENTRAL HERNIA WITHOUT OBSTRUCTION OR GANGRENE: ICD-10-CM

## 2020-02-06 DIAGNOSIS — Z98.890 H/O ILEOSTOMY: ICD-10-CM

## 2020-02-06 LAB
Lab: NORMAL
METHYLMALONATE SERPL-SCNC: 146 NMOL/L (ref 0–378)

## 2020-02-06 PROCEDURE — 87075 CULTR BACTERIA EXCEPT BLOOD: CPT | Performed by: SURGERY

## 2020-02-06 PROCEDURE — 25010000002 ONDANSETRON PER 1 MG: Performed by: ANESTHESIOLOGIST ASSISTANT

## 2020-02-06 PROCEDURE — 0DJD8ZZ INSPECTION OF LOWER INTESTINAL TRACT, VIA NATURAL OR ARTIFICIAL OPENING ENDOSCOPIC: ICD-10-PCS | Performed by: SURGERY

## 2020-02-06 PROCEDURE — 25010000002 KETOROLAC TROMETHAMINE PER 15 MG: Performed by: SURGERY

## 2020-02-06 PROCEDURE — C9290 INJ, BUPIVACAINE LIPOSOME: HCPCS | Performed by: SURGERY

## 2020-02-06 PROCEDURE — 25010000002 FENTANYL CITRATE (PF) 100 MCG/2ML SOLUTION: Performed by: ANESTHESIOLOGIST ASSISTANT

## 2020-02-06 PROCEDURE — 87186 SC STD MICRODIL/AGAR DIL: CPT | Performed by: SURGERY

## 2020-02-06 PROCEDURE — 87076 CULTURE ANAEROBE IDENT EACH: CPT | Performed by: SURGERY

## 2020-02-06 PROCEDURE — 0DBB0ZZ EXCISION OF ILEUM, OPEN APPROACH: ICD-10-PCS | Performed by: SURGERY

## 2020-02-06 PROCEDURE — 25010000002 METOCLOPRAMIDE PER 10 MG: Performed by: SURGERY

## 2020-02-06 PROCEDURE — 0WPF0JZ REMOVAL OF SYNTHETIC SUBSTITUTE FROM ABDOMINAL WALL, OPEN APPROACH: ICD-10-PCS | Performed by: SURGERY

## 2020-02-06 PROCEDURE — 25010000002 CEFAZOLIN PER 500 MG: Performed by: SURGERY

## 2020-02-06 PROCEDURE — 87070 CULTURE OTHR SPECIMN AEROBIC: CPT | Performed by: SURGERY

## 2020-02-06 PROCEDURE — 44625 REPAIR BOWEL OPENING: CPT | Performed by: SURGERY

## 2020-02-06 PROCEDURE — 25010000003 BUPIVACAINE LIPOSOME 1.3 % SUSPENSION: Performed by: SURGERY

## 2020-02-06 PROCEDURE — 25010000002 MORPHINE PER 10 MG: Performed by: SURGERY

## 2020-02-06 PROCEDURE — S0260 H&P FOR SURGERY: HCPCS | Performed by: SURGERY

## 2020-02-06 PROCEDURE — 87205 SMEAR GRAM STAIN: CPT | Performed by: SURGERY

## 2020-02-06 PROCEDURE — 25010000002 PROPOFOL 200 MG/20ML EMULSION: Performed by: ANESTHESIOLOGIST ASSISTANT

## 2020-02-06 PROCEDURE — 25010000002 MORPHINE PER 10 MG: Performed by: ANESTHESIOLOGIST ASSISTANT

## 2020-02-06 PROCEDURE — 25010000002 DEXAMETHASONE PER 1 MG: Performed by: ANESTHESIOLOGIST ASSISTANT

## 2020-02-06 PROCEDURE — 25010000002 MIDAZOLAM PER 1 MG: Performed by: ANESTHESIOLOGIST ASSISTANT

## 2020-02-06 PROCEDURE — 87185 SC STD ENZYME DETCJ PER NZM: CPT | Performed by: SURGERY

## 2020-02-06 DEVICE — PROXIMATE RELOADABLE LINEAR CUTTER WITH SAFETY LOCK-OUT, 75MM
Type: IMPLANTABLE DEVICE | Status: FUNCTIONAL
Brand: PROXIMATE

## 2020-02-06 DEVICE — PROXIMATE LINEAR CUTTER RELOAD, BLUE, 75MM
Type: IMPLANTABLE DEVICE | Status: FUNCTIONAL
Brand: PROXIMATE

## 2020-02-06 RX ORDER — HYDROMORPHONE HCL 110MG/55ML
0.2 PATIENT CONTROLLED ANALGESIA SYRINGE INTRAVENOUS
Status: DISCONTINUED | OUTPATIENT
Start: 2020-02-06 | End: 2020-02-06 | Stop reason: HOSPADM

## 2020-02-06 RX ORDER — LABETALOL HYDROCHLORIDE 5 MG/ML
5 INJECTION, SOLUTION INTRAVENOUS
Status: DISCONTINUED | OUTPATIENT
Start: 2020-02-06 | End: 2020-02-06 | Stop reason: HOSPADM

## 2020-02-06 RX ORDER — DEXAMETHASONE SODIUM PHOSPHATE 4 MG/ML
INJECTION, SOLUTION INTRA-ARTICULAR; INTRALESIONAL; INTRAMUSCULAR; INTRAVENOUS; SOFT TISSUE AS NEEDED
Status: DISCONTINUED | OUTPATIENT
Start: 2020-02-06 | End: 2020-02-06 | Stop reason: SURG

## 2020-02-06 RX ORDER — METOCLOPRAMIDE HYDROCHLORIDE 5 MG/ML
10 INJECTION INTRAMUSCULAR; INTRAVENOUS EVERY 6 HOURS
Status: DISCONTINUED | OUTPATIENT
Start: 2020-02-06 | End: 2020-02-08 | Stop reason: HOSPADM

## 2020-02-06 RX ORDER — KETOROLAC TROMETHAMINE 30 MG/ML
30 INJECTION, SOLUTION INTRAMUSCULAR; INTRAVENOUS EVERY 6 HOURS
Status: DISCONTINUED | OUTPATIENT
Start: 2020-02-06 | End: 2020-02-08 | Stop reason: HOSPADM

## 2020-02-06 RX ORDER — OXYCODONE HYDROCHLORIDE 5 MG/1
5 TABLET ORAL EVERY 4 HOURS PRN
Status: DISCONTINUED | OUTPATIENT
Start: 2020-02-06 | End: 2020-02-08 | Stop reason: HOSPADM

## 2020-02-06 RX ORDER — FLUMAZENIL 0.1 MG/ML
0.1 INJECTION INTRAVENOUS AS NEEDED
Status: DISCONTINUED | OUTPATIENT
Start: 2020-02-06 | End: 2020-02-06 | Stop reason: HOSPADM

## 2020-02-06 RX ORDER — ACETAMINOPHEN 325 MG/1
650 TABLET ORAL ONCE AS NEEDED
Status: DISCONTINUED | OUTPATIENT
Start: 2020-02-06 | End: 2020-02-06 | Stop reason: HOSPADM

## 2020-02-06 RX ORDER — DIPHENHYDRAMINE HYDROCHLORIDE 50 MG/ML
12.5 INJECTION INTRAMUSCULAR; INTRAVENOUS
Status: DISCONTINUED | OUTPATIENT
Start: 2020-02-06 | End: 2020-02-06 | Stop reason: HOSPADM

## 2020-02-06 RX ORDER — ONDANSETRON 4 MG/1
8 TABLET, ORALLY DISINTEGRATING ORAL EVERY 8 HOURS PRN
Status: DISCONTINUED | OUTPATIENT
Start: 2020-02-06 | End: 2020-02-08 | Stop reason: HOSPADM

## 2020-02-06 RX ORDER — NALOXONE HCL 0.4 MG/ML
0.4 VIAL (ML) INJECTION
Status: DISCONTINUED | OUTPATIENT
Start: 2020-02-06 | End: 2020-02-08 | Stop reason: HOSPADM

## 2020-02-06 RX ORDER — L.ACID,PARA/B.BIFIDUM/S.THERM 8B CELL
1 CAPSULE ORAL DAILY
Status: DISCONTINUED | OUTPATIENT
Start: 2020-02-07 | End: 2020-02-08 | Stop reason: HOSPADM

## 2020-02-06 RX ORDER — PROMETHAZINE HYDROCHLORIDE 25 MG/1
25 TABLET ORAL ONCE AS NEEDED
Status: DISCONTINUED | OUTPATIENT
Start: 2020-02-06 | End: 2020-02-06 | Stop reason: HOSPADM

## 2020-02-06 RX ORDER — EPHEDRINE SULFATE 50 MG/ML
INJECTION INTRAVENOUS AS NEEDED
Status: DISCONTINUED | OUTPATIENT
Start: 2020-02-06 | End: 2020-02-06 | Stop reason: SURG

## 2020-02-06 RX ORDER — ASPIRIN 81 MG/1
81 TABLET ORAL DAILY
Status: DISCONTINUED | OUTPATIENT
Start: 2020-02-07 | End: 2020-02-08 | Stop reason: HOSPADM

## 2020-02-06 RX ORDER — PANTOPRAZOLE SODIUM 40 MG/1
40 TABLET, DELAYED RELEASE ORAL
Status: DISCONTINUED | OUTPATIENT
Start: 2020-02-06 | End: 2020-02-08 | Stop reason: HOSPADM

## 2020-02-06 RX ORDER — NALOXONE HCL 0.4 MG/ML
0.4 VIAL (ML) INJECTION AS NEEDED
Status: DISCONTINUED | OUTPATIENT
Start: 2020-02-06 | End: 2020-02-06 | Stop reason: HOSPADM

## 2020-02-06 RX ORDER — SODIUM CHLORIDE 9 MG/ML
100 INJECTION, SOLUTION INTRAVENOUS CONTINUOUS
Status: DISCONTINUED | OUTPATIENT
Start: 2020-02-06 | End: 2020-02-07

## 2020-02-06 RX ORDER — AMLODIPINE BESYLATE 5 MG/1
5 TABLET ORAL DAILY
Status: DISCONTINUED | OUTPATIENT
Start: 2020-02-07 | End: 2020-02-08 | Stop reason: HOSPADM

## 2020-02-06 RX ORDER — PHENYLEPHRINE HCL IN 0.9% NACL 0.5 MG/5ML
SYRINGE (ML) INTRAVENOUS AS NEEDED
Status: DISCONTINUED | OUTPATIENT
Start: 2020-02-06 | End: 2020-02-06 | Stop reason: SURG

## 2020-02-06 RX ORDER — MORPHINE SULFATE 4 MG/ML
INJECTION, SOLUTION INTRAMUSCULAR; INTRAVENOUS AS NEEDED
Status: DISCONTINUED | OUTPATIENT
Start: 2020-02-06 | End: 2020-02-06 | Stop reason: SURG

## 2020-02-06 RX ORDER — LIDOCAINE HYDROCHLORIDE 10 MG/ML
INJECTION, SOLUTION EPIDURAL; INFILTRATION; INTRACAUDAL; PERINEURAL AS NEEDED
Status: DISCONTINUED | OUTPATIENT
Start: 2020-02-06 | End: 2020-02-06 | Stop reason: SURG

## 2020-02-06 RX ORDER — PROMETHAZINE HYDROCHLORIDE 25 MG/ML
6.25 INJECTION, SOLUTION INTRAMUSCULAR; INTRAVENOUS ONCE AS NEEDED
Status: DISCONTINUED | OUTPATIENT
Start: 2020-02-06 | End: 2020-02-06 | Stop reason: HOSPADM

## 2020-02-06 RX ORDER — MIDAZOLAM HYDROCHLORIDE 1 MG/ML
INJECTION INTRAMUSCULAR; INTRAVENOUS AS NEEDED
Status: DISCONTINUED | OUTPATIENT
Start: 2020-02-06 | End: 2020-02-06 | Stop reason: SURG

## 2020-02-06 RX ORDER — IPRATROPIUM BROMIDE AND ALBUTEROL SULFATE 2.5; .5 MG/3ML; MG/3ML
3 SOLUTION RESPIRATORY (INHALATION) ONCE AS NEEDED
Status: DISCONTINUED | OUTPATIENT
Start: 2020-02-06 | End: 2020-02-06 | Stop reason: HOSPADM

## 2020-02-06 RX ORDER — BUPIVACAINE HYDROCHLORIDE 2.5 MG/ML
INJECTION, SOLUTION INFILTRATION; PERINEURAL AS NEEDED
Status: DISCONTINUED | OUTPATIENT
Start: 2020-02-06 | End: 2020-02-06 | Stop reason: HOSPADM

## 2020-02-06 RX ORDER — POLYETHYLENE GLYCOL 3350 17 G/17G
17 POWDER, FOR SOLUTION ORAL DAILY
Status: DISCONTINUED | OUTPATIENT
Start: 2020-02-07 | End: 2020-02-08 | Stop reason: HOSPADM

## 2020-02-06 RX ORDER — DOCUSATE SODIUM 100 MG/1
100 CAPSULE, LIQUID FILLED ORAL 2 TIMES DAILY PRN
Status: DISCONTINUED | OUTPATIENT
Start: 2020-02-06 | End: 2020-02-08 | Stop reason: HOSPADM

## 2020-02-06 RX ORDER — LEVOTHYROXINE SODIUM 112 UG/1
112 TABLET ORAL
Status: DISCONTINUED | OUTPATIENT
Start: 2020-02-07 | End: 2020-02-08 | Stop reason: HOSPADM

## 2020-02-06 RX ORDER — SODIUM CHLORIDE 0.9 % (FLUSH) 0.9 %
10 SYRINGE (ML) INJECTION AS NEEDED
Status: DISCONTINUED | OUTPATIENT
Start: 2020-02-06 | End: 2020-02-06 | Stop reason: HOSPADM

## 2020-02-06 RX ORDER — ONDANSETRON 2 MG/ML
INJECTION INTRAMUSCULAR; INTRAVENOUS AS NEEDED
Status: DISCONTINUED | OUTPATIENT
Start: 2020-02-06 | End: 2020-02-06 | Stop reason: SURG

## 2020-02-06 RX ORDER — ONDANSETRON 2 MG/ML
4 INJECTION INTRAMUSCULAR; INTRAVENOUS ONCE AS NEEDED
Status: DISCONTINUED | OUTPATIENT
Start: 2020-02-06 | End: 2020-02-06 | Stop reason: HOSPADM

## 2020-02-06 RX ORDER — ONDANSETRON 4 MG/1
4 TABLET, FILM COATED ORAL EVERY 6 HOURS PRN
Status: DISCONTINUED | OUTPATIENT
Start: 2020-02-06 | End: 2020-02-08 | Stop reason: HOSPADM

## 2020-02-06 RX ORDER — SODIUM CHLORIDE 0.9 % (FLUSH) 0.9 %
10 SYRINGE (ML) INJECTION EVERY 12 HOURS SCHEDULED
Status: DISCONTINUED | OUTPATIENT
Start: 2020-02-06 | End: 2020-02-06 | Stop reason: HOSPADM

## 2020-02-06 RX ORDER — HYDROCODONE BITARTRATE AND ACETAMINOPHEN 5; 325 MG/1; MG/1
1 TABLET ORAL ONCE AS NEEDED
Status: DISCONTINUED | OUTPATIENT
Start: 2020-02-06 | End: 2020-02-06 | Stop reason: HOSPADM

## 2020-02-06 RX ORDER — CLINDAMYCIN PHOSPHATE 600 MG/50ML
600 INJECTION, SOLUTION INTRAVENOUS EVERY 8 HOURS
Status: DISCONTINUED | OUTPATIENT
Start: 2020-02-06 | End: 2020-02-08 | Stop reason: HOSPADM

## 2020-02-06 RX ORDER — FENTANYL CITRATE 50 UG/ML
25 INJECTION, SOLUTION INTRAMUSCULAR; INTRAVENOUS
Status: DISCONTINUED | OUTPATIENT
Start: 2020-02-06 | End: 2020-02-06 | Stop reason: HOSPADM

## 2020-02-06 RX ORDER — PROPOFOL 10 MG/ML
INJECTION, EMULSION INTRAVENOUS AS NEEDED
Status: DISCONTINUED | OUTPATIENT
Start: 2020-02-06 | End: 2020-02-06 | Stop reason: SURG

## 2020-02-06 RX ORDER — VENLAFAXINE HYDROCHLORIDE 75 MG/1
150 CAPSULE, EXTENDED RELEASE ORAL DAILY
Status: DISCONTINUED | OUTPATIENT
Start: 2020-02-07 | End: 2020-02-08 | Stop reason: HOSPADM

## 2020-02-06 RX ORDER — FENTANYL CITRATE 50 UG/ML
INJECTION, SOLUTION INTRAMUSCULAR; INTRAVENOUS AS NEEDED
Status: DISCONTINUED | OUTPATIENT
Start: 2020-02-06 | End: 2020-02-06 | Stop reason: SURG

## 2020-02-06 RX ORDER — ONDANSETRON 2 MG/ML
4 INJECTION INTRAMUSCULAR; INTRAVENOUS EVERY 6 HOURS PRN
Status: DISCONTINUED | OUTPATIENT
Start: 2020-02-06 | End: 2020-02-08 | Stop reason: HOSPADM

## 2020-02-06 RX ORDER — ROCURONIUM BROMIDE 10 MG/ML
INJECTION, SOLUTION INTRAVENOUS AS NEEDED
Status: DISCONTINUED | OUTPATIENT
Start: 2020-02-06 | End: 2020-02-06 | Stop reason: SURG

## 2020-02-06 RX ORDER — HYDROCODONE BITARTRATE AND ACETAMINOPHEN 5; 325 MG/1; MG/1
1 TABLET ORAL EVERY 8 HOURS PRN
Status: DISCONTINUED | OUTPATIENT
Start: 2020-02-06 | End: 2020-02-08 | Stop reason: HOSPADM

## 2020-02-06 RX ORDER — MEPERIDINE HYDROCHLORIDE 25 MG/ML
12.5 INJECTION INTRAMUSCULAR; INTRAVENOUS; SUBCUTANEOUS
Status: DISCONTINUED | OUTPATIENT
Start: 2020-02-06 | End: 2020-02-06 | Stop reason: HOSPADM

## 2020-02-06 RX ORDER — SODIUM CHLORIDE, SODIUM LACTATE, POTASSIUM CHLORIDE, CALCIUM CHLORIDE 600; 310; 30; 20 MG/100ML; MG/100ML; MG/100ML; MG/100ML
INJECTION, SOLUTION INTRAVENOUS CONTINUOUS PRN
Status: DISCONTINUED | OUTPATIENT
Start: 2020-02-06 | End: 2020-02-06 | Stop reason: SURG

## 2020-02-06 RX ORDER — HYDROCODONE BITARTRATE AND ACETAMINOPHEN 7.5; 325 MG/1; MG/1
2 TABLET ORAL EVERY 4 HOURS PRN
Status: DISCONTINUED | OUTPATIENT
Start: 2020-02-06 | End: 2020-02-06 | Stop reason: HOSPADM

## 2020-02-06 RX ORDER — ACETAMINOPHEN 650 MG/1
650 SUPPOSITORY RECTAL ONCE AS NEEDED
Status: DISCONTINUED | OUTPATIENT
Start: 2020-02-06 | End: 2020-02-06 | Stop reason: HOSPADM

## 2020-02-06 RX ORDER — GLYCOPYRROLATE 0.2 MG/ML
INJECTION INTRAMUSCULAR; INTRAVENOUS AS NEEDED
Status: DISCONTINUED | OUTPATIENT
Start: 2020-02-06 | End: 2020-02-06 | Stop reason: SURG

## 2020-02-06 RX ORDER — NEOSTIGMINE METHYLSULFATE 5 MG/5 ML
SYRINGE (ML) INTRAVENOUS AS NEEDED
Status: DISCONTINUED | OUTPATIENT
Start: 2020-02-06 | End: 2020-02-06 | Stop reason: SURG

## 2020-02-06 RX ORDER — MORPHINE SULFATE 4 MG/ML
4 INJECTION, SOLUTION INTRAMUSCULAR; INTRAVENOUS
Status: DISCONTINUED | OUTPATIENT
Start: 2020-02-06 | End: 2020-02-08 | Stop reason: HOSPADM

## 2020-02-06 RX ORDER — PROMETHAZINE HYDROCHLORIDE 25 MG/1
25 SUPPOSITORY RECTAL ONCE AS NEEDED
Status: DISCONTINUED | OUTPATIENT
Start: 2020-02-06 | End: 2020-02-06 | Stop reason: HOSPADM

## 2020-02-06 RX ORDER — ASCORBIC ACID 500 MG
1000 TABLET ORAL DAILY
Status: DISCONTINUED | OUTPATIENT
Start: 2020-02-07 | End: 2020-02-08 | Stop reason: HOSPADM

## 2020-02-06 RX ORDER — SODIUM CHLORIDE 9 MG/ML
75 INJECTION, SOLUTION INTRAVENOUS CONTINUOUS PRN
Status: DISCONTINUED | OUTPATIENT
Start: 2020-02-06 | End: 2020-02-08 | Stop reason: HOSPADM

## 2020-02-06 RX ADMIN — METOCLOPRAMIDE 10 MG: 5 INJECTION, SOLUTION INTRAMUSCULAR; INTRAVENOUS at 23:23

## 2020-02-06 RX ADMIN — MIDAZOLAM 2 MG: 1 INJECTION INTRAMUSCULAR; INTRAVENOUS at 07:21

## 2020-02-06 RX ADMIN — PHENYLEPHRINE HYDROCHLORIDE 200 MCG: 10 INJECTION INTRAVENOUS at 07:50

## 2020-02-06 RX ADMIN — FENTANYL CITRATE 50 MCG: 50 INJECTION, SOLUTION INTRAMUSCULAR; INTRAVENOUS at 08:49

## 2020-02-06 RX ADMIN — ROCURONIUM BROMIDE 5 MG: 10 INJECTION, SOLUTION INTRAVENOUS at 08:51

## 2020-02-06 RX ADMIN — PHENYLEPHRINE HYDROCHLORIDE 100 MCG: 10 INJECTION INTRAVENOUS at 07:29

## 2020-02-06 RX ADMIN — FENTANYL CITRATE 50 MCG: 50 INJECTION, SOLUTION INTRAMUSCULAR; INTRAVENOUS at 08:22

## 2020-02-06 RX ADMIN — MORPHINE SULFATE 4 MG: 4 INJECTION INTRAVENOUS at 09:05

## 2020-02-06 RX ADMIN — KETOROLAC TROMETHAMINE 30 MG: 30 INJECTION, SOLUTION INTRAMUSCULAR at 16:50

## 2020-02-06 RX ADMIN — METOCLOPRAMIDE 10 MG: 5 INJECTION, SOLUTION INTRAMUSCULAR; INTRAVENOUS at 16:50

## 2020-02-06 RX ADMIN — FENTANYL CITRATE 100 MCG: 50 INJECTION, SOLUTION INTRAMUSCULAR; INTRAVENOUS at 07:24

## 2020-02-06 RX ADMIN — PHENYLEPHRINE HYDROCHLORIDE 200 MCG: 10 INJECTION INTRAVENOUS at 08:09

## 2020-02-06 RX ADMIN — EPHEDRINE SULFATE 10 MG: 50 INJECTION, SOLUTION INTRAVENOUS at 08:09

## 2020-02-06 RX ADMIN — ROCURONIUM BROMIDE 40 MG: 10 INJECTION, SOLUTION INTRAVENOUS at 07:24

## 2020-02-06 RX ADMIN — CLINDAMYCIN PHOSPHATE 600 MG: 600 INJECTION, SOLUTION INTRAVENOUS at 23:23

## 2020-02-06 RX ADMIN — SODIUM CHLORIDE 100 ML/HR: 900 INJECTION, SOLUTION INTRAVENOUS at 20:07

## 2020-02-06 RX ADMIN — CLINDAMYCIN PHOSPHATE 600 MG: 600 INJECTION, SOLUTION INTRAVENOUS at 14:50

## 2020-02-06 RX ADMIN — MORPHINE SULFATE 4 MG: 4 INJECTION INTRAVENOUS at 11:04

## 2020-02-06 RX ADMIN — LIDOCAINE HYDROCHLORIDE 50 MG: 10 INJECTION, SOLUTION EPIDURAL; INFILTRATION; INTRACAUDAL; PERINEURAL at 07:24

## 2020-02-06 RX ADMIN — OXYCODONE HYDROCHLORIDE 5 MG: 5 TABLET ORAL at 20:14

## 2020-02-06 RX ADMIN — PANTOPRAZOLE SODIUM 40 MG: 40 TABLET, DELAYED RELEASE ORAL at 11:24

## 2020-02-06 RX ADMIN — KETOROLAC TROMETHAMINE 30 MG: 30 INJECTION, SOLUTION INTRAMUSCULAR at 11:24

## 2020-02-06 RX ADMIN — OXYCODONE HYDROCHLORIDE 5 MG: 5 TABLET ORAL at 11:31

## 2020-02-06 RX ADMIN — METOCLOPRAMIDE 10 MG: 5 INJECTION, SOLUTION INTRAMUSCULAR; INTRAVENOUS at 11:24

## 2020-02-06 RX ADMIN — ONDANSETRON 4 MG: 2 INJECTION INTRAMUSCULAR; INTRAVENOUS at 08:27

## 2020-02-06 RX ADMIN — METRONIDAZOLE 500 MG: 500 INJECTION, SOLUTION INTRAVENOUS at 07:36

## 2020-02-06 RX ADMIN — DEXAMETHASONE SODIUM PHOSPHATE 4 MG: 4 INJECTION, SOLUTION INTRAMUSCULAR; INTRAVENOUS at 07:35

## 2020-02-06 RX ADMIN — Medication 3 MG: at 09:19

## 2020-02-06 RX ADMIN — SODIUM CHLORIDE 9 ML/HR: 900 INJECTION, SOLUTION INTRAVENOUS at 06:42

## 2020-02-06 RX ADMIN — PHENYLEPHRINE HYDROCHLORIDE 100 MCG: 10 INJECTION INTRAVENOUS at 07:40

## 2020-02-06 RX ADMIN — EPHEDRINE SULFATE 10 MG: 50 INJECTION, SOLUTION INTRAVENOUS at 07:50

## 2020-02-06 RX ADMIN — GLYCOPYRROLATE 0.4 MG: 0.2 INJECTION, SOLUTION INTRAMUSCULAR; INTRAVENOUS at 09:19

## 2020-02-06 RX ADMIN — CEFAZOLIN SODIUM 2 G: 10 INJECTION, POWDER, FOR SOLUTION INTRAVENOUS at 07:22

## 2020-02-06 RX ADMIN — PROPOFOL 200 MG: 10 INJECTION, EMULSION INTRAVENOUS at 07:24

## 2020-02-06 RX ADMIN — SODIUM CHLORIDE 100 ML/HR: 900 INJECTION, SOLUTION INTRAVENOUS at 11:41

## 2020-02-06 RX ADMIN — KETOROLAC TROMETHAMINE 30 MG: 30 INJECTION, SOLUTION INTRAMUSCULAR at 23:23

## 2020-02-06 RX ADMIN — ROCURONIUM BROMIDE 10 MG: 10 INJECTION, SOLUTION INTRAVENOUS at 08:21

## 2020-02-06 RX ADMIN — PHENYLEPHRINE HYDROCHLORIDE 100 MCG: 10 INJECTION INTRAVENOUS at 07:44

## 2020-02-06 RX ADMIN — SODIUM CHLORIDE, SODIUM LACTATE, POTASSIUM CHLORIDE, AND CALCIUM CHLORIDE: .6; .31; .03; .02 INJECTION, SOLUTION INTRAVENOUS at 08:23

## 2020-02-06 RX ADMIN — PHENYLEPHRINE HYDROCHLORIDE 100 MCG: 10 INJECTION INTRAVENOUS at 07:35

## 2020-02-06 NOTE — ANESTHESIA PREPROCEDURE EVALUATION
Anesthesia Evaluation     Patient summary reviewed and Nursing notes reviewed   NPO Solid Status: > 6 hours  NPO Liquid Status: > 6 hours           Airway   Mallampati: II  TM distance: >3 FB  Neck ROM: full  No difficulty expected  Dental - normal exam     Pulmonary - normal exam    breath sounds clear to auscultation  (+) sleep apnea,   Cardiovascular - normal exam    ECG reviewed  Rhythm: regular  Rate: normal    (+) hypertension, hyperlipidemia,       Neuro/Psych  (+) syncope, numbness, psychiatric history,     GI/Hepatic/Renal/Endo    (+)  GERD,      Musculoskeletal (-) negative ROS    Abdominal  - normal exam    Abdomen: soft.  Bowel sounds: normal.   Substance History - negative use     OB/GYN negative ob/gyn ROS         Other - negative ROS                       Anesthesia Plan    ASA 3     general     intravenous induction     Anesthetic plan, all risks, benefits, and alternatives have been provided, discussed and informed consent has been obtained with: patient.  Use of blood products discussed with patient .   Plan discussed with [facility].

## 2020-02-06 NOTE — ANESTHESIA POSTPROCEDURE EVALUATION
Patient: Nallely Figueroa    Procedure Summary     Date:  02/06/20 Room / Location:  [facility] OR  / [facility] MAIN OR    Anesthesia Start:  0720 Anesthesia Stop:  0933    Procedures:       ILEOSTOMY TAKEDOWN, WOUND DEBRIDEMENT WITH MESH REMOVAL (N/A )      SIGMOIDOSCOPY RIGID (N/A Anus) Diagnosis:       H/O ileostomy      (H/O ileostomy [Z98.890])    Surgeon:  Emily Lr MD Provider:  Rodrigo Coronel MD    Anesthesia Type:  general ASA Status:  3          Anesthesia Type: general    Vitals  Vitals Value Taken Time   /62 2/6/2020 10:09 AM   Temp 97.4 °F (36.3 °C) 2/6/2020 10:09 AM   Pulse 72 2/6/2020 10:09 AM   Resp 18 2/6/2020 10:09 AM   SpO2 97 % 2/6/2020 10:09 AM   Vitals shown include unvalidated device data.        Post Anesthesia Care and Evaluation    Patient location during evaluation: bedside  Patient participation: complete - patient participated  Level of consciousness: awake and alert  Pain score: 1  Pain management: adequate  Airway patency: patent  Anesthetic complications: No anesthetic complications  PONV Status: none  Cardiovascular status: acceptable  Respiratory status: acceptable  Hydration status: acceptable  Post Neuraxial Block status: Motor and sensory function returned to baseline

## 2020-02-06 NOTE — H&P
HISTORY AND PHYSICAL      Patient Care Team:  Michelle Colon MD as PCP - General (Internal Medicine)    Chief complaint ileostomy and open abdominal wound    Subjective     Patient is a 64 y.o. female presents with ileostomy and open abdominal wound.  A few months ago she had a large bowel obstruction due to chronic diverticulitis and underwent sigmoid colectomy with anastomosis and diverting loop ileostomy.  During that operation there was spillage of stool and she later developed an abdominal wound infection and also had a history of ventral hernia repair with mesh.  There is a small 2 cm x 2 cm x 2 cm wound of the midline abdomen that has not been healing despite wound VAC therapy and Dakin's dressing changes.  It is my believe that the mesh is harboring the infection.    Review of Systems   Pertinent items are noted in HPI    History  Past Medical History:   Diagnosis Date   • Disease of thyroid gland    • Hypertension    • Sleep apnea      Past Surgical History:   Procedure Laterality Date   • CARDIAC CATHETERIZATION N/A 9/18/2019    Procedure: Left Heart Cath;  Surgeon: Jeronimo Kuhn MD;  Location: [facility] CATH INVASIVE LOCATION;  Service: Cardiovascular   • CARDIAC CATHETERIZATION N/A 9/18/2019    Procedure: Coronary angiography;  Surgeon: Jeronimo Kuhn MD;  Location: [facility] CATH INVASIVE LOCATION;  Service: Cardiovascular   • COLON SURGERY  12/06/2019    sigmoid colectomy with placement of loop ileostomy   • COLONOSCOPY     • EXPLORATORY LAPAROTOMY N/A 12/13/2019    Procedure: LAPAROTOMY EXPLORATORY, DRAINAGE OF SUBCUTANEOUS AND SUBFASCIALABCESS;  Surgeon: Emily Lr MD;  Location: [facility] OR;  Service: General   • EXPLORATORY LAPAROTOMY N/A 12/2/2019    Procedure: sigmoid colectomy with placement of loop ileostomy;  Surgeon: Emily Lr MD;  Location: [facility] OR;  Service: General   • EYE SURGERY      lasix    • HERNIA REPAIR     • TONSILLECTOMY       Family History    Problem Relation Age of Onset   • Supraventricular tachycardia Mother    • Heart attack Father      Social History     Tobacco Use   • Smoking status: Never Smoker   • Smokeless tobacco: Never Used   Substance Use Topics   • Alcohol use: No     Frequency: Never   • Drug use: No     Medications Prior to Admission   Medication Sig Dispense Refill Last Dose   • amLODIPine (NORVASC) 5 MG tablet Take 5 mg by mouth Daily.   2/6/2020 at Unknown time   • levoFLOXacin (LEVAQUIN) 750 MG tablet Take 1 tablet by mouth Daily. 21 tablet 3 2/5/2020 at Unknown time   • levothyroxine (SYNTHROID, LEVOTHROID) 112 MCG tablet Take 112 mcg by mouth Daily.   2/6/2020 at Unknown time   • venlafaxine XR (EFFEXOR-XR) 150 MG 24 hr capsule Take 1 capsule by mouth Daily.   2/6/2020 at Unknown time   • aspirin 81 MG EC tablet Take 81 mg by mouth Daily.   1/31/2020   • Biotin 1000 MCG tablet Take 1,000 mcg by mouth Daily.   Not Taking   • calcium (OS-SONNY) 600 MG tablet Take 1,200 mg by mouth Daily.   More than a month at Unknown time   • diphenoxylate-atropine (LOMOTIL) 2.5-0.025 MG per tablet Take 1 tablet by mouth 4 (Four) Times a Day As Needed for Diarrhea. 60 tablet 5 Taking   • ferrous sulfate 325 (65 FE) MG tablet Take 1 tablet by mouth Daily. 30 tablet 1 Taking   • HYDROcodone-acetaminophen (NORCO) 5-325 MG per tablet Take 1 tablet by mouth Every 8 (Eight) Hours As Needed for Moderate Pain . 15 tablet 0 Taking   • Multiple Vitamins-Minerals (DAILY MULTIVITAMIN) capsule Take 1 tablet by mouth Daily.   More than a month at Unknown time   • Omega-3 Fatty Acids (CVS FISH OIL) 1200 MG capsule Take 2,400 mg by mouth Daily.   More than a month at Unknown time   • ondansetron ODT (ZOFRAN ODT) 8 MG disintegrating tablet Take 1 tablet by mouth Every 8 (Eight) Hours As Needed for Nausea or Vomiting. 30 tablet 5 Taking   • polyethylene glycol (MIRALAX) packet Take 17 g by mouth Daily.   More than a month at Unknown time   • Probiotic Product (CVS  PROBIOTIC PEARLS EX ST) capsule Take 1 tablet by mouth Daily. Pt to stop taking on 2/3 for surgery   2/3/2020   • vitamin C (ASCORBIC ACID) 500 MG tablet Take 1,000 mg by mouth Daily. Do not take after 2/3 for surgery   2/3/2020   • vitamin d (DIALYVITE VITAMIN D 5000) 5000 units capsule Take 1 tablet by mouth Daily.   1/27/2020     Allergies:  Ace inhibitors    Objective     Vital Signs  Temp:  [99.4 °F (37.4 °C)] 99.4 °F (37.4 °C)  Heart Rate:  [83] 83  Resp:  [13] 13  BP: (122)/(74) 122/74    Physical Exam:      General Appearance:    Alert, cooperative, in no acute distress   Head:    Normocephalic, without obvious abnormality, atraumatic   Eyes:            Lids and lashes normal, conjunctivae and sclerae normal, no   icterus, no pallor, corneas clear, PERRLA   Ears:    Ears appear intact with no abnormalities noted   Throat:   No oral lesions, no thrush, oral mucosa moist   Neck:   No adenopathy, supple, trachea midline, no thyromegaly, no   carotid bruit, no JVD   Back:     No kyphosis present, no scoliosis present, no skin lesions,      erythema or scars, no tenderness to percussion or                   palpation,   range of motion normal   Lungs:     Clear to auscultation,respirations regular, even and                  unlabored    Heart:    Regular rhythm and normal rate, normal S1 and S2, no            murmur, no gallop, no rub, no click   Chest Wall:    No abnormalities observed   Abdomen:    Small midline wound 2 cm x 2 cm x 2 cm with significant granulation tissue and small amount of purulent drainage.  Ileostomy is viable in the right lower quadrant.  Abdomen is nontender nondistended.   Rectal:     Deferred   Extremities:   Moves all extremities well, no edema, no cyanosis, no             redness   Pulses:   Pulses palpable and equal bilaterally   Skin:   No bleeding, bruising or rash   Lymph nodes:   No palpable adenopathy   Neurologic:   Cranial nerves 2 - 12 grossly intact, sensation intact, DTR        present and equal bilaterally     Lab Results (last 24 hours)     ** No results found for the last 24 hours. **          Imaging Results (Last 24 Hours)     ** No results found for the last 24 hours. **          Results Review:    I reviewed the patient's new clinical results.  I reviewed the patient's new imaging results and agree with the interpretation.    Assessment/Plan       H/O ileostomy  Open abdominal wound with mesh    Plan for flex sigmoidoscopy to assess the anastomosis and then will proceed with reversal of loop ileostomy then debridement of wound with some local resection of some of the mesh but I do not want to remove all the mesh because that would require laparotomy and extensive lysis of adhesions and I am hoping to avoid that today.    Emily Lr MD  02/06/20  7:14 AM

## 2020-02-06 NOTE — OP NOTE
ILEOSTOMY TAKEDOWN, SIGMOIDOSCOPY  Procedure Report    Patient Name:  Nallely Figueroa  YOB: 1955    Date of Surgery:  2/6/2020     Indications: History of ileostomy and also an open wound with mesh at the bottom of the wound.    Pre-op Diagnosis:   H/O ileostomy [Z98.890]       Post-Op Diagnosis Codes:     * H/O ileostomy [Z98.890]    Procedure/CPT® Codes:      Procedure(s):  ILEOSTOMY TAKEDOWN,   Excisional WOUND DEBRIDEMENT INCLUDING SKIN FAT AND FASCIA WITH MESH REMOVAL  SIGMOIDOSCOPY flexible    Staff:  Surgeon(s):  Emily Lr MD    Assistant: Sheila Blount RNFA    Anesthesia: General    Estimated Blood Loss: minimal    Implants:    Implant Name Type Inv. Item Serial No.  Lot No. LRB No. Used   STPLR LNR CUT PROX 75MM YOMAIRA [id number] - [id number] Implant STPLR LNR CUT PROX 75MM YOMAIRA [id number]  [employer]  DIV OF J AND J [id number] N/A 1   RELOAD STPLR LNR CUT PROX 75MM YOMAIRA TCR75 - [id number] Implant RELOAD STPLR LNR CUT PROX 75MM YOMAIRA TCR75  [employer]  DIV OF J AND J [id number] N/A 1   RELOAD STPLR LNR CUT PROX 75MM YOMAIRA TCR75 - [id number] Implant RELOAD STPLR LNR CUT PROX 75MM YOMAIRA TCR75  [employer]  DIV OF J AND J [id number] N/A 1       Specimen:          Specimens     ID Source Type Tests Collected By Collected At Frozen?      1 Abdominal Wall Wound · ANAEROBIC CULTURE  · WOUND CULTURE   Emily Lr MD 2/6/20 7869      Description: abdominal wall wound    2 Abdominal Wall Wound · ANAEROBIC CULTURE  · WOUND CULTURE   Emily Lr MD 2/6/20 3599      Description: abdominal wall wound-2              Findings: Sigmoidoscopy showed wide patent anastomosis.  After the ileostomy takedown I was able to debride the wound and discovered a significant amount of mesh accessible in the wound and this was removed.  There was also some purulent drainage and this was cultured.    Complications: None    Description of Procedure: Endotracheal anesthesia was provided.  The patient was in a  frog-leg type position and rectal exam was performed and there was some mucus in the rectum that was removed.  I advanced the flexible sigmoidoscope throughout the colon 100 cm and then slowly brought it back.  I was able to visualize the anastomosis and there was no stricture.  The scope was then removed.    The abdomen was prepped and draped in a sterile fashion.  Prior to prepping and draping the abdomen I did close the ileostomy with 2-0 silk.  After it was prepped and also with a small 2 cm abdominal wound was prepped I then proceeded with taking down the ileostomy by first excising some of the skin around the ileostomy and then mobilizing the intestine from the surrounding fat and fascia using a combination of cautery and sharp dissection and blunt dissection.  After this a side-to-side functional end-to-end anastomosis was created with a 75 ALANIS staple line and then the common enterotomy was stapled off.  After this the common enterotomy staple line was oversewed with 3-0 Vicryl and then a stitch was placed at the crotch of the anastomosis.  The intestine was then replaced to the peritoneum and the wound was irrigated thoroughly with chlorhexidine irrigation.  I then closed the fascia with 1 PDS.  I then closed some of the subcutaneous fat with 2-0 Vicryl suture and then closed the skin with staples.    Next I turned my attention to the midline wound.  I was able to excise some of the skin surrounding a granulating sinus tract down to the fascia.  I then was able to identify some mesh in the wound and I extended the fascial defect with cautery about 1-1/2 inches superiorly and inferiorly.  I then removed a significant amount of mesh by simply using blunt and sharp dissection and cutting it away from this limited incision.  During the initial part of the dissection there was some pus that was encountered and I did culture this.  I then irrigated the wound thoroughly with chlorhexidine irrigation and then closed  the fascia with a running #1 PDS.  I placed an 8 Irish small drain in the base of the wound and brought it out laterally and secured that with 2-0 silk suture.  I then closed some of the fat with some 2-0 Vicryl suture and then closed the skin with radha.      Emily Lr MD     Date: 2/6/2020  Time: 12:10 PM

## 2020-02-07 LAB
ALBUMIN SERPL-MCNC: 3.2 G/DL (ref 3.5–5.2)
ALBUMIN/GLOB SERPL: 1.3 G/DL
ALP SERPL-CCNC: 66 U/L (ref 39–117)
ALT SERPL W P-5'-P-CCNC: 9 U/L (ref 1–33)
ANION GAP SERPL CALCULATED.3IONS-SCNC: 10 MMOL/L (ref 5–15)
AST SERPL-CCNC: 16 U/L (ref 1–32)
BASOPHILS # BLD AUTO: 0 10*3/MM3 (ref 0–0.2)
BASOPHILS NFR BLD AUTO: 0.2 % (ref 0–1.5)
BILIRUB SERPL-MCNC: 0.4 MG/DL (ref 0.2–1.2)
BUN BLD-MCNC: 10 MG/DL (ref 8–23)
BUN/CREAT SERPL: 18.5 (ref 7–25)
CALCIUM SPEC-SCNC: 8.5 MG/DL (ref 8.6–10.5)
CHLORIDE SERPL-SCNC: 106 MMOL/L (ref 98–107)
CO2 SERPL-SCNC: 24 MMOL/L (ref 22–29)
CREAT BLD-MCNC: 0.54 MG/DL (ref 0.57–1)
DEPRECATED RDW RBC AUTO: 42.9 FL (ref 37–54)
EOSINOPHIL # BLD AUTO: 0 10*3/MM3 (ref 0–0.4)
EOSINOPHIL NFR BLD AUTO: 0.4 % (ref 0.3–6.2)
ERYTHROCYTE [DISTWIDTH] IN BLOOD BY AUTOMATED COUNT: 15.4 % (ref 12.3–15.4)
GFR SERPL CREATININE-BSD FRML MDRD: 114 ML/MIN/1.73
GLOBULIN UR ELPH-MCNC: 2.4 GM/DL
GLUCOSE BLD-MCNC: 102 MG/DL (ref 65–99)
HCT VFR BLD AUTO: 31.9 % (ref 34–46.6)
HGB BLD-MCNC: 10.5 G/DL (ref 12–15.9)
LYMPHOCYTES # BLD AUTO: 1.5 10*3/MM3 (ref 0.7–3.1)
LYMPHOCYTES NFR BLD AUTO: 18.6 % (ref 19.6–45.3)
MCH RBC QN AUTO: 25.8 PG (ref 26.6–33)
MCHC RBC AUTO-ENTMCNC: 33 G/DL (ref 31.5–35.7)
MCV RBC AUTO: 78.2 FL (ref 79–97)
MONOCYTES # BLD AUTO: 0.7 10*3/MM3 (ref 0.1–0.9)
MONOCYTES NFR BLD AUTO: 8.8 % (ref 5–12)
NEUTROPHILS # BLD AUTO: 5.9 10*3/MM3 (ref 1.7–7)
NEUTROPHILS NFR BLD AUTO: 72 % (ref 42.7–76)
NRBC BLD AUTO-RTO: 0 /100 WBC (ref 0–0.2)
PLATELET # BLD AUTO: 263 10*3/MM3 (ref 140–450)
PMV BLD AUTO: 7.7 FL (ref 6–12)
POTASSIUM BLD-SCNC: 4.1 MMOL/L (ref 3.5–5.2)
PROT SERPL-MCNC: 5.6 G/DL (ref 6–8.5)
RBC # BLD AUTO: 4.08 10*6/MM3 (ref 3.77–5.28)
SODIUM BLD-SCNC: 140 MMOL/L (ref 136–145)
VIT B1 BLD-SCNC: 131.5 NMOL/L (ref 66.5–200)
WBC NRBC COR # BLD: 8.3 10*3/MM3 (ref 3.4–10.8)

## 2020-02-07 PROCEDURE — 85025 COMPLETE CBC W/AUTO DIFF WBC: CPT | Performed by: SURGERY

## 2020-02-07 PROCEDURE — 99024 POSTOP FOLLOW-UP VISIT: CPT | Performed by: SURGERY

## 2020-02-07 PROCEDURE — 80053 COMPREHEN METABOLIC PANEL: CPT | Performed by: SURGERY

## 2020-02-07 PROCEDURE — 25010000002 KETOROLAC TROMETHAMINE PER 15 MG: Performed by: SURGERY

## 2020-02-07 PROCEDURE — 25010000002 METOCLOPRAMIDE PER 10 MG: Performed by: SURGERY

## 2020-02-07 RX ADMIN — POLYETHYLENE GLYCOL 3350 17 G: 17 POWDER, FOR SOLUTION ORAL at 09:24

## 2020-02-07 RX ADMIN — CLINDAMYCIN PHOSPHATE 600 MG: 600 INJECTION, SOLUTION INTRAVENOUS at 09:24

## 2020-02-07 RX ADMIN — KETOROLAC TROMETHAMINE 30 MG: 30 INJECTION, SOLUTION INTRAMUSCULAR at 23:21

## 2020-02-07 RX ADMIN — KETOROLAC TROMETHAMINE 30 MG: 30 INJECTION, SOLUTION INTRAMUSCULAR at 10:34

## 2020-02-07 RX ADMIN — ASPIRIN 81 MG: 81 TABLET, DELAYED RELEASE ORAL at 09:24

## 2020-02-07 RX ADMIN — CLINDAMYCIN PHOSPHATE 600 MG: 600 INJECTION, SOLUTION INTRAVENOUS at 23:21

## 2020-02-07 RX ADMIN — METOCLOPRAMIDE 10 MG: 5 INJECTION, SOLUTION INTRAMUSCULAR; INTRAVENOUS at 10:34

## 2020-02-07 RX ADMIN — VENLAFAXINE HYDROCHLORIDE 150 MG: 75 CAPSULE, EXTENDED RELEASE ORAL at 09:24

## 2020-02-07 RX ADMIN — LEVOTHYROXINE SODIUM 112 MCG: 112 TABLET ORAL at 05:17

## 2020-02-07 RX ADMIN — METOCLOPRAMIDE 10 MG: 5 INJECTION, SOLUTION INTRAMUSCULAR; INTRAVENOUS at 23:21

## 2020-02-07 RX ADMIN — Medication 1 CAPSULE: at 09:27

## 2020-02-07 RX ADMIN — OXYCODONE HYDROCHLORIDE 5 MG: 5 TABLET ORAL at 05:18

## 2020-02-07 RX ADMIN — PANTOPRAZOLE SODIUM 40 MG: 40 TABLET, DELAYED RELEASE ORAL at 05:17

## 2020-02-07 RX ADMIN — OXYCODONE HYDROCHLORIDE 5 MG: 5 TABLET ORAL at 15:44

## 2020-02-07 RX ADMIN — AMLODIPINE BESYLATE 5 MG: 5 TABLET ORAL at 09:24

## 2020-02-07 RX ADMIN — KETOROLAC TROMETHAMINE 30 MG: 30 INJECTION, SOLUTION INTRAMUSCULAR at 17:14

## 2020-02-07 RX ADMIN — METOCLOPRAMIDE 10 MG: 5 INJECTION, SOLUTION INTRAMUSCULAR; INTRAVENOUS at 05:17

## 2020-02-07 RX ADMIN — SODIUM CHLORIDE 100 ML/HR: 900 INJECTION, SOLUTION INTRAVENOUS at 05:27

## 2020-02-07 RX ADMIN — CLINDAMYCIN PHOSPHATE 600 MG: 600 INJECTION, SOLUTION INTRAVENOUS at 15:34

## 2020-02-07 RX ADMIN — KETOROLAC TROMETHAMINE 30 MG: 30 INJECTION, SOLUTION INTRAMUSCULAR at 05:17

## 2020-02-07 RX ADMIN — METOCLOPRAMIDE 10 MG: 5 INJECTION, SOLUTION INTRAMUSCULAR; INTRAVENOUS at 17:14

## 2020-02-07 NOTE — PROGRESS NOTES
Discharge Planning Assessment  [facility]     Patient Name: Nallely Figueroa  MRN: [id number]  Today's Date: 2/7/2020    Admit Date: 2/6/2020    Discharge Needs Assessment     Row Name 02/07/20 1507       Living Environment    Lives With  parent(s)    Current Living Arrangements  home/apartment/condo    Primary Care Provided by  self    Provides Primary Care For  no one    Family Caregiver if Needed  parent(s)    Quality of Family Relationships  helpful;involved    Able to Return to Prior Arrangements  yes       Resource/Environmental Concerns    Resource/Environmental Concerns  none    Transportation Concerns  car, none       Transition Planning    Patient/Family Anticipates Transition to  home with help/services    Patient/Family Anticipated Services at Transition  home health care    Transportation Anticipated  family or friend will provide       Discharge Needs Assessment    Readmission Within the Last 30 Days  no previous admission in last 30 days    Concerns to be Addressed  denies needs/concerns at this time    Equipment Currently Used at Home  none    Anticipated Changes Related to Illness  none    Equipment Needed After Discharge  none    Provided post acute provider list?  Refused    Refused Provider List Comment  current with [facility]  and wants to resume their services on d/c         Discharge Plan     Row Name 02/07/20 1204       Plan    Plan  Home with [facility] , [facility] order placed.     Patient/Family in Agreement with Plan  yes    Plan Comments  Met with patient at bedside. Lives at home with her mother. Current with [facility]  and wants to resume their services on d/c. PCP Dr. Colon. No issues affording medications. Currently denies any other d/c needs or concerns at this time. DC barriers: full liquids, IVF, pain control            Home Medical Care      Service Provider Request Status Selected Services Address Phone Number Fax Number    [facility] Pending - Request Sent N/A [address] [facility]  [address] [place] [address] [phone]           Expected Discharge Date and Time     Expected Discharge Date Expected Discharge Time    Feb 9, 2020         Demographic Summary     Row Name 02/07/20 1507       General Information    Admission Type  inpatient    Arrived From  home    Required Notices Provided  Important Message from Medicare    Referral Source  admission list    Reason for Consult  discharge planning    Preferred Language  English     Used During This Interaction  no        Functional Status     Row Name 02/07/20 1507       Functional Status    Usual Activity Tolerance  good    Current Activity Tolerance  good       Functional Status, IADL    Medications  independent    Meal Preparation  independent    Housekeeping  independent    Laundry  independent    Shopping  independent       Mental Status    General Appearance WDL  WDL       Mental Status Summary    Recent Changes in Mental Status/Cognitive Functioning  no changes          Patient Forms     Row Name 02/07/20 1511       Patient Forms    Important Message from Medicare (IMM)  -- IM delivered 2/6            Mari Philippe RN

## 2020-02-07 NOTE — PROGRESS NOTES
Had very small amount of flatus recently has been ambulating no nausea no vomiting and tolerating a full liquid diet    Vitals:    02/07/20 1300   BP: 122/73   Pulse: 93   Resp: 16   Temp: 99.1 °F (37.3 °C)   SpO2: 96%         I/O last 3 completed shifts:  In: 3400 [P.O.:760; I.V.:2640]  Out: 3927.5 [Urine:3900; Drains:17.5; Blood:10]      Abdomen nondistended and non-tender  Small subcutaneous drain in the midline wound with serosanguineous drainage  Right lower quadrant bandage in midline incision bandage intact with no saturation  Extremities not edematous    Assessment plan  Postoperative day 1 ileostomy reversal and debridement of midline open wound with explantation of mesh    Tolerating full liquids and having flatus will continue full liquids and advance to low residue diet tomorrow and await bowel function

## 2020-02-07 NOTE — DISCHARGE PLACEMENT REQUEST
"aNllely Figueroa (64 y.o. Female)     Date of Birth Social Security Number Address Home Phone MRN    1955  [id number] [place] [facility] [phone] [id number]    Caodaism Marital Status          Uatsdin        Admission Date Admission Type Admitting Provider Attending Provider Department, Room/Bed    2/6/20 Elective Emily Lr MD Gore, Lanny, MD [facility] SURGICAL INPATIENT, 4123/1    Discharge Date Discharge Disposition Discharge Destination                       Attending Provider:  Emily Lr MD    Allergies:  Ace Inhibitors    Isolation:  None   Infection:  None   Code Status:  CPR    Ht:  160 cm (63\")   Wt:  66.7 kg (147 lb 0.8 oz)    Admission Cmt:  None   Principal Problem:  H/O ileostomy [Z98.890]                 Active Insurance as of 2/6/2020     Primary Coverage     Payor Plan Insurance Group Employer/Plan Group    MEDICARE MEDICARE A & B      Payor Plan Address Payor Plan Phone Number Payor Plan Fax Number Effective Dates    [address] [phone]  2/1/2020 - None Entered    [facility] [id number]       Subscriber Name Subscriber Birth Date Member ID       NALLELY FIGUEROA 1955 [id number]           Secondary Coverage     Payor Plan Insurance Group Employer/Plan Group    [employer] SUP AAR HEALTH CARE OPTIONS      Payor Plan Address Payor Plan Phone Number Payor Plan Fax Number Effective Dates    [facility] [phone]  2/1/2020 - None Entered    [address]       [facility] [address]       Subscriber Name Subscriber Birth Date Member ID       NALLELY FIGUEROA 1955 [id number]                 Emergency Contacts      (Rel.) Home Phone Work Phone Mobile Phone    TONYA FIGUEROA (Son) [phone] -- [phone]    Michael Sarkar (Brother) -- -- [phone]            "

## 2020-02-07 NOTE — PLAN OF CARE
Patient is complaining of minimal pain has only taken a few dose of oral pain medicine. Patient has not complained of nausea or vomiting. Patient has ambulated to the bathroom several times. Will continue to monitor

## 2020-02-08 VITALS
SYSTOLIC BLOOD PRESSURE: 129 MMHG | DIASTOLIC BLOOD PRESSURE: 75 MMHG | OXYGEN SATURATION: 95 % | RESPIRATION RATE: 15 BRPM | TEMPERATURE: 98.5 F | WEIGHT: 147.05 LBS | HEIGHT: 63 IN | BODY MASS INDEX: 26.05 KG/M2 | HEART RATE: 97 BPM

## 2020-02-08 LAB
ALBUMIN SERPL-MCNC: 3 G/DL (ref 3.5–5.2)
ALBUMIN/GLOB SERPL: 1.3 G/DL
ALP SERPL-CCNC: 63 U/L (ref 39–117)
ALT SERPL W P-5'-P-CCNC: 15 U/L (ref 1–33)
ANION GAP SERPL CALCULATED.3IONS-SCNC: 9 MMOL/L (ref 5–15)
AST SERPL-CCNC: 14 U/L (ref 1–32)
BASOPHILS # BLD AUTO: 0 10*3/MM3 (ref 0–0.2)
BASOPHILS NFR BLD AUTO: 0.7 % (ref 0–1.5)
BILIRUB SERPL-MCNC: 0.3 MG/DL (ref 0.2–1.2)
BUN BLD-MCNC: 7 MG/DL (ref 8–23)
BUN/CREAT SERPL: 13.2 (ref 7–25)
CALCIUM SPEC-SCNC: 8.1 MG/DL (ref 8.6–10.5)
CHLORIDE SERPL-SCNC: 107 MMOL/L (ref 98–107)
CO2 SERPL-SCNC: 27 MMOL/L (ref 22–29)
CREAT BLD-MCNC: 0.53 MG/DL (ref 0.57–1)
DEPRECATED RDW RBC AUTO: 42.9 FL (ref 37–54)
EOSINOPHIL # BLD AUTO: 0.2 10*3/MM3 (ref 0–0.4)
EOSINOPHIL NFR BLD AUTO: 2.2 % (ref 0.3–6.2)
ERYTHROCYTE [DISTWIDTH] IN BLOOD BY AUTOMATED COUNT: 15.5 % (ref 12.3–15.4)
GFR SERPL CREATININE-BSD FRML MDRD: 116 ML/MIN/1.73
GLOBULIN UR ELPH-MCNC: 2.3 GM/DL
GLUCOSE BLD-MCNC: 104 MG/DL (ref 65–99)
HCT VFR BLD AUTO: 29.3 % (ref 34–46.6)
HGB BLD-MCNC: 9.6 G/DL (ref 12–15.9)
LYMPHOCYTES # BLD AUTO: 1.6 10*3/MM3 (ref 0.7–3.1)
LYMPHOCYTES NFR BLD AUTO: 23.1 % (ref 19.6–45.3)
MCH RBC QN AUTO: 25.5 PG (ref 26.6–33)
MCHC RBC AUTO-ENTMCNC: 32.8 G/DL (ref 31.5–35.7)
MCV RBC AUTO: 77.8 FL (ref 79–97)
MONOCYTES # BLD AUTO: 0.7 10*3/MM3 (ref 0.1–0.9)
MONOCYTES NFR BLD AUTO: 10.2 % (ref 5–12)
NEUTROPHILS # BLD AUTO: 4.5 10*3/MM3 (ref 1.7–7)
NEUTROPHILS NFR BLD AUTO: 63.8 % (ref 42.7–76)
NRBC BLD AUTO-RTO: 0 /100 WBC (ref 0–0.2)
PLATELET # BLD AUTO: 241 10*3/MM3 (ref 140–450)
PMV BLD AUTO: 8 FL (ref 6–12)
POTASSIUM BLD-SCNC: 3.9 MMOL/L (ref 3.5–5.2)
PROT SERPL-MCNC: 5.3 G/DL (ref 6–8.5)
RBC # BLD AUTO: 3.76 10*6/MM3 (ref 3.77–5.28)
SODIUM BLD-SCNC: 143 MMOL/L (ref 136–145)
WBC NRBC COR # BLD: 7.1 10*3/MM3 (ref 3.4–10.8)

## 2020-02-08 PROCEDURE — 25010000002 KETOROLAC TROMETHAMINE PER 15 MG: Performed by: SURGERY

## 2020-02-08 PROCEDURE — 99024 POSTOP FOLLOW-UP VISIT: CPT | Performed by: SURGERY

## 2020-02-08 PROCEDURE — 85025 COMPLETE CBC W/AUTO DIFF WBC: CPT | Performed by: SURGERY

## 2020-02-08 PROCEDURE — 80053 COMPREHEN METABOLIC PANEL: CPT | Performed by: SURGERY

## 2020-02-08 PROCEDURE — 25010000002 METOCLOPRAMIDE PER 10 MG: Performed by: SURGERY

## 2020-02-08 RX ORDER — ONDANSETRON 8 MG/1
8 TABLET, ORALLY DISINTEGRATING ORAL EVERY 8 HOURS PRN
Qty: 30 TABLET | Refills: 5 | Status: SHIPPED | OUTPATIENT
Start: 2020-02-08 | End: 2020-09-30

## 2020-02-08 RX ORDER — HYDROCODONE BITARTRATE AND ACETAMINOPHEN 5; 325 MG/1; MG/1
1 TABLET ORAL EVERY 4 HOURS PRN
Qty: 30 TABLET | Refills: 0 | Status: SHIPPED | OUTPATIENT
Start: 2020-02-08 | End: 2020-06-09

## 2020-02-08 RX ADMIN — PANTOPRAZOLE SODIUM 40 MG: 40 TABLET, DELAYED RELEASE ORAL at 05:54

## 2020-02-08 RX ADMIN — METOCLOPRAMIDE 10 MG: 5 INJECTION, SOLUTION INTRAMUSCULAR; INTRAVENOUS at 05:54

## 2020-02-08 RX ADMIN — LEVOTHYROXINE SODIUM 112 MCG: 112 TABLET ORAL at 05:54

## 2020-02-08 RX ADMIN — ASPIRIN 81 MG: 81 TABLET, DELAYED RELEASE ORAL at 10:25

## 2020-02-08 RX ADMIN — AMLODIPINE BESYLATE 5 MG: 5 TABLET ORAL at 10:26

## 2020-02-08 RX ADMIN — POLYETHYLENE GLYCOL 3350 17 G: 17 POWDER, FOR SOLUTION ORAL at 10:26

## 2020-02-08 RX ADMIN — Medication 1 CAPSULE: at 10:25

## 2020-02-08 RX ADMIN — OXYCODONE HYDROCHLORIDE AND ACETAMINOPHEN 1000 MG: 500 TABLET ORAL at 10:26

## 2020-02-08 RX ADMIN — CLINDAMYCIN PHOSPHATE 600 MG: 600 INJECTION, SOLUTION INTRAVENOUS at 07:32

## 2020-02-08 RX ADMIN — VENLAFAXINE HYDROCHLORIDE 150 MG: 75 CAPSULE, EXTENDED RELEASE ORAL at 10:26

## 2020-02-08 RX ADMIN — KETOROLAC TROMETHAMINE 30 MG: 30 INJECTION, SOLUTION INTRAMUSCULAR at 05:54

## 2020-02-08 NOTE — DISCHARGE SUMMARY
Date of Discharge:  2/8/2020    Discharge Diagnosis: Ileostomy and open infected midline wound    Presenting Problem/History of Present Illness  Active Hospital Problems    Diagnosis  POA   • **H/O ileostomy [Z98.890]  Not Applicable      Resolved Hospital Problems   No resolved problems to display.           Hospital Course  Patient is a 64 y.o. female presented with ileostomy and open infected midline wound.  She underwent ileostomy takedown and also debridement of the midline wound with explantation of mesh.  The wound was cultured at this time and is growing gram-positive cocci.  She was treated with clindamycin in the hospital and has previously been on Levaquin for this.  She did have return of bowel function on postoperative day 2 and was discharged home on her p.o. Levaquin with follow-up in my office in 1 week.  She did have a subcutaneous drain that was removed prior to discharge..      Procedures Performed    Procedure(s):  ILEOSTOMY TAKEDOWN, WOUND DEBRIDEMENT WITH MESH REMOVAL  SIGMOIDOSCOPY RIGID  -------------------       Consults:   Consults     No orders found from 1/8/2020 to 2/7/2020.          Pertinent Test Results:     Condition on Discharge:      Vital Signs  Temp:  [98.5 °F (36.9 °C)-99.1 °F (37.3 °C)] 98.5 °F (36.9 °C)  Heart Rate:  [93-97] 97  Resp:  [15-17] 15  BP: (106-132)/(66-75) 129/75    Physical Exam:     General Appearance:    Alert, cooperative, in no acute distress   Head:    Normocephalic, without obvious abnormality, atraumatic   Eyes:            Lids and lashes normal, conjunctivae and sclerae normal, no   icterus, no pallor, corneas clear, PERRLA   Ears:    Ears appear intact with no abnormalities noted   Throat:   No oral lesions, no thrush, oral mucosa moist   Neck:   No adenopathy, supple, trachea midline, no thyromegaly, no     carotid bruit, no JVD   Back:     No kyphosis present, no scoliosis present, no skin lesions,       erythema or scars, no tenderness to percussion  or                   palpation,   range of motion normal   Lungs:     Clear to auscultation,respirations regular, even and                   unlabored    Heart:    Regular rhythm and normal rate, normal S1 and S2, no            murmur, no gallop, no rub, no click   Breast Exam:    Deferred   Abdomen:     Normal bowel sounds, no masses, no organomegaly, soft        non-tender, non-distended, no guarding, no rebound                 tenderness   Genitalia:    Deferred   Extremities:   Moves all extremities well, no edema, no cyanosis, no              redness   Pulses:   Pulses palpable and equal bilaterally   Skin:   No bleeding, bruising or rash   Lymph nodes:   No palpable adenopathy   Neurologic:   Cranial nerves 2 - 12 grossly intact, sensation intact, DTR        present and equal bilaterally       Discharge Disposition  Home or Self Care    Discharge Medications     Discharge Medications      Changes to Medications      Instructions Start Date   HYDROcodone-acetaminophen 5-325 MG per tablet  Commonly known as:  NORCO  What changed:  Another medication with the same name was added. Make sure you understand how and when to take each.   1 tablet, Oral, Every 8 Hours PRN      HYDROcodone-acetaminophen 5-325 MG per tablet  Commonly known as:  NORCO  What changed:  You were already taking a medication with the same name, and this prescription was added. Make sure you understand how and when to take each.   1 tablet, Oral, Every 4 Hours PRN      ondansetron ODT 8 MG disintegrating tablet  Commonly known as:  ZOFRAN ODT  What changed:  Another medication with the same name was added. Make sure you understand how and when to take each.   8 mg, Oral, Every 8 Hours PRN      ondansetron ODT 8 MG disintegrating tablet  Commonly known as:  ZOFRAN ODT  What changed:  You were already taking a medication with the same name, and this prescription was added. Make sure you understand how and when to take each.   8 mg, Oral, Every 8  Hours PRN         Continue These Medications      Instructions Start Date   amLODIPine 5 MG tablet  Commonly known as:  NORVASC   5 mg, Oral, Daily      aspirin 81 MG EC tablet   81 mg, Oral, Daily      Biotin 1000 MCG tablet   1,000 mcg, Oral, Every 24 Hours      calcium 600 MG tablet  Commonly known as:  OS-SONNY   1,200 mg, Oral, Daily      CVS FISH OIL 1200 MG capsule   2,400 mg, Oral, Daily      CVS PROBIOTIC PEARLS EX ST capsule   1 tablet, Oral, Daily, Pt to stop taking on 2/3 for surgery      DAILY MULTIVITAMIN capsule   1 tablet, Oral, Daily      DIALYVITE VITAMIN D 5000 125 MCG (5000 UT) capsule  Generic drug:  vitamin d   1 tablet, Oral, Daily      ferrous sulfate 325 (65 FE) MG tablet   325 mg, Oral, Daily      levoFLOXacin 750 MG tablet  Commonly known as:  LEVAQUIN   750 mg, Oral, Daily      levothyroxine 112 MCG tablet  Commonly known as:  SYNTHROID, LEVOTHROID   112 mcg, Oral, Daily      polyethylene glycol packet  Commonly known as:  MIRALAX   17 g, Oral, Daily      venlafaxine  MG 24 hr capsule  Commonly known as:  EFFEXOR-XR   1 capsule, Oral, Daily      vitamin C 500 MG tablet  Commonly known as:  ASCORBIC ACID   1,000 mg, Oral, Daily, Do not take after 2/3 for surgery         Stop These Medications    diphenoxylate-atropine 2.5-0.025 MG per tablet  Commonly known as:  LOMOTIL            Discharge Diet:     Activity at Discharge:     Follow-up Appointments  Future Appointments   Date Time Provider Department Center   4/9/2020  2:15 PM Michelle Colon MD [facility] None     Additional Instructions for the Follow-ups that You Need to Schedule     Ambulatory Referral to Home Health   As directed      Face to Face Visit Date:  2/7/2020    Follow-up provider for Plan of Care?:  I treated the patient in an acute care facility and will not continue treatment after discharge.    Follow-up provider:  MICHELLE COLON [[id number]]    Reason/Clinical Findings:  post hospital evaluation    Describe  mobility limitations that make leaving home difficult:  weakness    Nursing/Therapeutic Services Requested:  Other (ALVIN order )    Frequency:  1 Week 1               Test Results Pending at Discharge   Order Current Status    Anaerobic Culture - Wound, Abdominal Wall In process    Anaerobic Culture - Wound, Abdominal Wall In process    Wound Culture - Wound, Abdominal Wall Preliminary result    Wound Culture - Wound, Abdominal Wall Preliminary result           Emily Lr MD  02/08/20  10:18 AM    Time:

## 2020-02-08 NOTE — DISCHARGE INSTRUCTIONS
"Okay to shower Tuesday    Okay to drive    Call for any evidence of wound infection    Low residue diet for 1 week.    In this Article  What Is a Low-Residue Diet?   What You Can Eat   What You Can’t Eat   How to Make It Work for You   What Is a Low-Residue Diet?  It limits high-fiber foods, like whole-grain breads and cereals, nuts, seeds, raw or dried fruits, and vegetables.  \"Residue\" is undigested food, including fiber, that makes up stool. The goal of the diet is to have fewer, smaller bowel movements each day. That will ease symptoms like diarrhea, bloating, gas, and stomach cramping.  Your doctor may recommend this diet for a little while when you have a flare, or after surgery to help with recovery.  ADVERTISEMENT   What You Can Eat  Grains   Refined or enriched white breads and plain crackers, such as saltines or [place] toast (no seeds)   Cooked cereals, like farina, cream of wheat, and grits   Cold cereals, like puffed rice and corn flakes   White rice, noodles, and refined pasta  Fruits and Vegetables   The skin and seeds of many fruits and vegetables are full of fiber, so you need to peel them and avoid the seeds.  These vegetables are OK:  Well-cooked fresh vegetables or canned vegetables without seeds, like asparagus tips, beets, green beans, carrots, mushrooms, spinach, squash (no seeds), and pumpkin   Cooked potatoes without skin   Tomato sauce (no seeds)  Fruits on the good list include:  Ripe bananas   Soft cantaloupe   Honeydew   Canned or cooked fruits without seeds or skin, like applesauce or canned pears   Avocado  Milk and Dairy   They're OK in moderation. Milk has no fiber, but it may trigger symptoms like diarrhea and cramping if you're lactose intolerant. If you are (meaning you have trouble processing dairy foods), you could take lactase supplements or buy lactose-free products.  Meats   Animal products don't have fiber. You can eat beef, lamb, chicken, fish (no bones), and pork, as long as " they're lean, tender, and soft. Eggs are OK, too.  Fats, Sauces, and Condiments   These are all on the diet:  Margarine, butter, and oils   Mayonnaise and ketchup   Sour cream   Smooth sauces and salad dressing   Soy sauce   Clear jelly, honey, and syrup

## 2020-02-09 ENCOUNTER — READMISSION MANAGEMENT (OUTPATIENT)
Dept: CALL CENTER | Facility: HOSPITAL | Age: 65
End: 2020-02-09

## 2020-02-09 LAB
BACTERIA SPEC AEROBE CULT: ABNORMAL
GRAM STN SPEC: ABNORMAL
GRAM STN SPEC: ABNORMAL

## 2020-02-09 NOTE — OUTREACH NOTE
Prep Survey      Responses   Facility patient discharged from?  [facility]   Is patient eligible?  Yes   Discharge diagnosis  Ileostomy and open infected midline wound   Does the patient have one of the following disease processes/diagnoses(primary or secondary)?  General Surgery   What [facility]?    [facility]    Is there a DME ordered?  No   Prep survey completed?  Yes          Eladia Bell RN

## 2020-02-10 ENCOUNTER — TRANSITIONAL CARE MANAGEMENT TELEPHONE ENCOUNTER (OUTPATIENT)
Dept: FAMILY MEDICINE CLINIC | Facility: CLINIC | Age: 65
End: 2020-02-10

## 2020-02-10 LAB
BACTERIA SPEC AEROBE CULT: ABNORMAL
GRAM STN SPEC: ABNORMAL
GRAM STN SPEC: ABNORMAL

## 2020-02-10 RX ORDER — SODIUM HYPOCHLORITE 2.5 MG/ML
SOLUTION TOPICAL
COMMUNITY
Start: 2020-01-27 | End: 2020-10-27

## 2020-02-10 NOTE — PROGRESS NOTES
Case Management Discharge Note      Final Note: [facility]    Provided post acute provider list?: Refused       Home Medical Care - Selection Complete      Service Provider Request Status Selected Services Address Phone Number Fax Number    [facility] [address] [place], [place] [id number] [phone]        Final Discharge Disposition Code: 06 - home with home health care

## 2020-02-11 ENCOUNTER — READMISSION MANAGEMENT (OUTPATIENT)
Dept: CALL CENTER | Facility: HOSPITAL | Age: 65
End: 2020-02-11

## 2020-02-11 LAB
BACTERIA SPEC ANAEROBE CULT: ABNORMAL

## 2020-02-11 RX ORDER — AMOXICILLIN AND CLAVULANATE POTASSIUM 500; 125 MG/1; MG/1
1 TABLET, FILM COATED ORAL 3 TIMES DAILY
Qty: 30 TABLET | Refills: 0 | Status: SHIPPED | OUTPATIENT
Start: 2020-02-11 | End: 2020-02-21 | Stop reason: SDUPTHER

## 2020-02-11 NOTE — PROGRESS NOTES
She had a recent ileostomy takedown and also debridement of open wound with removal of mesh.  I cultured the wound at that time and below is the results    Culture results from last 30 days   Lab 02/06/20  0845 02/06/20  0838 01/27/20  1504   WOUNDCX Scant growth (1+) Streptococcus anginosus* Rare Streptococcus anginosus* Scant growth (1+) Normal Skin Joi   ANACX Screening for Anaerobes Screening for Anaerobes  --      A few days ago I switched her to Augmentin    She says she is feeling better        The incisions are healing well with no erythema.  There is some mild swelling and firmness of the midline incision just superior to the umbilicus which I believe is likely just related to scarring and inflammation and there is no clear signs of any infection.    Continue antibiotics.  We will see again in 1 week for staple removal.  I will likely continue antibiotics for the next 3 to 4 weeks as a precaution due to the fact that she has mesh involvement with this infection.  Answers for HPI/ROS submitted by the patient on 2/12/2020   What is the primary reason for your visit?: Other  Please describe your symptoms.: Post surgery  Have you had these symptoms before?: No

## 2020-02-14 ENCOUNTER — OFFICE VISIT (OUTPATIENT)
Dept: BARIATRICS/WEIGHT MGMT | Facility: CLINIC | Age: 65
End: 2020-02-14

## 2020-02-14 VITALS
HEIGHT: 63 IN | OXYGEN SATURATION: 100 % | BODY MASS INDEX: 27.07 KG/M2 | TEMPERATURE: 98.3 F | RESPIRATION RATE: 16 BRPM | SYSTOLIC BLOOD PRESSURE: 138 MMHG | WEIGHT: 152.8 LBS | HEART RATE: 84 BPM | DIASTOLIC BLOOD PRESSURE: 75 MMHG

## 2020-02-14 DIAGNOSIS — Z51.89 VISIT FOR WOUND CHECK: Primary | ICD-10-CM

## 2020-02-14 PROCEDURE — 99024 POSTOP FOLLOW-UP VISIT: CPT | Performed by: SURGERY

## 2020-02-17 ENCOUNTER — OFFICE VISIT (OUTPATIENT)
Dept: FAMILY MEDICINE CLINIC | Facility: CLINIC | Age: 65
End: 2020-02-17

## 2020-02-17 ENCOUNTER — READMISSION MANAGEMENT (OUTPATIENT)
Dept: CALL CENTER | Facility: HOSPITAL | Age: 65
End: 2020-02-17

## 2020-02-17 VITALS
HEIGHT: 63 IN | SYSTOLIC BLOOD PRESSURE: 112 MMHG | OXYGEN SATURATION: 97 % | WEIGHT: 156.4 LBS | RESPIRATION RATE: 16 BRPM | BODY MASS INDEX: 27.71 KG/M2 | TEMPERATURE: 98.2 F | DIASTOLIC BLOOD PRESSURE: 76 MMHG | HEART RATE: 87 BPM

## 2020-02-17 DIAGNOSIS — D64.9 ANEMIA, UNSPECIFIED TYPE: ICD-10-CM

## 2020-02-17 DIAGNOSIS — S31.109D CHRONIC WOUND INFECTION OF ABDOMEN, SUBSEQUENT ENCOUNTER: ICD-10-CM

## 2020-02-17 DIAGNOSIS — L08.9 CHRONIC WOUND INFECTION OF ABDOMEN, SUBSEQUENT ENCOUNTER: ICD-10-CM

## 2020-02-17 DIAGNOSIS — Z98.890 H/O ILEOSTOMY: Primary | ICD-10-CM

## 2020-02-17 PROCEDURE — 99495 TRANSJ CARE MGMT MOD F2F 14D: CPT | Performed by: INTERNAL MEDICINE

## 2020-02-17 NOTE — PROGRESS NOTES
Transitional Care Follow Up Visit    Chief Complaint   Patient presents with   • Transitional Care Management     infected midline wound       Subjective     History of Present Illness     Nallely Figueroa is a 65 y.o. female who presents for a transitional care management visit [facility]  Within 48 business hours after discharge our office contacted her via telephone to coordinate her care and needs.  I reviewed and discussed the details of that call along with the discharge summary, hospital problems, inpatient lab results, inpatient diagnostic studies, and consultation reports with Nallely.     Date of TCM Phone Call 12/6/2019 2/10/2020   [facility]   Date of Admission - 2/8/2020   Date of Discharge 12/5/2019 2/8/2020   Discharge Disposition Home or Self Care Home or Self Care       Risk for Readmission (LACE) Score: 6 (2/8/2020  6:00 AM)      Course During Hospital Stay(Reviewed from Discharge Summary Note)      Admission Diagnosis/es: Ileostomy and open infected midline wound    Discharge Diagnosis/es: Ileostomy and open infected midline wound    Hospital Course:  Patient is a 64 y.o. female presented with ileostomy and open infected midline wound.  She underwent ileostomy takedown and also debridement of the midline wound with explantation of mesh.  The wound was cultured at this time and is growing gram-positive cocci.  She was treated with clindamycin in the hospital and has previously been on Levaquin for this.  She did have return of bowel function on postoperative day 2 and was discharged home on her p.o. Levaquin with follow-up in my office in 1 week.  She did have a subcutaneous drain that was removed prior to discharge.    Hospital Consultants: none    Scheduled  Or Suggested Follow ups: pcp    Pending Test Results: none    Current outpatient and discharge medications have been reconciled for the patient.  Reviewed by: Michelle Colon,  "MD      Review of Systems   Constitutional: Negative for activity change, fatigue and fever.   HENT: Negative for congestion, sinus pain and sore throat.    Eyes: Negative for pain and discharge.   Respiratory: Negative for cough and shortness of breath.    Cardiovascular: Negative for chest pain and palpitations.   Gastrointestinal: Negative for abdominal pain, diarrhea, nausea and vomiting.   Endocrine: Negative for polydipsia, polyphagia and polyuria.   Genitourinary: Negative for dysuria and hematuria.   Musculoskeletal: Negative for arthralgias and myalgias.   Skin: Negative for rash and wound.   Neurological: Negative for dizziness, weakness and headaches.   Hematological: Negative for adenopathy. Does not bruise/bleed easily.   Psychiatric/Behavioral: Negative for dysphoric mood. The patient is not nervous/anxious.        Objective     /76 (BP Location: Left arm, Patient Position: Sitting, Cuff Size: Adult)   Pulse 87   Temp 98.2 °F (36.8 °C) (Oral)   Resp 16   Ht 160 cm (63\")   Wt 70.9 kg (156 lb 6.4 oz)   SpO2 97%   BMI 27.71 kg/m²     Physical Exam   Constitutional: She is oriented to person, place, and time. She appears well-developed and well-nourished. No distress.   HENT:   Head: Normocephalic and atraumatic.   Mouth/Throat: Oropharynx is clear and moist.   Eyes: Conjunctivae and EOM are normal. No scleral icterus.   Neck: Normal range of motion. Neck supple.   Cardiovascular: Normal rate, regular rhythm and normal heart sounds. Exam reveals no gallop and no friction rub.   No murmur heard.  Pulmonary/Chest: Effort normal and breath sounds normal. No respiratory distress. She has no wheezes. She has no rales.   Musculoskeletal: Normal range of motion. She exhibits no edema or deformity.   Lymphadenopathy:     She has no cervical adenopathy.   Neurological: She is alert and oriented to person, place, and time. No cranial nerve deficit. She exhibits normal muscle tone.   Skin: Skin is warm " and dry. No rash noted. She is not diaphoretic.   Psychiatric: She has a normal mood and affect. Her behavior is normal.   Vitals reviewed.      Hospital Data Reviewed:    Lab Results   Component Value Date    BUN 7 (L) 02/08/2020    CREATININE 0.53 (L) 02/08/2020    EGFRIFNONA 116 02/08/2020     02/08/2020    K 3.9 02/08/2020     02/08/2020    CALCIUM 8.1 (L) 02/08/2020    ALBUMIN 3.00 (L) 02/08/2020    BILITOT 0.3 02/08/2020    ALKPHOS 63 02/08/2020    AST 14 02/08/2020    ALT 15 02/08/2020    TRIG 67 11/25/2019    HDL 52 11/25/2019    VLDL 13.4 11/25/2019     (H) 11/25/2019    LDLHDL 2.70 11/25/2019    WBC 7.10 02/08/2020    RBC 3.76 (L) 02/08/2020    HCT 29.3 (L) 02/08/2020    MCV 77.8 (L) 02/08/2020    MCH 25.5 (L) 02/08/2020    TSH 2.220 11/25/2019    FREET4 1.43 11/25/2019    INR 1.06 02/03/2020    YIMR04KR 77.8 11/25/2019      Assessment/Plan          Assessment/Plan     Diagnoses and all orders for this visit:    1. H/O ileostomy (Primary)    2. Chronic wound infection of abdomen, subsequent encounter    3. Anemia, unspecified type      Pt is here to follow up after a planned ileostomy reversal. Pt had suffered a small bowel obstruction prior to ostomy, and then subsequently developed wound infection and required debridement. She was on levaquin after that. She was admitted for a scheduled reversal, and found to have an infection again, this time growing strep, so she was started on augmentin. She is taking probiotics. Her only complaint is itchiness along her sides, abdomen, back - doesn't have a rash, hives, welts, etc. Is compliant with wearing her abdominal binder. bc of interaction with levaquin, she never got a chance to start iron. Likely anemic bc of several surgeries and decreased PO intake. Ok to start iron and also advised vitamin c to help iron absorption.    No follow-ups on file.

## 2020-02-17 NOTE — OUTREACH NOTE
General Surgery Week 2 Survey      Responses   Facility patient discharged from?  [facility]   Does the patient have one of the following disease processes/diagnoses(primary or secondary)?  General Surgery   Week 2 attempt successful?  No   Revoke  Decline to participate [No answer]          Bhavana Mcleod LPN

## 2020-02-21 ENCOUNTER — OFFICE VISIT (OUTPATIENT)
Dept: BARIATRICS/WEIGHT MGMT | Facility: CLINIC | Age: 65
End: 2020-02-21

## 2020-02-21 VITALS
WEIGHT: 154.2 LBS | DIASTOLIC BLOOD PRESSURE: 75 MMHG | BODY MASS INDEX: 27.32 KG/M2 | OXYGEN SATURATION: 97 % | HEART RATE: 86 BPM | HEIGHT: 63 IN | TEMPERATURE: 98.2 F | SYSTOLIC BLOOD PRESSURE: 131 MMHG | RESPIRATION RATE: 16 BRPM

## 2020-02-21 DIAGNOSIS — Z51.89 VISIT FOR WOUND CHECK: Primary | ICD-10-CM

## 2020-02-21 PROCEDURE — 99024 POSTOP FOLLOW-UP VISIT: CPT | Performed by: SURGERY

## 2020-02-21 RX ORDER — AMOXICILLIN AND CLAVULANATE POTASSIUM 500; 125 MG/1; MG/1
1 TABLET, FILM COATED ORAL 3 TIMES DAILY
Qty: 30 TABLET | Refills: 0 | Status: SHIPPED | OUTPATIENT
Start: 2020-02-21 | End: 2020-03-02

## 2020-02-22 NOTE — PROGRESS NOTES
Patient returns 2 weeks after ileostomy reversal and also excisional debridement of open midline wound with explantation of mesh.  I did close the wound with staples and she has been on Augmentin.    I removed the staples and overall the wound is healing well.  At the midline wound there is some slight uneven mismatch of the skin over a 1 cm area.    I have advised her to use antibiotic ointment daily and to keep it covered and continue to wear her abdominal binder.  I do want to continue antibiotics.  I will reevaluate again in 2 weeks

## 2020-03-06 ENCOUNTER — OFFICE VISIT (OUTPATIENT)
Dept: BARIATRICS/WEIGHT MGMT | Facility: CLINIC | Age: 65
End: 2020-03-06

## 2020-03-06 VITALS
HEART RATE: 81 BPM | WEIGHT: 154 LBS | TEMPERATURE: 98.2 F | BODY MASS INDEX: 27.29 KG/M2 | DIASTOLIC BLOOD PRESSURE: 76 MMHG | SYSTOLIC BLOOD PRESSURE: 133 MMHG | OXYGEN SATURATION: 98 % | RESPIRATION RATE: 16 BRPM | HEIGHT: 63 IN

## 2020-03-06 DIAGNOSIS — Z51.89 VISIT FOR WOUND CHECK: Primary | ICD-10-CM

## 2020-03-06 PROCEDURE — 99024 POSTOP FOLLOW-UP VISIT: CPT | Performed by: SURGERY

## 2020-03-06 NOTE — PROGRESS NOTES
"Status post ileostomy reversal 1 month ago with wound debridement and removal of mesh.  She says over the last week she has had no drainage from her incision.      Vitals:    03/06/20 0852   BP: 133/76   BP Location: Right arm   Pulse: 81   Resp: 16   Temp: 98.2 °F (36.8 °C)   SpO2: 98%   Weight: 69.9 kg (154 lb)   Height: 160 cm (63\")             Lungs: CTAB,no wheeze  CV: RRR  Abd: Incision healed with no openings of the skin and there is no evidence of hernia on exam.  Ext: no edema      Lab Results   Component Value Date    WBC 7.10 02/08/2020    HGB 9.6 (L) 02/08/2020    HCT 29.3 (L) 02/08/2020    MCV 77.8 (L) 02/08/2020     02/08/2020     Lab Results   Component Value Date    GLUCOSE 104 (H) 02/08/2020    BUN 7 (L) 02/08/2020    CREATININE 0.53 (L) 02/08/2020    EGFRIFNONA 116 02/08/2020    EGFRIFAFRI 81 01/07/2017    BCR 13.2 02/08/2020    K 3.9 02/08/2020    CO2 27.0 02/08/2020    CALCIUM 8.1 (L) 02/08/2020    ALBUMIN 3.00 (L) 02/08/2020    LABIL2 1.6 05/29/2019    AST 14 02/08/2020    ALT 15 02/08/2020            Assessment and plan    Status post ileostomy reversal and wound debridement with removal of mesh.  Wound appears to have completely healed.  She has been off antibiotics for a few days.  No more antibiotics necessary.  She will continue to wear her abdominal binder during the day for another month.  She will follow-up as needed.    "

## 2020-04-27 RX ORDER — LEVOTHYROXINE SODIUM 112 UG/1
112 TABLET ORAL DAILY
Qty: 90 TABLET | Refills: 1 | Status: SHIPPED | OUTPATIENT
Start: 2020-04-27 | End: 2020-11-05

## 2020-04-27 RX ORDER — AMLODIPINE BESYLATE 5 MG/1
5 TABLET ORAL DAILY
Qty: 90 TABLET | Refills: 1 | Status: SHIPPED | OUTPATIENT
Start: 2020-04-27 | End: 2020-11-05

## 2020-04-27 NOTE — TELEPHONE ENCOUNTER
PATIENT WAS CALLING TO GET REFILLS ON HER amLODIPine (NORVASC) 5 MG tablet AND IF IT'S TIME SHE WOULD ALSO LIKE HER   levothyroxine (SYNTHROID, LEVOTHROID) 112 MCG tablet    PATIENT IS REQUESTING 90 DAY SUPPLIES OF BOTH MEDICATIONS       PATIENT CAN BE REACHED -[phone]       PATIENT CONFIRMED [id number] - [place], [place] - [address] [facility] - [phone] [place] [phone] FX

## 2020-06-09 ENCOUNTER — LAB (OUTPATIENT)
Dept: FAMILY MEDICINE CLINIC | Facility: CLINIC | Age: 65
End: 2020-06-09

## 2020-06-09 ENCOUNTER — OFFICE VISIT (OUTPATIENT)
Dept: FAMILY MEDICINE CLINIC | Facility: CLINIC | Age: 65
End: 2020-06-09

## 2020-06-09 VITALS
SYSTOLIC BLOOD PRESSURE: 140 MMHG | RESPIRATION RATE: 20 BRPM | WEIGHT: 170.2 LBS | TEMPERATURE: 98.2 F | BODY MASS INDEX: 30.16 KG/M2 | DIASTOLIC BLOOD PRESSURE: 90 MMHG | HEIGHT: 63 IN | HEART RATE: 85 BPM

## 2020-06-09 DIAGNOSIS — E78.5 HYPERLIPIDEMIA, UNSPECIFIED HYPERLIPIDEMIA TYPE: Primary | ICD-10-CM

## 2020-06-09 DIAGNOSIS — I10 ESSENTIAL HYPERTENSION: ICD-10-CM

## 2020-06-09 DIAGNOSIS — E03.9 HYPOTHYROIDISM, UNSPECIFIED TYPE: ICD-10-CM

## 2020-06-09 DIAGNOSIS — E78.5 HYPERLIPIDEMIA, UNSPECIFIED HYPERLIPIDEMIA TYPE: ICD-10-CM

## 2020-06-09 LAB
BASOPHILS # BLD AUTO: 0.06 10*3/MM3 (ref 0–0.2)
BASOPHILS NFR BLD AUTO: 1 % (ref 0–1.5)
DEPRECATED RDW RBC AUTO: 48.4 FL (ref 37–54)
EOSINOPHIL # BLD AUTO: 0.14 10*3/MM3 (ref 0–0.4)
EOSINOPHIL NFR BLD AUTO: 2.3 % (ref 0.3–6.2)
ERYTHROCYTE [DISTWIDTH] IN BLOOD BY AUTOMATED COUNT: 16.3 % (ref 12.3–15.4)
HCT VFR BLD AUTO: 40.3 % (ref 34–46.6)
HGB BLD-MCNC: 13.2 G/DL (ref 12–15.9)
IMM GRANULOCYTES # BLD AUTO: 0.02 10*3/MM3 (ref 0–0.05)
IMM GRANULOCYTES NFR BLD AUTO: 0.3 % (ref 0–0.5)
LYMPHOCYTES # BLD AUTO: 2.35 10*3/MM3 (ref 0.7–3.1)
LYMPHOCYTES NFR BLD AUTO: 38.7 % (ref 19.6–45.3)
MCH RBC QN AUTO: 26.6 PG (ref 26.6–33)
MCHC RBC AUTO-ENTMCNC: 32.8 G/DL (ref 31.5–35.7)
MCV RBC AUTO: 81.3 FL (ref 79–97)
MONOCYTES # BLD AUTO: 0.6 10*3/MM3 (ref 0.1–0.9)
MONOCYTES NFR BLD AUTO: 9.9 % (ref 5–12)
NEUTROPHILS # BLD AUTO: 2.91 10*3/MM3 (ref 1.7–7)
NEUTROPHILS NFR BLD AUTO: 47.8 % (ref 42.7–76)
NRBC BLD AUTO-RTO: 0 /100 WBC (ref 0–0.2)
PLATELET # BLD AUTO: 232 10*3/MM3 (ref 140–450)
PMV BLD AUTO: 10.7 FL (ref 6–12)
RBC # BLD AUTO: 4.96 10*6/MM3 (ref 3.77–5.28)
WBC NRBC COR # BLD: 6.08 10*3/MM3 (ref 3.4–10.8)

## 2020-06-09 PROCEDURE — 80061 LIPID PANEL: CPT | Performed by: INTERNAL MEDICINE

## 2020-06-09 PROCEDURE — 80053 COMPREHEN METABOLIC PANEL: CPT | Performed by: INTERNAL MEDICINE

## 2020-06-09 PROCEDURE — 99213 OFFICE O/P EST LOW 20 MIN: CPT | Performed by: INTERNAL MEDICINE

## 2020-06-09 PROCEDURE — 84443 ASSAY THYROID STIM HORMONE: CPT | Performed by: INTERNAL MEDICINE

## 2020-06-09 PROCEDURE — 36415 COLL VENOUS BLD VENIPUNCTURE: CPT

## 2020-06-09 PROCEDURE — 84439 ASSAY OF FREE THYROXINE: CPT | Performed by: INTERNAL MEDICINE

## 2020-06-09 PROCEDURE — 85025 COMPLETE CBC W/AUTO DIFF WBC: CPT | Performed by: INTERNAL MEDICINE

## 2020-06-10 LAB
ALBUMIN SERPL-MCNC: 4.8 G/DL (ref 3.5–5.2)
ALBUMIN/GLOB SERPL: 2.7 G/DL
ALP SERPL-CCNC: 55 U/L (ref 39–117)
ALT SERPL W P-5'-P-CCNC: 19 U/L (ref 1–33)
ANION GAP SERPL CALCULATED.3IONS-SCNC: 11 MMOL/L (ref 5–15)
AST SERPL-CCNC: 20 U/L (ref 1–32)
BILIRUB SERPL-MCNC: <0.2 MG/DL (ref 0.2–1.2)
BUN BLD-MCNC: 15 MG/DL (ref 8–23)
BUN/CREAT SERPL: 31.9 (ref 7–25)
CALCIUM SPEC-SCNC: 7.5 MG/DL (ref 8.6–10.5)
CHLORIDE SERPL-SCNC: 97 MMOL/L (ref 98–107)
CHOLEST SERPL-MCNC: 202 MG/DL (ref 0–200)
CO2 SERPL-SCNC: 29 MMOL/L (ref 22–29)
CREAT BLD-MCNC: 0.47 MG/DL (ref 0.57–1)
GFR SERPL CREATININE-BSD FRML MDRD: 133 ML/MIN/1.73
GLOBULIN UR ELPH-MCNC: 1.8 GM/DL
GLUCOSE BLD-MCNC: 98 MG/DL (ref 65–99)
HDLC SERPL-MCNC: 41 MG/DL (ref 40–60)
LDLC SERPL CALC-MCNC: 136 MG/DL (ref 0–100)
LDLC/HDLC SERPL: 3.32 {RATIO}
POTASSIUM BLD-SCNC: 4.2 MMOL/L (ref 3.5–5.2)
PROT SERPL-MCNC: 6.6 G/DL (ref 6–8.5)
SODIUM BLD-SCNC: 137 MMOL/L (ref 136–145)
T4 FREE SERPL-MCNC: 1.45 NG/DL (ref 0.93–1.7)
TRIGL SERPL-MCNC: 124 MG/DL (ref 0–150)
TSH SERPL DL<=0.05 MIU/L-ACNC: 2.69 UIU/ML (ref 0.27–4.2)
VLDLC SERPL-MCNC: 24.8 MG/DL (ref 5–40)

## 2020-06-17 DIAGNOSIS — E58 CALCIUM DEFICIENCY: Primary | ICD-10-CM

## 2020-06-17 DIAGNOSIS — E55.9 VITAMIN D DEFICIENCY: ICD-10-CM

## 2020-07-01 ENCOUNTER — LAB (OUTPATIENT)
Dept: FAMILY MEDICINE CLINIC | Facility: CLINIC | Age: 65
End: 2020-07-01

## 2020-07-01 DIAGNOSIS — E55.9 VITAMIN D DEFICIENCY: ICD-10-CM

## 2020-07-01 DIAGNOSIS — E58 CALCIUM DEFICIENCY: ICD-10-CM

## 2020-07-01 LAB
25(OH)D3 SERPL-MCNC: 64.6 NG/ML (ref 30–100)
ANION GAP SERPL CALCULATED.3IONS-SCNC: 9.1 MMOL/L (ref 5–15)
BUN SERPL-MCNC: 17 MG/DL (ref 8–23)
BUN/CREAT SERPL: 23 (ref 7–25)
CA-I BLD-MCNC: 5.1 MG/DL (ref 4.6–5.4)
CA-I SERPL ISE-MCNC: 1.27 MMOL/L (ref 1.15–1.35)
CALCIUM SPEC-SCNC: 9.3 MG/DL (ref 8.6–10.5)
CHLORIDE SERPL-SCNC: 102 MMOL/L (ref 98–107)
CO2 SERPL-SCNC: 25.9 MMOL/L (ref 22–29)
CREAT SERPL-MCNC: 0.74 MG/DL (ref 0.57–1)
GFR SERPL CREATININE-BSD FRML MDRD: 79 ML/MIN/1.73
GLUCOSE SERPL-MCNC: 88 MG/DL (ref 65–99)
MAGNESIUM SERPL-MCNC: 2.1 MG/DL (ref 1.6–2.4)
PHOSPHATE SERPL-MCNC: 3.4 MG/DL (ref 2.5–4.5)
POTASSIUM SERPL-SCNC: 4.1 MMOL/L (ref 3.5–5.2)
PTH-INTACT SERPL-MCNC: 26.1 PG/ML (ref 15–65)
SODIUM SERPL-SCNC: 137 MMOL/L (ref 136–145)

## 2020-07-01 PROCEDURE — 36415 COLL VENOUS BLD VENIPUNCTURE: CPT

## 2020-07-01 PROCEDURE — 83970 ASSAY OF PARATHORMONE: CPT | Performed by: INTERNAL MEDICINE

## 2020-07-01 PROCEDURE — 80048 BASIC METABOLIC PNL TOTAL CA: CPT | Performed by: INTERNAL MEDICINE

## 2020-07-01 PROCEDURE — 83735 ASSAY OF MAGNESIUM: CPT | Performed by: INTERNAL MEDICINE

## 2020-07-01 PROCEDURE — 82330 ASSAY OF CALCIUM: CPT | Performed by: INTERNAL MEDICINE

## 2020-07-01 PROCEDURE — 82306 VITAMIN D 25 HYDROXY: CPT | Performed by: INTERNAL MEDICINE

## 2020-07-01 PROCEDURE — 84100 ASSAY OF PHOSPHORUS: CPT | Performed by: INTERNAL MEDICINE

## 2020-09-30 ENCOUNTER — E-VISIT (OUTPATIENT)
Dept: FAMILY MEDICINE CLINIC | Facility: TELEHEALTH | Age: 65
End: 2020-09-30

## 2020-09-30 DIAGNOSIS — R39.89 SUSPECTED UTI: Primary | ICD-10-CM

## 2020-09-30 PROCEDURE — 99422 OL DIG E/M SVC 11-20 MIN: CPT | Performed by: NURSE PRACTITIONER

## 2020-09-30 RX ORDER — PHENAZOPYRIDINE HYDROCHLORIDE 200 MG/1
200 TABLET, FILM COATED ORAL 3 TIMES DAILY PRN
Qty: 6 TABLET | Refills: 0 | Status: SHIPPED | OUTPATIENT
Start: 2020-09-30 | End: 2020-10-02

## 2020-09-30 RX ORDER — SULFAMETHOXAZOLE AND TRIMETHOPRIM 800; 160 MG/1; MG/1
1 TABLET ORAL 2 TIMES DAILY
Qty: 10 TABLET | Refills: 0 | Status: SHIPPED | OUTPATIENT
Start: 2020-09-30 | End: 2020-10-05

## 2020-09-30 NOTE — PROGRESS NOTES
Nallely BERNARD Arms    1955  [id number]    I have reviewed the e-Visit questionnaire and patient's answers, my assessment and plan are as follows:    HPI- Pt reports pain while passing urine, cloudy urine, frequent urination, urgent urination, urine odor, back pain x 1-2 weeks. Denies fever or genital sores. She has had similar symptoms in the past many years ago and was treated for a UTI. She took an AZO UTI test and it was positive for a UTI.     Review of Systems - General ROS: negative for - fever  Genito-Urinary ROS: positive for - dysuria and urinary frequency/urgency  negative for - genital ulcers  Musculoskeletal ROS: positive for -back pain      Diagnoses and all orders for this visit:    Suspected UTI  -     sulfamethoxazole-trimethoprim (Bactrim DS) 800-160 MG per tablet; Take 1 tablet by mouth 2 (Two) Times a Day for 5 days.  -     phenazopyridine (Pyridium) 200 MG tablet; Take 1 tablet by mouth 3 (Three) Times a Day As Needed for Bladder Spasms for up to 2 days.       Take medicine as prescribed.   Wipe front to back, stay hydrated with water to flush the kidneys, avoid caffeinated beverages.  If symptoms worsen or do not improve follow up with your PCP or visit your nearest Urgent Care Center or ER.    Any medications prescribed have been sent electronically to   [facility] #[id number] - [place], [place] - [address] AT [facility] [address] - [phone] PH - [phone] FX  [address] [facility] [facility] [place] [address]  Phone: [phone] Fax: [phone]    [id number] - [place], [place] - [address] AT [address] [facility] [address] - [phone] PH - [phone] FX  [address]  [place] [address]  Phone: [phone] Fax: [phone]    [id number] - [place], [place] - [address] - [phone] PH - [phone] FX  [address]  [place]  [address]  Phone: [phone] Fax: [phone]    I spent 15 minutes reviewing this chart.     MARAL Gaines  09/30/20  17:00 EDT

## 2020-09-30 NOTE — PATIENT INSTRUCTIONS
Take medicine as prescribed.   Wipe front to back, stay hydrated with water to flush the kidneys, avoid caffeinated beverages.  If symptoms worsen or do not improve follow up with your PCP or visit your nearest Urgent Care Center or ER.      Urinary Tract Infection, Adult  A urinary tract infection (UTI) is an infection of any part of the urinary tract. The urinary tract includes:  · The kidneys.  · The ureters.  · The bladder.  · The urethra.  These organs make, store, and get rid of pee (urine) in the body.  What are the causes?  This is caused by germs (bacteria) in your genital area. These germs grow and cause swelling (inflammation) of your urinary tract.  What increases the risk?  You are more likely to develop this condition if:  · You have a small, thin tube (catheter) to drain pee.  · You cannot control when you pee or poop (incontinence).  · You are female, and:  ? You use these methods to prevent pregnancy:  ? A medicine that kills sperm (spermicide).  ? A device that blocks sperm (diaphragm).  ? You have low levels of a female hormone (estrogen).  ? You are pregnant.  · You have genes that add to your risk.  · You are sexually active.  · You take antibiotic medicines.  · You have trouble peeing because of:  ? A prostate that is bigger than normal, if you are male.  ? A blockage in the part of your body that drains pee from the bladder (urethra).  ? A kidney stone.  ? A nerve condition that affects your bladder (neurogenic bladder).  ? Not getting enough to drink.  ? Not peeing often enough.  · You have other conditions, such as:  ? Diabetes.  ? A weak disease-fighting system (immune system).  ? Sickle cell disease.  ? Gout.  ? Injury of the spine.  What are the signs or symptoms?  Symptoms of this condition include:  · Needing to pee right away (urgently).  · Peeing often.  · Peeing small amounts often.  · Pain or burning when peeing.  · Blood in the pee.  · Pee that smells bad or not like  normal.  · Trouble peeing.  · Pee that is cloudy.  · Fluid coming from the vagina, if you are female.  · Pain in the belly or lower back.  Other symptoms include:  · Throwing up (vomiting).  · No urge to eat.  · Feeling mixed up (confused).  · Being tired and grouchy (irritable).  · A fever.  · Watery poop (diarrhea).  How is this treated?  This condition may be treated with:  · Antibiotic medicine.  · Other medicines.  · Drinking enough water.  Follow these instructions at home:    Medicines  · Take over-the-counter and prescription medicines only as told by your doctor.  · If you were prescribed an antibiotic medicine, take it as told by your doctor. Do not stop taking it even if you start to feel better.  General instructions  · Make sure you:  ? Pee until your bladder is empty.  ? Do not hold pee for a long time.  ? Empty your bladder after sex.  ? Wipe from front to back after pooping if you are a female. Use each tissue one time when you wipe.  · Drink enough fluid to keep your pee pale yellow.  · Keep all follow-up visits as told by your doctor. This is important.  Contact a doctor if:  · You do not get better after 1-2 days.  · Your symptoms go away and then come back.  Get help right away if:  · You have very bad back pain.  · You have very bad pain in your lower belly.  · You have a fever.  · You are sick to your stomach (nauseous).  · You are throwing up.  Summary  · A urinary tract infection (UTI) is an infection of any part of the urinary tract.  · This condition is caused by germs in your genital area.  · There are many risk factors for a UTI. These include having a small, thin tube to drain pee and not being able to control when you pee or poop.  · Treatment includes antibiotic medicines for germs.  · Drink enough fluid to keep your pee pale yellow.  This information is not intended to replace advice given to you by your health care provider. Make sure you discuss any questions you have with your  health care provider.  Document Released: 06/05/2009 Document Revised: 12/05/2019 Document Reviewed: 06/27/2019  Elsevier Patient Education © 2020 [employer]

## 2020-10-27 ENCOUNTER — TELEPHONE (OUTPATIENT)
Dept: FAMILY MEDICINE CLINIC | Facility: CLINIC | Age: 65
End: 2020-10-27

## 2020-10-27 ENCOUNTER — OFFICE VISIT (OUTPATIENT)
Dept: FAMILY MEDICINE CLINIC | Facility: CLINIC | Age: 65
End: 2020-10-27

## 2020-10-27 VITALS
SYSTOLIC BLOOD PRESSURE: 142 MMHG | DIASTOLIC BLOOD PRESSURE: 90 MMHG | OXYGEN SATURATION: 98 % | RESPIRATION RATE: 16 BRPM | BODY MASS INDEX: 33.27 KG/M2 | WEIGHT: 187.8 LBS | HEIGHT: 63 IN | HEART RATE: 88 BPM | TEMPERATURE: 97.5 F

## 2020-10-27 DIAGNOSIS — E66.9 CLASS 1 OBESITY WITHOUT SERIOUS COMORBIDITY WITH BODY MASS INDEX (BMI) OF 33.0 TO 33.9 IN ADULT, UNSPECIFIED OBESITY TYPE: ICD-10-CM

## 2020-10-27 DIAGNOSIS — F33.41 RECURRENT MAJOR DEPRESSIVE DISORDER, IN PARTIAL REMISSION (HCC): ICD-10-CM

## 2020-10-27 DIAGNOSIS — G47.33 SLEEP APNEA, OBSTRUCTIVE: Primary | ICD-10-CM

## 2020-10-27 DIAGNOSIS — I10 ESSENTIAL HYPERTENSION: ICD-10-CM

## 2020-10-27 PROCEDURE — 99214 OFFICE O/P EST MOD 30 MIN: CPT | Performed by: INTERNAL MEDICINE

## 2020-10-27 RX ORDER — VENLAFAXINE HYDROCHLORIDE 75 MG/1
75 CAPSULE, EXTENDED RELEASE ORAL DAILY
Qty: 30 CAPSULE | Refills: 0 | Status: SHIPPED | OUTPATIENT
Start: 2020-10-27 | End: 2020-12-08

## 2020-10-27 RX ORDER — MODAFINIL 100 MG/1
100 TABLET ORAL EVERY MORNING
Qty: 30 TABLET | Refills: 0 | Status: SHIPPED | OUTPATIENT
Start: 2020-10-27 | End: 2020-12-08

## 2020-11-05 RX ORDER — LEVOTHYROXINE SODIUM 112 UG/1
112 TABLET ORAL DAILY
Qty: 90 TABLET | Refills: 1 | Status: SHIPPED | OUTPATIENT
Start: 2020-11-05 | End: 2021-05-11 | Stop reason: SDUPTHER

## 2020-11-05 RX ORDER — AMLODIPINE BESYLATE 5 MG/1
5 TABLET ORAL DAILY
Qty: 90 TABLET | Refills: 1 | Status: SHIPPED | OUTPATIENT
Start: 2020-11-05 | End: 2021-05-11 | Stop reason: SDUPTHER

## 2020-11-15 ENCOUNTER — E-VISIT (OUTPATIENT)
Dept: FAMILY MEDICINE CLINIC | Facility: CLINIC | Age: 65
End: 2020-11-15

## 2020-11-16 ENCOUNTER — TELEPHONE (OUTPATIENT)
Dept: FAMILY MEDICINE CLINIC | Facility: CLINIC | Age: 65
End: 2020-11-16

## 2020-11-16 DIAGNOSIS — U07.1 COVID-19: Primary | ICD-10-CM

## 2020-11-16 PROCEDURE — U0003 INFECTIOUS AGENT DETECTION BY NUCLEIC ACID (DNA OR RNA); SEVERE ACUTE RESPIRATORY SYNDROME CORONAVIRUS 2 (SARS-COV-2) (CORONAVIRUS DISEASE [COVID-19]), AMPLIFIED PROBE TECHNIQUE, MAKING USE OF HIGH THROUGHPUT TECHNOLOGIES AS DESCRIBED BY CMS-2020-01-R: HCPCS | Performed by: INTERNAL MEDICINE

## 2020-11-16 NOTE — TELEPHONE ENCOUNTER
Just put order in for testing at [facility] or [facility]. [facility] [place] also [facility] [place] [facility] [place]. They offer drive through testing M, W, F 9-2  And tues and thurs 1-6.   Does pt have symptoms? Was she exposed?

## 2020-11-16 NOTE — TELEPHONE ENCOUNTER
PATIENT CALLED BACK TO SEE IF  CAN RECOMMEND WHERE THEY SHOULD BE TESTED OR IF SHE CAN SEND A NOTE TO URGENT CARE AT THIS LOCATION.

## 2020-11-16 NOTE — TELEPHONE ENCOUNTER
PATIENT IS CALLING TO SEE WHAT  RECOMMENDS. SOMEONE AT HER JOB HAS TESTED POSITIVE, THEY WANTED TO KNOW IF OR WHERE SHE AND HER MOTHER SHOULD GET TESTED. PLEASE GIVE A CALL BACK ASAP. THIS IS THEIR FAMILY BUSINESS.    [facility] PH#: [phone]

## 2020-12-08 ENCOUNTER — OFFICE VISIT (OUTPATIENT)
Dept: FAMILY MEDICINE CLINIC | Facility: CLINIC | Age: 65
End: 2020-12-08

## 2020-12-08 VITALS
RESPIRATION RATE: 16 BRPM | WEIGHT: 197.6 LBS | HEART RATE: 85 BPM | TEMPERATURE: 97.7 F | DIASTOLIC BLOOD PRESSURE: 78 MMHG | BODY MASS INDEX: 35.01 KG/M2 | HEIGHT: 63 IN | SYSTOLIC BLOOD PRESSURE: 128 MMHG | OXYGEN SATURATION: 100 %

## 2020-12-08 DIAGNOSIS — E66.9 CLASS 2 OBESITY WITHOUT SERIOUS COMORBIDITY WITH BODY MASS INDEX (BMI) OF 35.0 TO 35.9 IN ADULT, UNSPECIFIED OBESITY TYPE: Primary | ICD-10-CM

## 2020-12-08 DIAGNOSIS — M25.50 ARTHRALGIA, UNSPECIFIED JOINT: ICD-10-CM

## 2020-12-08 DIAGNOSIS — M79.10 MYALGIA: ICD-10-CM

## 2020-12-08 DIAGNOSIS — F33.41 RECURRENT MAJOR DEPRESSIVE DISORDER, IN PARTIAL REMISSION (HCC): ICD-10-CM

## 2020-12-08 PROCEDURE — 99214 OFFICE O/P EST MOD 30 MIN: CPT | Performed by: INTERNAL MEDICINE

## 2020-12-08 RX ORDER — PHENTERMINE HYDROCHLORIDE 37.5 MG/1
37.5 TABLET ORAL
Qty: 30 TABLET | Refills: 0 | Status: SHIPPED | OUTPATIENT
Start: 2020-12-08 | End: 2021-01-07

## 2020-12-08 RX ORDER — VENLAFAXINE HYDROCHLORIDE 75 MG/1
75 CAPSULE, EXTENDED RELEASE ORAL DAILY
Qty: 90 CAPSULE | Refills: 1 | Status: SHIPPED | OUTPATIENT
Start: 2020-12-08 | End: 2021-03-08 | Stop reason: SDUPTHER

## 2021-01-05 DIAGNOSIS — E66.9 CLASS 2 OBESITY WITHOUT SERIOUS COMORBIDITY WITH BODY MASS INDEX (BMI) OF 35.0 TO 35.9 IN ADULT, UNSPECIFIED OBESITY TYPE: ICD-10-CM

## 2021-01-07 ENCOUNTER — LAB (OUTPATIENT)
Dept: FAMILY MEDICINE CLINIC | Facility: CLINIC | Age: 66
End: 2021-01-07

## 2021-01-07 ENCOUNTER — OFFICE VISIT (OUTPATIENT)
Dept: FAMILY MEDICINE CLINIC | Facility: CLINIC | Age: 66
End: 2021-01-07

## 2021-01-07 VITALS
SYSTOLIC BLOOD PRESSURE: 158 MMHG | RESPIRATION RATE: 16 BRPM | WEIGHT: 197 LBS | TEMPERATURE: 97.1 F | DIASTOLIC BLOOD PRESSURE: 86 MMHG | HEART RATE: 90 BPM | BODY MASS INDEX: 34.9 KG/M2 | OXYGEN SATURATION: 97 %

## 2021-01-07 DIAGNOSIS — E03.9 HYPOTHYROIDISM, UNSPECIFIED TYPE: ICD-10-CM

## 2021-01-07 DIAGNOSIS — E03.9 HYPOTHYROIDISM, UNSPECIFIED TYPE: Primary | ICD-10-CM

## 2021-01-07 DIAGNOSIS — E66.9 OBESITY WITH BODY MASS INDEX 30 OR GREATER: ICD-10-CM

## 2021-01-07 PROCEDURE — 99213 OFFICE O/P EST LOW 20 MIN: CPT | Performed by: PHYSICIAN ASSISTANT

## 2021-01-07 PROCEDURE — 84443 ASSAY THYROID STIM HORMONE: CPT | Performed by: PHYSICIAN ASSISTANT

## 2021-01-07 PROCEDURE — 36415 COLL VENOUS BLD VENIPUNCTURE: CPT

## 2021-01-07 RX ORDER — PHENTERMINE HYDROCHLORIDE 37.5 MG/1
37.5 TABLET ORAL
Qty: 30 TABLET | Refills: 0 | Status: SHIPPED | OUTPATIENT
Start: 2021-01-07 | End: 2021-02-04 | Stop reason: SDUPTHER

## 2021-01-07 NOTE — PROGRESS NOTES
Answers for HPI/ROS submitted by the patient on 1/5/2021   What is the primary reason for your visit?: Other  Please describe your symptoms.: Follow up Weight  Have you had these symptoms before?: Yes  How long have you been having these symptoms?: Greater than 2 weeks  Please list any medications you are currently taking for this condition.: Phentermine 37.5mg Tablets  Kelly Figueroa is a 65 y.o. female.     Chief Complaint   Patient presents with   • Diabetes   • Hyperlipidemia       /86 (BP Location: Left arm, Patient Position: Sitting, Cuff Size: Adult)   Pulse 90   Temp 97.1 °F (36.2 °C) (Temporal)   Resp 16   Wt 89.4 kg (197 lb)   SpO2 97%   BMI 34.90 kg/m²     BP Readings from Last 3 Encounters:   01/07/21 158/86   12/08/20 128/78   10/27/20 142/90       Wt Readings from Last 3 Encounters:   01/07/21 89.4 kg (197 lb)   12/08/20 89.6 kg (197 lb 9.6 oz)   10/27/20 85.2 kg (187 lb 12.8 oz)       HPI patient is here to follow-up for weight management for obesity.  She was previously following with Dr. Colon who had her on phentermine.  She took the phentermine last month and has had a better control over her appetite but did not lose weight.  She is not doing any exercise.  She denies palpitations, shortness of air, chest pain, sweating, or nausea on the medication.  This is the first month that she has been on the medication.  It has been approximately 6 months since her thyroid has been checked.    The following portions of the patient's history were reviewed and updated as appropriate: allergies, current medications, past family history, past medical history, past social history, past surgical history and problem list.    Review of Systems   Constitutional: Negative for fatigue and fever.   Respiratory: Negative for cough and shortness of breath.    Cardiovascular: Negative for chest pain.   Genitourinary: Negative for dysuria.   Musculoskeletal: Negative for back pain and neck pain.    Skin: Negative for rash and bruise.   Neurological: Negative for weakness and headache.   Psychiatric/Behavioral: Negative for depressed mood. The patient is not nervous/anxious.        Objective   Physical Exam  Constitutional:       Appearance: Normal appearance. She is obese.   Eyes:      Extraocular Movements: Extraocular movements intact.      Pupils: Pupils are equal, round, and reactive to light.   Neurological:      General: No focal deficit present.      Mental Status: She is alert and oriented to person, place, and time.   Psychiatric:         Mood and Affect: Mood normal.         Behavior: Behavior normal.           Diagnoses and all orders for this visit:    1. Hypothyroidism, unspecified type (Primary)  -     TSH; Future    2. Obesity with body mass index 30 or greater    Continue phentermine for 2 more months. Follow up in 1 month. Incorporate exercises we discussed.     Return in about 4 weeks (around 2/4/2021), or if symptoms worsen or fail to improve.

## 2021-01-08 ENCOUNTER — TELEPHONE (OUTPATIENT)
Dept: FAMILY MEDICINE CLINIC | Facility: CLINIC | Age: 66
End: 2021-01-08

## 2021-01-08 LAB — TSH SERPL DL<=0.05 MIU/L-ACNC: 4.61 UIU/ML (ref 0.27–4.2)

## 2021-01-08 NOTE — TELEPHONE ENCOUNTER
Spoke with patient r/t her lab result. She states that she hasn't missed any doses & that she hasn't changed anything else.

## 2021-01-08 NOTE — TELEPHONE ENCOUNTER
----- Message from Amanuel Youssef PA-C sent at 1/8/2021  8:10 AM EST -----  Thyroid up a little. Have you missed any doses?

## 2021-02-04 ENCOUNTER — OFFICE VISIT (OUTPATIENT)
Dept: FAMILY MEDICINE CLINIC | Facility: CLINIC | Age: 66
End: 2021-02-04

## 2021-02-04 VITALS
WEIGHT: 199.4 LBS | RESPIRATION RATE: 16 BRPM | DIASTOLIC BLOOD PRESSURE: 80 MMHG | TEMPERATURE: 97.5 F | SYSTOLIC BLOOD PRESSURE: 154 MMHG | BODY MASS INDEX: 35.32 KG/M2 | OXYGEN SATURATION: 96 % | HEART RATE: 92 BPM

## 2021-02-04 DIAGNOSIS — E66.9 CLASS 2 OBESITY WITHOUT SERIOUS COMORBIDITY WITH BODY MASS INDEX (BMI) OF 35.0 TO 35.9 IN ADULT, UNSPECIFIED OBESITY TYPE: ICD-10-CM

## 2021-02-04 PROCEDURE — 99213 OFFICE O/P EST LOW 20 MIN: CPT | Performed by: PHYSICIAN ASSISTANT

## 2021-02-04 RX ORDER — PHENTERMINE HYDROCHLORIDE 37.5 MG/1
37.5 TABLET ORAL
Qty: 30 TABLET | Refills: 0 | Status: SHIPPED | OUTPATIENT
Start: 2021-02-04 | End: 2021-03-08

## 2021-02-15 NOTE — PROGRESS NOTES
Kelly Figueroa is a 66 y.o. female.     Chief Complaint   Patient presents with   • Obesity       /80 (BP Location: Left arm, Patient Position: Sitting, Cuff Size: Adult)   Pulse 92   Temp 97.5 °F (36.4 °C) (Temporal)   Resp 16   Wt 90.4 kg (199 lb 6.4 oz)   SpO2 96%   BMI 35.32 kg/m²     BP Readings from Last 3 Encounters:   02/04/21 154/80   01/07/21 158/86   12/08/20 128/78       Wt Readings from Last 3 Encounters:   02/04/21 90.4 kg (199 lb 6.4 oz)   01/07/21 89.4 kg (197 lb)   12/08/20 89.6 kg (197 lb 9.6 oz)       HPI patient presents to the clinic for evaluation of obesity and phentermine therapy.  She states that she is currently doing very well on the medication and has not gained weight over the past 1 month.  She is trying to work on her diet and exercise more.  She would like to remain on the medication at least for 1 more month.  She denies palpitations, diaphoresis, nausea, or chest pain associated with the medication.    The following portions of the patient's history were reviewed and updated as appropriate: allergies, current medications, past family history, past medical history, past social history, past surgical history and problem list.    Review of Systems   Constitutional: Negative for fatigue and fever.   Respiratory: Negative for cough and shortness of breath.    Cardiovascular: Negative for chest pain.   Genitourinary: Negative for dysuria.   Musculoskeletal: Negative for back pain and neck pain.   Skin: Negative for rash and bruise.   Neurological: Negative for weakness and headache.   Psychiatric/Behavioral: Negative for depressed mood. The patient is not nervous/anxious.        Objective   Physical Exam  Constitutional:       Appearance: Normal appearance. She is obese.   Eyes:      Extraocular Movements: Extraocular movements intact.      Pupils: Pupils are equal, round, and reactive to light.   Neurological:      General: No focal deficit present.      Mental  Status: She is alert and oriented to person, place, and time.   Psychiatric:         Mood and Affect: Mood normal.         Behavior: Behavior normal.           Diagnoses and all orders for this visit:    1. Class 2 obesity without serious comorbidity with body mass index (BMI) of 35.0 to 35.9 in adult, unspecified obesity type  -     phentermine (ADIPEX-P) 37.5 MG tablet; Take 1 tablet by mouth Every Morning Before Breakfast.  Dispense: 30 tablet; Refill: 0    At this point she has not lost any weight on the medication and she will need to make lifestyle modifications as well as the medication use and we discussed this at extent.  She will follow-up in 1 month for another evaluation.    Return in about 4 weeks (around 3/4/2021), or if symptoms worsen or fail to improve.

## 2021-03-07 DIAGNOSIS — E66.9 CLASS 2 OBESITY WITHOUT SERIOUS COMORBIDITY WITH BODY MASS INDEX (BMI) OF 35.0 TO 35.9 IN ADULT, UNSPECIFIED OBESITY TYPE: ICD-10-CM

## 2021-03-08 RX ORDER — PHENTERMINE HYDROCHLORIDE 37.5 MG/1
37.5 TABLET ORAL
Qty: 30 TABLET | Refills: 0 | Status: SHIPPED | OUTPATIENT
Start: 2021-03-08 | End: 2021-04-16

## 2021-03-08 NOTE — TELEPHONE ENCOUNTER
Last month for phentermine and we need a vacation period from the medication. I also typically do see these monthly in the office for weight checks. I know  initially prescribed this. So if we ever go back on it I will need to see her monthly.

## 2021-03-09 RX ORDER — VENLAFAXINE HYDROCHLORIDE 75 MG/1
75 CAPSULE, EXTENDED RELEASE ORAL DAILY
Qty: 90 CAPSULE | Refills: 1 | Status: SHIPPED | OUTPATIENT
Start: 2021-03-09 | End: 2021-03-10 | Stop reason: SDUPTHER

## 2021-03-10 RX ORDER — VENLAFAXINE HYDROCHLORIDE 75 MG/1
75 CAPSULE, EXTENDED RELEASE ORAL DAILY
Qty: 90 CAPSULE | Refills: 1 | Status: SHIPPED | OUTPATIENT
Start: 2021-03-10 | End: 2021-11-01

## 2021-04-07 ENCOUNTER — TELEPHONE (OUTPATIENT)
Dept: FAMILY MEDICINE CLINIC | Facility: CLINIC | Age: 66
End: 2021-04-07

## 2021-04-07 DIAGNOSIS — Z12.31 ENCOUNTER FOR SCREENING MAMMOGRAM FOR BREAST CANCER: Primary | ICD-10-CM

## 2021-04-08 ENCOUNTER — APPOINTMENT (OUTPATIENT)
Dept: WOMENS IMAGING | Facility: HOSPITAL | Age: 66
End: 2021-04-08

## 2021-04-08 PROCEDURE — 77063 BREAST TOMOSYNTHESIS BI: CPT | Performed by: RADIOLOGY

## 2021-04-08 PROCEDURE — 77067 SCR MAMMO BI INCL CAD: CPT | Performed by: RADIOLOGY

## 2021-04-16 ENCOUNTER — OFFICE VISIT (OUTPATIENT)
Dept: FAMILY MEDICINE CLINIC | Facility: CLINIC | Age: 66
End: 2021-04-16

## 2021-04-16 ENCOUNTER — LAB (OUTPATIENT)
Dept: FAMILY MEDICINE CLINIC | Facility: CLINIC | Age: 66
End: 2021-04-16

## 2021-04-16 VITALS
HEIGHT: 63 IN | RESPIRATION RATE: 16 BRPM | WEIGHT: 202.13 LBS | TEMPERATURE: 97.3 F | BODY MASS INDEX: 35.81 KG/M2 | HEART RATE: 102 BPM | DIASTOLIC BLOOD PRESSURE: 84 MMHG | OXYGEN SATURATION: 97 % | SYSTOLIC BLOOD PRESSURE: 144 MMHG

## 2021-04-16 DIAGNOSIS — E66.9 OBESITY (BMI 30-39.9): ICD-10-CM

## 2021-04-16 DIAGNOSIS — R53.82 CHRONIC FATIGUE: ICD-10-CM

## 2021-04-16 DIAGNOSIS — E03.9 HYPOTHYROIDISM, UNSPECIFIED TYPE: Primary | ICD-10-CM

## 2021-04-16 DIAGNOSIS — E03.9 HYPOTHYROIDISM, UNSPECIFIED TYPE: ICD-10-CM

## 2021-04-16 PROBLEM — G47.33 SLEEP APNEA, OBSTRUCTIVE: Status: RESOLVED | Noted: 2019-12-20 | Resolved: 2021-04-16

## 2021-04-16 LAB
ALBUMIN SERPL-MCNC: 4.3 G/DL (ref 3.5–5.2)
ALBUMIN/GLOB SERPL: 1.8 G/DL
ALP SERPL-CCNC: 120 U/L (ref 39–117)
ALT SERPL W P-5'-P-CCNC: 20 U/L (ref 1–33)
ANION GAP SERPL CALCULATED.3IONS-SCNC: 9.1 MMOL/L (ref 5–15)
AST SERPL-CCNC: 17 U/L (ref 1–32)
BASOPHILS # BLD AUTO: 0.05 10*3/MM3 (ref 0–0.2)
BASOPHILS NFR BLD AUTO: 0.8 % (ref 0–1.5)
BILIRUB SERPL-MCNC: 0.3 MG/DL (ref 0–1.2)
BUN SERPL-MCNC: 13 MG/DL (ref 8–23)
BUN/CREAT SERPL: 17.8 (ref 7–25)
CALCIUM SPEC-SCNC: 9.4 MG/DL (ref 8.6–10.5)
CHLORIDE SERPL-SCNC: 103 MMOL/L (ref 98–107)
CO2 SERPL-SCNC: 26.9 MMOL/L (ref 22–29)
CREAT SERPL-MCNC: 0.73 MG/DL (ref 0.57–1)
DEPRECATED RDW RBC AUTO: 42.8 FL (ref 37–54)
EOSINOPHIL # BLD AUTO: 0.13 10*3/MM3 (ref 0–0.4)
EOSINOPHIL NFR BLD AUTO: 2.1 % (ref 0.3–6.2)
ERYTHROCYTE [DISTWIDTH] IN BLOOD BY AUTOMATED COUNT: 13.7 % (ref 12.3–15.4)
GFR SERPL CREATININE-BSD FRML MDRD: 80 ML/MIN/1.73
GLOBULIN UR ELPH-MCNC: 2.4 GM/DL
GLUCOSE SERPL-MCNC: 103 MG/DL (ref 65–99)
HCT VFR BLD AUTO: 43.4 % (ref 34–46.6)
HGB BLD-MCNC: 14.2 G/DL (ref 12–15.9)
IMM GRANULOCYTES # BLD AUTO: 0.01 10*3/MM3 (ref 0–0.05)
IMM GRANULOCYTES NFR BLD AUTO: 0.2 % (ref 0–0.5)
LYMPHOCYTES # BLD AUTO: 1.66 10*3/MM3 (ref 0.7–3.1)
LYMPHOCYTES NFR BLD AUTO: 26.9 % (ref 19.6–45.3)
MCH RBC QN AUTO: 28.2 PG (ref 26.6–33)
MCHC RBC AUTO-ENTMCNC: 32.7 G/DL (ref 31.5–35.7)
MCV RBC AUTO: 86.1 FL (ref 79–97)
MONOCYTES # BLD AUTO: 0.62 10*3/MM3 (ref 0.1–0.9)
MONOCYTES NFR BLD AUTO: 10 % (ref 5–12)
NEUTROPHILS NFR BLD AUTO: 3.7 10*3/MM3 (ref 1.7–7)
NEUTROPHILS NFR BLD AUTO: 60 % (ref 42.7–76)
NRBC BLD AUTO-RTO: 0 /100 WBC (ref 0–0.2)
PLATELET # BLD AUTO: 283 10*3/MM3 (ref 140–450)
PMV BLD AUTO: 10.4 FL (ref 6–12)
POTASSIUM SERPL-SCNC: 4.9 MMOL/L (ref 3.5–5.2)
PROT SERPL-MCNC: 6.7 G/DL (ref 6–8.5)
RBC # BLD AUTO: 5.04 10*6/MM3 (ref 3.77–5.28)
SODIUM SERPL-SCNC: 139 MMOL/L (ref 136–145)
T4 FREE SERPL-MCNC: 1.69 NG/DL (ref 0.93–1.7)
TSH SERPL DL<=0.05 MIU/L-ACNC: 2.13 UIU/ML (ref 0.27–4.2)
WBC # BLD AUTO: 6.17 10*3/MM3 (ref 3.4–10.8)

## 2021-04-16 PROCEDURE — 80053 COMPREHEN METABOLIC PANEL: CPT | Performed by: PHYSICIAN ASSISTANT

## 2021-04-16 PROCEDURE — 99214 OFFICE O/P EST MOD 30 MIN: CPT | Performed by: PHYSICIAN ASSISTANT

## 2021-04-16 PROCEDURE — 36415 COLL VENOUS BLD VENIPUNCTURE: CPT

## 2021-04-16 PROCEDURE — 84439 ASSAY OF FREE THYROXINE: CPT | Performed by: PHYSICIAN ASSISTANT

## 2021-04-16 PROCEDURE — 84443 ASSAY THYROID STIM HORMONE: CPT | Performed by: PHYSICIAN ASSISTANT

## 2021-04-16 PROCEDURE — 85025 COMPLETE CBC W/AUTO DIFF WBC: CPT | Performed by: PHYSICIAN ASSISTANT

## 2021-04-16 RX ORDER — MELOXICAM 15 MG/1
TABLET ORAL
COMMUNITY
Start: 2021-02-25 | End: 2021-08-06

## 2021-04-16 NOTE — PROGRESS NOTES
"Answers for HPI/ROS submitted by the patient on 4/16/2021  Please describe your symptoms.: Follow up  Have you had these symptoms before?: Yes  How long have you been having these symptoms?: Greater than 2 weeks  What is the primary reason for your visit?: Other    Subjective   Nallelyboyd Figueroa is a 66 y.o. female.     Chief Complaint   Patient presents with   • Hypothyroidism     2 month f/u       /84 (BP Location: Left arm, Patient Position: Sitting, Cuff Size: Adult)   Pulse 102   Temp 97.3 °F (36.3 °C) (Skin)   Resp 16   Ht 160 cm (63\")   Wt 91.7 kg (202 lb 2 oz)   SpO2 97%   BMI 35.80 kg/m²     BP Readings from Last 3 Encounters:   04/16/21 144/84   02/20/21 129/87   02/04/21 154/80       Wt Readings from Last 3 Encounters:   04/16/21 91.7 kg (202 lb 2 oz)   02/20/21 88.5 kg (195 lb)   02/04/21 90.4 kg (199 lb 6.4 oz)       HPI Patient presents to the clinic with complaints of continued fatigue. She has a history of hypothyroid and at the last visit she was found to have a tsh that was slightly elevated. We are repeating her lab today. She does feel like she is gaining weight and feels like she is eating better. She does not have diabetes. She has knee pain now that she is following with orthopedics for and has trouble with exercise.     The following portions of the patient's history were reviewed and updated as appropriate: allergies, current medications, past family history, past medical history, past social history, past surgical history and problem list.    Review of Systems    Objective   Physical Exam  Constitutional:       Appearance: She is well-developed.   HENT:      Head: Normocephalic and atraumatic.   Eyes:      Conjunctiva/sclera: Conjunctivae normal.      Pupils: Pupils are equal, round, and reactive to light.   Cardiovascular:      Rate and Rhythm: Normal rate and regular rhythm.      Heart sounds: Murmur (1/5 systolic murmur) heard.     Pulmonary:      Effort: Pulmonary effort is " normal.      Breath sounds: Normal breath sounds.   Abdominal:      General: Bowel sounds are normal.      Palpations: Abdomen is soft.      Tenderness: There is no abdominal tenderness.   Musculoskeletal:         General: No deformity. Normal range of motion.      Cervical back: Normal range of motion and neck supple.   Lymphadenopathy:      Cervical: No cervical adenopathy.   Skin:     General: Skin is warm and dry.      Capillary Refill: Capillary refill takes less than 2 seconds.      Findings: No rash.   Neurological:      Mental Status: She is alert and oriented to person, place, and time.      Cranial Nerves: No cranial nerve deficit.   Psychiatric:         Behavior: Behavior normal.         Thought Content: Thought content normal.         Judgment: Judgment normal.           Diagnoses and all orders for this visit:    1. Hypothyroidism, unspecified type (Primary)  Comments:  recheck tsh and t4 today to see if thyroid is being regulated.   Orders:  -     TSH; Future  -     T4, free; Future    2. Chronic fatigue  Comments:  check blood work and tsh and t4  Orders:  -     Comprehensive metabolic panel; Future  -     CBC w AUTO Differential; Future    3. Obesity (BMI 30-39.9)  Comments:  saxenda- she can follow up in 3 months- talked about side effects to the medication.   Orders:  -     Comprehensive metabolic panel; Future  -     CBC w AUTO Differential; Future    Other orders  -     Liraglutide (SAXENDA) 18 MG/3ML injection pen; Inject 0.6mg under skin daily for week one, THEN 1.2mg daily for week two, THEN 1.8mg daily for week three, then 2.4mg daily for week four.  Dispense: 3 pen; Refill: 0    If saxenda is not covered we do not have any other legitimate medical options. Hopefully this will be covered as weight loss will help drastically with her knee pain.     Return in about 3 months (around 7/16/2021), or if symptoms worsen or fail to improve.

## 2021-05-13 RX ORDER — AMLODIPINE BESYLATE 5 MG/1
5 TABLET ORAL DAILY
Qty: 90 TABLET | Refills: 1 | Status: SHIPPED | OUTPATIENT
Start: 2021-05-13 | End: 2021-11-04

## 2021-05-13 RX ORDER — LEVOTHYROXINE SODIUM 112 UG/1
112 TABLET ORAL DAILY
Qty: 90 TABLET | Refills: 1 | Status: SHIPPED | OUTPATIENT
Start: 2021-05-13 | End: 2021-08-10

## 2021-05-14 ENCOUNTER — APPOINTMENT (OUTPATIENT)
Dept: WOMENS IMAGING | Facility: HOSPITAL | Age: 66
End: 2021-05-14

## 2021-05-14 PROCEDURE — 77065 DX MAMMO INCL CAD UNI: CPT | Performed by: RADIOLOGY

## 2021-05-14 PROCEDURE — G0279 TOMOSYNTHESIS, MAMMO: HCPCS | Performed by: RADIOLOGY

## 2021-05-14 PROCEDURE — 76641 ULTRASOUND BREAST COMPLETE: CPT | Performed by: RADIOLOGY

## 2021-07-16 ENCOUNTER — OFFICE VISIT (OUTPATIENT)
Dept: FAMILY MEDICINE CLINIC | Facility: CLINIC | Age: 66
End: 2021-07-16

## 2021-07-16 VITALS
BODY MASS INDEX: 37.03 KG/M2 | WEIGHT: 209 LBS | DIASTOLIC BLOOD PRESSURE: 91 MMHG | OXYGEN SATURATION: 97 % | HEART RATE: 98 BPM | SYSTOLIC BLOOD PRESSURE: 157 MMHG | RESPIRATION RATE: 16 BRPM | HEIGHT: 63 IN

## 2021-07-16 DIAGNOSIS — R53.82 CHRONIC FATIGUE: Primary | ICD-10-CM

## 2021-07-16 DIAGNOSIS — R06.83 SNORING: ICD-10-CM

## 2021-07-16 DIAGNOSIS — K43.9 VENTRAL HERNIA WITHOUT OBSTRUCTION OR GANGRENE: ICD-10-CM

## 2021-07-16 DIAGNOSIS — I10 ESSENTIAL HYPERTENSION: ICD-10-CM

## 2021-07-16 PROBLEM — R63.5 WEIGHT GAIN: Status: RESOLVED | Noted: 2017-08-15 | Resolved: 2021-07-16

## 2021-07-16 PROBLEM — Z00.00 ENCOUNTER FOR GENERAL ADULT MEDICAL EXAMINATION WITHOUT ABNORMAL FINDINGS: Status: RESOLVED | Noted: 2019-12-20 | Resolved: 2021-07-16

## 2021-07-16 PROBLEM — R73.9 HYPERGLYCEMIA: Status: RESOLVED | Noted: 2017-10-12 | Resolved: 2021-07-16

## 2021-07-16 PROBLEM — R55 SYNCOPE: Status: RESOLVED | Noted: 2019-06-03 | Resolved: 2021-07-16

## 2021-07-16 PROBLEM — E66.9 OBESITY WITH BODY MASS INDEX 30 OR GREATER: Status: RESOLVED | Noted: 2018-04-30 | Resolved: 2021-07-16

## 2021-07-16 PROBLEM — E78.5 DYSLIPIDEMIA: Status: RESOLVED | Noted: 2019-09-13 | Resolved: 2021-07-16

## 2021-07-16 PROBLEM — R53.83 FATIGUE: Status: RESOLVED | Noted: 2019-06-03 | Resolved: 2021-07-16

## 2021-07-16 PROBLEM — R55 SYNCOPE: Status: RESOLVED | Noted: 2019-09-13 | Resolved: 2021-07-16

## 2021-07-16 PROBLEM — R94.39 ABNORMAL STRESS TEST: Status: RESOLVED | Noted: 2019-09-13 | Resolved: 2021-07-16

## 2021-07-16 PROBLEM — Z87.898 HISTORY OF BREAST PROBLEM: Status: RESOLVED | Noted: 2019-12-20 | Resolved: 2021-07-16

## 2021-07-16 PROBLEM — R61 DIAPHORESIS: Status: RESOLVED | Noted: 2019-09-13 | Resolved: 2021-07-16

## 2021-07-16 PROBLEM — K45.8 INTERNAL HERNIA: Status: RESOLVED | Noted: 2019-11-26 | Resolved: 2021-07-16

## 2021-07-16 PROCEDURE — 99214 OFFICE O/P EST MOD 30 MIN: CPT | Performed by: PHYSICIAN ASSISTANT

## 2021-07-16 NOTE — PROGRESS NOTES
"Answers for HPI/ROS submitted by the patient on 7/16/2021  Please describe your symptoms.: Follow up  Have you had these symptoms before?: Yes  How long have you been having these symptoms?: Greater than 2 weeks  What is the primary reason for your visit?: Other    Subjective   Nallelyboyd Figueroa is a 66 y.o. female.     Chief Complaint   Patient presents with   • Thyroid Problem     3 month f/u       /91 (BP Location: Left arm, Patient Position: Sitting, Cuff Size: Adult)   Pulse 98   Resp 16   Ht 160 cm (63\")   Wt 94.8 kg (209 lb)   SpO2 97%   BMI 37.02 kg/m²     BP Readings from Last 3 Encounters:   07/16/21 157/91   04/16/21 144/84   02/20/21 129/87       Wt Readings from Last 3 Encounters:   07/16/21 94.8 kg (209 lb)   04/16/21 91.7 kg (202 lb 2 oz)   02/20/21 88.5 kg (195 lb)       HPI Patient presents to the clinic for fatigue, hernia, snoring. She has been told recently that she has been snoring a lot and she does occasionally wake herself up at night. She did previously have asher. She was previously on Bipap along time ago. We tried to get her approved for saxenda but this was denied. Blood pressure is up today. She does not take this at home. With activity she does feel soa but denies having chest pain. Mammogram done at [facility].     The following portions of the patient's history were reviewed and updated as appropriate: allergies, current medications, past family history, past medical history, past social history, past surgical history and problem list.    Review of Systems    Objective   Physical Exam  Constitutional:       Appearance: She is well-developed. She is obese.   HENT:      Head: Normocephalic and atraumatic.   Eyes:      Conjunctiva/sclera: Conjunctivae normal.      Pupils: Pupils are equal, round, and reactive to light.   Cardiovascular:      Rate and Rhythm: Normal rate and regular rhythm.      Heart sounds: No murmur heard.     Pulmonary:      Effort: Pulmonary effort is " normal.      Breath sounds: Normal breath sounds.   Abdominal:      General: Bowel sounds are normal.      Palpations: Abdomen is soft.      Tenderness: There is no abdominal tenderness.   Musculoskeletal:         General: No deformity. Normal range of motion.      Cervical back: Normal range of motion and neck supple.   Lymphadenopathy:      Cervical: No cervical adenopathy.   Skin:     General: Skin is warm and dry.      Capillary Refill: Capillary refill takes less than 2 seconds.      Findings: No rash.   Neurological:      Mental Status: She is alert and oriented to person, place, and time.      Cranial Nerves: No cranial nerve deficit.   Psychiatric:         Behavior: Behavior normal.         Thought Content: Thought content normal.         Judgment: Judgment normal.           Diagnoses and all orders for this visit:    1. Chronic fatigue (Primary)  -     Ambulatory Referral to Sleep Medicine    2. Snoring  -     Ambulatory Referral to Sleep Medicine    3. Essential hypertension    4. Ventral hernia without obstruction or gangrene    Other orders  -     Cancel: Lipid panel; Future      Check sleep study- monitor home blood pressures. Send back to  to discuss ventral hernia.     Return in about 6 months (around 1/16/2022), or if symptoms worsen or fail to improve.

## 2021-07-19 ENCOUNTER — DOCUMENTATION (OUTPATIENT)
Dept: SURGERY | Facility: CLINIC | Age: 66
End: 2021-07-19

## 2021-07-20 ENCOUNTER — TELEPHONE (OUTPATIENT)
Dept: BARIATRICS/WEIGHT MGMT | Facility: CLINIC | Age: 66
End: 2021-07-20

## 2021-07-20 NOTE — TELEPHONE ENCOUNTER
Called to schedule her with Dr Lr. She stated when she had a visit with her GYN that she stated she thinks her hernia is back.

## 2021-07-23 ENCOUNTER — OFFICE VISIT (OUTPATIENT)
Dept: BARIATRICS/WEIGHT MGMT | Facility: CLINIC | Age: 66
End: 2021-07-23

## 2021-07-23 VITALS
TEMPERATURE: 98.2 F | HEIGHT: 63 IN | BODY MASS INDEX: 37.32 KG/M2 | WEIGHT: 210.6 LBS | DIASTOLIC BLOOD PRESSURE: 82 MMHG | RESPIRATION RATE: 16 BRPM | OXYGEN SATURATION: 99 % | HEART RATE: 95 BPM | SYSTOLIC BLOOD PRESSURE: 148 MMHG

## 2021-07-23 DIAGNOSIS — K43.9 VENTRAL HERNIA WITHOUT OBSTRUCTION OR GANGRENE: Primary | ICD-10-CM

## 2021-07-23 PROCEDURE — 99213 OFFICE O/P EST LOW 20 MIN: CPT | Performed by: SURGERY

## 2021-07-26 NOTE — PROGRESS NOTES
HISTORY AND PHYSICAL      Patient Care Team:  Amanuel Youssef PA-C as PCP - General (Physician Assistant)    Chief complaint abdominal distention possible hernia    Subjective     Patient is a 66 y.o. female presents with abdominal distention and possible hernia.  She has a history of sigmoid colectomy and placement of a loop ileostomy for diverticulitis.  She later had her loop ileostomy reversed.  She also developed a wound infection and had previously had mesh put in the midline wound which necessitated a reoperation to remove some of this mesh to relieve the infection.  This operation occurred in 2019.  After that in 2020 she reports an increase in weight regain.  She blames the Covid pandemic as well as her surgical illness and becoming less active than she was before.    She has a distant history of lap band placement and did lose a lot of weight with her LAP-BAND in the past.  Over the course of 2020 she had about a 70 pound weight regain.    He comes in today complaining of abdominal distention with the possibility of hernia.        Review of Systems   Pertinent items are noted in HPI    History  Past Medical History:   Diagnosis Date   • Anxiety maybe 25 years   • Arthritis 2 years   • Cataract 1 year    had surgery 1 year ago   • Depression probably 20 years    take medication for mood disorders   • Disease of thyroid gland    • Hypertension    • Hypothyroidism maybe 40 years    take medication   • Osteopenia maybe 5 years    Osteopenia   • Sleep apnea      Past Surgical History:   Procedure Laterality Date   • BARIATRIC SURGERY  June 2010    lap band removed 2015   • CARDIAC CATHETERIZATION N/A 9/18/2019    Procedure: Left Heart Cath;  Surgeon: Jeronimo Kuhn MD;  Location: [facility] CATH INVASIVE LOCATION;  Service: Cardiovascular   • CARDIAC CATHETERIZATION N/A 9/18/2019    Procedure: Coronary angiography;  Surgeon: Jeronimo Kuhn MD;  Location: [facility] CATH INVASIVE LOCATION;  Service:  Cardiovascular   • COLON SURGERY  12/06/2019    sigmoid colectomy with placement of loop ileostomy   • COLONOSCOPY     • COSMETIC SURGERY  2011    face lift after losing 100 lbs   • EXPLORATORY LAPAROTOMY N/A 12/13/2019    Procedure: LAPAROTOMY EXPLORATORY, DRAINAGE OF SUBCUTANEOUS AND SUBFASCIALABCESS;  Surgeon: Emily Lr MD;  Location: [facility] MAIN OR;  Service: General   • EXPLORATORY LAPAROTOMY N/A 12/2/2019    Procedure: sigmoid colectomy with placement of loop ileostomy;  Surgeon: Emily Lr MD;  Location: [facility] MAIN OR;  Service: General   • EYE SURGERY      lasix    • HERNIA REPAIR     • HYSTERECTOMY     • ILEOSTOMY CLOSURE N/A 2/6/2020    Procedure: ILEOSTOMY TAKEDOWN, WOUND DEBRIDEMENT WITH MESH REMOVAL;  Surgeon: Emily Lr MD;  Location: [facility] MAIN OR;  Service: General;  Laterality: N/A;   • SIGMOIDOSCOPY N/A 2/6/2020    Procedure: SIGMOIDOSCOPY RIGID;  Surgeon: Emily Lr MD;  Location: [facility] MAIN OR;  Service: General;  Laterality: N/A;   • SUBTOTAL HYSTERECTOMY  25 years   • TONSILLECTOMY       Family History   Problem Relation Age of Onset   • Supraventricular tachycardia Mother    • Diabetes Mother         Type 2   • Thyroid disease Mother    • Arthritis Mother    • Heart attack Father    • Early death Father         massive heart attack at 51   • Hyperlipidemia Paternal Grandmother    • Stroke Maternal Grandfather    • Alcohol abuse Maternal Uncle    • Arthritis Maternal Uncle    • Hearing loss Maternal Uncle    • Heart disease Maternal Uncle    • Hypertension Maternal Uncle    • Arthritis Brother         rheumitoid   • Cancer Brother         testicular   • Depression Brother    • Hypertension Brother    • Heart disease Maternal Uncle    • Hypertension Maternal Uncle      Social History     Tobacco Use   • Smoking status: Never Smoker   • Smokeless tobacco: Never Used   Vaping Use   • Vaping Use: Never used   Substance Use Topics   • Alcohol use: No   • Drug use: No     (Not in a  hospital admission)    Allergies:  Ace inhibitors    Objective     Vital Signs       Physical Exam:      General Appearance:    Alert, cooperative, in no acute distress   Head:    Normocephalic, without obvious abnormality, atraumatic   Eyes:            Lids and lashes normal, conjunctivae and sclerae normal, no   icterus, no pallor, corneas clear, PERRLA   Ears:    Ears appear intact with no abnormalities noted   Throat:   No oral lesions, no thrush, oral mucosa moist   Neck:   No adenopathy, supple, trachea midline, no thyromegaly, no   carotid bruit, no JVD   Back:     No kyphosis present, no scoliosis present, no skin lesions,      erythema or scars, no tenderness to percussion or                   palpation,   range of motion normal   Lungs:     Clear to auscultation,respirations regular, even and                  unlabored    Heart:    Regular rhythm and normal rate, normal S1 and S2, no            murmur, no gallop, no rub, no click   Chest Wall:    No abnormalities observed   Abdomen:    When the patient stands there is protuberance of the abdominal wall and is difficult to tell if this is truly a hernia or just the rectus diastases of the abdomen.   Rectal:     Deferred   Extremities:   Moves all extremities well, no edema, no cyanosis, no             redness   Pulses:   Pulses palpable and equal bilaterally   Skin:   No bleeding, bruising or rash   Lymph nodes:   No palpable adenopathy   Neurologic:   Cranial nerves 2 - 12 grossly intact, sensation intact, DTR       present and equal bilaterally     Lab Results (last 24 hours)     ** No results found for the last 24 hours. **          Imaging Results (Last 24 Hours)     ** No results found for the last 24 hours. **          Results Review:    I reviewed the patient's new clinical results.  I reviewed the patient's new imaging results and agree with the interpretation.    Assessment/Plan       Rectus diastases versus ventral hernia.    56-year-old lady  presents with rectus diastases versus ventral hernia.  She has an extensive surgical history.  We will plan for CT scan of the abdomen and pelvis to further evaluate.    Emily Lr MD  07/26/21  10:23 EDT

## 2021-07-30 ENCOUNTER — HOSPITAL ENCOUNTER (OUTPATIENT)
Dept: CT IMAGING | Facility: HOSPITAL | Age: 66
Discharge: HOME | End: 2021-07-30
Admitting: SURGERY

## 2021-07-30 DIAGNOSIS — K43.9 VENTRAL HERNIA WITHOUT OBSTRUCTION OR GANGRENE: ICD-10-CM

## 2021-07-30 LAB — CREAT BLDA-MCNC: 0.7 MG/DL (ref 0.6–1.3)

## 2021-07-30 PROCEDURE — 0 IOPAMIDOL PER 1 ML: Performed by: SURGERY

## 2021-07-30 PROCEDURE — 74177 CT ABD & PELVIS W/CONTRAST: CPT

## 2021-07-30 PROCEDURE — 82565 ASSAY OF CREATININE: CPT

## 2021-07-30 RX ADMIN — IOPAMIDOL 100 ML: 755 INJECTION, SOLUTION INTRAVENOUS at 15:37

## 2021-08-06 ENCOUNTER — PREP FOR SURGERY (OUTPATIENT)
Dept: OTHER | Facility: HOSPITAL | Age: 66
End: 2021-08-06

## 2021-08-06 ENCOUNTER — OFFICE VISIT (OUTPATIENT)
Dept: BARIATRICS/WEIGHT MGMT | Facility: CLINIC | Age: 66
End: 2021-08-06

## 2021-08-06 VITALS
SYSTOLIC BLOOD PRESSURE: 132 MMHG | BODY MASS INDEX: 37.17 KG/M2 | HEART RATE: 115 BPM | RESPIRATION RATE: 18 BRPM | DIASTOLIC BLOOD PRESSURE: 92 MMHG | HEIGHT: 63 IN | WEIGHT: 209.8 LBS | TEMPERATURE: 98.2 F | OXYGEN SATURATION: 99 %

## 2021-08-06 DIAGNOSIS — K43.9 VENTRAL HERNIA WITHOUT OBSTRUCTION OR GANGRENE: Primary | ICD-10-CM

## 2021-08-06 PROCEDURE — 99213 OFFICE O/P EST LOW 20 MIN: CPT | Performed by: SURGERY

## 2021-08-06 RX ORDER — CELECOXIB 200 MG/1
200 CAPSULE ORAL ONCE
Status: CANCELLED | OUTPATIENT
Start: 2021-08-06 | End: 2021-08-06

## 2021-08-06 RX ORDER — ACETAMINOPHEN 500 MG
1000 TABLET ORAL ONCE
Status: CANCELLED | OUTPATIENT
Start: 2021-08-06 | End: 2021-08-06

## 2021-08-06 RX ORDER — GABAPENTIN 300 MG/1
300 CAPSULE ORAL ONCE
Status: CANCELLED | OUTPATIENT
Start: 2021-08-06 | End: 2021-08-06

## 2021-08-10 ENCOUNTER — LAB (OUTPATIENT)
Dept: LAB | Facility: HOSPITAL | Age: 66
End: 2021-08-10

## 2021-08-10 ENCOUNTER — HOSPITAL ENCOUNTER (OUTPATIENT)
Dept: CARDIOLOGY | Facility: HOSPITAL | Age: 66
Discharge: HOME | End: 2021-08-10

## 2021-08-10 LAB
ANION GAP SERPL CALCULATED.3IONS-SCNC: 11 MMOL/L (ref 5–15)
BUN SERPL-MCNC: 8 MG/DL (ref 8–23)
BUN/CREAT SERPL: 13.8 (ref 7–25)
CALCIUM SPEC-SCNC: 9.5 MG/DL (ref 8.6–10.5)
CHLORIDE SERPL-SCNC: 104 MMOL/L (ref 98–107)
CO2 SERPL-SCNC: 25 MMOL/L (ref 22–29)
CREAT SERPL-MCNC: 0.58 MG/DL (ref 0.57–1)
DEPRECATED RDW RBC AUTO: 40.3 FL (ref 37–54)
ERYTHROCYTE [DISTWIDTH] IN BLOOD BY AUTOMATED COUNT: 13.2 % (ref 12.3–15.4)
GFR SERPL CREATININE-BSD FRML MDRD: 104 ML/MIN/1.73
GLUCOSE SERPL-MCNC: 99 MG/DL (ref 65–99)
HCT VFR BLD AUTO: 44 % (ref 34–46.6)
HGB BLD-MCNC: 13.8 G/DL (ref 12–15.9)
MCH RBC QN AUTO: 26.2 PG (ref 26.6–33)
MCHC RBC AUTO-ENTMCNC: 31.4 G/DL (ref 31.5–35.7)
MCV RBC AUTO: 83.7 FL (ref 79–97)
PLATELET # BLD AUTO: 303 10*3/MM3 (ref 140–450)
PMV BLD AUTO: 10.5 FL (ref 6–12)
POTASSIUM SERPL-SCNC: 4.5 MMOL/L (ref 3.5–5.2)
RBC # BLD AUTO: 5.26 10*6/MM3 (ref 3.77–5.28)
SARS-COV-2 ORF1AB RESP QL NAA+PROBE: NOT DETECTED
SODIUM SERPL-SCNC: 140 MMOL/L (ref 136–145)
WBC # BLD AUTO: 6.22 10*3/MM3 (ref 3.4–10.8)

## 2021-08-10 PROCEDURE — U0004 COV-19 TEST NON-CDC HGH THRU: HCPCS

## 2021-08-10 PROCEDURE — 93005 ELECTROCARDIOGRAM TRACING: CPT | Performed by: SURGERY

## 2021-08-10 PROCEDURE — 80048 BASIC METABOLIC PNL TOTAL CA: CPT

## 2021-08-10 PROCEDURE — C9803 HOPD COVID-19 SPEC COLLECT: HCPCS

## 2021-08-10 PROCEDURE — 85027 COMPLETE CBC AUTOMATED: CPT

## 2021-08-10 PROCEDURE — 93010 ELECTROCARDIOGRAM REPORT: CPT | Performed by: INTERNAL MEDICINE

## 2021-08-10 PROCEDURE — U0005 INFEC AGEN DETEC AMPLI PROBE: HCPCS

## 2021-08-10 RX ORDER — LEVOTHYROXINE SODIUM 112 UG/1
112 TABLET ORAL DAILY
Qty: 90 TABLET | Refills: 1 | Status: SHIPPED | OUTPATIENT
Start: 2021-08-10 | End: 2022-04-29

## 2021-08-11 ENCOUNTER — ANESTHESIA EVENT (OUTPATIENT)
Dept: PERIOP | Facility: HOSPITAL | Age: 66
End: 2021-08-11

## 2021-08-12 ENCOUNTER — HOSPITAL ENCOUNTER (INPATIENT)
Facility: HOSPITAL | Age: 66
LOS: 4 days | Discharge: HOME | End: 2021-08-16
Attending: SURGERY | Admitting: SURGERY

## 2021-08-12 ENCOUNTER — ANESTHESIA (OUTPATIENT)
Dept: PERIOP | Facility: HOSPITAL | Age: 66
End: 2021-08-12

## 2021-08-12 DIAGNOSIS — K43.9 VENTRAL HERNIA WITHOUT OBSTRUCTION OR GANGRENE: ICD-10-CM

## 2021-08-12 LAB
ALBUMIN SERPL-MCNC: 3.9 G/DL (ref 3.5–5.2)
ALBUMIN/GLOB SERPL: 1.6 G/DL
ALP SERPL-CCNC: 122 U/L (ref 39–117)
ALT SERPL W P-5'-P-CCNC: 17 U/L (ref 1–33)
ANION GAP SERPL CALCULATED.3IONS-SCNC: 11 MMOL/L (ref 5–15)
AST SERPL-CCNC: 22 U/L (ref 1–32)
BILIRUB SERPL-MCNC: 0.3 MG/DL (ref 0–1.2)
BUN SERPL-MCNC: 17 MG/DL (ref 8–23)
BUN/CREAT SERPL: 22.7 (ref 7–25)
CALCIUM SPEC-SCNC: 8.4 MG/DL (ref 8.6–10.5)
CHLORIDE SERPL-SCNC: 104 MMOL/L (ref 98–107)
CO2 SERPL-SCNC: 24 MMOL/L (ref 22–29)
CREAT SERPL-MCNC: 0.75 MG/DL (ref 0.57–1)
GFR SERPL CREATININE-BSD FRML MDRD: 77 ML/MIN/1.73
GLOBULIN UR ELPH-MCNC: 2.5 GM/DL
GLUCOSE SERPL-MCNC: 192 MG/DL (ref 65–99)
POTASSIUM SERPL-SCNC: 4 MMOL/L (ref 3.5–5.2)
PROT SERPL-MCNC: 6.4 G/DL (ref 6–8.5)
SODIUM SERPL-SCNC: 139 MMOL/L (ref 136–145)

## 2021-08-12 PROCEDURE — 25010000002 FENTANYL CITRATE (PF) 100 MCG/2ML SOLUTION

## 2021-08-12 PROCEDURE — 25010000002 MIDAZOLAM PER 1 MG

## 2021-08-12 PROCEDURE — 25010000003 CEFAZOLIN PER 500 MG: Performed by: SURGERY

## 2021-08-12 PROCEDURE — 0WUF0JZ SUPPLEMENT ABDOMINAL WALL WITH SYNTHETIC SUBSTITUTE, OPEN APPROACH: ICD-10-PCS | Performed by: SURGERY

## 2021-08-12 PROCEDURE — 25010000002 PROPOFOL 10 MG/ML EMULSION

## 2021-08-12 PROCEDURE — 0KNK0ZZ RELEASE RIGHT ABDOMEN MUSCLE, OPEN APPROACH: ICD-10-PCS | Performed by: SURGERY

## 2021-08-12 PROCEDURE — 0DNU0ZZ RELEASE OMENTUM, OPEN APPROACH: ICD-10-PCS | Performed by: SURGERY

## 2021-08-12 PROCEDURE — 25010000002 NEOSTIGMINE 10 MG/10ML SOLUTION: Performed by: ANESTHESIOLOGY

## 2021-08-12 PROCEDURE — 80053 COMPREHEN METABOLIC PANEL: CPT | Performed by: SURGERY

## 2021-08-12 PROCEDURE — 25010000002 HYDROMORPHONE PER 4 MG

## 2021-08-12 PROCEDURE — 49568 PR IMPLANT MESH HERNIA REPAIR/DEBRIDEMENT CLOSURE: CPT | Performed by: SURGERY

## 2021-08-12 PROCEDURE — 25010000002 DEXAMETHASONE PER 1 MG

## 2021-08-12 PROCEDURE — 49561 PR REPAIR INCISIONAL HERNIA,STRANG: CPT | Performed by: SURGERY

## 2021-08-12 PROCEDURE — C1781 MESH (IMPLANTABLE): HCPCS | Performed by: SURGERY

## 2021-08-12 PROCEDURE — 0DNE0ZZ RELEASE LARGE INTESTINE, OPEN APPROACH: ICD-10-PCS | Performed by: SURGERY

## 2021-08-12 PROCEDURE — 0KNL0ZZ RELEASE LEFT ABDOMEN MUSCLE, OPEN APPROACH: ICD-10-PCS | Performed by: SURGERY

## 2021-08-12 PROCEDURE — 0DN80ZZ RELEASE SMALL INTESTINE, OPEN APPROACH: ICD-10-PCS | Performed by: SURGERY

## 2021-08-12 PROCEDURE — 25010000002 ROPIVACAINE PER 1 MG: Performed by: ANESTHESIOLOGY

## 2021-08-12 PROCEDURE — 25010000002 ONDANSETRON PER 1 MG: Performed by: ANESTHESIOLOGY

## 2021-08-12 DEVICE — VENTRALIGHT ST MESH
Type: IMPLANTABLE DEVICE | Site: ABDOMEN | Status: FUNCTIONAL
Brand: VENTRALIGHT ST

## 2021-08-12 RX ORDER — SODIUM CHLORIDE 9 MG/ML
100 INJECTION, SOLUTION INTRAVENOUS CONTINUOUS
Status: DISCONTINUED | OUTPATIENT
Start: 2021-08-12 | End: 2021-08-13

## 2021-08-12 RX ORDER — FENTANYL CITRATE 50 UG/ML
50 INJECTION, SOLUTION INTRAMUSCULAR; INTRAVENOUS
Status: DISCONTINUED | OUTPATIENT
Start: 2021-08-12 | End: 2021-08-12 | Stop reason: HOSPADM

## 2021-08-12 RX ORDER — HYDROMORPHONE HCL 110MG/55ML
0.5 PATIENT CONTROLLED ANALGESIA SYRINGE INTRAVENOUS
Status: DISCONTINUED | OUTPATIENT
Start: 2021-08-12 | End: 2021-08-12 | Stop reason: HOSPADM

## 2021-08-12 RX ORDER — NALOXONE HCL 0.4 MG/ML
0.4 VIAL (ML) INJECTION
Status: DISCONTINUED | OUTPATIENT
Start: 2021-08-12 | End: 2021-08-16 | Stop reason: HOSPADM

## 2021-08-12 RX ORDER — HYDROMORPHONE HCL 110MG/55ML
1 PATIENT CONTROLLED ANALGESIA SYRINGE INTRAVENOUS
Status: DISCONTINUED | OUTPATIENT
Start: 2021-08-12 | End: 2021-08-12 | Stop reason: HOSPADM

## 2021-08-12 RX ORDER — HYDROMORPHONE HCL 110MG/55ML
0.25 PATIENT CONTROLLED ANALGESIA SYRINGE INTRAVENOUS
Status: DISCONTINUED | OUTPATIENT
Start: 2021-08-12 | End: 2021-08-12 | Stop reason: HOSPADM

## 2021-08-12 RX ORDER — ACETAMINOPHEN 325 MG/1
650 TABLET ORAL EVERY 4 HOURS PRN
Status: DISCONTINUED | OUTPATIENT
Start: 2021-08-12 | End: 2021-08-16 | Stop reason: HOSPADM

## 2021-08-12 RX ORDER — FENTANYL CITRATE 50 UG/ML
25 INJECTION, SOLUTION INTRAMUSCULAR; INTRAVENOUS
Status: DISCONTINUED | OUTPATIENT
Start: 2021-08-12 | End: 2021-08-12 | Stop reason: HOSPADM

## 2021-08-12 RX ORDER — PHENYLEPHRINE HCL IN 0.9% NACL 1 MG/10 ML
SYRINGE (ML) INTRAVENOUS AS NEEDED
Status: DISCONTINUED | OUTPATIENT
Start: 2021-08-12 | End: 2021-08-12 | Stop reason: SURG

## 2021-08-12 RX ORDER — POLYETHYLENE GLYCOL 3350 17 G/17G
17 POWDER, FOR SOLUTION ORAL DAILY
Status: DISCONTINUED | OUTPATIENT
Start: 2021-08-13 | End: 2021-08-16 | Stop reason: HOSPADM

## 2021-08-12 RX ORDER — NALOXONE HCL 0.4 MG/ML
0.1 VIAL (ML) INJECTION
Status: DISCONTINUED | OUTPATIENT
Start: 2021-08-12 | End: 2021-08-16 | Stop reason: HOSPADM

## 2021-08-12 RX ORDER — NALOXONE HCL 0.4 MG/ML
0.4 VIAL (ML) INJECTION AS NEEDED
Status: DISCONTINUED | OUTPATIENT
Start: 2021-08-12 | End: 2021-08-12 | Stop reason: HOSPADM

## 2021-08-12 RX ORDER — OXYCODONE HYDROCHLORIDE 5 MG/1
7.5 TABLET ORAL ONCE AS NEEDED
Status: DISCONTINUED | OUTPATIENT
Start: 2021-08-12 | End: 2021-08-12 | Stop reason: HOSPADM

## 2021-08-12 RX ORDER — OXYCODONE HYDROCHLORIDE 5 MG/1
10 TABLET ORAL EVERY 4 HOURS PRN
Status: DISCONTINUED | OUTPATIENT
Start: 2021-08-12 | End: 2021-08-16 | Stop reason: HOSPADM

## 2021-08-12 RX ORDER — ONDANSETRON 2 MG/ML
INJECTION INTRAMUSCULAR; INTRAVENOUS AS NEEDED
Status: DISCONTINUED | OUTPATIENT
Start: 2021-08-12 | End: 2021-08-12 | Stop reason: SURG

## 2021-08-12 RX ORDER — AMLODIPINE BESYLATE 5 MG/1
5 TABLET ORAL DAILY
Status: DISCONTINUED | OUTPATIENT
Start: 2021-08-13 | End: 2021-08-16 | Stop reason: HOSPADM

## 2021-08-12 RX ORDER — SODIUM CHLORIDE 0.9 % (FLUSH) 0.9 %
10 SYRINGE (ML) INJECTION AS NEEDED
Status: DISCONTINUED | OUTPATIENT
Start: 2021-08-12 | End: 2021-08-12 | Stop reason: HOSPADM

## 2021-08-12 RX ORDER — ONDANSETRON 2 MG/ML
4 INJECTION INTRAMUSCULAR; INTRAVENOUS EVERY 6 HOURS PRN
Status: DISCONTINUED | OUTPATIENT
Start: 2021-08-12 | End: 2021-08-16 | Stop reason: HOSPADM

## 2021-08-12 RX ORDER — LIDOCAINE HYDROCHLORIDE 10 MG/ML
INJECTION, SOLUTION EPIDURAL; INFILTRATION; INTRACAUDAL; PERINEURAL AS NEEDED
Status: DISCONTINUED | OUTPATIENT
Start: 2021-08-12 | End: 2021-08-12 | Stop reason: SURG

## 2021-08-12 RX ORDER — ACETAMINOPHEN 500 MG
1000 TABLET ORAL ONCE
Status: COMPLETED | OUTPATIENT
Start: 2021-08-12 | End: 2021-08-12

## 2021-08-12 RX ORDER — ONDANSETRON 4 MG/1
4 TABLET, FILM COATED ORAL EVERY 6 HOURS PRN
Status: DISCONTINUED | OUTPATIENT
Start: 2021-08-12 | End: 2021-08-16 | Stop reason: HOSPADM

## 2021-08-12 RX ORDER — PROMETHAZINE HYDROCHLORIDE 12.5 MG/1
12.5 SUPPOSITORY RECTAL EVERY 6 HOURS PRN
Status: DISCONTINUED | OUTPATIENT
Start: 2021-08-12 | End: 2021-08-16 | Stop reason: HOSPADM

## 2021-08-12 RX ORDER — ALBUTEROL SULFATE 2.5 MG/3ML
2.5 SOLUTION RESPIRATORY (INHALATION) ONCE AS NEEDED
Status: DISCONTINUED | OUTPATIENT
Start: 2021-08-12 | End: 2021-08-12 | Stop reason: HOSPADM

## 2021-08-12 RX ORDER — HYDROMORPHONE HCL 110MG/55ML
1 PATIENT CONTROLLED ANALGESIA SYRINGE INTRAVENOUS EVERY 4 HOURS PRN
Status: DISCONTINUED | OUTPATIENT
Start: 2021-08-12 | End: 2021-08-16 | Stop reason: HOSPADM

## 2021-08-12 RX ORDER — PROMETHAZINE HYDROCHLORIDE 12.5 MG/1
12.5 TABLET ORAL EVERY 6 HOURS PRN
Status: DISCONTINUED | OUTPATIENT
Start: 2021-08-12 | End: 2021-08-16 | Stop reason: HOSPADM

## 2021-08-12 RX ORDER — DEXAMETHASONE SODIUM PHOSPHATE 4 MG/ML
INJECTION, SOLUTION INTRA-ARTICULAR; INTRALESIONAL; INTRAMUSCULAR; INTRAVENOUS; SOFT TISSUE
Status: COMPLETED | OUTPATIENT
Start: 2021-08-12 | End: 2021-08-12

## 2021-08-12 RX ORDER — LEVOTHYROXINE SODIUM 112 UG/1
112 TABLET ORAL DAILY
Status: DISCONTINUED | OUTPATIENT
Start: 2021-08-13 | End: 2021-08-16 | Stop reason: HOSPADM

## 2021-08-12 RX ORDER — MIDAZOLAM HYDROCHLORIDE 1 MG/ML
INJECTION INTRAMUSCULAR; INTRAVENOUS AS NEEDED
Status: DISCONTINUED | OUTPATIENT
Start: 2021-08-12 | End: 2021-08-12 | Stop reason: SURG

## 2021-08-12 RX ORDER — VENLAFAXINE HYDROCHLORIDE 75 MG/1
75 CAPSULE, EXTENDED RELEASE ORAL DAILY
Status: DISCONTINUED | OUTPATIENT
Start: 2021-08-13 | End: 2021-08-16 | Stop reason: HOSPADM

## 2021-08-12 RX ORDER — MORPHINE SULFATE 4 MG/ML
4 INJECTION, SOLUTION INTRAMUSCULAR; INTRAVENOUS
Status: DISCONTINUED | OUTPATIENT
Start: 2021-08-12 | End: 2021-08-16 | Stop reason: HOSPADM

## 2021-08-12 RX ORDER — ROCURONIUM BROMIDE 10 MG/ML
INJECTION, SOLUTION INTRAVENOUS AS NEEDED
Status: DISCONTINUED | OUTPATIENT
Start: 2021-08-12 | End: 2021-08-12 | Stop reason: SURG

## 2021-08-12 RX ORDER — NEOSTIGMINE METHYLSULFATE 1 MG/ML
INJECTION, SOLUTION INTRAVENOUS AS NEEDED
Status: DISCONTINUED | OUTPATIENT
Start: 2021-08-12 | End: 2021-08-12 | Stop reason: SURG

## 2021-08-12 RX ORDER — ACETAMINOPHEN 650 MG/1
650 SUPPOSITORY RECTAL EVERY 4 HOURS PRN
Status: DISCONTINUED | OUTPATIENT
Start: 2021-08-12 | End: 2021-08-16 | Stop reason: HOSPADM

## 2021-08-12 RX ORDER — HYDROCODONE BITARTRATE AND ACETAMINOPHEN 5; 325 MG/1; MG/1
1 TABLET ORAL EVERY 4 HOURS PRN
Status: DISCONTINUED | OUTPATIENT
Start: 2021-08-12 | End: 2021-08-16 | Stop reason: HOSPADM

## 2021-08-12 RX ORDER — ROPIVACAINE HYDROCHLORIDE 5 MG/ML
INJECTION, SOLUTION EPIDURAL; INFILTRATION; PERINEURAL
Status: COMPLETED | OUTPATIENT
Start: 2021-08-12 | End: 2021-08-12

## 2021-08-12 RX ORDER — SACCHAROMYCES BOULARDII 250 MG
250 CAPSULE ORAL DAILY
Status: DISCONTINUED | OUTPATIENT
Start: 2021-08-13 | End: 2021-08-16 | Stop reason: HOSPADM

## 2021-08-12 RX ORDER — ONDANSETRON 2 MG/ML
4 INJECTION INTRAMUSCULAR; INTRAVENOUS ONCE AS NEEDED
Status: DISCONTINUED | OUTPATIENT
Start: 2021-08-12 | End: 2021-08-12 | Stop reason: HOSPADM

## 2021-08-12 RX ORDER — LABETALOL HYDROCHLORIDE 5 MG/ML
5 INJECTION, SOLUTION INTRAVENOUS
Status: DISCONTINUED | OUTPATIENT
Start: 2021-08-12 | End: 2021-08-12 | Stop reason: HOSPADM

## 2021-08-12 RX ORDER — FENTANYL CITRATE 50 UG/ML
INJECTION, SOLUTION INTRAMUSCULAR; INTRAVENOUS AS NEEDED
Status: DISCONTINUED | OUTPATIENT
Start: 2021-08-12 | End: 2021-08-12 | Stop reason: SURG

## 2021-08-12 RX ORDER — GABAPENTIN 300 MG/1
300 CAPSULE ORAL ONCE
Status: COMPLETED | OUTPATIENT
Start: 2021-08-12 | End: 2021-08-12

## 2021-08-12 RX ORDER — PROPOFOL 10 MG/ML
VIAL (ML) INTRAVENOUS AS NEEDED
Status: DISCONTINUED | OUTPATIENT
Start: 2021-08-12 | End: 2021-08-12 | Stop reason: SURG

## 2021-08-12 RX ORDER — DEXAMETHASONE SODIUM PHOSPHATE 4 MG/ML
INJECTION, SOLUTION INTRA-ARTICULAR; INTRALESIONAL; INTRAMUSCULAR; INTRAVENOUS; SOFT TISSUE AS NEEDED
Status: DISCONTINUED | OUTPATIENT
Start: 2021-08-12 | End: 2021-08-12 | Stop reason: SURG

## 2021-08-12 RX ORDER — METOCLOPRAMIDE HYDROCHLORIDE 5 MG/ML
10 INJECTION INTRAMUSCULAR; INTRAVENOUS EVERY 6 HOURS PRN
Status: DISCONTINUED | OUTPATIENT
Start: 2021-08-12 | End: 2021-08-13

## 2021-08-12 RX ORDER — AMOXICILLIN 250 MG
2 CAPSULE ORAL 2 TIMES DAILY
Status: DISCONTINUED | OUTPATIENT
Start: 2021-08-12 | End: 2021-08-16 | Stop reason: HOSPADM

## 2021-08-12 RX ORDER — CELECOXIB 200 MG/1
200 CAPSULE ORAL ONCE
Status: COMPLETED | OUTPATIENT
Start: 2021-08-12 | End: 2021-08-12

## 2021-08-12 RX ORDER — OXYCODONE HYDROCHLORIDE 5 MG/1
10 TABLET ORAL EVERY 4 HOURS PRN
Status: DISCONTINUED | OUTPATIENT
Start: 2021-08-12 | End: 2021-08-12 | Stop reason: HOSPADM

## 2021-08-12 RX ORDER — HYDROMORPHONE HCL 110MG/55ML
PATIENT CONTROLLED ANALGESIA SYRINGE INTRAVENOUS AS NEEDED
Status: DISCONTINUED | OUTPATIENT
Start: 2021-08-12 | End: 2021-08-12 | Stop reason: SURG

## 2021-08-12 RX ORDER — SODIUM CHLORIDE 0.9 % (FLUSH) 0.9 %
10 SYRINGE (ML) INJECTION EVERY 12 HOURS SCHEDULED
Status: DISCONTINUED | OUTPATIENT
Start: 2021-08-12 | End: 2021-08-12 | Stop reason: HOSPADM

## 2021-08-12 RX ORDER — SODIUM CHLORIDE, SODIUM LACTATE, POTASSIUM CHLORIDE, CALCIUM CHLORIDE 600; 310; 30; 20 MG/100ML; MG/100ML; MG/100ML; MG/100ML
9 INJECTION, SOLUTION INTRAVENOUS CONTINUOUS PRN
Status: DISCONTINUED | OUTPATIENT
Start: 2021-08-12 | End: 2021-08-16 | Stop reason: HOSPADM

## 2021-08-12 RX ADMIN — PROPOFOL 180 MG: 10 INJECTION, EMULSION INTRAVENOUS at 14:19

## 2021-08-12 RX ADMIN — SODIUM CHLORIDE, POTASSIUM CHLORIDE, SODIUM LACTATE AND CALCIUM CHLORIDE: 600; 310; 30; 20 INJECTION, SOLUTION INTRAVENOUS at 15:43

## 2021-08-12 RX ADMIN — Medication 100 MCG: at 15:36

## 2021-08-12 RX ADMIN — LABETALOL 20 MG/4 ML (5 MG/ML) INTRAVENOUS SYRINGE 5 MG: at 16:58

## 2021-08-12 RX ADMIN — ONDANSETRON 4 MG: 2 INJECTION INTRAMUSCULAR; INTRAVENOUS at 16:23

## 2021-08-12 RX ADMIN — DEXAMETHASONE SODIUM PHOSPHATE 4 MG: 4 INJECTION, SOLUTION INTRA-ARTICULAR; INTRALESIONAL; INTRAMUSCULAR; INTRAVENOUS; SOFT TISSUE at 14:41

## 2021-08-12 RX ADMIN — GABAPENTIN 300 MG: 300 CAPSULE ORAL at 12:38

## 2021-08-12 RX ADMIN — ROCURONIUM BROMIDE 10 MG: 10 INJECTION INTRAVENOUS at 15:25

## 2021-08-12 RX ADMIN — HYDROMORPHONE HYDROCHLORIDE 1 MG: 2 INJECTION INTRAMUSCULAR; INTRAVENOUS; SUBCUTANEOUS at 16:13

## 2021-08-12 RX ADMIN — ROCURONIUM BROMIDE 10 MG: 10 INJECTION INTRAVENOUS at 16:02

## 2021-08-12 RX ADMIN — Medication 100 MCG: at 14:36

## 2021-08-12 RX ADMIN — CELECOXIB 200 MG: 200 CAPSULE ORAL at 12:38

## 2021-08-12 RX ADMIN — MIDAZOLAM 2 MG: 1 INJECTION INTRAMUSCULAR; INTRAVENOUS at 14:15

## 2021-08-12 RX ADMIN — FENTANYL CITRATE 100 MCG: 50 INJECTION, SOLUTION INTRAMUSCULAR; INTRAVENOUS at 14:17

## 2021-08-12 RX ADMIN — GLYCOPYRROLATE 0.6 MCG: 0.2 INJECTION, SOLUTION INTRAMUSCULAR; INTRAVITREAL at 16:31

## 2021-08-12 RX ADMIN — LIDOCAINE HYDROCHLORIDE 100 MG: 10 INJECTION, SOLUTION EPIDURAL; INFILTRATION; INTRACAUDAL; PERINEURAL at 14:19

## 2021-08-12 RX ADMIN — SODIUM CHLORIDE, POTASSIUM CHLORIDE, SODIUM LACTATE AND CALCIUM CHLORIDE 9 ML/HR: 600; 310; 30; 20 INJECTION, SOLUTION INTRAVENOUS at 12:38

## 2021-08-12 RX ADMIN — ACETAMINOPHEN 1000 MG: 500 TABLET, FILM COATED ORAL at 12:38

## 2021-08-12 RX ADMIN — HYDROMORPHONE HYDROCHLORIDE 1 MG: 2 INJECTION INTRAMUSCULAR; INTRAVENOUS; SUBCUTANEOUS at 14:52

## 2021-08-12 RX ADMIN — ROPIVACAINE HYDROCHLORIDE 30 ML: 5 INJECTION EPIDURAL; INFILTRATION; PERINEURAL at 14:41

## 2021-08-12 RX ADMIN — CEFAZOLIN 1 G: 1 INJECTION, POWDER, FOR SOLUTION INTRAMUSCULAR; INTRAVENOUS at 21:08

## 2021-08-12 RX ADMIN — NEOSTIGMINE METHYLSULFATE 5 MG: 1 INJECTION, SOLUTION INTRAVENOUS at 16:31

## 2021-08-12 RX ADMIN — Medication 100 MCG: at 15:02

## 2021-08-12 RX ADMIN — ROCURONIUM BROMIDE 50 MG: 10 INJECTION INTRAVENOUS at 14:19

## 2021-08-12 RX ADMIN — CEFAZOLIN SODIUM 2 G: 1 INJECTION, POWDER, FOR SOLUTION INTRAMUSCULAR; INTRAVENOUS at 14:29

## 2021-08-12 RX ADMIN — DEXAMETHASONE SODIUM PHOSPHATE 4 MG: 4 INJECTION, SOLUTION INTRAMUSCULAR; INTRAVENOUS at 14:42

## 2021-08-12 RX ADMIN — DOCUSATE SODIUM 50 MG AND SENNOSIDES 8.6 MG 2 TABLET: 8.6; 5 TABLET, FILM COATED ORAL at 21:07

## 2021-08-12 RX ADMIN — Medication 100 MCG: at 15:06

## 2021-08-12 NOTE — ANESTHESIA PROCEDURE NOTES
Airway  Urgency: elective    Date/Time: 8/12/2021 2:23 PM  Airway not difficult    General Information and Staff    Patient location during procedure: OR  Anesthesiologist: Garfield Tsai DO  CRNA: Kathleen Bingham AA    Indications and Patient Condition  Indications for airway management: airway protection    Preoxygenated: yes  Mask difficulty assessment: 2 - vent by mask + OA or adjuvant +/- NMBA    Final Airway Details  Final airway type: endotracheal airway      Successful airway: ETT  Cuffed: yes   Successful intubation technique: direct laryngoscopy  Facilitating devices/methods: intubating stylet  Endotracheal tube insertion site: oral  Blade: Kim  Blade size: 3  ETT size (mm): 6.5  Cormack-Lehane Classification: grade I - full view of glottis  Placement verified by: capnometry   Measured from: teeth  ETT/EBT  to teeth (cm): 22  Number of attempts at approach: 1  Assessment: lips, teeth, and gum same as pre-op and atraumatic intubation    Additional Comments  Intubation performed by ELLA Rodriguez.

## 2021-08-12 NOTE — ANESTHESIA POSTPROCEDURE EVALUATION
Patient: Nallely Figueroa    Procedure Summary     Date: 08/12/21 Room / Location: [facility] OR 08 / [facility] MAIN OR    Anesthesia Start: 1415 Anesthesia Stop: 1655    Procedure: VENTRAL HERNIA WITH COMPONENT SEPARATION with mesh (N/A Abdomen) Diagnosis:       Ventral hernia without obstruction or gangrene      (Ventral hernia without obstruction or gangrene [K43.9])    Surgeons: Emily Lr MD Provider: Avila Shankar MD    Anesthesia Type: general ASA Status: 3          Anesthesia Type: general    Vitals  Vitals Value Taken Time   /80 08/12/21 1745   Temp 97.5 °F (36.4 °C) 08/12/21 1743   Pulse 92 08/12/21 1747   Resp 9 08/12/21 1743   SpO2 92 % 08/12/21 1747   Vitals shown include unvalidated device data.        Post Anesthesia Care and Evaluation    Patient location during evaluation: PACU  Patient participation: complete - patient participated  Level of consciousness: awake  Pain scale: See nurse's notes for pain score.  Pain management: adequate  Airway patency: patent  Anesthetic complications: No anesthetic complications  PONV Status: none  Cardiovascular status: acceptable  Respiratory status: acceptable  Hydration status: acceptable    Comments: Patient seen and examined postoperatively; vital signs stable; SpO2 greater than or equal to 90%; cardiopulmonary status stable; nausea/vomiting adequately controlled; pain adequately controlled; no apparent anesthesia complications; patient discharged from anesthesia care when discharge criteria were met

## 2021-08-12 NOTE — ANESTHESIA PREPROCEDURE EVALUATION
Anesthesia Evaluation     Patient summary reviewed and Nursing notes reviewed   no history of anesthetic complications:  NPO Solid Status: > 8 hours  NPO Liquid Status: > 8 hours           Airway   Dental      Pulmonary    (+) sleep apnea,   Cardiovascular     ECG reviewed  PT is on anticoagulation therapy    (+) hypertension, hyperlipidemia,       Neuro/Psych  (+) numbness, psychiatric history Anxiety and Depression,     GI/Hepatic/Renal/Endo    (+) obesity,   thyroid problem hypothyroidism    Musculoskeletal     (+) arthralgias,   Abdominal    Substance History      OB/GYN          Other   arthritis,      ROS/Med Hx Other: Ventral hernia, osteopenia, CTS, h/o SBO, low vit D, wrist pain    Echocardiogram Findings    Left Ventricle Left ventricular systolic function is normal. Calculated EF = 69.0%. Estimated EF was in agreement with the calculated EF. Normal left ventricular cavity size noted.  Right Ventricle Normal right ventricular cavity size noted.  Left Atrium Normal left atrial size noted.  Right Atrium Normal right atrial size noted.  Aortic Valve The aortic valve is abnormal in structure. The valve exhibits sclerosis.  Mitral Valve Mild mitral valve regurgitation is present.  Tricuspid Valve The tricuspid valve is normal.  Mild tricuspid valve regurgitation is present.  Pulmonic Valve The pulmonic valve is not well visualized.  Greater Vessels No dilation of the aortic root is present.  Pericardium There is no evidence of pericardial effusion.    Stress  · Findings consistent with a normal ECG stress test.  · Findings consistent with a normal ECG stress test.  · Left ventricular ejection fraction is normal (Calculated EF = 64%).  · Myocardial perfusion imaging indicates a medium-sized, moderately severe area of ischemia located in the anterior wall and apex.  · Impressions are consistent with an intermediate risk study.    Cath  FINDINGS:     1. HEMODYNAMICS:  LVEDP 12, /70.     2. LEFT  VENTRICULOGRAPHY: EF 60%, no mitral regurgitation.     3. CORONARY ANGIOGRAPHY:      Left main: Normal     LAD: Mid 30 to 40% stenosis after the diagonal bifurcation  Mid to distal long segment  40% stenosis with some intramyocardial bridging     Left circumflex artery: Normal     RCA: Normal     SUMMARY:   Nonobstructive coronary artery disease  LAD with moderate disease with intramyocardial bridging in the mid segment  Normal LV systolic function  Normal wall motion  Normal left side filling pressures     RECOMMENDATIONS:   Aggressive risk factor modification  Medical management for obstructive coronary artery disease    PSH  HERNIA REPAIR TONSILLECTOMY  CARDIAC CATHETERIZATION CARDIAC CATHETERIZATION  COLON SURGERY EXPLORATORY LAPAROTOMY  EXPLORATORY LAPAROTOMY COLONOSCOPY  EYE SURGERY ILEOSTOMY CLOSURE  SIGMOIDOSCOPY BARIATRIC SURGERY  COSMETIC SURGERY HYSTERECTOMY  SUBTOTAL HYSTERECTOMY                   Anesthesia Plan    ASA 3     general with block   (Patient identified; pre-operative vital signs, all relevant labs/studies, complete medical/surgical/anesthetic history, full medication list, full allergy list, and NPO status obtained/reviewed; physical assessment performed; anesthetic options, side effects, potential complications, risks, and benefits discussed; questions answered; written anesthesia consent obtained; patient cleared for procedure; anesthesia machine and equipment checked and functioning)  intravenous induction     Anesthetic plan, all risks, benefits, and alternatives have been provided, discussed and informed consent has been obtained with: patient.    Plan discussed with CRNA and CAA.

## 2021-08-12 NOTE — OP NOTE
VENTRAL/INCISIONAL HERNIA REPAIR  Procedure Report    Patient Name:  Nallely Figueroa  YOB: 1955    Date of Surgery:  8/12/2021     Indications: There is a 66-year-old lady with a history of multiple ventral hernia repairs in the past and also complex intestinal operations who presented with a large midline ventral hernia and was also known to have a smaller fat-containing hernia of the epigastrium at the midline.    Pre-op Diagnosis:   Ventral hernia without obstruction or gangrene [K43.9]       Post-Op Diagnosis Codes:     * Ventral hernia without obstruction or gangrene [K43.9], incarcerated    Procedure/CPT® Codes:      Procedure(s):  Repair of incarcerated VENTRAL HERNIA WITH BILATERAL ANTERIOR COMPONENT SEPARATION with mesh, extensive lysis of adhesions.    Staff:  Surgeon(s):  Emily Lr MD    Assistant: Sheila Blount RNFA  Assistant: Sheila Blount RNFA was responsible for performing the following activities: Retraction, Suction, Irrigation, Suturing, Closing, and Placing Dressing and their skilled assistance was necessary for the success of this case.   Anesthesia: General    Estimated Blood Loss: minimal    Implants:    Implant Name Type Inv. Item Serial No.  Lot No. LRB No. Used Action   MESH VENTRALIGHT ST ELLIPSE 8X10IN - [id number] Implant MESH VENTRALIGHT ST ELLIPSE 8X10IN  [employer]  ([employer]) [id number] N/A 1 Implanted       Specimen:          None        Findings: Large midline hernia defect about 9 cm in diameter and also a smaller ventral hernia defect at the midline more superiorly at the epigastrium that was about 3 cm in diameter..  There is also a small 2 cm ventral hernia defect of the right mid abdomen that was repaired.    Complications: None    Description of Procedure: General endotracheal anesthesia was provided.  Alexander catheter was placed.  The abdomen is prepped and draped in a sterile fashion.  A midline incision was made and incised through  the subcutaneous fat and encountered the hernia sac.  This was excised.  There was no injury to the bowel except for 1 small cautery injury to a portion of the small bowel that was antimesenteric.  It was about a 2 to 3 mm cautery injury of the serosa and I oversewed with 3-0 silk sutures as a precaution.    I did an extensive lysis of adhesions to take down colon and small intestine and omentum down from the ventral hernia defect.  This took greater than 1 hour.  I did identify the more superior epigastric ventral hernia defect and was able to see this with retraction of the superior aspect of the midline ventral hernia.  I closed this ventral hernia defect primarily with a #1 looped PDS.  During the course of the dissection I found a smaller 2 cm ventral hernia defect of the right mid abdomen and also close this in the same fashion.    The midline ventral hernia defect was under too much tension to bring the edges of the fascia together.  A component separation was indicated.  I raised the subcutaneous fat off the abdominal wall bilaterally with cautery.  Hemostasis was achieved with cautery.  I then made an incision with cautery of the external oblique near the lateral edge of the rectus abdominis and this exposed the underlying internal oblique and I did this bilaterally for component separation.  After this was conducted there was better region the midline fascia was able to be brought together with little tension.  I then obtained a 20 x 25 cm ventral light ST mesh and placed it intra-abdominally and secured it circumferentially with interrupted 0 Ethibond sutures.  After this was conducted I then irrigated the subcutaneous spaces bilaterally and the area of the mesh with irrisept.  I then placed bilateral 19 Felice drains and secured into the skin on the outside with 2-0 silk sutures and these drains were in the subcutaneous spaces bilaterally.  I then closed the midline fascia with a running #1 PDS.  I then  approximated some of the subcutaneous fat with interrupted 2-0 Vicryl sutures and then I closed the skin with radha.    Emily Lr MD     Date: 8/12/2021  Time: 16:41 EDT

## 2021-08-12 NOTE — ANESTHESIA PROCEDURE NOTES
Peripheral Block    Pre-sedation assessment completed: 8/12/2021 2:30 PM    Patient location during procedure: OR  Start time: 8/12/2021 2:30 PM  Stop time: 8/12/2021 2:43 PM  Reason for block: at surgeon's request, post-op pain management and secondary anesthetic  Performed by  Anesthesiologist: Garfield Tsai DO  Preanesthetic Checklist  Completed: patient identified, IV checked, site marked, risks and benefits discussed, surgical consent, monitors and equipment checked, pre-op evaluation and timeout performed  Prep:  Pt Position: supine  Sterile barriers:cap, gloves, mask and washed/disinfected hands  Prep: ChloraPrep  Patient monitoring: blood pressure monitoring, continuous pulse oximetry and EKG  Procedure  Sedation:no  Performed under: general  Guidance:ultrasound guided  ULTRASOUND INTERPRETATION. Using ultrasound guidance a 20 G gauge needle was placed in close proximity to the nerve, at which point, under ultrasound guidance anesthetic was injected in the area of the nerve and spread of the anesthesia was seen on ultrasound in close proximity thereto.  There were no abnormalities seen on ultrasound; a digital image was taken; and the patient tolerated the procedure with no complications. Images:still images obtained, printed/placed on chart    Laterality:Bilateral  Block Type:TAP  Injection Technique:single-shot  Needle Type:echogenic  Needle Gauge:20 G  Resistance on Injection: less than 15 psi    Medications Used: dexamethasone (DECADRON) injection, 4 mg  ropivacaine (NAROPIN) 0.5 % injection, 30 mL  Med admintered at 8/12/2021 2:41 PM      Medications  Preservative Free Saline:30ml  Comment:Needle tip visualized throughout the block 30 ml's of solution was injected on each side    Post Assessment  Injection Assessment: negative aspiration for heme, no paresthesia on injection and incremental injection  Patient Tolerance:comfortable throughout block  Complications:no

## 2021-08-13 LAB
BASOPHILS # BLD AUTO: 0 10*3/MM3 (ref 0–0.2)
BASOPHILS NFR BLD AUTO: 0 % (ref 0–1.5)
DEPRECATED RDW RBC AUTO: 40.3 FL (ref 37–54)
EOSINOPHIL # BLD AUTO: 0 10*3/MM3 (ref 0–0.4)
EOSINOPHIL NFR BLD AUTO: 0 % (ref 0.3–6.2)
ERYTHROCYTE [DISTWIDTH] IN BLOOD BY AUTOMATED COUNT: 14 % (ref 12.3–15.4)
HCT VFR BLD AUTO: 37.7 % (ref 34–46.6)
HGB BLD-MCNC: 12.2 G/DL (ref 12–15.9)
HOLD SPECIMEN: NORMAL
LYMPHOCYTES # BLD AUTO: 0.5 10*3/MM3 (ref 0.7–3.1)
LYMPHOCYTES NFR BLD AUTO: 3.6 % (ref 19.6–45.3)
MCH RBC QN AUTO: 26.3 PG (ref 26.6–33)
MCHC RBC AUTO-ENTMCNC: 32.4 G/DL (ref 31.5–35.7)
MCV RBC AUTO: 81.3 FL (ref 79–97)
MONOCYTES # BLD AUTO: 0.8 10*3/MM3 (ref 0.1–0.9)
MONOCYTES NFR BLD AUTO: 6.5 % (ref 5–12)
NEUTROPHILS NFR BLD AUTO: 11.7 10*3/MM3 (ref 1.7–7)
NEUTROPHILS NFR BLD AUTO: 89.9 % (ref 42.7–76)
NRBC BLD AUTO-RTO: 0 /100 WBC (ref 0–0.2)
PLATELET # BLD AUTO: 260 10*3/MM3 (ref 140–450)
PMV BLD AUTO: 8.3 FL (ref 6–12)
RBC # BLD AUTO: 4.64 10*6/MM3 (ref 3.77–5.28)
WBC # BLD AUTO: 13 10*3/MM3 (ref 3.4–10.8)

## 2021-08-13 PROCEDURE — 25010000002 ENOXAPARIN PER 10 MG: Performed by: NURSE PRACTITIONER

## 2021-08-13 PROCEDURE — 99024 POSTOP FOLLOW-UP VISIT: CPT | Performed by: NURSE PRACTITIONER

## 2021-08-13 PROCEDURE — 85025 COMPLETE CBC W/AUTO DIFF WBC: CPT | Performed by: SURGERY

## 2021-08-13 PROCEDURE — 25010000003 CEFAZOLIN PER 500 MG: Performed by: SURGERY

## 2021-08-13 RX ORDER — METOCLOPRAMIDE 10 MG/1
10 TABLET ORAL EVERY 6 HOURS PRN
Status: DISCONTINUED | OUTPATIENT
Start: 2021-08-13 | End: 2021-08-16 | Stop reason: HOSPADM

## 2021-08-13 RX ORDER — METOCLOPRAMIDE HYDROCHLORIDE 5 MG/ML
10 INJECTION INTRAMUSCULAR; INTRAVENOUS EVERY 6 HOURS PRN
Status: DISCONTINUED | OUTPATIENT
Start: 2021-08-13 | End: 2021-08-13

## 2021-08-13 RX ORDER — MELOXICAM 15 MG/1
15 TABLET ORAL DAILY
Status: DISCONTINUED | OUTPATIENT
Start: 2021-08-13 | End: 2021-08-16 | Stop reason: HOSPADM

## 2021-08-13 RX ADMIN — OXYCODONE 10 MG: 5 TABLET ORAL at 08:33

## 2021-08-13 RX ADMIN — CEFAZOLIN 1 G: 1 INJECTION, POWDER, FOR SOLUTION INTRAMUSCULAR; INTRAVENOUS at 03:58

## 2021-08-13 RX ADMIN — Medication 250 MG: at 08:33

## 2021-08-13 RX ADMIN — ACETAMINOPHEN 650 MG: 325 TABLET, FILM COATED ORAL at 08:37

## 2021-08-13 RX ADMIN — POLYETHYLENE GLYCOL 3350 17 G: 17 POWDER, FOR SOLUTION ORAL at 08:33

## 2021-08-13 RX ADMIN — LEVOTHYROXINE SODIUM 112 MCG: 0.11 TABLET ORAL at 08:33

## 2021-08-13 RX ADMIN — DOCUSATE SODIUM 50 MG AND SENNOSIDES 8.6 MG 2 TABLET: 8.6; 5 TABLET, FILM COATED ORAL at 20:18

## 2021-08-13 RX ADMIN — VENLAFAXINE HYDROCHLORIDE 75 MG: 75 CAPSULE, EXTENDED RELEASE ORAL at 08:33

## 2021-08-13 RX ADMIN — DOCUSATE SODIUM 50 MG AND SENNOSIDES 8.6 MG 2 TABLET: 8.6; 5 TABLET, FILM COATED ORAL at 08:34

## 2021-08-13 RX ADMIN — MELOXICAM 15 MG: 15 TABLET ORAL at 18:22

## 2021-08-13 RX ADMIN — ENOXAPARIN SODIUM 40 MG: 40 INJECTION SUBCUTANEOUS at 18:22

## 2021-08-13 RX ADMIN — AMLODIPINE BESYLATE 5 MG: 5 TABLET ORAL at 08:34

## 2021-08-13 RX ADMIN — OXYCODONE 10 MG: 5 TABLET ORAL at 17:36

## 2021-08-13 RX ADMIN — OXYCODONE 10 MG: 5 TABLET ORAL at 21:45

## 2021-08-13 RX ADMIN — OXYCODONE 10 MG: 5 TABLET ORAL at 13:20

## 2021-08-13 NOTE — PROGRESS NOTES
"General Surgery Progress Note    Name: Nallely Figueroa ADMIT: 2021   : 1955  PCP: Amanuel Youssef PA-C    MRN: [id number] LOS: 1 days   AGE/SEX: 66 y.o. female  ROOM: [address] [place] [address]    CC: Abdominal pain, POD 1 ventral hernia repair     Subjective     66 y.o. female status post Ventral hernia repair with mesh placement on 21. Post op pt is doing well. Denies nausea or vomiting. Pt is having some abdominal pain that is worse with movement. She was able to tolerate her full liquid diet well this morning. Pt has a urinary catheter in place.    Objective       Scheduled Medications:   amLODIPine, 5 mg, Oral, Daily  levothyroxine, 112 mcg, Oral, Daily  polyethylene glycol, 17 g, Oral, Daily  saccharomyces boulardii, 250 mg, Oral, Daily  senna-docusate sodium, 2 tablet, Oral, BID  venlafaxine XR, 75 mg, Oral, Daily        Active Infusions:  lactated ringers, 9 mL/hr, Last Rate: 9 mL/hr (21 1238)        As Needed Medications:  •  acetaminophen **OR** acetaminophen  •  HYDROcodone-acetaminophen  •  HYDROmorphone **AND** naloxone  •  lactated ringers  •  metoclopramide  •  Morphine **AND** naloxone  •  ondansetron **OR** ondansetron  •  oxyCODONE  •  promethazine **OR** promethazine    Vital Signs  Vital Signs Patient Vitals for the past 24 hrs:   BP Temp Temp src Pulse Resp SpO2 Height Weight   21 1638 168/98 98.4 °F (36.9 °C) Oral 101 16 92 % -- --   21 1223 158/91 98.2 °F (36.8 °C) Oral 101 18 92 % -- --   21 0747 158/87 98.1 °F (36.7 °C) Oral 101 16 93 % -- --   21 0437 159/90 98.2 °F (36.8 °C) Oral 107 14 92 % -- --   21 0006 158/82 98 °F (36.7 °C) Oral 102 12 92 % -- --   21 2100 134/77 97.7 °F (36.5 °C) -- 89 12 94 % -- --   21 1853 -- -- -- -- -- -- 160 cm (63\") 99.3 kg (218 lb 14.7 oz)   21 1758 153/77 97.5 °F (36.4 °C) Oral 75 15 90 % -- --   21 1743 161/80 97.5 °F (36.4 °C) -- 78 9 94 % -- --     I/O:  I/O last 3 completed " shifts:  In: 1550 [I.V.:1550]  Out: 590 [Urine:400; Drains:190]  BMI:  Body mass index is 38.78 kg/m².    Physical Exam:  Physical Exam  Constitutional:       Appearance: Normal appearance. She is obese.   Cardiovascular:      Rate and Rhythm: Normal rate and regular rhythm.   Pulmonary:      Effort: Pulmonary effort is normal.      Breath sounds: Normal breath sounds.   Abdominal:      General: Abdomen is flat. Bowel sounds are normal.      Palpations: Abdomen is soft.      Comments: YOMI drain on right 75 ml bloody output, Left YOMI drain 25 ml bloody output , staples clean/ dry and intact    Skin:     General: Skin is warm and dry.   Neurological:      General: No focal deficit present.      Mental Status: She is alert and oriented to person, place, and time.   Psychiatric:         Mood and Affect: Mood normal.         Behavior: Behavior normal.         Thought Content: Thought content normal.         Judgment: Judgment normal.     urinary catheter in place     Results Review: leukocytosis noted, but likely reactive from surgery     CBC    Results from last 7 days   Lab Units 08/13/21  0217 08/10/21  0625   WBC 10*3/mm3 13.00* 6.22   HEMOGLOBIN g/dL 12.2 13.8   PLATELETS 10*3/mm3 260 303     HbA1C   Lab Results   Component Value Date    HGBA1C 5.3 06/03/2019     Blood Glucose No results found for: POCGLU  Infection     CMP   Results from last 7 days   Lab Units 08/12/21  1923 08/10/21  0625   SODIUM mmol/L 139 140   POTASSIUM mmol/L 4.0 4.5   CHLORIDE mmol/L 104 104   CO2 mmol/L 24.0 25.0   BUN mg/dL 17 8   CREATININE mg/dL 0.75 0.58   GLUCOSE mg/dL 192* 99   ALBUMIN g/dL 3.90  --    BILIRUBIN mg/dL 0.3  --    ALK PHOS U/L 122*  --    AST (SGOT) U/L 22  --    ALT (SGPT) U/L 17  --      UA      Radiology(recent) No radiology results for the last day    I reviewed the patient's new clinical results.    Assessment/Plan       Ventral hernia      66 y.o. female status post large ventral hernia with 2 smaller hernia  repairs on 8/12/21. Post op pt is doing well. She is not having any nausea or vomiting. She tolerated a full liquid breakfast well this morning. Pt is having some complaints of abdominal pain that are worse with movement/ changing positions in bed. Labs and vitals reviewed and stable.  YOMI drainage bloody in both right and left drains ( right more that left) . Will continue to monitor     Ok to d/c mireles catheter now. Ok to restart Mobic. Will add Lovenox for  DVT prophylaxis.       Personal protective equipment used for this patient encounter:  Patient wearing surgical mask []    Provider wearing a surgical mask [x]    Gloves []    Eye protection [x]    Face Shield []    Gown []    N 95 respirator or CAPR/PAPR []         MARAL Self  08/13/21  17:26 EDT

## 2021-08-13 NOTE — CASE MANAGEMENT/SOCIAL WORK
Discharge Planning Assessment  [facility]     Patient Name: Nallely Figueroa  MRN: [id number]  Today's Date: 8/13/2021    Admit Date: 8/12/2021    Discharge Needs Assessment     Row Name 08/13/21 2450       Living Environment    Lives With  parent(s)    Current Living Arrangements  home/apartment/condo    Primary Care Provided by  self    Provides Primary Care For  no one    Family Caregiver if Needed  child(morales), adult;parent(s)    Quality of Family Relationships  helpful    Able to Return to Prior Arrangements  yes       Resource/Environmental Concerns    Resource/Environmental Concerns  none    Transportation Concerns  car, none       Transition Planning    Patient/Family Anticipates Transition to  home with family    Patient/Family Anticipated Services at Transition  none    Transportation Anticipated  family or friend will provide       Discharge Needs Assessment    Readmission Within the Last 30 Days  no previous admission in last 30 days    Equipment Currently Used at Home  none    Concerns to be Addressed  denies needs/concerns at this time    Anticipated Changes Related to Illness  none    Equipment Needed After Discharge  none        Discharge Plan     Row Name 08/13/21 6540       Plan    Plan  D/C Plan: Home with family.    Patient/Family in Agreement with Plan  yes    Plan Comments   spoke to patient at bedside wearing mask and goggles and keeping distance greater than 6 feet and spent less than 15 minutes in room. Patient lives with mom, is IADLs and drives. PCP and pharmacy verified-denies any difficulty affording meds. Patient denies any d/c needs at this time and mom or uncle will provide transport at d/c. Barrier to D/C: POD#1 hernia repair, full liquids.          Expected Discharge Date and Time     Expected Discharge Date Expected Discharge Time    Aug 13, 2021         Demographic Summary     Row Name 08/13/21 5308       General Information    Admission Type  inpatient    Arrived From   operating room    Referral Source  admission list    Reason for Consult  discharge planning    Preferred Language  English     Used During This Interaction  no        Functional Status     Row Name 08/13/21 1130       Functional Status    Usual Activity Tolerance  good    Current Activity Tolerance  good       Functional Status, IADL    Medications  independent    Meal Preparation  independent    Housekeeping  independent    Laundry  independent    Shopping  independent       Mental Status    General Appearance WDL  WDL       Mental Status Summary    Recent Changes in Mental Status/Cognitive Functioning  no changes          Patient Forms     Row Name 08/13/21 1136       Patient Forms    Important Message from Medicare (IMM)  Delivered 8/12/21 by registration            Sultana Landry

## 2021-08-13 NOTE — PLAN OF CARE
Goal Outcome Evaluation:   Patient doing well on full liquid diet. No complaints of nausea. Moderate amounts of pain. Pain meds given. vss

## 2021-08-13 NOTE — PLAN OF CARE
Goal Outcome Evaluation:  Plan of Care Reviewed With: patient           Outcome Summary: pt arrived to unit shortly prior to shift change. sat up in bed and ate dinner, having minimal pain through the night. does not want to take pain medication if not needed as pain is controlled unless when coughing.

## 2021-08-14 LAB
BASOPHILS # BLD AUTO: 0 10*3/MM3 (ref 0–0.2)
BASOPHILS NFR BLD AUTO: 0.5 % (ref 0–1.5)
DEPRECATED RDW RBC AUTO: 41.6 FL (ref 37–54)
EOSINOPHIL # BLD AUTO: 0 10*3/MM3 (ref 0–0.4)
EOSINOPHIL NFR BLD AUTO: 0.4 % (ref 0.3–6.2)
ERYTHROCYTE [DISTWIDTH] IN BLOOD BY AUTOMATED COUNT: 14.6 % (ref 12.3–15.4)
HCT VFR BLD AUTO: 34.1 % (ref 34–46.6)
HGB BLD-MCNC: 11.4 G/DL (ref 12–15.9)
HOLD SPECIMEN: NORMAL
LYMPHOCYTES # BLD AUTO: 1.4 10*3/MM3 (ref 0.7–3.1)
LYMPHOCYTES NFR BLD AUTO: 14.7 % (ref 19.6–45.3)
MCH RBC QN AUTO: 27.1 PG (ref 26.6–33)
MCHC RBC AUTO-ENTMCNC: 33.3 G/DL (ref 31.5–35.7)
MCV RBC AUTO: 81.5 FL (ref 79–97)
MONOCYTES # BLD AUTO: 0.9 10*3/MM3 (ref 0.1–0.9)
MONOCYTES NFR BLD AUTO: 10.2 % (ref 5–12)
NEUTROPHILS NFR BLD AUTO: 6.8 10*3/MM3 (ref 1.7–7)
NEUTROPHILS NFR BLD AUTO: 74.2 % (ref 42.7–76)
NRBC BLD AUTO-RTO: 0 /100 WBC (ref 0–0.2)
PLATELET # BLD AUTO: 261 10*3/MM3 (ref 140–450)
PMV BLD AUTO: 8.4 FL (ref 6–12)
RBC # BLD AUTO: 4.18 10*6/MM3 (ref 3.77–5.28)
WBC # BLD AUTO: 9.2 10*3/MM3 (ref 3.4–10.8)

## 2021-08-14 PROCEDURE — 25010000002 ENOXAPARIN PER 10 MG: Performed by: NURSE PRACTITIONER

## 2021-08-14 PROCEDURE — 85025 COMPLETE CBC W/AUTO DIFF WBC: CPT | Performed by: SURGERY

## 2021-08-14 PROCEDURE — 99024 POSTOP FOLLOW-UP VISIT: CPT | Performed by: SURGERY

## 2021-08-14 RX ADMIN — DOCUSATE SODIUM 50 MG AND SENNOSIDES 8.6 MG 2 TABLET: 8.6; 5 TABLET, FILM COATED ORAL at 09:14

## 2021-08-14 RX ADMIN — OXYCODONE 10 MG: 5 TABLET ORAL at 09:17

## 2021-08-14 RX ADMIN — POLYETHYLENE GLYCOL 3350 17 G: 17 POWDER, FOR SOLUTION ORAL at 09:13

## 2021-08-14 RX ADMIN — Medication 250 MG: at 09:14

## 2021-08-14 RX ADMIN — OXYCODONE 10 MG: 5 TABLET ORAL at 05:32

## 2021-08-14 RX ADMIN — OXYCODONE 10 MG: 5 TABLET ORAL at 21:59

## 2021-08-14 RX ADMIN — MELOXICAM 15 MG: 15 TABLET ORAL at 09:14

## 2021-08-14 RX ADMIN — DOCUSATE SODIUM 50 MG AND SENNOSIDES 8.6 MG 2 TABLET: 8.6; 5 TABLET, FILM COATED ORAL at 20:00

## 2021-08-14 RX ADMIN — ENOXAPARIN SODIUM 40 MG: 40 INJECTION SUBCUTANEOUS at 16:15

## 2021-08-14 RX ADMIN — LEVOTHYROXINE SODIUM 112 MCG: 0.11 TABLET ORAL at 09:14

## 2021-08-14 RX ADMIN — AMLODIPINE BESYLATE 5 MG: 5 TABLET ORAL at 09:14

## 2021-08-14 RX ADMIN — VENLAFAXINE HYDROCHLORIDE 75 MG: 75 CAPSULE, EXTENDED RELEASE ORAL at 09:14

## 2021-08-14 RX ADMIN — OXYCODONE 10 MG: 5 TABLET ORAL at 14:04

## 2021-08-14 NOTE — PLAN OF CARE
Goal Outcome Evaluation:  Plan of Care Reviewed With: patient        Progress: no change  Outcome Summary: Pt is resting in bed. She does well ambulating in the room. Pt is painful with movement. Pt has no IV so pain has been treated with PRN P.O pain meds. VSS, will continue to monitor.

## 2021-08-14 NOTE — PROGRESS NOTES
General Surgery Progress Note    Name: Nallely Figueroa ADMIT: 2021   : 1955  PCP: Amanuel Youssef PA-C    MRN: [id number] LOS: 2 days   AGE/SEX: 66 y.o. female  ROOM: [address] [place] [address]    CC: Abdominal pain, POD 2 ventral hernia repair     Subjective     66 y.o. female status post Ventral hernia repair with mesh placement on 21. Post op pt is doing well.     Patient's IV came out last night and she would like to go without it.  She has been using oral pain medicine.  She would like her diet to be advanced.  No flatus no bowel movement.    Objective       Scheduled Medications:   amLODIPine, 5 mg, Oral, Daily  enoxaparin, 40 mg, Subcutaneous, Q24H  levothyroxine, 112 mcg, Oral, Daily  meloxicam, 15 mg, Oral, Daily  polyethylene glycol, 17 g, Oral, Daily  saccharomyces boulardii, 250 mg, Oral, Daily  senna-docusate sodium, 2 tablet, Oral, BID  venlafaxine XR, 75 mg, Oral, Daily        Active Infusions:  lactated ringers, 9 mL/hr, Last Rate: 9 mL/hr (21 1238)        As Needed Medications:  •  acetaminophen **OR** acetaminophen  •  HYDROcodone-acetaminophen  •  HYDROmorphone **AND** naloxone  •  lactated ringers  •  metoclopramide  •  Morphine **AND** naloxone  •  ondansetron **OR** ondansetron  •  oxyCODONE  •  promethazine **OR** promethazine    Vital Signs  Vital Signs   Patient Vitals for the past 24 hrs:   BP Temp Temp src Pulse Resp SpO2   21 0542 157/83 99 °F (37.2 °C) Oral 99 16 96 %   21 2031 147/76 99.4 °F (37.4 °C) Oral 99 16 95 %   21 1638 168/98 98.4 °F (36.9 °C) Oral 101 16 92 %   21 1223 158/91 98.2 °F (36.8 °C) Oral 101 18 92 %     I/O:  I/O last 3 completed shifts:  In: 720 [P.O.:720]  Out: 4755 [Urine:4350; Drains:405]  BMI:  Body mass index is 38.78 kg/m².    Physical Exam:  Physical Exam  Constitutional:       Appearance: Normal appearance. She is obese.   Cardiovascular:      Rate and Rhythm: Normal rate and regular rhythm.   Pulmonary:      Effort:  Pulmonary effort is normal.      Breath sounds: Normal breath sounds.   Abdominal:      General: Abdomen is flat. Bowel sounds are normal.      Palpations: Abdomen is soft.      Comments: YOMI drain serosanguineous   Skin:     General: Skin is warm and dry.   Neurological:      General: No focal deficit present.      Mental Status: She is alert and oriented to person, place, and time.   Psychiatric:         Mood and Affect: Mood normal.         Behavior: Behavior normal.         Thought Content: Thought content normal.         Judgment: Judgment normal.     urinary catheter in place   CBC    Results from last 7 days   Lab Units 08/14/21  0242 08/13/21  0217 08/10/21  0625   WBC 10*3/mm3 9.20 13.00* 6.22   HEMOGLOBIN g/dL 11.4* 12.2 13.8   PLATELETS 10*3/mm3 261 260 303     HbA1C   Lab Results   Component Value Date    HGBA1C 5.3 06/03/2019     Blood Glucose No results found for: POCGLU  Infection     CMP   Results from last 7 days   Lab Units 08/12/21  1923 08/10/21  0625   SODIUM mmol/L 139 140   POTASSIUM mmol/L 4.0 4.5   CHLORIDE mmol/L 104 104   CO2 mmol/L 24.0 25.0   BUN mg/dL 17 8   CREATININE mg/dL 0.75 0.58   GLUCOSE mg/dL 192* 99   ALBUMIN g/dL 3.90  --    BILIRUBIN mg/dL 0.3  --    ALK PHOS U/L 122*  --    AST (SGOT) U/L 22  --    ALT (SGPT) U/L 17  --      UA      Radiology(recent) No radiology results for the last day    I reviewed the patient's new clinical results.    Assessment/Plan       Ventral hernia      66 y.o. female status post large ventral hernia with 2 smaller hernia repairs on 8/12/21. Post op pt is doing well. She is not having any nausea or vomiting.       Advance to regular diet.  She still in too much pain to go home today so perhaps in 1 or 2 days she will go home.      Personal protective equipment used for this patient encounter:  Patient wearing surgical mask []    Provider wearing a surgical mask [x]    Gloves []    Eye protection [x]    Face Shield []    Gown []    N 95 respirator  or CAPR/PAPR []         Emily Lr MD  08/14/21  08:45 EDT

## 2021-08-14 NOTE — PLAN OF CARE
Goal Outcome Evaluation:  Plan of Care Reviewed With: patient        Progress: improving  Outcome Summary: Diet advanced to a regular diet today. Per surgery patient still in too much pain to go home today but hopefully in the next day or 2

## 2021-08-15 LAB
BASOPHILS # BLD AUTO: 0.1 10*3/MM3 (ref 0–0.2)
BASOPHILS NFR BLD AUTO: 0.7 % (ref 0–1.5)
DEPRECATED RDW RBC AUTO: 40.3 FL (ref 37–54)
EOSINOPHIL # BLD AUTO: 0.1 10*3/MM3 (ref 0–0.4)
EOSINOPHIL NFR BLD AUTO: 1.6 % (ref 0.3–6.2)
ERYTHROCYTE [DISTWIDTH] IN BLOOD BY AUTOMATED COUNT: 14.2 % (ref 12.3–15.4)
HCT VFR BLD AUTO: 32.6 % (ref 34–46.6)
HGB BLD-MCNC: 11 G/DL (ref 12–15.9)
LYMPHOCYTES # BLD AUTO: 1.4 10*3/MM3 (ref 0.7–3.1)
LYMPHOCYTES NFR BLD AUTO: 17.4 % (ref 19.6–45.3)
MCH RBC QN AUTO: 27.4 PG (ref 26.6–33)
MCHC RBC AUTO-ENTMCNC: 33.9 G/DL (ref 31.5–35.7)
MCV RBC AUTO: 80.7 FL (ref 79–97)
MONOCYTES # BLD AUTO: 0.8 10*3/MM3 (ref 0.1–0.9)
MONOCYTES NFR BLD AUTO: 10.5 % (ref 5–12)
NEUTROPHILS NFR BLD AUTO: 5.4 10*3/MM3 (ref 1.7–7)
NEUTROPHILS NFR BLD AUTO: 69.8 % (ref 42.7–76)
NRBC BLD AUTO-RTO: 0.1 /100 WBC (ref 0–0.2)
PLATELET # BLD AUTO: 258 10*3/MM3 (ref 140–450)
PMV BLD AUTO: 8.1 FL (ref 6–12)
RBC # BLD AUTO: 4.03 10*6/MM3 (ref 3.77–5.28)
WBC # BLD AUTO: 7.8 10*3/MM3 (ref 3.4–10.8)

## 2021-08-15 PROCEDURE — 63710000001 ONDANSETRON PER 8 MG: Performed by: SURGERY

## 2021-08-15 PROCEDURE — 99024 POSTOP FOLLOW-UP VISIT: CPT | Performed by: SURGERY

## 2021-08-15 PROCEDURE — 85025 COMPLETE CBC W/AUTO DIFF WBC: CPT | Performed by: SURGERY

## 2021-08-15 PROCEDURE — 25010000002 ENOXAPARIN PER 10 MG: Performed by: NURSE PRACTITIONER

## 2021-08-15 RX ORDER — ACETAMINOPHEN 500 MG
1000 TABLET ORAL EVERY 6 HOURS
Status: DISCONTINUED | OUTPATIENT
Start: 2021-08-15 | End: 2021-08-16 | Stop reason: HOSPADM

## 2021-08-15 RX ADMIN — MELOXICAM 15 MG: 15 TABLET ORAL at 08:43

## 2021-08-15 RX ADMIN — ONDANSETRON HYDROCHLORIDE 4 MG: 4 TABLET, FILM COATED ORAL at 02:28

## 2021-08-15 RX ADMIN — ACETAMINOPHEN 1000 MG: 500 TABLET, FILM COATED ORAL at 21:00

## 2021-08-15 RX ADMIN — AMLODIPINE BESYLATE 5 MG: 5 TABLET ORAL at 08:43

## 2021-08-15 RX ADMIN — ENOXAPARIN SODIUM 40 MG: 40 INJECTION SUBCUTANEOUS at 17:08

## 2021-08-15 RX ADMIN — VENLAFAXINE HYDROCHLORIDE 75 MG: 75 CAPSULE, EXTENDED RELEASE ORAL at 08:43

## 2021-08-15 RX ADMIN — OXYCODONE 10 MG: 5 TABLET ORAL at 14:44

## 2021-08-15 RX ADMIN — OXYCODONE 10 MG: 5 TABLET ORAL at 20:59

## 2021-08-15 RX ADMIN — DOCUSATE SODIUM 50 MG AND SENNOSIDES 8.6 MG 2 TABLET: 8.6; 5 TABLET, FILM COATED ORAL at 20:59

## 2021-08-15 RX ADMIN — ACETAMINOPHEN 1000 MG: 500 TABLET, FILM COATED ORAL at 17:07

## 2021-08-15 RX ADMIN — DOCUSATE SODIUM 50 MG AND SENNOSIDES 8.6 MG 2 TABLET: 8.6; 5 TABLET, FILM COATED ORAL at 08:43

## 2021-08-15 RX ADMIN — ACETAMINOPHEN 1000 MG: 500 TABLET, FILM COATED ORAL at 11:26

## 2021-08-15 RX ADMIN — LEVOTHYROXINE SODIUM 112 MCG: 0.11 TABLET ORAL at 08:43

## 2021-08-15 RX ADMIN — OXYCODONE 10 MG: 5 TABLET ORAL at 02:28

## 2021-08-15 RX ADMIN — OXYCODONE 10 MG: 5 TABLET ORAL at 06:15

## 2021-08-15 RX ADMIN — Medication 250 MG: at 08:43

## 2021-08-15 RX ADMIN — METHYLNALTREXONE BROMIDE 450 MG: 150 TABLET ORAL at 11:26

## 2021-08-15 RX ADMIN — POLYETHYLENE GLYCOL 3350 17 G: 17 POWDER, FOR SOLUTION ORAL at 08:43

## 2021-08-15 NOTE — PROGRESS NOTES
General Surgery Progress Note    Name: Nallely Figueroa ADMIT: 2021   : 1955  PCP: Amanuel Youssef PA-C    MRN: [id number] LOS: 3 days   AGE/SEX: 66 y.o. female  ROOM: [address] [place] [address]    CC: Abdominal pain, POD 2 ventral hernia repair     Subjective     66 y.o. female status post Ventral hernia repair with mesh placement on 21. Post op pt is doing well.     Still no bm, had flatus, taking oral pain med    Objective       Scheduled Medications:   acetaminophen, 1,000 mg, Oral, Q6H  amLODIPine, 5 mg, Oral, Daily  enoxaparin, 40 mg, Subcutaneous, Q24H  levothyroxine, 112 mcg, Oral, Daily  meloxicam, 15 mg, Oral, Daily  Methylnaltrexone Bromide, 450 mg, Oral, Daily  polyethylene glycol, 17 g, Oral, Daily  saccharomyces boulardii, 250 mg, Oral, Daily  senna-docusate sodium, 2 tablet, Oral, BID  venlafaxine XR, 75 mg, Oral, Daily        Active Infusions:  lactated ringers, 9 mL/hr, Last Rate: 9 mL/hr (21 1238)        As Needed Medications:  •  acetaminophen **OR** acetaminophen  •  HYDROcodone-acetaminophen  •  HYDROmorphone **AND** naloxone  •  lactated ringers  •  metoclopramide  •  Morphine **AND** naloxone  •  ondansetron **OR** ondansetron  •  oxyCODONE  •  promethazine **OR** promethazine    Vital Signs  Vital Signs   Patient Vitals for the past 24 hrs:   BP Temp Temp src Pulse Resp SpO2   08/15/21 0500 155/82 98.1 °F (36.7 °C) Oral 96 16 96 %   21 2100 142/69 99 °F (37.2 °C) Oral 99 18 96 %   21 1146 152/79 98.7 °F (37.1 °C) Oral 104 18 93 %     I/O:  I/O last 3 completed shifts:  In: 720 [P.O.:720]  Out: 2395 [Urine:; Drains:345]  BMI:  Body mass index is 38.78 kg/m².    Physical Exam:  Physical Exam  Constitutional:       Appearance: Normal appearance. She is obese.   Cardiovascular:      Rate and Rhythm: Normal rate and regular rhythm.   Pulmonary:      Effort: Pulmonary effort is normal.      Breath sounds: Normal breath sounds.   Abdominal:      General: Abdomen is  flat. Bowel sounds are normal.      Palpations: Abdomen is soft.      Comments: YOMI drain serosanguineous   Skin:     General: Skin is warm and dry.   Neurological:      General: No focal deficit present.      Mental Status: She is alert and oriented to person, place, and time.   Psychiatric:         Mood and Affect: Mood normal.         Behavior: Behavior normal.         Thought Content: Thought content normal.         Judgment: Judgment normal.     urinary catheter in place   CBC    Results from last 7 days   Lab Units 08/15/21  0219 08/14/21  0242 08/13/21  0217 08/10/21  0625   WBC 10*3/mm3 7.80 9.20 13.00* 6.22   HEMOGLOBIN g/dL 11.0* 11.4* 12.2 13.8   PLATELETS 10*3/mm3 258 261 260 303     HbA1C   Lab Results   Component Value Date    HGBA1C 5.3 06/03/2019     Blood Glucose No results found for: POCGLU  Infection     CMP   Results from last 7 days   Lab Units 08/12/21  1923 08/10/21  0625   SODIUM mmol/L 139 140   POTASSIUM mmol/L 4.0 4.5   CHLORIDE mmol/L 104 104   CO2 mmol/L 24.0 25.0   BUN mg/dL 17 8   CREATININE mg/dL 0.75 0.58   GLUCOSE mg/dL 192* 99   ALBUMIN g/dL 3.90  --    BILIRUBIN mg/dL 0.3  --    ALK PHOS U/L 122*  --    AST (SGOT) U/L 22  --    ALT (SGPT) U/L 17  --      UA      Radiology(recent) No radiology results for the last day    I reviewed the patient's new clinical results.    Assessment/Plan       Ventral hernia      66 y.o. female status post large ventral hernia with 2 smaller hernia repairs on 8/12/21. Post op pt is doing well. She is not having any nausea or vomiting.       Add relistor PO, laxatives, hopefully home tomorrow    Personal protective equipment used for this patient encounter:  Patient wearing surgical mask []    Provider wearing a surgical mask [x]    Gloves []    Eye protection [x]    Face Shield []    Gown []    N 95 respirator or CAPR/PAPR []         Emily Lr MD  08/15/21  11:13 EDT

## 2021-08-15 NOTE — PLAN OF CARE
Goal Outcome Evaluation:  Plan of Care Reviewed With: patient        Progress: no change  Outcome Summary: Pt is resting in bed. She has been given PRN oxycodone for pain control. Ambualting in room with standby assistance. Still no BM. Movement encouraged. VSS, will continue to monitor.

## 2021-08-15 NOTE — PLAN OF CARE
Goal Outcome Evaluation:  Plan of Care Reviewed With: patient        Progress: improving  Outcome Summary: MD added relistor to meds today and patient encouraged to ambulate in the halls. patient did ambulate in thorne once with family and encouraged to walk more. Still waiting on a bowel movement

## 2021-08-15 NOTE — PROGRESS NOTES
HISTORY AND PHYSICAL      Patient Care Team:  Amanuel Youssef PA-C as PCP - General (Physician Assistant)     Chief complaint abdominal distention possible hernia     Subjective      Patient is a 66 y.o. female presents with abdominal distention and possible hernia.  She has a history of sigmoid colectomy and placement of a loop ileostomy for diverticulitis.  She later had her loop ileostomy reversed.  She also developed a wound infection and had previously had mesh put in the midline wound which necessitated a reoperation to remove some of this mesh to relieve the infection.  This operation occurred in 2019.  After that in 2020 she reports an increase in weight regain.  She blames the Covid pandemic as well as her surgical illness and becoming less active than she was before.     She has a distant history of lap band placement and did lose a lot of weight with her LAP-BAND in the past.  Over the course of 2020 she had about a 70 pound weight regain.     He comes in today complaining of abdominal distention with the possibility of hernia.           Review of Systems              Pertinent items are noted in HPI     History  Medical History        Past Medical History:   Diagnosis Date   • Anxiety maybe 25 years   • Arthritis 2 years   • Cataract 1 year     had surgery 1 year ago   • Depression probably 20 years     take medication for mood disorders   • Disease of thyroid gland     • Hypertension     • Hypothyroidism maybe 40 years     take medication   • Osteopenia maybe 5 years     Osteopenia   • Sleep apnea           Surgical History         Past Surgical History:   Procedure Laterality Date   • BARIATRIC SURGERY   June 2010     lap band removed 2015   • CARDIAC CATHETERIZATION N/A 9/18/2019     Procedure: Left Heart Cath;  Surgeon: Jeronimo Kuhn MD;  Location: [facility] INVASIVE LOCATION;  Service: Cardiovascular   • CARDIAC CATHETERIZATION N/A 9/18/2019     Procedure: Coronary angiography;   Surgeon: Jeronimo Kuhn MD;  Location: [facility] CATH INVASIVE LOCATION;  Service: Cardiovascular   • COLON SURGERY   12/06/2019     sigmoid colectomy with placement of loop ileostomy   • COLONOSCOPY       • COSMETIC SURGERY   2011     face lift after losing 100 lbs   • EXPLORATORY LAPAROTOMY N/A 12/13/2019     Procedure: LAPAROTOMY EXPLORATORY, DRAINAGE OF SUBCUTANEOUS AND SUBFASCIALABCESS;  Surgeon: Emily Lr MD;  Location: [facility] MAIN OR;  Service: General   • EXPLORATORY LAPAROTOMY N/A 12/2/2019     Procedure: sigmoid colectomy with placement of loop ileostomy;  Surgeon: Emily Lr MD;  Location: [facility] MAIN OR;  Service: General   • EYE SURGERY         lasix    • HERNIA REPAIR       • HYSTERECTOMY       • ILEOSTOMY CLOSURE N/A 2/6/2020     Procedure: ILEOSTOMY TAKEDOWN, WOUND DEBRIDEMENT WITH MESH REMOVAL;  Surgeon: Emily Lr MD;  Location: [facility] MAIN OR;  Service: General;  Laterality: N/A;   • SIGMOIDOSCOPY N/A 2/6/2020     Procedure: SIGMOIDOSCOPY RIGID;  Surgeon: Emily Lr MD;  Location: [facility] MAIN OR;  Service: General;  Laterality: N/A;   • SUBTOTAL HYSTERECTOMY   25 years   • TONSILLECTOMY                   Family History   Problem Relation Age of Onset   • Supraventricular tachycardia Mother     • Diabetes Mother           Type 2   • Thyroid disease Mother     • Arthritis Mother     • Heart attack Father     • Early death Father           massive heart attack at 51   • Hyperlipidemia Paternal Grandmother     • Stroke Maternal Grandfather     • Alcohol abuse Maternal Uncle     • Arthritis Maternal Uncle     • Hearing loss Maternal Uncle     • Heart disease Maternal Uncle     • Hypertension Maternal Uncle     • Arthritis Brother           rheumitoid   • Cancer Brother           testicular   • Depression Brother     • Hypertension Brother     • Heart disease Maternal Uncle     • Hypertension Maternal Uncle        Social History           Tobacco Use   • Smoking status: Never Smoker   •  Smokeless tobacco: Never Used   Vaping Use   • Vaping Use: Never used   Substance Use Topics   • Alcohol use: No   • Drug use: No        Prescriptions Prior to Admission   (Not in a hospital admission)      Allergies:  Ace inhibitors     Objective      Vital Signs     Physical Exam:                 General Appearance:    Alert, cooperative, in no acute distress   Head:    Normocephalic, without obvious abnormality, atraumatic   Eyes:            Lids and lashes normal, conjunctivae and sclerae normal, no   icterus, no pallor, corneas clear, PERRLA   Ears:    Ears appear intact with no abnormalities noted   Throat:   No oral lesions, no thrush, oral mucosa moist   Neck:   No adenopathy, supple, trachea midline, no thyromegaly, no   carotid bruit, no JVD   Back:     No kyphosis present, no scoliosis present, no skin lesions,      erythema or scars, no tenderness to percussion or                   palpation,   range of motion normal   Lungs:     Clear to auscultation,respirations regular, even and                  unlabored    Heart:    Regular rhythm and normal rate, normal S1 and S2, no            murmur, no gallop, no rub, no click   Chest Wall:    No abnormalities observed   Abdomen:    When the patient stands there is protuberance of the abdominal wall and is difficult to tell if this is truly a hernia or just the rectus diastases of the abdomen.   Rectal:     Deferred   Extremities:   Moves all extremities well, no edema, no cyanosis, no             redness   Pulses:   Pulses palpable and equal bilaterally   Skin:   No bleeding, bruising or rash   Lymph nodes:   No palpable adenopathy   Neurologic:   Cranial nerves 2 - 12 grossly intact, sensation intact, DTR       present and equal bilaterally          Lab Results (last 24 hours)      ** No results found for the last 24 hours. **                 Imaging Results (Last 24 Hours)      ** No results found for the last 24 hours. **             Results Review:                I reviewed the patient's new clinical results.  I reviewed the patient's new imaging results and agree with the interpretation.     Assessment/Plan         Rectus diastases versus ventral hernia.     56-year-old lady presents with rectus diastases versus ventral hernia.  She has an extensive surgical history.  We will plan for CT scan of the abdomen and pelvis to further evaluate.

## 2021-08-16 ENCOUNTER — READMISSION MANAGEMENT (OUTPATIENT)
Dept: CALL CENTER | Facility: HOSPITAL | Age: 66
End: 2021-08-16

## 2021-08-16 VITALS
BODY MASS INDEX: 38.79 KG/M2 | OXYGEN SATURATION: 95 % | DIASTOLIC BLOOD PRESSURE: 81 MMHG | RESPIRATION RATE: 14 BRPM | TEMPERATURE: 98.1 F | HEIGHT: 63 IN | HEART RATE: 91 BPM | SYSTOLIC BLOOD PRESSURE: 149 MMHG | WEIGHT: 218.92 LBS

## 2021-08-16 LAB
BASOPHILS # BLD AUTO: 0 10*3/MM3 (ref 0–0.2)
BASOPHILS NFR BLD AUTO: 0.6 % (ref 0–1.5)
DEPRECATED RDW RBC AUTO: 37.6 FL (ref 37–54)
EOSINOPHIL # BLD AUTO: 0.1 10*3/MM3 (ref 0–0.4)
EOSINOPHIL NFR BLD AUTO: 2.6 % (ref 0.3–6.2)
ERYTHROCYTE [DISTWIDTH] IN BLOOD BY AUTOMATED COUNT: 13.5 % (ref 12.3–15.4)
HCT VFR BLD AUTO: 33.1 % (ref 34–46.6)
HGB BLD-MCNC: 11.2 G/DL (ref 12–15.9)
LYMPHOCYTES # BLD AUTO: 1 10*3/MM3 (ref 0.7–3.1)
LYMPHOCYTES NFR BLD AUTO: 17.1 % (ref 19.6–45.3)
MCH RBC QN AUTO: 27.3 PG (ref 26.6–33)
MCHC RBC AUTO-ENTMCNC: 33.7 G/DL (ref 31.5–35.7)
MCV RBC AUTO: 81.1 FL (ref 79–97)
MONOCYTES # BLD AUTO: 0.6 10*3/MM3 (ref 0.1–0.9)
MONOCYTES NFR BLD AUTO: 10 % (ref 5–12)
NEUTROPHILS NFR BLD AUTO: 3.9 10*3/MM3 (ref 1.7–7)
NEUTROPHILS NFR BLD AUTO: 69.7 % (ref 42.7–76)
NRBC BLD AUTO-RTO: 0 /100 WBC (ref 0–0.2)
PLATELET # BLD AUTO: 279 10*3/MM3 (ref 140–450)
PMV BLD AUTO: 8.5 FL (ref 6–12)
QT INTERVAL: 367 MS
RBC # BLD AUTO: 4.08 10*6/MM3 (ref 3.77–5.28)
WBC # BLD AUTO: 5.6 10*3/MM3 (ref 3.4–10.8)

## 2021-08-16 PROCEDURE — 85025 COMPLETE CBC W/AUTO DIFF WBC: CPT | Performed by: SURGERY

## 2021-08-16 RX ORDER — OXYCODONE HYDROCHLORIDE AND ACETAMINOPHEN 5; 325 MG/1; MG/1
1 TABLET ORAL EVERY 6 HOURS PRN
Qty: 30 TABLET | Refills: 0 | Status: SHIPPED | OUTPATIENT
Start: 2021-08-16 | End: 2021-09-03

## 2021-08-16 RX ADMIN — VENLAFAXINE HYDROCHLORIDE 75 MG: 75 CAPSULE, EXTENDED RELEASE ORAL at 08:31

## 2021-08-16 RX ADMIN — Medication 250 MG: at 08:31

## 2021-08-16 RX ADMIN — OXYCODONE 10 MG: 5 TABLET ORAL at 08:30

## 2021-08-16 RX ADMIN — MELOXICAM 15 MG: 15 TABLET ORAL at 08:31

## 2021-08-16 RX ADMIN — METHYLNALTREXONE BROMIDE 450 MG: 150 TABLET ORAL at 08:30

## 2021-08-16 RX ADMIN — LEVOTHYROXINE SODIUM 112 MCG: 0.11 TABLET ORAL at 08:31

## 2021-08-16 RX ADMIN — DOCUSATE SODIUM 50 MG AND SENNOSIDES 8.6 MG 2 TABLET: 8.6; 5 TABLET, FILM COATED ORAL at 08:31

## 2021-08-16 RX ADMIN — AMLODIPINE BESYLATE 5 MG: 5 TABLET ORAL at 08:31

## 2021-08-16 RX ADMIN — ACETAMINOPHEN 1000 MG: 500 TABLET, FILM COATED ORAL at 10:39

## 2021-08-16 RX ADMIN — ACETAMINOPHEN 1000 MG: 500 TABLET, FILM COATED ORAL at 04:27

## 2021-08-16 RX ADMIN — POLYETHYLENE GLYCOL 3350 17 G: 17 POWDER, FOR SOLUTION ORAL at 08:30

## 2021-08-16 NOTE — CASE MANAGEMENT/SOCIAL WORK
Case Management Discharge Note      Final Note: Home with family         Selected Continued Care - Discharged on 8/16/2021 Admission date: 8/12/2021 - Discharge disposition: Home or Self Care     Final Discharge Disposition Code: 01 - home or self-care      Discharge Planning Assessment  KERA Cardoso     Patient Name: Nallely Figuerao  MRN: [id number]  Today's Date: 8/16/2021    Admit Date: 8/12/2021      Discharge Plan     Row Name 08/16/21 1221       Plan    Final Discharge Disposition Code  01 - home or self-care    Final Note  Home with family    Row Name 08/16/21 1220       Plan    Plan  D/C Plan: Home with family.    Patient/Family in Agreement with Plan  yes    Plan Comments   spoke to patient at bedside wearing mask and goggles and keeping distance greater than 6 feet and spent less than 15 minutes in room. IMM letter reviewed with patient, verbal consent obtained and declines copy.          Expected Discharge Date and Time     Expected Discharge Date Expected Discharge Time    Aug 16, 2021           Patient Forms     Row Name 08/16/21 1221       Patient Forms    Important Message from Medicare (IMM)  Delivered 8/16/21    Delivered to  Patient    Method of delivery  In person            Sultana Landry

## 2021-08-16 NOTE — PLAN OF CARE
Goal Outcome Evaluation:              Outcome Summary: pt doing well today, had BM and will discharge home today.

## 2021-08-16 NOTE — PLAN OF CARE
Goal Outcome Evaluation:            Pt post op day 3 & still no BM. Started on restoril today & encouraging pt to ambulate for bowel promotion. Fluids encouraged as well. C/O pain to abdomen rating 6 on 10 scale. PRN PO ocycodone given; effective. VSS. Anticipates d/c w/family, hopeful for it to be tomorrow. Plan on going. Call light w/I reach.

## 2021-08-16 NOTE — NURSING NOTE
Pt had bowel movement and will go home with YOMI drains and will follow up with Dr. Lr on Friday to evaluate drains.

## 2021-08-16 NOTE — OUTREACH NOTE
Prep Survey      Responses   [facility] facility patient discharged from?  [facility]   Is LACE score < 7 ?  No   Emergency Room discharge w/ pulse ox?  No   Eligibility  [facility]   Date of Admission  08/12/21   Date of Discharge  08/16/21   Discharge Disposition  Home or Self Care   Discharge diagnosis  VENTRAL/INCISIONAL HERNIA REPAIR   Does the patient have one of the following disease processes/diagnoses(primary or secondary)?  General Surgery   Does the patient have Home health ordered?  No   Is there a DME ordered?  No   Prep survey completed?  Yes          Kyara Cárdenas RN

## 2021-08-16 NOTE — DISCHARGE INSTRUCTIONS
Record output of drains.    Okay to shower Wednesday.  rno heavy lifting.  Follow-up in the office this Friday.

## 2021-08-17 ENCOUNTER — TRANSITIONAL CARE MANAGEMENT TELEPHONE ENCOUNTER (OUTPATIENT)
Dept: CALL CENTER | Facility: HOSPITAL | Age: 66
End: 2021-08-17

## 2021-08-17 NOTE — OUTREACH NOTE
Call Center TCM Note      Responses   [facility] patient discharged from?  [facility]   Does the patient have one of the following disease processes/diagnoses(primary or secondary)?  General Surgery   TCM attempt successful?  Yes [Jonathan and Marya on verbal release ]   Call start time  1051   Call end time  1053   Discharge diagnosis  VENTRAL/INCISIONAL HERNIA REPAIR   Meds reviewed with patient/caregiver?  Yes   Is the patient having any side effects they believe may be caused by any medication additions or changes?  No   Does the patient have all medications related to this admission filled (includes all antibiotics, pain medications, etc.)  Yes   Is the patient taking all medications as directed (includes completed medication regime)?  Yes   Does the patient have a follow up appointment scheduled with their surgeon?  Yes [8-20-21 with Dr. Lr surgeon ]   Has the patient kept scheduled appointments due by today?  N/A   Comments  Hosp OR fu apt on 8/24/21   Psychosocial issues?  No   Did the patient receive a copy of their discharge instructions?  Yes   Nursing interventions  Reviewed instructions with patient   What is the patient's perception of their health status since discharge?  Improving   Nursing interventions  Nurse provided patient education   Is the patient /caregiver able to teach back basic post-op care?  Do not remove steri-strips, Keep incision areas clean,dry and protected, Take showers only when approved by MD-sponge bathe until then   Is the patient/caregiver able to teach back signs and symptoms of incisional infection?  Increased redness, swelling or pain at the incisonal site, Increased drainage or bleeding, Pus or odor from incision   Is the patient/caregiver able to teach back steps to recovery at home?  Set small, achievable goals for return to baseline health   If the patient is a current smoker, are they able to teach back resources for cessation?  Not a smoker   Is the  patient/caregiver able to teach back the hierarchy of who to call/visit for symptoms/problems? PCP, Specialist, Home health nurse, Urgent Care, ED, 911  Yes   TCM call completed?  Yes          Sultana Mark RN    8/17/2021, 10:54 EDT

## 2021-08-19 NOTE — DISCHARGE SUMMARY
Date of Discharge:  8/19/2021    Discharge Diagnosis: Ventral hernia with loss of domain    Presenting Problem/History of Present Illness  Active Hospital Problems    Diagnosis  POA   • **Ventral hernia [K43.9]  Unknown      Resolved Hospital Problems   No resolved problems to display.           Hospital Course  Patient is a 66 y.o. female presented with ventral hernia with loss of domain.  She underwent ventral hernia pair with bilateral anterior component separation with mesh.  Postoperatively she was monitored for return of bowel function and for pain management.  She was finally discharged on the date above with the ability to ambulate and tolerate diet and manage her pain..      Procedures Performed    Procedure(s):  VENTRAL HERNIA WITH COMPONENT SEPARATION with mesh  -------------------       Consults:   Consults     No orders found from 7/14/2021 to 8/13/2021.          Pertinent Test Results:     Condition on Discharge:  good    Vital Signs       Physical Exam:     General Appearance:    Alert, cooperative, in no acute distress   Head:    Normocephalic, without obvious abnormality, atraumatic   Eyes:            Lids and lashes normal, conjunctivae and sclerae normal, no   icterus, no pallor, corneas clear, PERRLA   Ears:    Ears appear intact with no abnormalities noted   Throat:   No oral lesions, no thrush, oral mucosa moist   Neck:   No adenopathy, supple, trachea midline, no thyromegaly, no     carotid bruit, no JVD   Back:     No kyphosis present, no scoliosis present, no skin lesions,       erythema or scars, no tenderness to percussion or                   palpation,   range of motion normal   Lungs:     Clear to auscultation,respirations regular, even and                   unlabored    Heart:    Regular rhythm and normal rate, normal S1 and S2, no            murmur, no gallop, no rub, no click   Breast Exam:    Deferred   Abdomen:     Normal bowel sounds, no masses, no organomegaly, soft         non-tender, non-distended, no guarding, no rebound                 tenderness   Genitalia:    Deferred   Extremities:   Moves all extremities well, no edema, no cyanosis, no              redness   Pulses:   Pulses palpable and equal bilaterally   Skin:   No bleeding, bruising or rash   Lymph nodes:   No palpable adenopathy   Neurologic:   Cranial nerves 2 - 12 grossly intact, sensation intact, DTR        present and equal bilaterally       Discharge Disposition  Home or Self Care    Discharge Medications     Discharge Medications      New Medications      Instructions Start Date   oxyCODONE-acetaminophen 5-325 MG per tablet  Commonly known as: Percocet   1 tablet, Oral, Every 6 Hours PRN         Changes to Medications      Instructions Start Date   amLODIPine 5 MG tablet  Commonly known as: NORVASC  What changed: additional instructions   5 mg, Oral, Daily      venlafaxine XR 75 MG 24 hr capsule  Commonly known as: Effexor XR  What changed: additional instructions   75 mg, Oral, Daily         Continue These Medications      Instructions Start Date   aspirin 81 MG EC tablet   81 mg, Oral, Daily, LD 8/7      calcium 600 MG tablet  Commonly known as: OS-SONNY   1,200 mg, Oral, Daily, Hold DOS      CRANBERRY PO   1 tablet, Oral, Daily, LD 8/9      [employer] Fish Oil 1200 MG capsule   2,400 mg, Oral, Daily, LD 8/7      [employer] Probiotic Pearls Ex St capsule   1 tablet, Oral, Daily, Hold DOS      Daily Multivitamin capsule   1 tablet, Oral, Daily, LD 8/7      Dialyvite Vitamin D 5000 125 MCG (5000 UT) capsule capsule  Generic drug: vitamin D3   1 tablet, Oral, Daily, Hold DOS      levothyroxine 112 MCG tablet  Commonly known as: SYNTHROID, LEVOTHROID   112 mcg, Oral, Daily      polyethylene glycol packet  Commonly known as: MIRALAX   17 g, Oral, Daily, Hold DOS      vitamin C 500 MG tablet  Commonly known as: ASCORBIC ACID   1,000 mg, Oral, Daily, Hold DOS             Discharge Diet:   Diet Instructions     Regular              Activity at Discharge:   Activity Instructions     As tolerated             Follow-up Appointments  Future Appointments   Date Time Provider Department Center   8/20/2021 11:00 AM Emily Lr MD [facility] None   8/24/2021  9:00 AM Niesha Christine APRN [facility]   10/19/2021  1:45 PM Vivienne Clifford MD [facility]   1/17/2022  8:00 AM Amanuel Youssef PA-C [facility]  [facility]         Test Results Pending at Discharge       Emily Lr MD  08/19/21  18:56 EDT    Time:

## 2021-08-20 ENCOUNTER — OFFICE VISIT (OUTPATIENT)
Dept: BARIATRICS/WEIGHT MGMT | Facility: CLINIC | Age: 66
End: 2021-08-20

## 2021-08-20 VITALS
BODY MASS INDEX: 37.14 KG/M2 | SYSTOLIC BLOOD PRESSURE: 137 MMHG | DIASTOLIC BLOOD PRESSURE: 84 MMHG | OXYGEN SATURATION: 97 % | WEIGHT: 209.6 LBS | RESPIRATION RATE: 16 BRPM | HEIGHT: 63 IN | TEMPERATURE: 98.2 F | HEART RATE: 103 BPM

## 2021-08-20 DIAGNOSIS — K43.9 VENTRAL HERNIA WITHOUT OBSTRUCTION OR GANGRENE: Primary | ICD-10-CM

## 2021-08-20 PROCEDURE — 99024 POSTOP FOLLOW-UP VISIT: CPT | Performed by: SURGERY

## 2021-08-20 RX ORDER — MELOXICAM 15 MG/1
15 TABLET ORAL DAILY
COMMUNITY
Start: 2021-08-09 | End: 2022-03-16

## 2021-08-20 RX ORDER — GLUCOSAMINE/D3/BOSWELLIA SERRA 1500MG-400
TABLET ORAL
COMMUNITY
End: 2022-09-09

## 2021-08-23 NOTE — PROGRESS NOTES
She was recently admitted for ventral hernia repair and underwent bilateral anterior component separation with mesh repair.    She still has YOMI drains that are each putting out 50 cc/day.  On exam her incision is clean dry and intact with no evidence of infection.    We will continue YOMI drains at this time.  See again in 1 week for staple removal and discontinuation of YOMI drains.  Answers for HPI/ROS submitted by the patient on 8/20/2021  Please describe your symptoms.: Surgery follow up  Have you had these symptoms before?: No  How long have you been having these symptoms?: 5-7 days  What is the primary reason for your visit?: Other

## 2021-08-25 ENCOUNTER — READMISSION MANAGEMENT (OUTPATIENT)
Dept: CALL CENTER | Facility: HOSPITAL | Age: 66
End: 2021-08-25

## 2021-08-25 NOTE — OUTREACH NOTE
General Surgery Week 2 Survey      Responses   [facility] patient discharged from?  [facility]   Does the patient have one of the following disease processes/diagnoses(primary or secondary)?  General Surgery   Week 2 attempt successful?  No   Unsuccessful attempts  Attempt 1          Gracie Guerrero LPN

## 2021-08-27 ENCOUNTER — OFFICE VISIT (OUTPATIENT)
Dept: BARIATRICS/WEIGHT MGMT | Facility: CLINIC | Age: 66
End: 2021-08-27

## 2021-08-27 VITALS
OXYGEN SATURATION: 96 % | HEIGHT: 63 IN | HEART RATE: 96 BPM | WEIGHT: 206 LBS | BODY MASS INDEX: 36.5 KG/M2 | SYSTOLIC BLOOD PRESSURE: 127 MMHG | RESPIRATION RATE: 18 BRPM | DIASTOLIC BLOOD PRESSURE: 84 MMHG | TEMPERATURE: 97.9 F

## 2021-08-27 DIAGNOSIS — Z51.89 VISIT FOR WOUND CHECK: Primary | ICD-10-CM

## 2021-08-27 PROCEDURE — 87070 CULTURE OTHR SPECIMN AEROBIC: CPT | Performed by: SURGERY

## 2021-08-27 PROCEDURE — 87205 SMEAR GRAM STAIN: CPT | Performed by: SURGERY

## 2021-08-27 PROCEDURE — 99024 POSTOP FOLLOW-UP VISIT: CPT | Performed by: SURGERY

## 2021-08-27 PROCEDURE — 87077 CULTURE AEROBIC IDENTIFY: CPT | Performed by: SURGERY

## 2021-08-27 PROCEDURE — 87186 SC STD MICRODIL/AGAR DIL: CPT | Performed by: SURGERY

## 2021-08-27 RX ORDER — AMOXICILLIN AND CLAVULANATE POTASSIUM 875; 125 MG/1; MG/1
1 TABLET, FILM COATED ORAL 2 TIMES DAILY
Qty: 14 TABLET | Refills: 0 | Status: SHIPPED | OUTPATIENT
Start: 2021-08-27 | End: 2021-09-03

## 2021-08-27 NOTE — PROGRESS NOTES
She is here for follow-up after hospitalization of her recent component separation with no improvement.  I removed the YOMI drains today.  She says she has had some episodes of sweating and feeling hot so I decided to culture the drain fluid.  Her staples were removed as well and possibly with the central portion of the incision with a slight mismatch in the skin edges.  We will see again in 1 week to ensure she continues to heal well.  As a precaution I am putting her on Augmentin.  Her sweating and feeling hot will await culture.  Answers for HPI/ROS submitted by the patient on 8/27/2021  Please describe your symptoms.: Surgery Follow-up  Have you had these symptoms before?: Yes  How long have you been having these symptoms?: Greater than 2 weeks  What is the primary reason for your visit?: Other

## 2021-08-29 ENCOUNTER — READMISSION MANAGEMENT (OUTPATIENT)
Dept: CALL CENTER | Facility: HOSPITAL | Age: 66
End: 2021-08-29

## 2021-08-29 LAB
BACTERIA SPEC AEROBE CULT: ABNORMAL
GRAM STN SPEC: ABNORMAL
GRAM STN SPEC: ABNORMAL

## 2021-08-29 NOTE — OUTREACH NOTE
General Surgery Week 2 Survey      Responses   [facility] patient discharged from?  [facility]   Does the patient have one of the following disease processes/diagnoses(primary or secondary)?  General Surgery   Week 2 attempt successful?  Yes   Call start time  1501   Call end time  1503   Meds reviewed with patient/caregiver?  Yes   Is the patient taking all medications as directed (includes completed medication regime)?  Yes   Medication comments  new antibiotics started Friday   Has the patient kept scheduled appointments due by today?  Yes   Comments  Has seen Dr. Lr, surgeon   Comments  Healing well, no s/s infection   What is the patient's perception of their health status since discharge?  Improving   Week 2 call completed?  Yes   Wrap up additional comments  Pt.  states is doing well, no new issues or questions today.           Nannette Velasquez RN

## 2021-08-30 RX ORDER — LEVOFLOXACIN 750 MG/1
750 TABLET ORAL DAILY
Qty: 10 TABLET | Refills: 0 | Status: SHIPPED | OUTPATIENT
Start: 2021-08-30 | End: 2021-09-03 | Stop reason: SDUPTHER

## 2021-09-03 ENCOUNTER — OFFICE VISIT (OUTPATIENT)
Dept: BARIATRICS/WEIGHT MGMT | Facility: CLINIC | Age: 66
End: 2021-09-03

## 2021-09-03 VITALS
RESPIRATION RATE: 16 BRPM | HEIGHT: 63 IN | TEMPERATURE: 94.8 F | SYSTOLIC BLOOD PRESSURE: 142 MMHG | HEART RATE: 101 BPM | OXYGEN SATURATION: 97 % | BODY MASS INDEX: 36.25 KG/M2 | DIASTOLIC BLOOD PRESSURE: 87 MMHG | WEIGHT: 204.6 LBS

## 2021-09-03 DIAGNOSIS — K43.9 VENTRAL HERNIA WITHOUT OBSTRUCTION OR GANGRENE: Primary | ICD-10-CM

## 2021-09-03 PROCEDURE — 99024 POSTOP FOLLOW-UP VISIT: CPT | Performed by: SURGERY

## 2021-09-03 RX ORDER — LEVOFLOXACIN 750 MG/1
750 TABLET ORAL DAILY
Qty: 10 TABLET | Refills: 0 | Status: SHIPPED | OUTPATIENT
Start: 2021-09-03 | End: 2021-10-01

## 2021-09-03 NOTE — PROGRESS NOTES
She was recently hospitalized for complex ventral hernia.  The required component separation and mesh placement.    Last week I cultured her drain fluid      Culture results from last 30 days   Lab 08/27/21  1643   WOUNDCX Heavy growth (4+) Enterobacter cloacae complex*         I started her on Levaquin after a few days of Augmentin.  She says the left drain site has no drainage the right drain site has minimal drainage and is also continued drainage from the midline portion of the wound.  Last week did attempt to glue.  Today on exam there is a very superficial skin dehiscence of 1 inch of the midline incision and there is also a very slight superficial dehiscence of the very superior 1 cm midline incision.  There is no significant erythema.      I am going to refill her Levaquin to continue therapy for to complete a full 2 weeks.  We will see again in 1 week to decide on ongoing antibiotic therapy.

## 2021-09-10 ENCOUNTER — OFFICE VISIT (OUTPATIENT)
Dept: BARIATRICS/WEIGHT MGMT | Facility: CLINIC | Age: 66
End: 2021-09-10

## 2021-09-10 ENCOUNTER — LAB (OUTPATIENT)
Dept: LAB | Facility: HOSPITAL | Age: 66
End: 2021-09-10

## 2021-09-10 VITALS
TEMPERATURE: 97.9 F | RESPIRATION RATE: 18 BRPM | HEART RATE: 101 BPM | WEIGHT: 205 LBS | SYSTOLIC BLOOD PRESSURE: 129 MMHG | HEIGHT: 63 IN | OXYGEN SATURATION: 97 % | BODY MASS INDEX: 36.32 KG/M2 | DIASTOLIC BLOOD PRESSURE: 81 MMHG

## 2021-09-10 DIAGNOSIS — K43.9 VENTRAL HERNIA WITHOUT OBSTRUCTION OR GANGRENE: Primary | ICD-10-CM

## 2021-09-10 DIAGNOSIS — K43.9 VENTRAL HERNIA WITHOUT OBSTRUCTION OR GANGRENE: ICD-10-CM

## 2021-09-10 LAB
ALBUMIN SERPL-MCNC: 4.1 G/DL (ref 3.5–5.2)
ALBUMIN/GLOB SERPL: 1.5 G/DL
ALP SERPL-CCNC: 116 U/L (ref 39–117)
ALT SERPL W P-5'-P-CCNC: 11 U/L (ref 1–33)
ANION GAP SERPL CALCULATED.3IONS-SCNC: 12 MMOL/L (ref 5–15)
AST SERPL-CCNC: 16 U/L (ref 1–32)
BACTERIA UR QL AUTO: ABNORMAL /HPF
BASOPHILS # BLD AUTO: 0.1 10*3/MM3 (ref 0–0.2)
BASOPHILS NFR BLD AUTO: 1 % (ref 0–1.5)
BILIRUB SERPL-MCNC: 0.3 MG/DL (ref 0–1.2)
BILIRUB UR QL STRIP: NEGATIVE
BUN SERPL-MCNC: 15 MG/DL (ref 8–23)
BUN/CREAT SERPL: 20 (ref 7–25)
CALCIUM SPEC-SCNC: 8.9 MG/DL (ref 8.6–10.5)
CHLORIDE SERPL-SCNC: 100 MMOL/L (ref 98–107)
CLARITY UR: CLEAR
CO2 SERPL-SCNC: 25 MMOL/L (ref 22–29)
COLOR UR: ABNORMAL
CREAT SERPL-MCNC: 0.75 MG/DL (ref 0.57–1)
DEPRECATED RDW RBC AUTO: 42 FL (ref 37–54)
EOSINOPHIL # BLD AUTO: 0.4 10*3/MM3 (ref 0–0.4)
EOSINOPHIL NFR BLD AUTO: 4.8 % (ref 0.3–6.2)
ERYTHROCYTE [DISTWIDTH] IN BLOOD BY AUTOMATED COUNT: 15 % (ref 12.3–15.4)
GFR SERPL CREATININE-BSD FRML MDRD: 77 ML/MIN/1.73
GLOBULIN UR ELPH-MCNC: 2.8 GM/DL
GLUCOSE SERPL-MCNC: 109 MG/DL (ref 65–99)
GLUCOSE UR STRIP-MCNC: NEGATIVE MG/DL
HCT VFR BLD AUTO: 38.6 % (ref 34–46.6)
HGB BLD-MCNC: 12.4 G/DL (ref 12–15.9)
HGB UR QL STRIP.AUTO: ABNORMAL
HYALINE CASTS UR QL AUTO: ABNORMAL /LPF
KETONES UR QL STRIP: ABNORMAL
LEUKOCYTE ESTERASE UR QL STRIP.AUTO: ABNORMAL
LYMPHOCYTES # BLD AUTO: 2.2 10*3/MM3 (ref 0.7–3.1)
LYMPHOCYTES NFR BLD AUTO: 26.8 % (ref 19.6–45.3)
MCH RBC QN AUTO: 25.6 PG (ref 26.6–33)
MCHC RBC AUTO-ENTMCNC: 32.1 G/DL (ref 31.5–35.7)
MCV RBC AUTO: 79.9 FL (ref 79–97)
MONOCYTES # BLD AUTO: 0.8 10*3/MM3 (ref 0.1–0.9)
MONOCYTES NFR BLD AUTO: 9.7 % (ref 5–12)
NEUTROPHILS NFR BLD AUTO: 4.7 10*3/MM3 (ref 1.7–7)
NEUTROPHILS NFR BLD AUTO: 57.7 % (ref 42.7–76)
NITRITE UR QL STRIP: NEGATIVE
NRBC BLD AUTO-RTO: 0.1 /100 WBC (ref 0–0.2)
PH UR STRIP.AUTO: 6 [PH] (ref 5–8)
PLATELET # BLD AUTO: 357 10*3/MM3 (ref 140–450)
PMV BLD AUTO: 7.6 FL (ref 6–12)
POTASSIUM SERPL-SCNC: 3.9 MMOL/L (ref 3.5–5.2)
PROT SERPL-MCNC: 6.9 G/DL (ref 6–8.5)
PROT UR QL STRIP: NEGATIVE
RBC # BLD AUTO: 4.83 10*6/MM3 (ref 3.77–5.28)
RBC # UR: ABNORMAL /HPF
REF LAB TEST METHOD: ABNORMAL
SODIUM SERPL-SCNC: 137 MMOL/L (ref 136–145)
SP GR UR STRIP: 1.01 (ref 1–1.03)
SQUAMOUS #/AREA URNS HPF: ABNORMAL /HPF
UROBILINOGEN UR QL STRIP: ABNORMAL
WBC # BLD AUTO: 8.1 10*3/MM3 (ref 3.4–10.8)
WBC UR QL AUTO: ABNORMAL /HPF

## 2021-09-10 PROCEDURE — 36415 COLL VENOUS BLD VENIPUNCTURE: CPT

## 2021-09-10 PROCEDURE — 80053 COMPREHEN METABOLIC PANEL: CPT

## 2021-09-10 PROCEDURE — 85025 COMPLETE CBC W/AUTO DIFF WBC: CPT

## 2021-09-10 PROCEDURE — 99024 POSTOP FOLLOW-UP VISIT: CPT | Performed by: SURGERY

## 2021-09-10 PROCEDURE — 81001 URINALYSIS AUTO W/SCOPE: CPT

## 2021-09-10 RX ORDER — COLLAGENASE SANTYL 250 [ARB'U]/G
1 OINTMENT TOPICAL DAILY
Qty: 30 G | Refills: 5 | Status: SHIPPED | OUTPATIENT
Start: 2021-09-10 | End: 2022-01-27

## 2021-09-10 RX ORDER — TETRACYCLINE HYDROCHLORIDE 500 MG/1
500 CAPSULE ORAL 2 TIMES DAILY
Qty: 14 CAPSULE | Refills: 2 | Status: SHIPPED | OUTPATIENT
Start: 2021-09-10 | End: 2021-10-01

## 2021-09-11 NOTE — PROGRESS NOTES
"About 1 month ago she had a complex ventral hernia repair.  She had multiple ventral hernia repairs previously and also has had wound infections previously.  I recently placed her on Levaquin because she has some subjective feelings of feverishness.  Today she says she has felt achy all over and tired and weak.  She also thinks she feels a lump to the right of her incision.    Vitals:    09/10/21 1057   BP: 129/81   BP Location: Right arm   Patient Position: Sitting   Cuff Size: Large Adult   Pulse: 101   Resp: 18   Temp: 97.9 °F (36.6 °C)   SpO2: 97%   Weight: 93 kg (205 lb)   Height: 160 cm (63\")         On exam there is about a 1 inch area of the midline incision where the skin is not well approximated but the wound is very superficial.  There is some whitish tissue of this wound.  I can palpate something firm underneath and I believe this is the ninth of the PDS suture under the skin.  There is no knot that is exposed.  There is also a firm area about 1 or 2 inches to the right of this area that I believe could just be a stitch holding the mesh in place.  There is no evidence of hernia recurrence.  There is no tenderness and no peritoneal signs on exam.      She also believes she may be having symptoms of a bladder infection with burning when she urinates.  I would like her to have a urinalysis and I will also like to check a CBC.  I am switching her antibiotic from Levaquin to tetracycline.  Also ordering Santyl ointment for her wound.  I want to see her in 1 week.  She has already returned to work.  "

## 2021-09-17 ENCOUNTER — OFFICE VISIT (OUTPATIENT)
Dept: BARIATRICS/WEIGHT MGMT | Facility: CLINIC | Age: 66
End: 2021-09-17

## 2021-09-17 VITALS
WEIGHT: 206 LBS | HEIGHT: 63 IN | DIASTOLIC BLOOD PRESSURE: 82 MMHG | BODY MASS INDEX: 36.5 KG/M2 | HEART RATE: 102 BPM | SYSTOLIC BLOOD PRESSURE: 136 MMHG | OXYGEN SATURATION: 98 % | TEMPERATURE: 98.5 F | RESPIRATION RATE: 17 BRPM

## 2021-09-17 DIAGNOSIS — Z09 FOLLOW UP: Primary | ICD-10-CM

## 2021-09-17 PROCEDURE — 99024 POSTOP FOLLOW-UP VISIT: CPT | Performed by: SURGERY

## 2021-09-18 NOTE — PROGRESS NOTES
She had a complex ventral hernia repair with component separation and mesh on August 12.  She has a long history of multiple ventral hernia repairs and abdominal surgery and history of wound infection.  After surgery she has some drainage from her wound and I did culture it and it grew Enterococcus.  She had initially been on Levaquin but I switched her to tetracycline recently.  This is according to sensitivities.  A week ago she was having some vague body aches and feeling weak and also some dysuria.  Her urinalysis did have some leukocyte esterase but otherwise did not show any other signs of infection but this urinalysis was performed while she was on antibiotics.    This week she is feeling some better and has started working.  She keeps her dressing covered and there is some mild ongoing drainage.  Last week I did prescribe Santyl ointment but she said it was too expensive.  She has been applying antibiotic ointment to her wound.    On exam there is a 2 cm skin dehiscence of the wound but the underlying tissue is intact.  There is no depth to the wound.  The tissue appears to be mildly granulated.    I will stop antibiotics and continue antibiotic ointment to the area.  Is a very slow healing wound.  I have ordered to use Santyl ointment however she is asked to dispense it.  I did apply silver nitrate to the wound today.  See again in 2 weeks.

## 2021-10-01 ENCOUNTER — OFFICE VISIT (OUTPATIENT)
Dept: BARIATRICS/WEIGHT MGMT | Facility: CLINIC | Age: 66
End: 2021-10-01

## 2021-10-01 VITALS
TEMPERATURE: 98.3 F | SYSTOLIC BLOOD PRESSURE: 132 MMHG | RESPIRATION RATE: 16 BRPM | DIASTOLIC BLOOD PRESSURE: 84 MMHG | HEIGHT: 63 IN | HEART RATE: 91 BPM | WEIGHT: 203.4 LBS | OXYGEN SATURATION: 96 % | BODY MASS INDEX: 36.04 KG/M2

## 2021-10-01 DIAGNOSIS — Z09 FOLLOW UP: Primary | ICD-10-CM

## 2021-10-01 PROCEDURE — 99024 POSTOP FOLLOW-UP VISIT: CPT | Performed by: SURGERY

## 2021-10-01 NOTE — PROGRESS NOTES
On 8/12, she had ventral hernia repair with component separation and mesh.  Over the last month she has had a small 2 cm opening of the midline wound.  I can also palpate a firm mass to the right of midline that I believe could be managed its folded in some way.  She is been off antibiotics for over a week.  She has been using Santyl ointment on the area.      Today the wound is essentially unchanged.  There may be slightly less bit of whitish fibrotic tissue and more epithelialization of the right side of the skin.  The central portion is granulating.    Elected to continue Santyl ointment and in 2 weeks I will likely close the wound in the office.

## 2021-10-15 ENCOUNTER — OFFICE VISIT (OUTPATIENT)
Dept: BARIATRICS/WEIGHT MGMT | Facility: CLINIC | Age: 66
End: 2021-10-15

## 2021-10-15 VITALS
WEIGHT: 201.6 LBS | BODY MASS INDEX: 35.72 KG/M2 | TEMPERATURE: 97 F | DIASTOLIC BLOOD PRESSURE: 85 MMHG | SYSTOLIC BLOOD PRESSURE: 133 MMHG | RESPIRATION RATE: 14 BRPM | OXYGEN SATURATION: 97 % | HEIGHT: 63 IN | HEART RATE: 94 BPM

## 2021-10-15 DIAGNOSIS — E66.9 OBESITY (BMI 30-39.9): Primary | ICD-10-CM

## 2021-10-15 PROCEDURE — 99024 POSTOP FOLLOW-UP VISIT: CPT | Performed by: SURGERY

## 2021-10-15 NOTE — PROGRESS NOTES
She had ventral hernia repair over 2 months ago.  I have been seeing her to 3 weeks for small wound in the midline incision.  We recently started the Santyl ointment.    Today on examination the wound appears to be getting smaller and more contracted.  Is about 1.4 cm in length and about 4 mm in width and very superficial.  There is no erythema.    Although I was considering closing the wound today I decided that she should just continue the central ointment and this will likely close in the next 2 to 4 weeks.  She is no longer on antibiotics.  She is no longer having any symptoms of fatigue or flulike symptoms like she was before.  We will see her again in 2 to 3 weeks.

## 2021-10-19 ENCOUNTER — OFFICE VISIT (OUTPATIENT)
Dept: SLEEP MEDICINE | Facility: CLINIC | Age: 66
End: 2021-10-19

## 2021-10-19 VITALS
OXYGEN SATURATION: 97 % | HEART RATE: 97 BPM | SYSTOLIC BLOOD PRESSURE: 137 MMHG | DIASTOLIC BLOOD PRESSURE: 92 MMHG | HEIGHT: 63 IN | BODY MASS INDEX: 35.72 KG/M2 | WEIGHT: 201.6 LBS

## 2021-10-19 DIAGNOSIS — I10 ESSENTIAL HYPERTENSION: ICD-10-CM

## 2021-10-19 DIAGNOSIS — E66.9 CLASS 2 OBESITY: ICD-10-CM

## 2021-10-19 DIAGNOSIS — G47.30 OBSERVED SLEEP APNEA: Primary | ICD-10-CM

## 2021-10-19 DIAGNOSIS — R06.83 SNORING: ICD-10-CM

## 2021-10-19 DIAGNOSIS — G47.8 NON-RESTORATIVE SLEEP: ICD-10-CM

## 2021-10-19 PROBLEM — E66.812 CLASS 2 OBESITY: Status: ACTIVE | Noted: 2021-04-16

## 2021-10-19 PROCEDURE — 99204 OFFICE O/P NEW MOD 45 MIN: CPT | Performed by: INTERNAL MEDICINE

## 2021-10-19 PROCEDURE — G0463 HOSPITAL OUTPT CLINIC VISIT: HCPCS

## 2021-10-19 NOTE — PROGRESS NOTES
[id number] [place] [address]  Phone   Fax       Nallely Figueroa  1955  66 y.o.  female      PCP:Amanuel Youssef PA-C    Type of service: Initial New Patient Office Visit  Date of service: 10/19/2021    Chief Complaint   Patient presents with   • Witnessed Apnea   • Snoring   • Fatigue   • Non-restorative Sleep   • Obesity   • Dry Mouth       History of present illness;  Nallely Figueroa is a new patient for me and was seen today for sleep related problems of snoring, nonrestorative sleep and witnessed apneas. The symptoms are present for more than 10 years and they are persistent in nature.  The snoring is present in all positions and it is loud.  Has  history of prior sleep evaluation and sleep studies 1990s and used to use a CPAP but as he lost weight she quit using it but unfortunately she has put on back some weight now.  Her grandchildren are telling her that she snores.. Patient has  prior surgery namely tonsillectomy, 1961.  She works as a  in a car dealership    Patient gives the following sleep history.  Sleep schedule:  Bedtime: 9 PM  Wake time: 5:30 AM  Normally takes about 30-60 minutes to fall asleep  Average hours of sleep 7   Number of naps per day none  Symptoms  In addition to snoring, nonrestorative sleep and witnessed apneas patient gives the following associated symptoms.  Have you ever awakened gasping for breath, coughing, choking: Yes   Change in weight,  Yes gained about 50 pounds  Morning headaches  No   Awaken with a sore throat or dry mouth  Yes   Leg jerking at night:  No   Crawly feeling/urge sensation to move in the legs: No   Teeth grinding:No   Have you ever awakened at night with a sour taste or burning sensation in your chest:  No   Do you have muscle weakness with laughing or anger or sleep paralysis:  No   Have you ever felt paralyzed while going to sleep or waking up:  No   Sleepwalking,  "nightmares, No   Nocturia (urination at night): 3 times per night  Memory Problem:No     Past medical history: (Relevant to sleep medicine)  1. Depression  2. Thyroid disorder  3. Hypertension  4. Heart rate    • Medications are reviewed by me and documented in the encounter  • Allergies reviewed and documented in encounter    Social history:  Do you drive a commercial vehicle:  No   Shift work:  No   Tobacco use:  No   Alcohol use:  0 per week  Caffeinated drinks: 3    FAMILY HISTORY (Your mother, father, brothers and sisters)  1. Sleep apnea with  Brother  2. Diabetes mellitus  3. Obesity  4. Thyroid disorder  5. Hypertension  6. Heart disease    REVIEW OF SYSTEMS.  Full review of systems available on the intake form which is scanned in the media tab.  The relevant positive are noted below  1. Daytime excessive sleepiness with [place] Sleepiness Scale :Total score: 2   2. Snoring  3. Nonrestorative sleep      Physical exam:  Vitals:    10/19/21 1350   BP: 137/92   BP Location: Left arm   Patient Position: Sitting   Pulse: 97   SpO2: 97%   Weight: 91.4 kg (201 lb 9.6 oz)   Height: 160 cm (63\")    Body mass index is 35.71 kg/m².    HEENT: Head is atraumatic, normocephalic  Eyes: pupils are round equal and reacting to light and accommodation, conjunctiva normal  Nose: no nasal septal defects or deviation and the nasal passages are clear, no nasal polyps,  Throat: tonsils are not present, tongue normal, oral airway Mallampati class 3  NECK: , trachea is in the midline, thyroid not enlarged  RESPIRATORY SYSTEM: Breath sounds are equal on both sides, there are no wheezes   CARDIOVASULAR SYSTEM: Heart sounds are regular rhythm and dami rate, no edema  EXTREMITES: No cyanosis, clubbing  NEUROLOGICAL SYSTEM: Oriented x 3, no gross motor defects, gait normal    Labs reviewed.  TSH Results:  TSH    TSH 1/7/21 4/16/21   TSH 4.610 (A) 2.130   (A) Abnormal value                  Assessment and plan:  · Witnessed apnea (R06.81) " patient's symptoms and examination is consistent with sleep apnea (G47.30)  I have talked to the patient about the signs and symptoms of sleep apnea. In addition, I have also discussed pathophysiology of sleep apnea.  I also discussed the complications of untreated sleep apnea including effects on hypertension, diabetes mellitus and nonrestorative sleep with hypersomnia which can increase risk for motor vehicle accidents.  Untreated sleep apnea is also a risk factor for development of atrial fibrillation, pulmonary hypertension and stroke.  Discussed in detail of various testing methods including home-based and lab based sleep studies.  Based on history and physical examination the most appropriate study is home sleep test.  The order for the sleep study is placed in [employer].  The test will be scheduled after approval from insurance. Treatment and management will be discussed after the test is completed.  Patient was given opportunity to ask questions and all the questions were answered.   · Snoring (R06.83) snoring is the sound created by turbulent airflow vibrating upper airway soft tissue.  I have also discussed factors affecting snoring including sleep deprivation, sleeping on the back and alcohol ingestion. To minimize snoring, patient is advised to have adequate sleep, sleep on the side and avoid alcohol and sedative medications before bedtime  · Obesity class 2, with BMI Body mass index is 35.71 kg/m².. I have discussed the relationship between weight and sleep apnea.There is direct correlation between weight and severity of sleep apnea.  Weight reduction is encouraged, as it is going to reduce the severity of sleep apnea. I have also discussed with the patient diet and exercise to achieve ideal body weight  · Hypertension. Hypertension.  Essential hypertension is highly correlated with sleep apnea. Treating sleep apnea will assist in good blood pressure control.    I have also discussed with the patient the  following  • Sleep hygiene: Maintaining a regular bedtime and wake time, not to watch television or work in bed, limit caffeine-containing beverages before bed time and avoid naps during the day  • Adequate amount of sleep.  Generally most people needs about 7 to 8 hours of sleep.  • Return for 31 to 90 days after PAP setup with down load..  Patient's questions were answered.        Vivienne Clifford MD  Sleep Medicine.  Medical Director, [facility], [facility]  10/19/2021 ,

## 2021-10-29 ENCOUNTER — HOSPITAL ENCOUNTER (OUTPATIENT)
Dept: SLEEP MEDICINE | Facility: HOSPITAL | Age: 66
Discharge: HOME | End: 2021-10-29
Admitting: INTERNAL MEDICINE

## 2021-10-29 DIAGNOSIS — G47.8 NON-RESTORATIVE SLEEP: ICD-10-CM

## 2021-10-29 DIAGNOSIS — I10 ESSENTIAL HYPERTENSION: ICD-10-CM

## 2021-10-29 DIAGNOSIS — G47.30 OBSERVED SLEEP APNEA: ICD-10-CM

## 2021-10-29 DIAGNOSIS — R06.83 SNORING: ICD-10-CM

## 2021-10-29 DIAGNOSIS — E66.9 CLASS 2 OBESITY: ICD-10-CM

## 2021-10-29 PROCEDURE — 95806 SLEEP STUDY UNATT&RESP EFFT: CPT

## 2021-10-29 PROCEDURE — 95806 SLEEP STUDY UNATT&RESP EFFT: CPT | Performed by: INTERNAL MEDICINE

## 2021-10-29 RX ORDER — TRIAMCINOLONE ACETONIDE 55 UG/1
2 SPRAY, METERED NASAL DAILY
COMMUNITY
End: 2022-09-09

## 2021-10-29 RX ORDER — PANTOPRAZOLE SODIUM 40 MG/1
40 TABLET, DELAYED RELEASE ORAL DAILY
COMMUNITY
End: 2022-11-10 | Stop reason: SDUPTHER

## 2021-11-01 RX ORDER — VENLAFAXINE HYDROCHLORIDE 75 MG/1
75 CAPSULE, EXTENDED RELEASE ORAL DAILY
Qty: 90 CAPSULE | Refills: 1 | Status: SHIPPED | OUTPATIENT
Start: 2021-11-01 | End: 2022-05-10 | Stop reason: SDUPTHER

## 2021-11-02 DIAGNOSIS — G47.33 SEVERE OBSTRUCTIVE SLEEP APNEA: Primary | ICD-10-CM

## 2021-11-02 DIAGNOSIS — G47.34 SLEEP RELATED HYPOXIA: ICD-10-CM

## 2021-11-02 DIAGNOSIS — R06.83 SNORING: ICD-10-CM

## 2021-11-04 RX ORDER — AMLODIPINE BESYLATE 5 MG/1
5 TABLET ORAL DAILY
Qty: 90 TABLET | Refills: 1 | Status: SHIPPED | OUTPATIENT
Start: 2021-11-04 | End: 2022-05-09 | Stop reason: SDUPTHER

## 2021-11-12 ENCOUNTER — OFFICE VISIT (OUTPATIENT)
Dept: BARIATRICS/WEIGHT MGMT | Facility: CLINIC | Age: 66
End: 2021-11-12

## 2021-11-12 VITALS
HEIGHT: 63 IN | RESPIRATION RATE: 17 BRPM | WEIGHT: 199.4 LBS | HEART RATE: 87 BPM | DIASTOLIC BLOOD PRESSURE: 82 MMHG | BODY MASS INDEX: 35.33 KG/M2 | OXYGEN SATURATION: 99 % | SYSTOLIC BLOOD PRESSURE: 147 MMHG | TEMPERATURE: 98.3 F

## 2021-11-12 DIAGNOSIS — S31.109S OPEN WOUND OF ANTERIOR ABDOMINAL WALL, SEQUELA: Primary | ICD-10-CM

## 2021-11-12 PROCEDURE — 99024 POSTOP FOLLOW-UP VISIT: CPT | Performed by: SURGERY

## 2021-11-13 ENCOUNTER — LAB (OUTPATIENT)
Dept: LAB | Facility: HOSPITAL | Age: 66
End: 2021-11-13

## 2021-11-13 DIAGNOSIS — G47.33 SEVERE OBSTRUCTIVE SLEEP APNEA: ICD-10-CM

## 2021-11-13 DIAGNOSIS — G47.34 SLEEP RELATED HYPOXIA: ICD-10-CM

## 2021-11-13 PROCEDURE — C9803 HOPD COVID-19 SPEC COLLECT: HCPCS

## 2021-11-13 PROCEDURE — U0004 COV-19 TEST NON-CDC HGH THRU: HCPCS

## 2021-11-13 PROCEDURE — U0005 INFEC AGEN DETEC AMPLI PROBE: HCPCS

## 2021-11-14 LAB — SARS-COV-2 ORF1AB RESP QL NAA+PROBE: NOT DETECTED

## 2021-11-15 ENCOUNTER — HOSPITAL ENCOUNTER (OUTPATIENT)
Dept: SLEEP MEDICINE | Facility: HOSPITAL | Age: 66
Discharge: HOME | End: 2021-11-15
Admitting: INTERNAL MEDICINE

## 2021-11-15 VITALS — HEIGHT: 63 IN | WEIGHT: 199.3 LBS | BODY MASS INDEX: 35.31 KG/M2

## 2021-11-15 DIAGNOSIS — R06.83 SNORING: ICD-10-CM

## 2021-11-15 DIAGNOSIS — G47.34 SLEEP RELATED HYPOXIA: ICD-10-CM

## 2021-11-15 DIAGNOSIS — G47.33 SEVERE OBSTRUCTIVE SLEEP APNEA: ICD-10-CM

## 2021-11-15 PROCEDURE — 95811 POLYSOM 6/>YRS CPAP 4/> PARM: CPT | Performed by: INTERNAL MEDICINE

## 2021-11-15 PROCEDURE — 95811 POLYSOM 6/>YRS CPAP 4/> PARM: CPT

## 2021-11-16 DIAGNOSIS — G47.33 SEVERE OBSTRUCTIVE SLEEP APNEA: Primary | ICD-10-CM

## 2021-11-16 NOTE — PROGRESS NOTES
"Subjective:    s/p complex ventral hernia repair 3 months ago  Objective:      /82 (BP Location: Right arm)   Pulse 87   Temp 98.3 °F (36.8 °C)   Resp 17   Ht 160 cm (63\")   Wt 90.4 kg (199 lb 6.4 oz)   SpO2 99%   BMI 35.32 kg/m²     General:  alert, appears stated age and cooperative   Abdomen: soft, bowel sounds active, appropriate tenderness   Incision:   healing well, no drainage, no erythema, no hernia, no seroma, no swelling, no dehiscence, incision well approximated   Heart: Regular rate   Lungs: Clear to auscultation bilaterally     I reviewed the patient's new clinical results.        Assessment:   Status post ventral hernia repair with component separation about 3 months ago.     Plan:   Her wound is nearly closed.  Very superficial.  She can follow-up as needed.  "

## 2021-12-14 ENCOUNTER — OFFICE VISIT (OUTPATIENT)
Dept: FAMILY MEDICINE CLINIC | Facility: CLINIC | Age: 66
End: 2021-12-14

## 2021-12-14 ENCOUNTER — LAB (OUTPATIENT)
Dept: FAMILY MEDICINE CLINIC | Facility: CLINIC | Age: 66
End: 2021-12-14

## 2021-12-14 VITALS
DIASTOLIC BLOOD PRESSURE: 94 MMHG | SYSTOLIC BLOOD PRESSURE: 160 MMHG | WEIGHT: 202.8 LBS | HEART RATE: 93 BPM | BODY MASS INDEX: 35.93 KG/M2 | HEIGHT: 63 IN | RESPIRATION RATE: 12 BRPM | OXYGEN SATURATION: 96 %

## 2021-12-14 DIAGNOSIS — E03.9 HYPOTHYROIDISM, UNSPECIFIED TYPE: ICD-10-CM

## 2021-12-14 DIAGNOSIS — G47.33 OSA (OBSTRUCTIVE SLEEP APNEA): ICD-10-CM

## 2021-12-14 DIAGNOSIS — E78.5 HYPERLIPIDEMIA, UNSPECIFIED HYPERLIPIDEMIA TYPE: ICD-10-CM

## 2021-12-14 DIAGNOSIS — I10 ESSENTIAL HYPERTENSION: Primary | ICD-10-CM

## 2021-12-14 PROCEDURE — 80061 LIPID PANEL: CPT | Performed by: PHYSICIAN ASSISTANT

## 2021-12-14 PROCEDURE — 84443 ASSAY THYROID STIM HORMONE: CPT | Performed by: PHYSICIAN ASSISTANT

## 2021-12-14 PROCEDURE — 99214 OFFICE O/P EST MOD 30 MIN: CPT | Performed by: PHYSICIAN ASSISTANT

## 2021-12-14 PROCEDURE — 36415 COLL VENOUS BLD VENIPUNCTURE: CPT

## 2021-12-14 NOTE — PROGRESS NOTES
"Kelly Figueroa is a 66 y.o. female.     Chief Complaint   Patient presents with   • Hypertension   • Hypothyroidism   • Hyperlipidemia       /94   Pulse 93   Resp 12   Ht 160 cm (63\")   Wt 92 kg (202 lb 12.8 oz)   SpO2 96%   BMI 35.92 kg/m²     BP Readings from Last 3 Encounters:   12/14/21 160/94   11/12/21 147/82   10/19/21 137/92       Wt Readings from Last 3 Encounters:   12/14/21 92 kg (202 lb 12.8 oz)   11/15/21 90.4 kg (199 lb 4.7 oz)   11/12/21 90.4 kg (199 lb 6.4 oz)       HPI Presents to the clinic for htn, hypothyroid, hld management. She also is have a LTKA upcoming and need surgery clearance. She just had a ventral hernia repair earlier this year in august and had no issues with anaesthesia. We did send her to sleep as well for eval on asher and she is now on cpap.     The following portions of the patient's history were reviewed and updated as appropriate: allergies, current medications, past family history, past medical history, past social history, past surgical history and problem list.    Review of Systems    Objective   Physical Exam  Constitutional:       Appearance: She is well-developed.   HENT:      Head: Normocephalic and atraumatic.   Eyes:      Conjunctiva/sclera: Conjunctivae normal.      Pupils: Pupils are equal, round, and reactive to light.   Cardiovascular:      Rate and Rhythm: Normal rate and regular rhythm.      Heart sounds: No murmur heard.      Pulmonary:      Effort: Pulmonary effort is normal.      Breath sounds: Normal breath sounds.   Abdominal:      General: Bowel sounds are normal.      Palpations: Abdomen is soft.      Tenderness: There is no abdominal tenderness.   Musculoskeletal:         General: No deformity. Normal range of motion.      Cervical back: Normal range of motion and neck supple.   Lymphadenopathy:      Cervical: No cervical adenopathy.   Skin:     General: Skin is warm and dry.      Capillary Refill: Capillary refill takes less than 2 " seconds.      Findings: No rash.   Neurological:      Mental Status: She is alert and oriented to person, place, and time.      Cranial Nerves: No cranial nerve deficit.   Psychiatric:         Behavior: Behavior normal.         Thought Content: Thought content normal.         Judgment: Judgment normal.               Diagnoses and all orders for this visit:    1. Essential hypertension (Primary)  Comments:  continue norvasc- monitor home blood pressures and if the remain above 130/80 we will consider other agent.     2. Hypothyroidism, unspecified type  Comments:  stable at this time.   Orders:  -     TSH Rfx On Abnormal To Free T4; Future    3. Hyperlipidemia, unspecified hyperlipidemia type  Comments:  Check lipid panel today but we will likely end up requiring statin therapy. ASCVD- 15.0% but BP likely lower so score may be slightly less.   Orders:  -     Lipid panel; Future    4. FERNANDO (obstructive sleep apnea)  Comments:  Had her sleep study which showed severe apnea. She has started her cpap therapy and this is helping.       RCRI- 0%- risk of surgery 3.9%. Had recent ventral repair and had no issues with the surgery itself or anasthesia. Had some slow post operative wound healing. She is cleared on my end for her TKA. EKG and labs scheduled prior to procedure. Request records be sent to my office.     Return in about 6 months (around 6/14/2022), or if symptoms worsen or fail to improve.

## 2021-12-15 LAB
CHOLEST SERPL-MCNC: 256 MG/DL (ref 0–200)
HDLC SERPL-MCNC: 47 MG/DL (ref 40–60)
LDLC SERPL CALC-MCNC: 176 MG/DL (ref 0–100)
LDLC/HDLC SERPL: 3.68 {RATIO}
TRIGL SERPL-MCNC: 180 MG/DL (ref 0–150)
TSH SERPL DL<=0.05 MIU/L-ACNC: 3.64 UIU/ML (ref 0.27–4.2)
VLDLC SERPL-MCNC: 33 MG/DL (ref 5–40)

## 2021-12-16 PROBLEM — M25.50 ARTHRALGIA: Status: RESOLVED | Noted: 2017-01-06 | Resolved: 2021-12-16

## 2021-12-16 PROBLEM — G47.8 NON-RESTORATIVE SLEEP: Status: RESOLVED | Noted: 2021-10-19 | Resolved: 2021-12-16

## 2021-12-16 PROBLEM — G47.30 OBSERVED SLEEP APNEA: Status: RESOLVED | Noted: 2019-12-20 | Resolved: 2021-12-16

## 2021-12-16 PROBLEM — Z43.2 ATTENTION TO ILEOSTOMY: Status: RESOLVED | Noted: 2019-12-03 | Resolved: 2021-12-16

## 2021-12-16 RX ORDER — ATORVASTATIN CALCIUM 20 MG/1
20 TABLET, FILM COATED ORAL DAILY
Qty: 90 TABLET | Refills: 3 | Status: SHIPPED | OUTPATIENT
Start: 2021-12-16 | End: 2022-06-07 | Stop reason: SDUPTHER

## 2022-01-18 ENCOUNTER — OFFICE VISIT (OUTPATIENT)
Dept: SLEEP MEDICINE | Facility: CLINIC | Age: 67
End: 2022-01-18

## 2022-01-18 VITALS
SYSTOLIC BLOOD PRESSURE: 159 MMHG | WEIGHT: 202 LBS | BODY MASS INDEX: 35.79 KG/M2 | OXYGEN SATURATION: 97 % | DIASTOLIC BLOOD PRESSURE: 93 MMHG | HEIGHT: 63 IN | HEART RATE: 83 BPM

## 2022-01-18 DIAGNOSIS — Z99.89 OSA ON CPAP: Primary | ICD-10-CM

## 2022-01-18 DIAGNOSIS — G47.33 OSA ON CPAP: Primary | ICD-10-CM

## 2022-01-18 DIAGNOSIS — E66.9 CLASS 2 OBESITY: ICD-10-CM

## 2022-01-18 PROCEDURE — 99213 OFFICE O/P EST LOW 20 MIN: CPT | Performed by: INTERNAL MEDICINE

## 2022-01-18 PROCEDURE — G0463 HOSPITAL OUTPT CLINIC VISIT: HCPCS

## 2022-01-18 NOTE — PROGRESS NOTES
"  [address] [address]  Phone   Fax       SLEEP CLINIC FOLLOW UP PROGRESS NOTE.    Nallely Figueroa  1955  66 y.o.  female      PCP: Amanuel Youssef PA-C      Date of visit: 1/18/2022    Chief Complaint   Patient presents with   • Sleep Apnea   • Obesity       HPI:  This is a 66 y.o. years old patient who has a history of obstructive sleep apnea is here for  the initial compliance follow-up.  Sleep apnea is severe in severity with a AHI of 48/hr. Patient is using positive airway pressure therapy with auto CPAP and the symptoms of snoring, non-restorative sleep and daytime excessive sleepiness have improved significantly on the therapy. Normally goes to bed at 9 PM and wakes up at 5:30 AM.  The patient wakes up 0 time(s) during the night and has no problem going back to sleep.  Feels refreshed after waking up.  Patient also denies headaches and nasal congestion.   He feels well rested and her nocturia has significantly improved    Medications and allergies are reviewed by me and documented in the encounter.     SOCIAL ( habits pertaining to sleep medicine)  History tobacco use:No   History of alcohol use: 0 per week  Caffeine use: 2     REVIEW OF SYSTEMS:   [place] Sleepiness Scale :Total score: 2   Nasal congestion:No   Dry mouth/nose:No   Post nasal drip; No   Acid reflux/Heartburn:No   Abd bloating:No   Morning headache:No   Anxiety:No   Depression:No    PHYSICAL EXAMINATION:  CONSTITUTIONAL:  Vitals:    01/18/22 0849   BP: 159/93   BP Location: Left arm   Patient Position: Sitting   Cuff Size: Adult   Pulse: 83   SpO2: 97%   Weight: 91.6 kg (202 lb)   Height: 160 cm (63\")    Body mass index is 35.78 kg/m².   NOSE: nasal passages are clear, no nasal polyps, septum in the midline.  THROAT: throat is clear, oral airway Mallampati class 3  RESP SYSTEM: Breath sounds are normal, no wheezes or crackles  CARDIOVASULAR: Heart rate is regular " without murmur. No edema      Data reviewed:  The Smart card downloaded on 1/18/2022 has been reviewed independently by me for compliance and discussed the data with the patient.   Compliance; 100%  More than 4 hr use, 100%  Average use of the device 9 hours and per night  Residual AHI: 1.0 /hr (goal < 5.0 /hr)  Mask type: Nasal  Device: ResMed  DME: [employer]    ASSESSMENT AND PLAN:  · Obstructive sleep apnea ( G 47.33).  The symptoms of sleep apnea have improved with the device and the treatment.  Patient's compliance with the device is excellent for treatment of sleep apnea.  I have independently reviewed the smart card down load and discussed with the patient the download data and encouarged the patient to continue to use the device.The residual AHI is acceptable. The device is benefiting the patient and the device is medically necessary.  Without proper control of sleep apnea and good compliance there is a increased risk for hypertension, diabetes mellitus and nonrestorative sleep with hypersomnia which can increase risk for motor vehicle accidents.  Untreated sleep apnea is also a risk factor for development of atrial fibrillation, pulmonary hypertension and stroke. The patient is also instructed to get the supplies from the [employer] and and change them on a regular basis.  A prescription for supplies has been sent to the [employer].  I have also discussed the good sleep hygiene habits and adequate amount of sleep needed for good health.  · Obesity, class 2 with BMI is Body mass index is 35.78 kg/m².. I have discuss the relationship between the weight and sleep apnea. The benefit of weight loss in reducing severity of sleep apnea was discussed. Discussed diet and exercise with the patient to achieve ideal BMI.   · Return in about 1 year (around 1/18/2023) for Annual visit with smartcard download. . Patient's questions were answered.        Vivienne Clifford MD  Sleep Medicine.  Medical  Director, [facility], [place] [facility] [place] [facility]  1/18/2022 ,

## 2022-01-27 ENCOUNTER — OFFICE VISIT (OUTPATIENT)
Dept: CARDIOLOGY | Facility: CLINIC | Age: 67
End: 2022-01-27

## 2022-01-27 VITALS
DIASTOLIC BLOOD PRESSURE: 100 MMHG | HEIGHT: 63 IN | SYSTOLIC BLOOD PRESSURE: 174 MMHG | OXYGEN SATURATION: 95 % | HEART RATE: 94 BPM | WEIGHT: 208 LBS | BODY MASS INDEX: 36.86 KG/M2

## 2022-01-27 DIAGNOSIS — Z01.810 PREOP CARDIOVASCULAR EXAM: Primary | ICD-10-CM

## 2022-01-27 DIAGNOSIS — R94.31 ABNORMAL EKG: ICD-10-CM

## 2022-01-27 DIAGNOSIS — I25.10 CORONARY ARTERY DISEASE INVOLVING NATIVE CORONARY ARTERY OF NATIVE HEART WITHOUT ANGINA PECTORIS: ICD-10-CM

## 2022-01-27 DIAGNOSIS — I10 ESSENTIAL HYPERTENSION: ICD-10-CM

## 2022-01-27 DIAGNOSIS — E78.5 DYSLIPIDEMIA: ICD-10-CM

## 2022-01-27 PROCEDURE — 93000 ELECTROCARDIOGRAM COMPLETE: CPT | Performed by: INTERNAL MEDICINE

## 2022-01-27 PROCEDURE — 99214 OFFICE O/P EST MOD 30 MIN: CPT | Performed by: INTERNAL MEDICINE

## 2022-01-27 NOTE — PROGRESS NOTES
Cardiology Office Visit      Encounter Date:  01/27/2022    Patient ID:   Nallely Figueroa is a 66 y.o. female.    Reason For Followup:  Preop evaluation for knee surgery  Abnormal EKG    Brief Clinical History:  Dear Amanuel Rouse PA-C    I had the pleasure of seeing Nallely Figueroa today. As you are well aware, this is a 66 y.o. female with the past cardiac evaluation and cardiac catheterization 2019 with only mild obstructive coronary artery disease normal LV systolic function medical risk factors including hypertension hyperlipidemia here for preop evaluation for a total knee surgery with an abnormal EKG    Interval History:  Patient denies any symptoms of chest pain shortness of breath dizziness or syncope  Denies any new cardiac symptoms  Blood pressure elevated in the office but patient says she is anxious and also in significant pain  Compliant with current medical therapy    Assessment & Plan    Impressions:  Mild obstructive coronary artery disease on cardiac catheterization 2019 with no new cardiac symptoms  Hypertension blood pressure is elevated could be situational plan to recheck blood pressure at home if it is under control continue the same therapy if the blood pressure is still elevated at home consider increasing the dose of Norvasc or adding a second agent  Hyperlipidemia patient was recently started on a statin  Twelve-lead EKG done in the office normal  Obstructive sleep apnea currently followed up with sleep clinic      Recommendations:  Stable from cardiac standpoint okay to proceed with the planned total knee replacement surgery from cardiac standpoint patient is a low risk to undergo the surgical procedure  Home blood pressure monitoring and notify office if the blood pressure is still elevated at home  Continue current medical therapy with aspirin and statin  Continued aggressive risk factor modification  Follow-up in office as needed    Prior work-up and evaluation reviewed and  "discussed with the patient need for aggressive risk factor modification reviewed and discussed with the patient recent labs EKG lab findings and prior cath findings reviewed and discussed with the patient    Objective:    Vitals:  Vitals:    01/27/22 0938   BP: 174/100   Pulse: 94   SpO2: 95%   Weight: 94.3 kg (208 lb)   Height: 160 cm (63\")       Physical Exam:    General: Alert, cooperative, no distress, appears stated age  Head:  Normocephalic, atraumatic, mucous membranes moist  Eyes:  Conjunctiva/corneas clear, EOM's intact     Neck:  Supple,  no adenopathy;      Lungs: Clear to auscultation bilaterally, no wheezes rhonchi rales are noted  Chest wall: No tenderness  Heart::  Regular rate and rhythm, S1 and S2 normal, no murmur, rub or gallop  Abdomen: Soft, non-tender, nondistended bowel sounds active  Extremities: No cyanosis, clubbing, or edema  Pulses: 2+ and symmetric all extremities  Skin:  No rashes or lesions  Neuro/psych: A&O x3. CN II through XII are grossly intact with appropriate affect      Allergies:  Allergies   Allergen Reactions   • Ace Inhibitors Cough       Medication Review:     Current Outpatient Medications:   •  amLODIPine (NORVASC) 5 MG tablet, Take 1 tablet by mouth Daily., Disp: 90 tablet, Rfl: 1  •  aspirin 81 MG EC tablet, Take 81 mg by mouth Daily. LD 8/7, Disp: , Rfl:   •  atorvastatin (Lipitor) 20 MG tablet, Take 1 tablet by mouth Daily., Disp: 90 tablet, Rfl: 3  •  levothyroxine (SYNTHROID, LEVOTHROID) 112 MCG tablet, TAKE 1 TABLET BY MOUTH DAILY, Disp: 90 tablet, Rfl: 1  •  meloxicam (MOBIC) 15 MG tablet, Take 15 mg by mouth Daily., Disp: , Rfl:   •  pantoprazole (PROTONIX) 40 MG EC tablet, Take 40 mg by mouth Daily., Disp: , Rfl:   •  polyethylene glycol (MIRALAX) packet, Take 17 g by mouth Daily. Hold DOS, Disp: , Rfl:   •  Triamcinolone Acetonide (NASACORT) 55 MCG/ACT nasal inhaler, 2 sprays into the nostril(s) as directed by provider Daily., Disp: , Rfl:   •  venlafaxine XR " (EFFEXOR-XR) 75 MG 24 hr capsule, TAKE 1 CAPSULE BY MOUTH DAILY, Disp: 90 capsule, Rfl: 1  •  Biotin 10582 MCG tablet, Take  by mouth., Disp: , Rfl:   •  CRANBERRY PO, Take 1 tablet by mouth Daily. LD 8/9, Disp: , Rfl:   •  Multiple Vitamins-Minerals (DAILY MULTIVITAMIN) capsule, Take 1 tablet by mouth Daily. LD 8/7, Disp: , Rfl:   •  Omega-3 Fatty Acids ([employer] FISH OIL) 1200 MG capsule, Take 2,400 mg by mouth Daily. LD 8/7, Disp: , Rfl:   •  Probiotic Product ([employer] PROBIOTIC PEARLS EX ST) capsule, Take 1 tablet by mouth Daily. Hold DOS, Disp: , Rfl:   •  vitamin C (ASCORBIC ACID) 500 MG tablet, Take 1,000 mg by mouth Daily. Hold DOS, Disp: , Rfl:   •  vitamin d (DIALYVITE VITAMIN D 5000) 5000 units capsule, Take 1 tablet by mouth Daily. Hold DOS, Disp: , Rfl:     Family History:  Family History   Problem Relation Age of Onset   • Supraventricular tachycardia Mother    • Diabetes Mother         Type 2   • Thyroid disease Mother    • Arthritis Mother    • Heart attack Father         Massive heart attack age 51   • Early death Father         massive heart attack at 51   • Hyperlipidemia Paternal Grandmother    • Stroke Maternal Grandfather    • Alcohol abuse Maternal Uncle    • Arthritis Maternal Uncle    • Hearing loss Maternal Uncle    • Heart disease Maternal Uncle    • Hypertension Maternal Uncle    • Arthritis Brother         rheumitoid   • Alcohol abuse Brother    • Cancer Brother         testicular   • Depression Brother    • Hypertension Brother    • Arthritis Brother    • Heart disease Maternal Uncle    • Hypertension Maternal Uncle        Past Medical History:  Past Medical History:   Diagnosis Date   • Anxiety maybe 25 years   • Arthritis 2 years   • Carpal tunnel syndrome    • Cataract 1 year    had surgery 1 year ago   • Depression probably 20 years    take medication for mood disorders   • Disease of thyroid gland    • Gastroesophageal reflux disease    • Hypertension    • Hypothyroidism maybe 40 years     take medication   • Osteopenia maybe 5 years    Osteopenia   • Osteopenia    • Sleep apnea     no machine    lost wt        Past surgical History:  Past Surgical History:   Procedure Laterality Date   • BARIATRIC SURGERY  June 2010    lap band removed 2015   • CARDIAC CATHETERIZATION N/A 9/18/2019    Procedure: Left Heart Cath;  Surgeon: Jeronimo Kuhn MD;  Location: [facility] CATH INVASIVE LOCATION;  Service: Cardiovascular   • CARDIAC CATHETERIZATION N/A 9/18/2019    Procedure: Coronary angiography;  Surgeon: Jeronimo Kuhn MD;  Location: [facility] CATH INVASIVE LOCATION;  Service: Cardiovascular   • COLON SURGERY  12/06/2019    sigmoid colectomy with placement of loop ileostomy   • COLONOSCOPY     • COSMETIC SURGERY  2011    face lift after losing 100 lbs   • EXPLORATORY LAPAROTOMY N/A 12/13/2019    Procedure: LAPAROTOMY EXPLORATORY, DRAINAGE OF SUBCUTANEOUS AND SUBFASCIALABCESS;  Surgeon: Emily Lr MD;  Location: [facility] MAIN OR;  Service: General   • EXPLORATORY LAPAROTOMY N/A 12/2/2019    Procedure: sigmoid colectomy with placement of loop ileostomy;  Surgeon: Emily Lr MD;  Location: [facility] MAIN OR;  Service: General   • EYE SURGERY      lasix    • HERNIA REPAIR     • HYSTERECTOMY     • ILEOSTOMY CLOSURE N/A 2/6/2020    Procedure: ILEOSTOMY TAKEDOWN, WOUND DEBRIDEMENT WITH MESH REMOVAL;  Surgeon: Emily Lr MD;  Location: [facility] MAIN OR;  Service: General;  Laterality: N/A;   • SIGMOIDOSCOPY N/A 2/6/2020    Procedure: SIGMOIDOSCOPY RIGID;  Surgeon: Emily Lr MD;  Location: [facility] MAIN OR;  Service: General;  Laterality: N/A;   • SUBTOTAL HYSTERECTOMY  25 years   • TONSILLECTOMY     • VENTRAL/INCISIONAL HERNIA REPAIR N/A 8/12/2021    Procedure: VENTRAL HERNIA WITH COMPONENT SEPARATION with mesh;  Surgeon: Emily Lr MD;  Location: [facility] MAIN OR;  Service: General;  Laterality: N/A;       Social History:  Social History     Socioeconomic History   • Marital status:    Tobacco Use    • Smoking status: Never Smoker   • Smokeless tobacco: Never Used   Vaping Use   • Vaping Use: Never used   Substance and Sexual Activity   • Alcohol use: No   • Drug use: No   • Sexual activity: Not Currently     Birth control/protection: None     Comment: Not sexually active since   2000       Review of Systems:  The following systems were reviewed as they relate to the cardiovascular system: Constitutional, Eyes, ENT, Cardiovascular, Respiratory, Gastrointestinal, Integumentary, Neurological, Psychiatric, Hematologic, Endocrine, Musculoskeletal, and Genitourinary. The pertinent cardiovascular findings are reported above with all other cardiovascular points within those systems being negative.    Diagnostic Study Review:     Current Electrocardiogram:    ECG 12 Lead    Date/Time: 2022 10:13 AM  Performed by: Jeronimo Kuhn MD  Authorized by: Jeronimo Kuhn MD   Comparison: compared with previous ECG   Similar to previous ECG  Rhythm: sinus rhythm  Rate: normal  BPM: 82  Conduction: conduction normal  ST Segments: ST segments normal  T Waves: T waves normal  QRS axis: normal    Clinical impression: normal ECG              NOTE: The following portions of the patient's history were reviewed and updated this visit as appropriate: allergies, current medications, past family history, past medical history, past social history, past surgical history and problem list.

## 2022-03-16 ENCOUNTER — OFFICE VISIT (OUTPATIENT)
Dept: FAMILY MEDICINE CLINIC | Facility: CLINIC | Age: 67
End: 2022-03-16

## 2022-03-16 VITALS
WEIGHT: 211.8 LBS | SYSTOLIC BLOOD PRESSURE: 134 MMHG | TEMPERATURE: 98.4 F | OXYGEN SATURATION: 97 % | DIASTOLIC BLOOD PRESSURE: 80 MMHG | HEIGHT: 63 IN | RESPIRATION RATE: 16 BRPM | BODY MASS INDEX: 37.53 KG/M2 | HEART RATE: 94 BPM

## 2022-03-16 DIAGNOSIS — Z23 NEED FOR 23-POLYVALENT PNEUMOCOCCAL POLYSACCHARIDE VACCINE: ICD-10-CM

## 2022-03-16 DIAGNOSIS — Z11.59 NEED FOR HEPATITIS C SCREENING TEST: ICD-10-CM

## 2022-03-16 DIAGNOSIS — E78.5 HYPERLIPIDEMIA, UNSPECIFIED HYPERLIPIDEMIA TYPE: ICD-10-CM

## 2022-03-16 DIAGNOSIS — E03.9 HYPOTHYROIDISM, UNSPECIFIED TYPE: ICD-10-CM

## 2022-03-16 DIAGNOSIS — Z00.00 MEDICARE ANNUAL WELLNESS VISIT, INITIAL: Primary | ICD-10-CM

## 2022-03-16 DIAGNOSIS — I10 ESSENTIAL HYPERTENSION: ICD-10-CM

## 2022-03-16 PROBLEM — E66.9 CLASS 2 OBESITY: Status: RESOLVED | Noted: 2021-04-16 | Resolved: 2022-03-16

## 2022-03-16 PROBLEM — R06.83 SNORING: Status: RESOLVED | Noted: 2021-10-19 | Resolved: 2022-03-16

## 2022-03-16 PROBLEM — E66.812 CLASS 2 OBESITY: Status: RESOLVED | Noted: 2021-04-16 | Resolved: 2022-03-16

## 2022-03-16 PROBLEM — Z98.890 H/O ILEOSTOMY: Status: RESOLVED | Noted: 2019-12-13 | Resolved: 2022-03-16

## 2022-03-16 PROCEDURE — 1170F FXNL STATUS ASSESSED: CPT | Performed by: FAMILY MEDICINE

## 2022-03-16 PROCEDURE — G0438 PPPS, INITIAL VISIT: HCPCS | Performed by: FAMILY MEDICINE

## 2022-03-16 PROCEDURE — G0009 ADMIN PNEUMOCOCCAL VACCINE: HCPCS | Performed by: FAMILY MEDICINE

## 2022-03-16 PROCEDURE — 1126F AMNT PAIN NOTED NONE PRSNT: CPT | Performed by: FAMILY MEDICINE

## 2022-03-16 PROCEDURE — 1159F MED LIST DOCD IN RCRD: CPT | Performed by: FAMILY MEDICINE

## 2022-03-16 PROCEDURE — 90732 PPSV23 VACC 2 YRS+ SUBQ/IM: CPT | Performed by: FAMILY MEDICINE

## 2022-03-16 RX ORDER — ASPIRIN 325 MG
TABLET, DELAYED RELEASE (ENTERIC COATED) ORAL
COMMUNITY
Start: 2022-01-25 | End: 2022-03-16

## 2022-03-16 RX ORDER — PHENOL 1.4 %
600 AEROSOL, SPRAY (ML) MUCOUS MEMBRANE 2 TIMES DAILY WITH MEALS
COMMUNITY

## 2022-03-16 RX ORDER — ONDANSETRON 4 MG/1
TABLET, ORALLY DISINTEGRATING ORAL
COMMUNITY
Start: 2022-02-04 | End: 2022-03-16

## 2022-03-16 RX ORDER — HYDROCODONE BITARTRATE AND ACETAMINOPHEN 7.5; 325 MG/1; MG/1
1-2 TABLET ORAL EVERY 6 HOURS PRN
COMMUNITY
Start: 2022-02-04 | End: 2022-03-16

## 2022-03-16 RX ORDER — OXYCODONE HYDROCHLORIDE AND ACETAMINOPHEN 5; 325 MG/1; MG/1
2 TABLET ORAL
COMMUNITY
End: 2022-03-16

## 2022-03-16 NOTE — PROGRESS NOTES
Initial Medicare Wellness Visit   The ABC's of the Annual Wellness Visit    Chief Complaint   Patient presents with   • Medicare Wellness-Initial Visit   • Establish Care   • Hypertension   • Hyperlipidemia   • Hypothyroidism   • Osteoarthritis   • Depression       HPI:  Nallely Figueroa, -1955, is a 67 y.o. female who presents for an Initial Medicare Wellness Visit.  She is coming to get established today and get a checkup.  She is currently being treated for several medical problems including hypertension, hyperlipidemia, hypothyroidism, depression, she has issues with chronic pain due to osteoarthritis.  She is a status post left total knee replacement in 2022.  She sustained a fall and injured her knee 16 days post surgery and she is under care of her orthopedist.  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  She lives with her mom and she is fully independent with her activities of daily living.  She is on venlafaxine for depression and she feels pretty good with that. Patient does not report any chest pain, shortness of breath, dizziness, nausea, vomiting, or diarrhea, visual issues, headaches, numbness or tingling. No urinary issues reported like urgency, frequency, or discomfort upon urination.  No significant weight changes reported.  No swelling reported.  No rashes or any other skin issues reported. No emotional issues or insomnia.    Recent Hospitalizations:  No hospitalization(s) within the last year..    Current Medical Providers:  Patient Care Team:  Erin Quintana MD as PCP - General (Family Medicine)  Kellen Muller MD as Consulting Physician (Obstetrics and Gynecology)  Kaan Sneed MD as Consulting Physician (Orthopedic Surgery)  Emily Lr MD as Consulting Physician (Bariatrics)  Vivienne Clifford MD as Consulting Physician (Sleep Medicine)    Health Habits and Functional and Cognitive Screening and Depression Screening:  Functional & Cognitive Status 3/16/2022   Do  you have difficulty preparing food and eating? No   Do you have difficulty bathing yourself, getting dressed or grooming yourself? No   Do you have difficulty using the toilet? No   Do you have difficulty moving around from place to place? No   Do you have trouble with steps or getting out of a bed or a chair? No   Current Diet Well Balanced Diet   Dental Exam Up to date   Eye Exam Up to date   Exercise (times per week) 0 times per week   Do you need help using the phone?  No   Are you deaf or do you have serious difficulty hearing?  No   Do you need help with transportation? No   Do you need help shopping? No   Do you need help preparing meals?  No   Do you need help with housework?  No   Do you need help with laundry? No   Do you need help taking your medications? No   Do you need help managing money? No   Do you ever drive or ride in a car without wearing a seat belt? No   Have you felt unusual stress, anger or loneliness in the last month? No   Who do you live with? Other   If you need help, do you have trouble finding someone available to you? No   Do you have difficulty concentrating, remembering or making decisions? No       Compared to one year ago, the patient feels her physical health is the same and her mental health is the same.    Depression Screen:  PHQ-2/PHQ-9 Depression Screening 3/16/2022   Retired PHQ-9 Total Score -   Retired Total Score -   Little Interest or Pleasure in Doing Things 0-->not at all   Feeling Down, Depressed or Hopeless 0-->not at all   PHQ-9: Brief Depression Severity Measure Score 0         Past Medical/Family/Social History:  The following portions of the patient's history were reviewed and updated as appropriate: allergies, current medications, past family history, past medical history, past social history, past surgical history and problem list.    Allergies   Allergen Reactions   • Ace Inhibitors Cough         Current Outpatient Medications:   •  aspirin 81 MG EC tablet,  Take 81 mg by mouth Daily. LD 8/7, Disp: , Rfl:   •  amLODIPine (NORVASC) 5 MG tablet, Take 1 tablet by mouth Daily., Disp: 90 tablet, Rfl: 1  •  atorvastatin (Lipitor) 20 MG tablet, Take 1 tablet by mouth Daily., Disp: 90 tablet, Rfl: 3  •  Biotin 98093 MCG tablet, Take  by mouth., Disp: , Rfl:   •  calcium carbonate (OS-SONNY) 600 MG tablet, Take 600 mg by mouth 2 (Two) Times a Day With Meals., Disp: , Rfl:   •  CRANBERRY PO, Take 1 tablet by mouth Daily. LD 8/9, Disp: , Rfl:   •  levothyroxine (SYNTHROID, LEVOTHROID) 112 MCG tablet, TAKE 1 TABLET BY MOUTH DAILY, Disp: 90 tablet, Rfl: 1  •  Multiple Vitamins-Minerals (DAILY MULTIVITAMIN) capsule, Take 1 tablet by mouth Daily. LD 8/7, Disp: , Rfl:   •  Omega-3 Fatty Acids (CVS FISH OIL) 1200 MG capsule, Take 2,400 mg by mouth Daily. LD 8/7, Disp: , Rfl:   •  pantoprazole (PROTONIX) 40 MG EC tablet, Take 40 mg by mouth Daily., Disp: , Rfl:   •  polyethylene glycol (MIRALAX) packet, Take 17 g by mouth Daily. Hold DOS, Disp: , Rfl:   •  Probiotic Product (CVS PROBIOTIC PEARLS EX ST) capsule, Take 1 tablet by mouth Daily. Hold DOS, Disp: , Rfl:   •  Triamcinolone Acetonide (NASACORT) 55 MCG/ACT nasal inhaler, 2 sprays into the nostril(s) as directed by provider Daily., Disp: , Rfl:   •  venlafaxine XR (EFFEXOR-XR) 75 MG 24 hr capsule, TAKE 1 CAPSULE BY MOUTH DAILY, Disp: 90 capsule, Rfl: 1  •  vitamin C (ASCORBIC ACID) 500 MG tablet, Take 1,000 mg by mouth Daily. Hold DOS, Disp: , Rfl:   •  vitamin d (DIALYVITE VITAMIN D 5000) 5000 units capsule, Take 1 tablet by mouth Daily. Hold DOS, Disp: , Rfl:   No current facility-administered medications for this visit.    Aspirin use counseling: Taking ASA appropriately as indicated    Current medication list contains no high risk medications.  No harmful drug interactions have been identified.     Family History   Problem Relation Age of Onset   • Supraventricular tachycardia Mother    • Diabetes Mother         Type 2   •  Thyroid disease Mother    • Arthritis Mother    • Heart attack Father         Massive heart attack age 51   • Early death Father         massive heart attack at 51   • Hyperlipidemia Paternal Grandmother    • Stroke Maternal Grandfather    • Alcohol abuse Maternal Uncle    • Arthritis Maternal Uncle    • Hearing loss Maternal Uncle    • Heart disease Maternal Uncle    • Hypertension Maternal Uncle    • Arthritis Brother         rheumitoid   • Alcohol abuse Brother    • Drug abuse Brother         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   • Cancer Brother         testicular   • Depression Brother    • Hypertension Brother    • Arthritis Brother    • Heart disease Maternal Uncle    • Hypertension Maternal Uncle        Social History     Tobacco Use   • Smoking status: Never Smoker   • Smokeless tobacco: Never Used   Substance Use Topics   • Alcohol use: No       Past Surgical History:   Procedure Laterality Date   • BARIATRIC SURGERY  06/2010    lap band removed 2015   • CARDIAC CATHETERIZATION N/A 09/18/2019    Procedure: Left Heart Cath;  Surgeon: Jeronimo Kuhn MD;  Location: [facility] CATH INVASIVE LOCATION;  Service: Cardiovascular   • CARDIAC CATHETERIZATION N/A 09/18/2019    Procedure: Coronary angiography;  Surgeon: Jeronimo Kuhn MD;  Location: [facility] CATH INVASIVE LOCATION;  Service: Cardiovascular   • COLON SURGERY  12/06/2019    sigmoid colectomy with placement of loop ileostomy   • COLONOSCOPY     • COSMETIC SURGERY  2011    face lift after losing 100 lbs   • EXPLORATORY LAPAROTOMY N/A 12/13/2019    Procedure: LAPAROTOMY EXPLORATORY, DRAINAGE OF SUBCUTANEOUS AND SUBFASCIALABCESS;  Surgeon: Emily Lr MD;  Location: [facility] MAIN OR;  Service: General   • EXPLORATORY LAPAROTOMY N/A 12/02/2019    Procedure: sigmoid colectomy with placement of loop ileostomy;  Surgeon: Emily Lr MD;  Location: [facility] MAIN OR;  Service: General   • EYE SURGERY      lasix    • HERNIA REPAIR     • ILEOSTOMY CLOSURE N/A  "02/06/2020    Procedure: ILEOSTOMY TAKEDOWN, WOUND DEBRIDEMENT WITH MESH REMOVAL;  Surgeon: Emily Lr MD;  Location: [facility] MAIN OR;  Service: General;  Laterality: N/A;   • REPLACEMENT TOTAL KNEE Left Total Left Knee Feb 7 2022       • SIGMOIDOSCOPY N/A 02/06/2020    Procedure: SIGMOIDOSCOPY RIGID;  Surgeon: Emily Lr MD;  Location: [facility] MAIN OR;  Service: General;  Laterality: N/A;   • SUBTOTAL HYSTERECTOMY  25 years   • TONSILLECTOMY     • VENTRAL/INCISIONAL HERNIA REPAIR N/A 08/12/2021    Procedure: VENTRAL HERNIA WITH COMPONENT SEPARATION with mesh;  Surgeon: Emily Lr MD;  Location: [facility] MAIN OR;  Service: General;  Laterality: N/A;       Patient Active Problem List   Diagnosis   • Essential hypertension   • Carpal tunnel syndrome   • Depression   • Medicare annual wellness visit, initial   • Hyperlipidemia   • Hypothyroidism   • Osteopenia   • FERNANDO on CPAP   • Small bowel obstruction (HCC)   • Ventral hernia   • Vitamin D deficiency   • Need for hepatitis C screening test   • Need for 23-polyvalent pneumococcal polysaccharide vaccine       Review of Systems   Constitutional: Negative for activity change, fatigue and fever.   Respiratory: Negative for cough, shortness of breath and wheezing.    Cardiovascular: Negative for chest pain, palpitations and leg swelling.   Gastrointestinal: Negative for constipation, diarrhea and indigestion.   Skin: Negative for color change, dry skin and rash.   Neurological: Negative for tremors and headache.       Objective     Vitals:    03/16/22 1429   BP: 134/80   BP Location: Left arm   Patient Position: Sitting   Cuff Size: Adult   Pulse: 94   Resp: 16   Temp: 98.4 °F (36.9 °C)   SpO2: 97%   Weight: 96.1 kg (211 lb 12.8 oz)   Height: 160 cm (63\")   PainSc: 0-No pain       Patient's Body mass index is 37.52 kg/m². indicating that she is obese (BMI >30). Obesity-related health conditions include the following: hypertension, dyslipidemias and " osteoarthritis. Obesity is unchanged. BMI is is above average; BMI management plan is completed. We discussed portion control and increasing exercise..      No exam data present    The patient has no evidence of cognitve impairment.     Physical Exam  Vitals and nursing note reviewed.   Constitutional:       General: She is not in acute distress.     Appearance: Normal appearance. She is well-developed. She is not ill-appearing.   HENT:      Head: Normocephalic and atraumatic.   Cardiovascular:      Rate and Rhythm: Normal rate and regular rhythm.      Heart sounds: Normal heart sounds. No murmur heard.    No gallop.   Pulmonary:      Effort: Pulmonary effort is normal. No respiratory distress.      Breath sounds: Normal breath sounds. No wheezing, rhonchi or rales.   Chest:      Chest wall: No tenderness.   Musculoskeletal:      Cervical back: Normal range of motion and neck supple.   Neurological:      General: No focal deficit present.      Mental Status: She is alert and oriented to person, place, and time. Mental status is at baseline.   Psychiatric:         Mood and Affect: Mood normal.         Recent Lab Results:     Lab Results   Component Value Date    CHOL 256 (H) 12/14/2021    TRIG 180 (H) 12/14/2021    HDL 47 12/14/2021    VLDL 33 12/14/2021    LDLHDL 3.68 12/14/2021       Assessment/Plan   Age-appropriate Screening Schedule:  Refer to the list below for future screening recommendations based on patient's age, sex and/or medical conditions.      Health Maintenance   Topic Date Due   • TDAP/TD VACCINES (1 - Tdap) Never done   • ZOSTER VACCINE (1 of 2) 04/25/2022 (Originally 2/11/2005)   • DXA SCAN  04/08/2023   • MAMMOGRAM  05/14/2023   • INFLUENZA VACCINE  Completed   • LIPID PANEL  Discontinued       Medicare Risks and Personalized Health Plan:  Advance Directive Discussion  Breast Cancer/Mammogram Screening  Cardiovascular risk  Colon Cancer Screening  Dementia/Memory   Depression/Dysphoria  Diabetic  Lab Screening   Fall Risk  Immunizations Discussed/Encouraged (specific immunizations; Shingrix and COVID19 )  Obesity/Overweight   Osteoporosis Risk      CMS-Preventive Services Quick Reference  Medicare Preventive Services Addressed:  Annual Wellness Visit (AWV)  Bone Density Measurements  Cardiovascular Disease Screening Tests (may do this order every 5 years in beneficiaries without signs or symptoms of cardiovascular disease)  Colorectal Cancer Screening, Colonoscopy  Screening Mammography     Advance Care Planning:  ACP discussion was held with the patient during this visit. Patient has an advance directive (not in EMR), copy requested.    Diagnoses and all orders for this visit:    1. Medicare annual wellness visit, initial (Primary)    2. Essential hypertension  -     Comprehensive Metabolic Panel  -     Lipid Panel    3. Hyperlipidemia, unspecified hyperlipidemia type  -     Comprehensive Metabolic Panel  -     Lipid Panel    4. Hypothyroidism, unspecified type  -     TSH  -     T4, Free    5. Need for hepatitis C screening test  -     Hepatitis C Antibody    6. Need for 23-polyvalent pneumococcal polysaccharide vaccine  -     pneumococcal polysaccharide 23-valent (PNEUMOVAX-23) vaccine 0.5 mL    Medicare wellness exam was done today.  I reviewed her medical problems and her medications.  I also reviewed her previous labs and the new set of fasting blood work is being ordered.  Patient is currently recovering from the left knee replacement and she is still undergoing physical therapy and she is staying in touch with her orthopedist.  I also reviewed her health maintenance.  Her last mammogram was in 5/2021 and she gets these done through her gynecologist.  Her last colonoscopy was in 11/2019.  Her last DEXA scan was in 4/2021.  She is fully vaccinated against COVID-19 including her booster shot.  She had Prevnar previously and she was given Pneumovax today.  We also discussed Shingrix vaccination, which was  recommended and she was advised to check with her pharmacy.  Healthy lifestyle was reinforced.    An After Visit Summary and PPPS with all of these plans were given to the patient.      Follow Up:  Return in about 6 months (around 9/16/2022) for Next scheduled follow up.        Requested Prescriptions      No prescriptions requested or ordered in this encounter

## 2022-03-22 ENCOUNTER — LAB (OUTPATIENT)
Dept: FAMILY MEDICINE CLINIC | Facility: CLINIC | Age: 67
End: 2022-03-22

## 2022-03-22 ENCOUNTER — PATIENT ROUNDING (BHMG ONLY) (OUTPATIENT)
Dept: FAMILY MEDICINE CLINIC | Facility: CLINIC | Age: 67
End: 2022-03-22

## 2022-03-22 LAB
ALBUMIN SERPL-MCNC: 4.5 G/DL (ref 3.5–5.2)
ALBUMIN/GLOB SERPL: 1.7 G/DL
ALP SERPL-CCNC: 108 U/L (ref 39–117)
ALT SERPL W P-5'-P-CCNC: 13 U/L (ref 1–33)
ANION GAP SERPL CALCULATED.3IONS-SCNC: 8.8 MMOL/L (ref 5–15)
AST SERPL-CCNC: 18 U/L (ref 1–32)
BILIRUB SERPL-MCNC: 0.3 MG/DL (ref 0–1.2)
BUN SERPL-MCNC: 16 MG/DL (ref 8–23)
BUN/CREAT SERPL: 22.5 (ref 7–25)
CALCIUM SPEC-SCNC: 9.4 MG/DL (ref 8.6–10.5)
CHLORIDE SERPL-SCNC: 105 MMOL/L (ref 98–107)
CHOLEST SERPL-MCNC: 156 MG/DL (ref 0–200)
CO2 SERPL-SCNC: 26.2 MMOL/L (ref 22–29)
CREAT SERPL-MCNC: 0.71 MG/DL (ref 0.57–1)
EGFRCR SERPLBLD CKD-EPI 2021: 93.3 ML/MIN/1.73
GLOBULIN UR ELPH-MCNC: 2.6 GM/DL
GLUCOSE SERPL-MCNC: 99 MG/DL (ref 65–99)
HCV AB SER DONR QL: NORMAL
HDLC SERPL-MCNC: 45 MG/DL (ref 40–60)
LDLC SERPL CALC-MCNC: 95 MG/DL (ref 0–100)
LDLC/HDLC SERPL: 2.08 {RATIO}
POTASSIUM SERPL-SCNC: 4.2 MMOL/L (ref 3.5–5.2)
PROT SERPL-MCNC: 7.1 G/DL (ref 6–8.5)
SODIUM SERPL-SCNC: 140 MMOL/L (ref 136–145)
T4 FREE SERPL-MCNC: 1.89 NG/DL (ref 0.93–1.7)
TRIGL SERPL-MCNC: 86 MG/DL (ref 0–150)
TSH SERPL DL<=0.05 MIU/L-ACNC: 4.7 UIU/ML (ref 0.27–4.2)
VLDLC SERPL-MCNC: 16 MG/DL (ref 5–40)

## 2022-03-22 PROCEDURE — 84443 ASSAY THYROID STIM HORMONE: CPT | Performed by: FAMILY MEDICINE

## 2022-03-22 PROCEDURE — 80061 LIPID PANEL: CPT | Performed by: FAMILY MEDICINE

## 2022-03-22 PROCEDURE — 80053 COMPREHEN METABOLIC PANEL: CPT | Performed by: FAMILY MEDICINE

## 2022-03-22 PROCEDURE — 36415 COLL VENOUS BLD VENIPUNCTURE: CPT | Performed by: FAMILY MEDICINE

## 2022-03-22 PROCEDURE — 86803 HEPATITIS C AB TEST: CPT | Performed by: FAMILY MEDICINE

## 2022-03-22 PROCEDURE — 84439 ASSAY OF FREE THYROXINE: CPT | Performed by: FAMILY MEDICINE

## 2022-03-22 NOTE — PROGRESS NOTES
March 22, 2022    Hello, may I speak with Nallely Figueroa?    MY CHART MESSAGE SENT TO PATIENT.    I am  with PAR[facility] SAPP [facility]  [address] [place] [address] [place] [id number].

## 2022-03-23 ENCOUNTER — TELEPHONE (OUTPATIENT)
Dept: FAMILY MEDICINE CLINIC | Facility: CLINIC | Age: 67
End: 2022-03-23

## 2022-03-23 NOTE — TELEPHONE ENCOUNTER
Pt returning call due to lab results, states she is on the same dose of thyroid meds for quite some time.  She says she also takes Biotin and she has been told that can cause change in results.  She would like to know if she should discontinue the Biotin or just stop it a few days prior to future lab draws?

## 2022-03-23 NOTE — PROGRESS NOTES
Patient wanted you to know that she has been taking Biotin, should she continue taking it, or stop taking it before next labs?    03/23/22   10:20am

## 2022-03-23 NOTE — TELEPHONE ENCOUNTER
Please advise the patient to stop taking biotin at least 1 month before her next blood work.  Thank you.

## 2022-04-20 ENCOUNTER — APPOINTMENT (OUTPATIENT)
Dept: WOMENS IMAGING | Facility: HOSPITAL | Age: 67
End: 2022-04-20

## 2022-04-20 PROCEDURE — 77067 SCR MAMMO BI INCL CAD: CPT | Performed by: RADIOLOGY

## 2022-04-20 PROCEDURE — 77063 BREAST TOMOSYNTHESIS BI: CPT | Performed by: RADIOLOGY

## 2022-04-29 RX ORDER — VENLAFAXINE HYDROCHLORIDE 75 MG/1
75 CAPSULE, EXTENDED RELEASE ORAL DAILY
Qty: 90 CAPSULE | Refills: 1 | OUTPATIENT
Start: 2022-04-29

## 2022-04-29 RX ORDER — LEVOTHYROXINE SODIUM 112 UG/1
112 TABLET ORAL DAILY
Qty: 90 TABLET | Refills: 1 | Status: SHIPPED | OUTPATIENT
Start: 2022-04-29 | End: 2022-09-16 | Stop reason: SDUPTHER

## 2022-04-29 RX ORDER — LEVOTHYROXINE SODIUM 112 UG/1
112 TABLET ORAL DAILY
Qty: 90 TABLET | Refills: 1 | Status: SHIPPED | OUTPATIENT
Start: 2022-04-29 | End: 2022-05-10

## 2022-05-09 RX ORDER — AMLODIPINE BESYLATE 5 MG/1
5 TABLET ORAL DAILY
Qty: 90 TABLET | Refills: 1 | Status: SHIPPED | OUTPATIENT
Start: 2022-05-09 | End: 2022-08-09

## 2022-05-10 RX ORDER — VENLAFAXINE HYDROCHLORIDE 75 MG/1
75 CAPSULE, EXTENDED RELEASE ORAL DAILY
Qty: 90 CAPSULE | Refills: 1 | Status: SHIPPED | OUTPATIENT
Start: 2022-05-10 | End: 2022-06-07 | Stop reason: SDUPTHER

## 2022-05-10 RX ORDER — VENLAFAXINE HYDROCHLORIDE 75 MG/1
75 CAPSULE, EXTENDED RELEASE ORAL DAILY
Qty: 90 CAPSULE | Refills: 1 | OUTPATIENT
Start: 2022-05-10

## 2022-05-10 NOTE — TELEPHONE ENCOUNTER
PATIENT SAID SHE THOUGHT HER MYCHART MESSAGES WERE GOING TO DR SUMNER. I ADVISED HER TO GIVE THEM A CALL AND TO UPDATE HER PCP WITH THE PHARMACY.

## 2022-06-07 RX ORDER — ATORVASTATIN CALCIUM 20 MG/1
20 TABLET, FILM COATED ORAL DAILY
Qty: 90 TABLET | Refills: 1 | Status: SHIPPED | OUTPATIENT
Start: 2022-06-07 | End: 2022-12-03

## 2022-06-07 RX ORDER — VENLAFAXINE HYDROCHLORIDE 75 MG/1
75 CAPSULE, EXTENDED RELEASE ORAL DAILY
Qty: 90 CAPSULE | Refills: 1 | Status: SHIPPED | OUTPATIENT
Start: 2022-06-07 | End: 2022-11-13

## 2022-08-09 RX ORDER — AMLODIPINE BESYLATE 5 MG/1
5 TABLET ORAL DAILY
Qty: 90 TABLET | Refills: 1 | Status: SHIPPED | OUTPATIENT
Start: 2022-08-09 | End: 2022-09-16 | Stop reason: SDUPTHER

## 2022-09-09 ENCOUNTER — TELEMEDICINE (OUTPATIENT)
Dept: FAMILY MEDICINE CLINIC | Facility: CLINIC | Age: 67
End: 2022-09-09

## 2022-09-09 DIAGNOSIS — J06.9 UPPER RESPIRATORY TRACT INFECTION DUE TO COVID-19 VIRUS: Primary | ICD-10-CM

## 2022-09-09 DIAGNOSIS — U07.1 UPPER RESPIRATORY TRACT INFECTION DUE TO COVID-19 VIRUS: Primary | ICD-10-CM

## 2022-09-09 PROCEDURE — 99213 OFFICE O/P EST LOW 20 MIN: CPT | Performed by: FAMILY MEDICINE

## 2022-09-09 NOTE — PROGRESS NOTES
Subjective   Chief Complaint   Patient presents with   • URI   • Nasal Congestion     Tested positive for COVID-19 today in the morning     Nallely Figueroa is a 67 y.o. female.     Patient Care Team:  Erin Quintana MD as PCP - General (Family Medicine)  Kellen Muller MD as Consulting Physician (Obstetrics and Gynecology)  Kana Sneed MD as Consulting Physician (Orthopedic Surgery)  Emily Lr MD as Consulting Physician (Bariatrics)  Vivienne Clifford MD as Consulting Physician (Sleep Medicine)     You have chosen to receive care through a telehealth visit.  Do you consent to use a video/audio connection for your medical care today? Yes    Patient is being evaluated today through telehealth medicine appointment via the video in view of COVID-19 pandemic.  Connection is being achieved through [employer].  We will can see and hear each other well.  Patient is at home and I am in my office during this encounter.  She reports that overnight she started developing some congestion and called feelings.  She tested herself for COVID-19 earlier in the morning and the test was positive.  She has been having some sneezing and some drainage.  She has had some mild headache, but this is better now.  No fever, chest pains, or difficulty breathing are being reported.  She is vaccinated and double boosted against COVID-19 and her last shot was about 2 weeks ago.       The following portions of the patient's history were reviewed and updated as appropriate: allergies, current medications, past family history, past medical history, past social history, past surgical history and problem list.  Past Medical History:   Diagnosis Date   • Anxiety maybe 25 years   • Arthritis 2 years   • Carpal tunnel syndrome    • Cataract 1 year    had surgery 1 year ago   • Depression probably 20 years    take medication for mood disorders   • Disease of thyroid gland    • Gastroesophageal reflux disease    • Hyperlipidemia 2021   •  Hypertension    • Hypothyroidism maybe 40 years    take medication   • Obesity off and on most of life   • Osteopenia maybe 5 years    Osteopenia   • Osteopenia    • Sleep apnea     no machine    lost wt      Past Surgical History:   Procedure Laterality Date   • BARIATRIC SURGERY  06/2010    lap band removed 2015   • CARDIAC CATHETERIZATION N/A 09/18/2019    Procedure: Left Heart Cath;  Surgeon: Jeronimo Kuhn MD;  Location: [facility] CATH INVASIVE LOCATION;  Service: Cardiovascular   • CARDIAC CATHETERIZATION N/A 09/18/2019    Procedure: Coronary angiography;  Surgeon: Jeronimo Kuhn MD;  Location: [facility] CATH INVASIVE LOCATION;  Service: Cardiovascular   • COLON SURGERY  12/06/2019    sigmoid colectomy with placement of loop ileostomy   • COLONOSCOPY     • COSMETIC SURGERY  2011    face lift after losing 100 lbs   • EXPLORATORY LAPAROTOMY N/A 12/13/2019    Procedure: LAPAROTOMY EXPLORATORY, DRAINAGE OF SUBCUTANEOUS AND SUBFASCIALABCESS;  Surgeon: Emily Lr MD;  Location: [facility] OR;  Service: General   • EXPLORATORY LAPAROTOMY N/A 12/02/2019    Procedure: sigmoid colectomy with placement of loop ileostomy;  Surgeon: Emily Lr MD;  Location: [facility] OR;  Service: General   • EYE SURGERY      lasix    • HERNIA REPAIR     • ILEOSTOMY CLOSURE N/A 02/06/2020    Procedure: ILEOSTOMY TAKEDOWN, WOUND DEBRIDEMENT WITH MESH REMOVAL;  Surgeon: Emily Lr MD;  Location: [facility] OR;  Service: General;  Laterality: N/A;   • REPLACEMENT TOTAL KNEE Left Total Left Knee Feb 7 2022       • SIGMOIDOSCOPY N/A 02/06/2020    Procedure: SIGMOIDOSCOPY RIGID;  Surgeon: Emily Lr MD;  Location: [facility] OR;  Service: General;  Laterality: N/A;   • SUBTOTAL HYSTERECTOMY  25 years   • TONSILLECTOMY     • VENTRAL/INCISIONAL HERNIA REPAIR N/A 08/12/2021    Procedure: VENTRAL HERNIA WITH COMPONENT SEPARATION with mesh;  Surgeon: Emily Lr MD;  Location: [facility] OR;  Service: General;   Laterality: N/A;     The patient has a family history of  Family History   Problem Relation Age of Onset   • Supraventricular tachycardia Mother    • Diabetes Mother         Type 2   • Thyroid disease Mother    • Arthritis Mother    • Heart attack Father         Massive heart attack age 51   • Early death Father         massive heart attack at 51   • Hyperlipidemia Paternal Grandmother    • Stroke Maternal Grandfather    • Alcohol abuse Maternal Uncle    • Arthritis Maternal Uncle    • Hearing loss Maternal Uncle    • Heart disease Maternal Uncle    • Hypertension Maternal Uncle    • Arthritis Brother         rheumitoid   • Alcohol abuse Brother    • Drug abuse Brother         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   • Cancer Brother         testicular   • Depression Brother    • Hypertension Brother    • Arthritis Brother    • Heart disease Maternal Uncle    • Hypertension Maternal Uncle      Social History     Socioeconomic History   • Marital status:    Tobacco Use   • Smoking status: Never Smoker   • Smokeless tobacco: Never Used   Vaping Use   • Vaping Use: Never used   Substance and Sexual Activity   • Alcohol use: No   • Drug use: No   • Sexual activity: Not Currently     Birth control/protection: None     Comment: Not sexually active since   2000       Review of Systems   Constitutional: Negative for activity change, appetite change, chills and fever.   HENT: Positive for congestion and postnasal drip. Negative for ear pain, sinus pressure, sore throat and swollen glands.    Respiratory: Negative for cough, choking, chest tightness, shortness of breath, wheezing and stridor.    Cardiovascular: Negative for chest pain.   Skin: Negative for dry skin and rash.   Neurological: Positive for headache.     There were no vitals taken for this visit.    Current Outpatient Medications:   •  amLODIPine (NORVASC) 5 MG tablet, TAKE 1 TABLET BY MOUTH DAILY, Disp: 90 tablet, Rfl: 1  •  aspirin 81 MG EC  tablet, Take 81 mg by mouth Daily. LD 8/7, Disp: , Rfl:   •  atorvastatin (Lipitor) 20 MG tablet, Take 1 tablet by mouth Daily., Disp: 90 tablet, Rfl: 1  •  calcium carbonate (OS-SONNY) 600 MG tablet, Take 600 mg by mouth 2 (Two) Times a Day With Meals., Disp: , Rfl:   •  CRANBERRY PO, Take 1 tablet by mouth Daily. LD 8/9, Disp: , Rfl:   •  levothyroxine (SYNTHROID, LEVOTHROID) 112 MCG tablet, TAKE 1 TABLET BY MOUTH DAILY, Disp: 90 tablet, Rfl: 1  •  Multiple Vitamins-Minerals (DAILY MULTIVITAMIN) capsule, Take 1 tablet by mouth Daily. LD 8/7, Disp: , Rfl:   •  Omega-3 Fatty Acids (CVS FISH OIL) 1200 MG capsule, Take 2,400 mg by mouth Daily. LD 8/7, Disp: , Rfl:   •  pantoprazole (PROTONIX) 40 MG EC tablet, Take 40 mg by mouth Daily., Disp: , Rfl:   •  polyethylene glycol (MIRALAX) packet, Take 17 g by mouth Daily. Hold DOS, Disp: , Rfl:   •  Probiotic Product (CVS PROBIOTIC PEARLS EX ST) capsule, Take 1 tablet by mouth Daily. Hold DOS, Disp: , Rfl:   •  venlafaxine XR (EFFEXOR-XR) 75 MG 24 hr capsule, Take 1 capsule by mouth Daily., Disp: 90 capsule, Rfl: 1  •  vitamin C (ASCORBIC ACID) 500 MG tablet, Take 1,000 mg by mouth Daily. Hold DOS, Disp: , Rfl:   •  vitamin d (DIALYVITE VITAMIN D 5000) 5000 units capsule, Take 1 tablet by mouth Daily. Hold DOS, Disp: , Rfl:     Objective   Physical Exam  Constitutional:       General: She is not in acute distress.     Appearance: Normal appearance. She is well-developed. She is not ill-appearing or diaphoretic.      Comments: Patient is in no distress, patient has normal voice and speech.  Normal respiratory effort.   HENT:      Head: Normocephalic and atraumatic.   Pulmonary:      Effort: Pulmonary effort is normal.   Musculoskeletal:      Cervical back: Normal range of motion and neck supple.   Neurological:      General: No focal deficit present.      Mental Status: She is alert and oriented to person, place, and time. Mental status is at baseline.   Psychiatric:          Mood and Affect: Mood normal.         Assessment & Plan   Diagnoses and all orders for this visit:    1. Upper respiratory tract infection due to COVID-19 virus (Primary)      I reviewed her symptoms and concerns.  Her symptoms just started today and are fairly mild.  Symptomatic treatment with over-the-counter medications like Tylenol and ibuprofen was discussed and recommended.  We also discussed treatment options specifically designated for COVID-19 infection including Paxlovid, however considering her mild symptoms she prefers to just wait and monitor to symptoms.  She is to contact us back if any concerns or any worsening.  Isolation was recommended for the minimum of 5 days.        Return if symptoms worsen or fail to improve, for Recheck.    Requested Prescriptions      No prescriptions requested or ordered in this encounter

## 2022-09-16 ENCOUNTER — OFFICE VISIT (OUTPATIENT)
Dept: FAMILY MEDICINE CLINIC | Facility: CLINIC | Age: 67
End: 2022-09-16

## 2022-09-16 ENCOUNTER — LAB (OUTPATIENT)
Dept: FAMILY MEDICINE CLINIC | Facility: CLINIC | Age: 67
End: 2022-09-16

## 2022-09-16 VITALS
SYSTOLIC BLOOD PRESSURE: 151 MMHG | HEART RATE: 88 BPM | WEIGHT: 206.4 LBS | DIASTOLIC BLOOD PRESSURE: 93 MMHG | HEIGHT: 63 IN | TEMPERATURE: 97.5 F | RESPIRATION RATE: 16 BRPM | BODY MASS INDEX: 36.57 KG/M2 | OXYGEN SATURATION: 98 %

## 2022-09-16 DIAGNOSIS — E78.2 MIXED HYPERLIPIDEMIA: ICD-10-CM

## 2022-09-16 DIAGNOSIS — M25.562 CHRONIC PAIN OF LEFT KNEE: ICD-10-CM

## 2022-09-16 DIAGNOSIS — I10 ESSENTIAL HYPERTENSION: Primary | ICD-10-CM

## 2022-09-16 DIAGNOSIS — E03.9 ACQUIRED HYPOTHYROIDISM: ICD-10-CM

## 2022-09-16 DIAGNOSIS — G89.29 CHRONIC PAIN OF LEFT KNEE: ICD-10-CM

## 2022-09-16 PROBLEM — Z11.59 NEED FOR HEPATITIS C SCREENING TEST: Status: RESOLVED | Noted: 2022-03-16 | Resolved: 2022-09-16

## 2022-09-16 PROBLEM — J06.9 UPPER RESPIRATORY TRACT INFECTION DUE TO COVID-19 VIRUS: Status: RESOLVED | Noted: 2022-09-09 | Resolved: 2022-09-16

## 2022-09-16 PROBLEM — U07.1 UPPER RESPIRATORY TRACT INFECTION DUE TO COVID-19 VIRUS: Status: RESOLVED | Noted: 2022-09-09 | Resolved: 2022-09-16

## 2022-09-16 PROBLEM — Z23 NEED FOR 23-POLYVALENT PNEUMOCOCCAL POLYSACCHARIDE VACCINE: Status: RESOLVED | Noted: 2022-03-16 | Resolved: 2022-09-16

## 2022-09-16 PROBLEM — K56.609 SMALL BOWEL OBSTRUCTION: Status: RESOLVED | Noted: 2018-04-30 | Resolved: 2022-09-16

## 2022-09-16 LAB
BACTERIA UR QL AUTO: NORMAL /HPF
BILIRUB UR QL STRIP: NEGATIVE
CLARITY UR: CLEAR
COLOR UR: YELLOW
GLUCOSE UR STRIP-MCNC: NEGATIVE MG/DL
HGB UR QL STRIP.AUTO: ABNORMAL
HYALINE CASTS UR QL AUTO: NORMAL /LPF
KETONES UR QL STRIP: NEGATIVE
LEUKOCYTE ESTERASE UR QL STRIP.AUTO: ABNORMAL
NITRITE UR QL STRIP: NEGATIVE
PH UR STRIP.AUTO: 7.5 [PH] (ref 5–8)
PROT UR QL STRIP: NEGATIVE
RBC # UR STRIP: NORMAL /HPF
REF LAB TEST METHOD: NORMAL
SP GR UR STRIP: <=1.005 (ref 1–1.03)
SQUAMOUS #/AREA URNS HPF: NORMAL /HPF
T4 FREE SERPL-MCNC: 1.62 NG/DL (ref 0.93–1.7)
TSH SERPL DL<=0.05 MIU/L-ACNC: 2.5 UIU/ML (ref 0.27–4.2)
UROBILINOGEN UR QL STRIP: ABNORMAL
WBC # UR STRIP: NORMAL /HPF

## 2022-09-16 PROCEDURE — 99214 OFFICE O/P EST MOD 30 MIN: CPT | Performed by: FAMILY MEDICINE

## 2022-09-16 PROCEDURE — 84443 ASSAY THYROID STIM HORMONE: CPT | Performed by: FAMILY MEDICINE

## 2022-09-16 PROCEDURE — 84439 ASSAY OF FREE THYROXINE: CPT | Performed by: FAMILY MEDICINE

## 2022-09-16 PROCEDURE — 81001 URINALYSIS AUTO W/SCOPE: CPT | Performed by: FAMILY MEDICINE

## 2022-09-16 PROCEDURE — 36415 COLL VENOUS BLD VENIPUNCTURE: CPT | Performed by: FAMILY MEDICINE

## 2022-09-16 RX ORDER — AMLODIPINE BESYLATE 5 MG/1
5 TABLET ORAL DAILY
Qty: 90 TABLET | Refills: 1 | Status: SHIPPED | OUTPATIENT
Start: 2022-09-16 | End: 2023-02-06 | Stop reason: SDUPTHER

## 2022-09-16 RX ORDER — ASPIRIN 325 MG
TABLET, DELAYED RELEASE (ENTERIC COATED) ORAL
COMMUNITY
Start: 2022-09-12 | End: 2022-09-16

## 2022-09-16 RX ORDER — LEVOTHYROXINE SODIUM 112 UG/1
112 TABLET ORAL DAILY
Qty: 90 TABLET | Refills: 1 | Status: SHIPPED | OUTPATIENT
Start: 2022-09-16 | End: 2023-02-24 | Stop reason: SDUPTHER

## 2022-09-16 NOTE — PROGRESS NOTES
Subjective   Chief Complaint   Patient presents with   • Hypertension     6 month follow up/ surgery clearance   • Hypothyroidism   • Hyperlipidemia   • Med Refill   • Depression     Nallely Figueroa is a 67 y.o. female.     Patient Care Team:  Erin Quintana MD as PCP - General (Family Medicine)  Kellen Muller MD as Consulting Physician (Obstetrics and Gynecology)  Kana Sneed MD as Consulting Physician (Orthopedic Surgery)  Emily Lr MD as Consulting Physician (Bariatrics)  Vivienne Clifford MD as Consulting Physician (Sleep Medicine)    History of Present Illness  She is coming in today to follow-up on her chronic medical problems and her medications including hypertension, hyperlipidemia, hypothyroidism, and depression.  We are also doing preop clearance before her upcoming left knee arthroscopic surgery which is scheduled to be done on 10/3/2022.  She reports taking all of her medicines as directed and she denies any issues or side effects.  She had a left knee replacement in 2/2022 and was doing well, she tells me that in 5/2022 she took a fall, since then her knee has been hurting and feels unstable.  She has had several falls due to her knee giving away on her. Patient does not report any chest pain, shortness of breath, dizziness, nausea, vomiting, or diarrhea, visual issues, headaches, numbness or tingling. No urinary issues reported like urgency, frequency, or discomfort upon urination.  No significant weight changes reported.  No swelling reported.  No rashes or any other skin issues reported. No emotional issues or insomnia.       The following portions of the patient's history were reviewed and updated as appropriate: allergies, current medications, past family history, past medical history, past social history, past surgical history and problem list.  Past Medical History:   Diagnosis Date   • Anxiety maybe 25 years   • Arthritis 2 years   • Carpal tunnel syndrome    • Cataract 1  year    had surgery 1 year ago   • Depression probably 20 years    take medication for mood disorders   • Disease of thyroid gland    • Gastroesophageal reflux disease    • Hyperlipidemia 2021   • Hypertension    • Hypothyroidism maybe 40 years    take medication   • Obesity off and on most of life   • Osteopenia maybe 5 years    Osteopenia   • Osteopenia    • Sleep apnea     no machine    lost wt      Past Surgical History:   Procedure Laterality Date   • BARIATRIC SURGERY  06/2010    lap band removed 2015   • CARDIAC CATHETERIZATION N/A 09/18/2019    Procedure: Left Heart Cath;  Surgeon: Jeronimo Kuhn MD;  Location: [facility] CATH INVASIVE LOCATION;  Service: Cardiovascular   • CARDIAC CATHETERIZATION N/A 09/18/2019    Procedure: Coronary angiography;  Surgeon: Jeronimo Kuhn MD;  Location: [facility] CATH INVASIVE LOCATION;  Service: Cardiovascular   • COLON SURGERY  12/06/2019    sigmoid colectomy with placement of loop ileostomy   • COLONOSCOPY     • COSMETIC SURGERY  2011    face lift after losing 100 lbs   • EXPLORATORY LAPAROTOMY N/A 12/13/2019    Procedure: LAPAROTOMY EXPLORATORY, DRAINAGE OF SUBCUTANEOUS AND SUBFASCIALABCESS;  Surgeon: Emily Lr MD;  Location: [facility] MAIN OR;  Service: General   • EXPLORATORY LAPAROTOMY N/A 12/02/2019    Procedure: sigmoid colectomy with placement of loop ileostomy;  Surgeon: Emily Lr MD;  Location: [facility] MAIN OR;  Service: General   • EYE SURGERY      lasix    • HERNIA REPAIR     • ILEOSTOMY CLOSURE N/A 02/06/2020    Procedure: ILEOSTOMY TAKEDOWN, WOUND DEBRIDEMENT WITH MESH REMOVAL;  Surgeon: Emily Lr MD;  Location: [facility] MAIN OR;  Service: General;  Laterality: N/A;   • REPLACEMENT TOTAL KNEE Left Total Left Knee Feb 7 2022       • SIGMOIDOSCOPY N/A 02/06/2020    Procedure: SIGMOIDOSCOPY RIGID;  Surgeon: Emily Lr MD;  Location: [facility] MAIN OR;  Service: General;  Laterality: N/A;   • SUBTOTAL HYSTERECTOMY  25 years   • TONSILLECTOMY      • VENTRAL/INCISIONAL HERNIA REPAIR N/A 2021    Procedure: VENTRAL HERNIA WITH COMPONENT SEPARATION with mesh;  Surgeon: Emily Lr MD;  Location: [facility] MAIN OR;  Service: General;  Laterality: N/A;     The patient has a family history of  Family History   Problem Relation Age of Onset   • Supraventricular tachycardia Mother    • Diabetes Mother         Type 2   • Thyroid disease Mother    • Arthritis Mother    • Heart attack Father         Massive heart attack age 51   • Early death Father         massive heart attack at 51   • Hyperlipidemia Paternal Grandmother    • Stroke Maternal Grandfather    • Alcohol abuse Maternal Uncle    • Arthritis Maternal Uncle    • Hearing loss Maternal Uncle    • Heart disease Maternal Uncle    • Hypertension Maternal Uncle    • Arthritis Brother         rheumitoid   • Alcohol abuse Brother    • Drug abuse Brother         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   • Cancer Brother         testicular   • Depression Brother    • Hypertension Brother    • Arthritis Brother    • Heart disease Maternal Uncle    • Hypertension Maternal Uncle      Social History     Socioeconomic History   • Marital status:    Tobacco Use   • Smoking status: Never Smoker   • Smokeless tobacco: Never Used   Vaping Use   • Vaping Use: Never used   Substance and Sexual Activity   • Alcohol use: No   • Drug use: No   • Sexual activity: Not Currently     Birth control/protection: None     Comment: Not sexually active since   2000       Review of Systems   Constitutional: Negative for activity change, fatigue and fever.   Respiratory: Negative for cough, shortness of breath and wheezing.    Cardiovascular: Negative for chest pain, palpitations and leg swelling.   Gastrointestinal: Negative for constipation, diarrhea and indigestion.   Musculoskeletal: Positive for arthralgias and gait problem.   Skin: Negative for color change, dry skin and rash.   Neurological: Negative for tremors and  "headache.     Visit Vitals  /93 (BP Location: Left arm, Patient Position: Sitting, Cuff Size: Adult)   Pulse 88   Temp 97.5 °F (36.4 °C)   Resp 16   Ht 160 cm (62.99\")   Wt 93.6 kg (206 lb 6.4 oz)   SpO2 98%   BMI 36.57 kg/m²       Current Outpatient Medications:   •  amLODIPine (NORVASC) 5 MG tablet, Take 1 tablet by mouth Daily., Disp: 90 tablet, Rfl: 1  •  aspirin 81 MG EC tablet, Take 81 mg by mouth Daily. LD 8/7, Disp: , Rfl:   •  atorvastatin (Lipitor) 20 MG tablet, Take 1 tablet by mouth Daily., Disp: 90 tablet, Rfl: 1  •  calcium carbonate (OS-SONNY) 600 MG tablet, Take 600 mg by mouth 2 (Two) Times a Day With Meals., Disp: , Rfl:   •  CRANBERRY PO, Take 1 tablet by mouth Daily. LD 8/9, Disp: , Rfl:   •  levothyroxine (SYNTHROID, LEVOTHROID) 112 MCG tablet, Take 1 tablet by mouth Daily., Disp: 90 tablet, Rfl: 1  •  Multiple Vitamins-Minerals (DAILY MULTIVITAMIN) capsule, Take 1 tablet by mouth Daily. LD 8/7, Disp: , Rfl:   •  Omega-3 Fatty Acids ([employer] FISH OIL) 1200 MG capsule, Take 2,400 mg by mouth Daily. LD 8/7, Disp: , Rfl:   •  pantoprazole (PROTONIX) 40 MG EC tablet, Take 40 mg by mouth Daily., Disp: , Rfl:   •  polyethylene glycol (MIRALAX) packet, Take 17 g by mouth Daily. Hold DOS, Disp: , Rfl:   •  Probiotic Product ([employer]S PROBIOTIC PEARLS EX ST) capsule, Take 1 tablet by mouth Daily. Hold DOS, Disp: , Rfl:   •  venlafaxine XR (EFFEXOR-XR) 75 MG 24 hr capsule, Take 1 capsule by mouth Daily., Disp: 90 capsule, Rfl: 1  •  vitamin C (ASCORBIC ACID) 500 MG tablet, Take 1,000 mg by mouth Daily. Hold DOS, Disp: , Rfl:   •  vitamin d (DIALYVITE VITAMIN D 5000) 5000 units capsule, Take 1 tablet by mouth Daily. Hold DOS, Disp: , Rfl:     Objective   Physical Exam  Vitals and nursing note reviewed.   Constitutional:       General: She is not in acute distress.     Appearance: Normal appearance. She is well-developed. She is not ill-appearing.   HENT:      Head: Normocephalic and atraumatic. "   Cardiovascular:      Rate and Rhythm: Normal rate and regular rhythm.      Heart sounds: Murmur heard.     No gallop.   Pulmonary:      Effort: Pulmonary effort is normal. No respiratory distress.      Breath sounds: Normal breath sounds. No wheezing, rhonchi or rales.   Chest:      Chest wall: No tenderness.   Musculoskeletal:      Cervical back: Normal range of motion and neck supple.   Neurological:      General: No focal deficit present.      Mental Status: She is alert and oriented to person, place, and time. Mental status is at baseline.   Psychiatric:         Mood and Affect: Mood normal.         FOLLOWING LABS WERE REVIEWED TODAY:  CMP    CMP 3/22/22   Glucose 99   BUN 16   Creatinine 0.71   Sodium 140   Potassium 4.2   Chloride 105   Calcium 9.4   Albumin 4.50   Total Bilirubin 0.3   Alkaline Phosphatase 108   AST (SGOT) 18   ALT (SGPT) 13           Lipid Panel    Lipid Panel 12/14/21 3/22/22   Total Cholesterol 256 (A) 156   Triglycerides 180 (A) 86   HDL Cholesterol 47 45   VLDL Cholesterol 33 16   LDL Cholesterol  176 (A) 95   LDL/HDL Ratio 3.68 2.08   (A) Abnormal value            TSH    TSH 12/14/21 3/22/22   TSH 3.640 4.700 (A)   (A) Abnormal value            Diagnoses and all orders for this visit:    1. Essential hypertension (Primary)  -     Cancel: Comprehensive Metabolic Panel  -     Cancel: Lipid Panel  -     Cancel: CBC Auto Differential  -     Urinalysis With Culture If Indicated - Urine, Clean Catch  -     amLODIPine (NORVASC) 5 MG tablet; Take 1 tablet by mouth Daily.  Dispense: 90 tablet; Refill: 1    2. Mixed hyperlipidemia  -     Cancel: Comprehensive Metabolic Panel  -     Cancel: Lipid Panel    3. Acquired hypothyroidism  -     Cancel: Comprehensive Metabolic Panel  -     T4, Free  -     TSH  -     levothyroxine (SYNTHROID, LEVOTHROID) 112 MCG tablet; Take 1 tablet by mouth Daily.  Dispense: 90 tablet; Refill: 1    4. Chronic pain of left knee      I reviewed her medical problems and  her medications.  I also reviewed her preop blood work results which patient provided today.  This was done on 9/14/2022 including CBC and CMP.  Results are being scanned into the patient's chart.  Her blood pressure is noted to be elevated today, but she reports getting good readings at home and she will continue to monitor.  Medication refill was given.  Surgical clearance was given.        Return in about 6 months (around 3/16/2023) for Medicare Wellness.    Requested Prescriptions     Signed Prescriptions Disp Refills   • amLODIPine (NORVASC) 5 MG tablet 90 tablet 1     Sig: Take 1 tablet by mouth Daily.   • levothyroxine (SYNTHROID, LEVOTHROID) 112 MCG tablet 90 tablet 1     Sig: Take 1 tablet by mouth Daily.

## 2022-10-01 NOTE — PROGRESS NOTES
S/p sigmoid colectomy, had wound infection with wound vac and ileostomy      afvss      Wound granulating well, stoma skin inflamed    A/p    Home health to see  See next week  May try putting cellophane down first, then stoma bag.   
Patient/Caregiver provided printed discharge information.

## 2022-11-10 RX ORDER — PANTOPRAZOLE SODIUM 40 MG/1
40 TABLET, DELAYED RELEASE ORAL DAILY
Qty: 90 TABLET | Refills: 1 | Status: SHIPPED | OUTPATIENT
Start: 2022-11-10 | End: 2023-01-26 | Stop reason: SDUPTHER

## 2022-11-13 RX ORDER — VENLAFAXINE HYDROCHLORIDE 75 MG/1
75 CAPSULE, EXTENDED RELEASE ORAL DAILY
Qty: 90 CAPSULE | Refills: 1 | Status: SHIPPED | OUTPATIENT
Start: 2022-11-13 | End: 2023-01-26 | Stop reason: SDUPTHER

## 2022-12-03 RX ORDER — ATORVASTATIN CALCIUM 20 MG/1
20 TABLET, FILM COATED ORAL DAILY
Qty: 90 TABLET | Refills: 1 | Status: SHIPPED | OUTPATIENT
Start: 2022-12-03 | End: 2023-02-20 | Stop reason: SDUPTHER

## 2022-12-15 ENCOUNTER — OFFICE VISIT (OUTPATIENT)
Dept: FAMILY MEDICINE CLINIC | Facility: CLINIC | Age: 67
End: 2022-12-15

## 2022-12-15 VITALS
RESPIRATION RATE: 16 BRPM | SYSTOLIC BLOOD PRESSURE: 144 MMHG | HEIGHT: 63 IN | HEART RATE: 75 BPM | WEIGHT: 191.6 LBS | TEMPERATURE: 97.8 F | DIASTOLIC BLOOD PRESSURE: 86 MMHG | BODY MASS INDEX: 33.95 KG/M2

## 2022-12-15 DIAGNOSIS — E03.9 ACQUIRED HYPOTHYROIDISM: ICD-10-CM

## 2022-12-15 DIAGNOSIS — E78.2 MIXED HYPERLIPIDEMIA: ICD-10-CM

## 2022-12-15 DIAGNOSIS — R01.1 HEART MURMUR: ICD-10-CM

## 2022-12-15 DIAGNOSIS — M17.11 PRIMARY OSTEOARTHRITIS OF RIGHT KNEE: ICD-10-CM

## 2022-12-15 DIAGNOSIS — I10 ESSENTIAL HYPERTENSION: ICD-10-CM

## 2022-12-15 DIAGNOSIS — Z01.818 PREOP EXAMINATION: Primary | ICD-10-CM

## 2022-12-15 PROCEDURE — 99214 OFFICE O/P EST MOD 30 MIN: CPT | Performed by: FAMILY MEDICINE

## 2022-12-15 NOTE — PROGRESS NOTES
Subjective   Chief Complaint   Patient presents with   • Pre-op Exam     Right knee replacement    • Hypertension   • Hypothyroidism   • Hyperlipidemia     Nallely Figueroa is a 67 y.o. female.     Patient Care Team:  Erin Quintana MD as PCP - General (Family Medicine)  Kellen Muller MD as Consulting Physician (Obstetrics and Gynecology)  Kana Sneed MD as Consulting Physician (Orthopedic Surgery)  Emily Lr MD as Consulting Physician (Bariatrics)  Vivienne Clifford MD as Consulting Physician (Sleep Medicine)    History of Present Illness  She is coming in today for her preop clearance before her upcoming right total knee replacement which is scheduled for 2/6/2023 and is going to be done by Dr. Kana Sneed.  She had her left knee replacement in 2/2022, revision was done in 10/2022 due to some ongoing pain.  Her right knee pain is also getting worse.  Patient is currently being treated for hypertension, hypothyroidism, and hyperlipidemia.  She tells me that in 1/2022 she had a cardiac clearance done before her knee replacement in 2/2022.  She denies any chest pains, shortness of breath, dizziness, or syncope.  No nausea or vomiting reported.  No headaches, no rashes, no urinary symptoms       The following portions of the patient's history were reviewed and updated as appropriate: allergies, current medications, past family history, past medical history, past social history, past surgical history and problem list.  Past Medical History:   Diagnosis Date   • Anxiety maybe 25 years   • Arthritis 2 years   • Carpal tunnel syndrome    • Cataract 1 year    had surgery 1 year ago   • Depression probably 20 years    take medication for mood disorders   • Disease of thyroid gland    • Gastroesophageal reflux disease    • Heart murmur 12/15/2022   • Hyperlipidemia 2021   • Hypertension    • Hypothyroidism maybe 40 years    take medication   • Obesity off and on most of life   • Osteopenia maybe 5 years     Osteopenia   • Osteopenia    • Sleep apnea     no machine    lost wt      Past Surgical History:   Procedure Laterality Date   • BARIATRIC SURGERY  06/2010    lap band removed 2015   • CARDIAC CATHETERIZATION N/A 09/18/2019    Procedure: Left Heart Cath;  Surgeon: Jeronimo Kuhn MD;  Location: [facility] CATH INVASIVE LOCATION;  Service: Cardiovascular   • CARDIAC CATHETERIZATION N/A 09/18/2019    Procedure: Coronary angiography;  Surgeon: Jeronimo Kuhn MD;  Location: [facility] CATH INVASIVE LOCATION;  Service: Cardiovascular   • COLON SURGERY  12/06/2019    sigmoid colectomy with placement of loop ileostomy   • COLONOSCOPY     • COSMETIC SURGERY  2011    face lift after losing 100 lbs   • EXPLORATORY LAPAROTOMY N/A 12/13/2019    Procedure: LAPAROTOMY EXPLORATORY, DRAINAGE OF SUBCUTANEOUS AND SUBFASCIALABCESS;  Surgeon: Emily Lr MD;  Location: [facility] MAIN OR;  Service: General   • EXPLORATORY LAPAROTOMY N/A 12/02/2019    Procedure: sigmoid colectomy with placement of loop ileostomy;  Surgeon: Emily Lr MD;  Location: [facility] MAIN OR;  Service: General   • EYE SURGERY      lasix    • HERNIA REPAIR     • ILEOSTOMY CLOSURE N/A 02/06/2020    Procedure: ILEOSTOMY TAKEDOWN, WOUND DEBRIDEMENT WITH MESH REMOVAL;  Surgeon: Emily Lr MD;  Location: [facility] MAIN OR;  Service: General;  Laterality: N/A;   • JOINT REPLACEMENT  Total Left Knee Feb 7 2022   • REPLACEMENT TOTAL KNEE Left Total Left Knee Feb 7 2022       • SIGMOIDOSCOPY N/A 02/06/2020    Procedure: SIGMOIDOSCOPY RIGID;  Surgeon: Emily Lr MD;  Location: [facility] MAIN OR;  Service: General;  Laterality: N/A;   • SUBTOTAL HYSTERECTOMY  25 years   • TONSILLECTOMY     • VENTRAL/INCISIONAL HERNIA REPAIR N/A 08/12/2021    Procedure: VENTRAL HERNIA WITH COMPONENT SEPARATION with mesh;  Surgeon: Emily Lr MD;  Location: [facility] MAIN OR;  Service: General;  Laterality: N/A;     The patient has a family history of  Family History   Problem  "Relation Age of Onset   • Supraventricular tachycardia Mother    • Diabetes Mother         Type 2   • Thyroid disease Mother    • Arthritis Mother    • Heart attack Father         Massive heart attack age 51   • Early death Father         massive heart attack at 51   • Hyperlipidemia Paternal Grandmother    • Stroke Maternal Grandfather    • Alcohol abuse Maternal Uncle    • Arthritis Maternal Uncle    • Hearing loss Maternal Uncle    • Heart disease Maternal Uncle    • Hypertension Maternal Uncle    • Arthritis Brother         rheumitoid   • Alcohol abuse Brother    • Drug abuse Brother         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   • Cancer Brother         testicular   • Depression Brother    • Hypertension Brother    • Arthritis Brother    • Heart disease Maternal Uncle    • Hypertension Maternal Uncle      Social History     Socioeconomic History   • Marital status:    Tobacco Use   • Smoking status: Never   • Smokeless tobacco: Never   Vaping Use   • Vaping Use: Never used   Substance and Sexual Activity   • Alcohol use: No   • Drug use: No   • Sexual activity: Not Currently     Birth control/protection: None     Comment: Not sexually active since   2000       Review of Systems   Constitutional: Negative for activity change, fatigue and fever.   Respiratory: Negative for shortness of breath and wheezing.    Cardiovascular: Negative for chest pain, palpitations and leg swelling.   Musculoskeletal: Positive for arthralgias and gait problem. Negative for back pain.   Skin: Negative for rash.   Neurological: Negative for tremors and headache.     Visit Vitals  /86 (BP Location: Left arm, Patient Position: Sitting, Cuff Size: Adult)   Pulse 75   Temp 97.8 °F (36.6 °C) (Temporal)   Resp 16   Ht 160 cm (62.99\")   Wt 86.9 kg (191 lb 9.6 oz)   BMI 33.95 kg/m²       Current Outpatient Medications:   •  amLODIPine (NORVASC) 5 MG tablet, Take 1 tablet by mouth Daily., Disp: 90 tablet, Rfl: 1  •  " aspirin 81 MG EC tablet, Take 81 mg by mouth Daily. LD 8/7, Disp: , Rfl:   •  atorvastatin (LIPITOR) 20 MG tablet, TAKE 1 TABLET BY MOUTH DAILY, Disp: 90 tablet, Rfl: 1  •  levothyroxine (SYNTHROID, LEVOTHROID) 112 MCG tablet, Take 1 tablet by mouth Daily., Disp: 90 tablet, Rfl: 1  •  mupirocin (BACTROBAN) 2 % ointment, , Disp: , Rfl:   •  pantoprazole (PROTONIX) 40 MG EC tablet, Take 1 tablet by mouth Daily., Disp: 90 tablet, Rfl: 1  •  polyethylene glycol (MIRALAX) packet, Take 17 g by mouth Daily. Hold DOS, Disp: , Rfl:   •  venlafaxine XR (EFFEXOR-XR) 75 MG 24 hr capsule, TAKE 1 CAPSULE BY MOUTH DAILY, Disp: 90 capsule, Rfl: 1  •  calcium carbonate (OS-SONNY) 600 MG tablet, Take 600 mg by mouth 2 (Two) Times a Day With Meals., Disp: , Rfl:   •  CRANBERRY PO, Take 1 tablet by mouth Daily. LD 8/9, Disp: , Rfl:   •  Multiple Vitamins-Minerals (DAILY MULTIVITAMIN) capsule, Take 1 tablet by mouth Daily. LD 8/7, Disp: , Rfl:   •  Omega-3 Fatty Acids ([employer]S FISH OIL) 1200 MG capsule, Take 2,400 mg by mouth Daily. LD 8/7, Disp: , Rfl:   •  Probiotic Product ([employer]S PROBIOTIC PEARLS EX ST) capsule, Take 1 tablet by mouth Daily. Hold DOS, Disp: , Rfl:   •  psyllium (METAMUCIL) 0.52 g capsule, Take 0.52 g by mouth Daily., Disp: , Rfl:   •  vitamin C (ASCORBIC ACID) 500 MG tablet, Take 1,000 mg by mouth Daily. Hold DOS, Disp: , Rfl:   •  vitamin d (DIALYVITE VITAMIN D 5000) 5000 units capsule, Take 1 tablet by mouth Daily. Hold DOS, Disp: , Rfl:     Objective   Physical Exam  Vitals and nursing note reviewed.   Constitutional:       General: She is not in acute distress.     Appearance: Normal appearance. She is well-developed. She is not ill-appearing.   HENT:      Head: Normocephalic and atraumatic.   Cardiovascular:      Rate and Rhythm: Normal rate and regular rhythm.      Heart sounds: Murmur heard.     No gallop.      Comments: There is a systolic murmur noted over aortic area, 2/6 in intensity.  No radiation.  Pulmonary:       Effort: Pulmonary effort is normal. No respiratory distress.      Breath sounds: Normal breath sounds. No wheezing, rhonchi or rales.   Chest:      Chest wall: No tenderness.   Musculoskeletal:      Cervical back: Normal range of motion and neck supple.   Neurological:      General: No focal deficit present.      Mental Status: She is alert and oriented to person, place, and time. Mental status is at baseline.   Psychiatric:         Mood and Affect: Mood normal.           FOLLOWING LABS WERE REVIEWED TODAY:  CMP    CMP 3/22/22   Glucose 99   BUN 16   Creatinine 0.71   Sodium 140   Potassium 4.2   Chloride 105   Calcium 9.4   Albumin 4.50   Total Bilirubin 0.3   Alkaline Phosphatase 108   AST (SGOT) 18   ALT (SGPT) 13             TSH    TSH 3/22/22 9/16/22   TSH 4.700 (A) 2.500   (A) Abnormal value                 Assessment & Plan   Diagnoses and all orders for this visit:    1. Preop examination (Primary)    2. Primary osteoarthritis of right knee    3. Essential hypertension  -     XR Chest 2 View    4. Mixed hyperlipidemia    5. Acquired hypothyroidism    6. Heart murmur  -     Adult Transthoracic Echo Complete W/ Cont if Necessary Per Protocol; Future      Preop evaluation was done today.  Patient had previously cardiac clearance done in 1/2022 prior to her left knee replacement.  There is heart murmur noted on exam today.  Patient reports being advised about this possibly in the past, but she is not sure.  I reviewed her records and she had echo done in 8/2019 which showed mild mitral valve regurgitation and mild tricuspid valve regurgitation.  I will be repeating echocardiogram.  I will be getting chest x-ray.  I also reviewed the blood work results which she had done yesterday at [facility] including CBC and BMP.          Return in about 6 months (around 6/15/2023) for Medicare Wellness.    Requested Prescriptions      No prescriptions requested or ordered in this encounter

## 2022-12-19 ENCOUNTER — HOSPITAL ENCOUNTER (OUTPATIENT)
Dept: CARDIOLOGY | Facility: HOSPITAL | Age: 67
Discharge: HOME | End: 2022-12-19
Admitting: FAMILY MEDICINE

## 2022-12-19 DIAGNOSIS — R01.1 HEART MURMUR: ICD-10-CM

## 2022-12-19 LAB
BH CV ECHO MEAS - ACS: 1.46 CM
BH CV ECHO MEAS - AO MAX PG: 15.1 MMHG
BH CV ECHO MEAS - AO MEAN PG: 8.2 MMHG
BH CV ECHO MEAS - AO ROOT DIAM: 3 CM
BH CV ECHO MEAS - AO V2 MAX: 194.4 CM/SEC
BH CV ECHO MEAS - AO V2 VTI: 38.7 CM
BH CV ECHO MEAS - AVA(I,D): 1.93 CM2
BH CV ECHO MEAS - EDV(CUBED): 71.7 ML
BH CV ECHO MEAS - EDV(MOD-SP4): 41.6 ML
BH CV ECHO MEAS - EF(MOD-BP): 63 %
BH CV ECHO MEAS - EF(MOD-SP4): 63.1 %
BH CV ECHO MEAS - ESV(CUBED): 11.5 ML
BH CV ECHO MEAS - ESV(MOD-SP4): 15.3 ML
BH CV ECHO MEAS - FS: 45.8 %
BH CV ECHO MEAS - IVS/LVPW: 0.9 CM
BH CV ECHO MEAS - IVSD: 1 CM
BH CV ECHO MEAS - LA DIMENSION: 3 CM
BH CV ECHO MEAS - LV DIASTOLIC VOL/BSA (35-75): 21.9 CM2
BH CV ECHO MEAS - LV MASS(C)D: 146.6 GRAMS
BH CV ECHO MEAS - LV MAX PG: 5.3 MMHG
BH CV ECHO MEAS - LV MEAN PG: 2.5 MMHG
BH CV ECHO MEAS - LV SYSTOLIC VOL/BSA (12-30): 8.1 CM2
BH CV ECHO MEAS - LV V1 MAX: 115.5 CM/SEC
BH CV ECHO MEAS - LV V1 VTI: 27.6 CM
BH CV ECHO MEAS - LVIDD: 4.2 CM
BH CV ECHO MEAS - LVIDS: 2.25 CM
BH CV ECHO MEAS - LVOT AREA: 2.7 CM2
BH CV ECHO MEAS - LVOT DIAM: 1.86 CM
BH CV ECHO MEAS - LVPWD: 1.12 CM
BH CV ECHO MEAS - MV A MAX VEL: 74 CM/SEC
BH CV ECHO MEAS - MV DEC TIME: 0.31 MSEC
BH CV ECHO MEAS - MV E MAX VEL: 28.9 CM/SEC
BH CV ECHO MEAS - MV E/A: 0.39
BH CV ECHO MEAS - MV MAX PG: 3.7 MMHG
BH CV ECHO MEAS - MV MEAN PG: 1.29 MMHG
BH CV ECHO MEAS - MV V2 VTI: 27.9 CM
BH CV ECHO MEAS - MVA(VTI): 2.7 CM2
BH CV ECHO MEAS - PA V2 MAX: 113.7 CM/SEC
BH CV ECHO MEAS - RAP SYSTOLE: 3 MMHG
BH CV ECHO MEAS - RVDD: 3.7 CM
BH CV ECHO MEAS - RVSP: 26.8 MMHG
BH CV ECHO MEAS - SI(MOD-SP4): 13.8 ML/M2
BH CV ECHO MEAS - SV(LVOT): 74.9 ML
BH CV ECHO MEAS - SV(MOD-SP4): 26.2 ML
BH CV ECHO MEAS - TR MAX PG: 23.8 MMHG
BH CV ECHO MEAS - TR MAX VEL: 244 CM/SEC
MAXIMAL PREDICTED HEART RATE: 153 BPM
STRESS TARGET HR: 130 BPM

## 2022-12-19 PROCEDURE — 93306 TTE W/DOPPLER COMPLETE: CPT

## 2022-12-19 PROCEDURE — 93306 TTE W/DOPPLER COMPLETE: CPT | Performed by: INTERNAL MEDICINE

## 2023-01-17 ENCOUNTER — OFFICE VISIT (OUTPATIENT)
Dept: SLEEP MEDICINE | Facility: CLINIC | Age: 68
End: 2023-01-17
Payer: MEDICARE

## 2023-01-17 VITALS
HEART RATE: 75 BPM | BODY MASS INDEX: 32.78 KG/M2 | HEIGHT: 63 IN | WEIGHT: 185 LBS | OXYGEN SATURATION: 97 % | DIASTOLIC BLOOD PRESSURE: 80 MMHG | SYSTOLIC BLOOD PRESSURE: 125 MMHG

## 2023-01-17 DIAGNOSIS — G47.33 OSA ON CPAP: Primary | ICD-10-CM

## 2023-01-17 DIAGNOSIS — E66.9 CLASS 1 OBESITY: ICD-10-CM

## 2023-01-17 DIAGNOSIS — Z99.89 OSA ON CPAP: Primary | ICD-10-CM

## 2023-01-17 PROBLEM — E66.811 CLASS 1 OBESITY: Status: ACTIVE | Noted: 2021-04-16

## 2023-01-17 PROCEDURE — G0463 HOSPITAL OUTPT CLINIC VISIT: HCPCS

## 2023-01-17 PROCEDURE — 99213 OFFICE O/P EST LOW 20 MIN: CPT | Performed by: INTERNAL MEDICINE

## 2023-01-17 NOTE — PROGRESS NOTES
"  [address] [address]  Phone   Fax       SLEEP CLINIC FOLLOW UP PROGRESS NOTE.    Nallely Figueroa  [id number]   1955  67 y.o.  female      PCP: Erin Quintana MD      Date of visit: 1/17/2023    Chief Complaint   Patient presents with   • Sleep Apnea   • Obesity       HPI:  This is a 67 y.o. years old patient is here for the management of obstructive sleep apnea.  Sleep apnea is severe in severity with a AHI of 48/hr. Patient is using positive airway pressure therapy with auto CPAP and the symptoms of sleep apnea have improved significantly on the therapy. Normally patient goes to bed at 11 PM and wakes up at 7 AM .  The patient wakes up 2 time(s) during the night and has no problem going back to sleep.  Feels refreshed after waking up.  She had a knee replacement but unfortunately she fell now it needs to be redone.    Medications and allergies are reviewed by me and documented in the encounter.     SOCIAL (habits pertaining to sleep medicine)  • History tobacco use:No   • History of alcohol use: 0 per week  • Caffeine use: 3     REVIEW OF SYSTEMS:   Pertaining positive symptoms are:  • [place] Sleepiness Scale :Total score: 1   • Anxiety      PHYSICAL EXAMINATION:  CONSTITUTIONAL:  Vitals:    01/17/23 0902   BP: 125/80   BP Location: Left arm   Patient Position: Sitting   Cuff Size: Adult   Pulse: 75   SpO2: 97%   Weight: 83.9 kg (185 lb)   Height: 160 cm (62.99\")    Body mass index is 32.78 kg/m².   NOSE: nasal passages are clear, No deformities noted   RESP SYSTEM: Not in any respiratory distress, no chest deformities noted,   CARDIOVASULAR: No edema noted  NEURO: Oriented x 3, gait normal,  Mood and affect appeared appropriate      Data reviewed:  The Smart card downloaded on 1/17/2023 has been reviewed independently by me for compliance and discussed the data with the patient.   Compliance; 97%  More than 4 hr use, 97%  Average use " of the device 8 hours and 50 minutes per night  Residual AHI: 1.5 /hr (goal < 5.0 /hr)  Mask type: Nasal cradle DreamWear  Device: ResMed  DME: [employer]      ASSESSMENT AND PLAN:  · Obstructive sleep apnea ( G 47.33).  The symptoms of sleep apnea have improved with the device and the treatment.  Patient's compliance with the device is excellent for treatment of sleep apnea.  I have independently reviewed the smart card down load and discussed with the patient the download data and encouarged the patient to continue to use the device.The residual AHI is acceptable. The device is benefiting the patient and the device is medically necessary.  Without proper control of sleep apnea and good compliance there is a increased risk for hypertension, diabetes mellitus and nonrestorative sleep with hypersomnia which can increase risk for motor vehicle accidents.  Untreated sleep apnea is also a risk factor for development of atrial fibrillation, pulmonary hypertension, insulin resistance and stroke. The patient is also instructed to get the supplies from the [facility] and and change them on a regular basis.  A prescription for supplies has been sent to the [facility].  I have also discussed the good sleep hygiene habits and adequate amount of sleep needed for good health.  · Obesity  1 with BMI is Body mass index is 32.78 kg/m².. I have discuss the relationship between the weight and sleep apnea. The benefit of weight loss in reducing severity of sleep apnea was discussed. Discussed diet and exercise with the patient to achieve ideal BMI   · Return in about 1 year (around 1/17/2024) for with smart card down load. . Patient's questions were answered.    1/17/2023  Vivienne Clifford MD  Sleep Medicine.  Medical Director,   [facility] sleep centers.

## 2023-01-26 RX ORDER — VENLAFAXINE HYDROCHLORIDE 75 MG/1
75 CAPSULE, EXTENDED RELEASE ORAL DAILY
Qty: 90 CAPSULE | Refills: 1 | Status: SHIPPED | OUTPATIENT
Start: 2023-01-26

## 2023-01-26 RX ORDER — PANTOPRAZOLE SODIUM 40 MG/1
40 TABLET, DELAYED RELEASE ORAL DAILY
Qty: 90 TABLET | Refills: 1 | Status: SHIPPED | OUTPATIENT
Start: 2023-01-26

## 2023-02-06 DIAGNOSIS — I10 ESSENTIAL HYPERTENSION: ICD-10-CM

## 2023-02-06 RX ORDER — AMLODIPINE BESYLATE 5 MG/1
5 TABLET ORAL DAILY
Qty: 90 TABLET | Refills: 1 | Status: SHIPPED | OUTPATIENT
Start: 2023-02-06

## 2023-02-20 RX ORDER — ATORVASTATIN CALCIUM 20 MG/1
20 TABLET, FILM COATED ORAL DAILY
Qty: 90 TABLET | Refills: 1 | Status: SHIPPED | OUTPATIENT
Start: 2023-02-20

## 2023-02-24 DIAGNOSIS — E03.9 ACQUIRED HYPOTHYROIDISM: ICD-10-CM

## 2023-02-24 RX ORDER — LEVOTHYROXINE SODIUM 112 UG/1
112 TABLET ORAL DAILY
Qty: 90 TABLET | Refills: 1 | Status: SHIPPED | OUTPATIENT
Start: 2023-02-24

## 2023-03-21 ENCOUNTER — OFFICE VISIT (OUTPATIENT)
Dept: FAMILY MEDICINE CLINIC | Facility: CLINIC | Age: 68
End: 2023-03-21
Payer: MEDICARE

## 2023-03-21 ENCOUNTER — LAB (OUTPATIENT)
Dept: FAMILY MEDICINE CLINIC | Facility: CLINIC | Age: 68
End: 2023-03-21
Payer: MEDICARE

## 2023-03-21 VITALS
DIASTOLIC BLOOD PRESSURE: 80 MMHG | BODY MASS INDEX: 32.57 KG/M2 | TEMPERATURE: 97.5 F | RESPIRATION RATE: 16 BRPM | SYSTOLIC BLOOD PRESSURE: 137 MMHG | HEART RATE: 82 BPM | OXYGEN SATURATION: 97 % | HEIGHT: 63 IN | WEIGHT: 183.8 LBS

## 2023-03-21 DIAGNOSIS — Z00.00 MEDICARE ANNUAL WELLNESS VISIT, SUBSEQUENT: Primary | ICD-10-CM

## 2023-03-21 DIAGNOSIS — E78.2 MIXED HYPERLIPIDEMIA: ICD-10-CM

## 2023-03-21 PROBLEM — Z01.818 PREOP EXAMINATION: Status: RESOLVED | Noted: 2022-12-15 | Resolved: 2023-03-21

## 2023-03-21 LAB
ALBUMIN SERPL-MCNC: 4.4 G/DL (ref 3.5–5.2)
ALBUMIN/GLOB SERPL: 1.5 G/DL
ALP SERPL-CCNC: 120 U/L (ref 39–117)
ALT SERPL W P-5'-P-CCNC: 12 U/L (ref 1–33)
ANION GAP SERPL CALCULATED.3IONS-SCNC: 10 MMOL/L (ref 5–15)
AST SERPL-CCNC: 17 U/L (ref 1–32)
BILIRUB SERPL-MCNC: 0.4 MG/DL (ref 0–1.2)
BUN SERPL-MCNC: 15 MG/DL (ref 8–23)
BUN/CREAT SERPL: 21.7 (ref 7–25)
CALCIUM SPEC-SCNC: 9.3 MG/DL (ref 8.6–10.5)
CHLORIDE SERPL-SCNC: 104 MMOL/L (ref 98–107)
CHOLEST SERPL-MCNC: 144 MG/DL (ref 0–200)
CO2 SERPL-SCNC: 27 MMOL/L (ref 22–29)
CREAT SERPL-MCNC: 0.69 MG/DL (ref 0.57–1)
EGFRCR SERPLBLD CKD-EPI 2021: 94.7 ML/MIN/1.73
GLOBULIN UR ELPH-MCNC: 2.9 GM/DL
GLUCOSE SERPL-MCNC: 103 MG/DL (ref 65–99)
HDLC SERPL-MCNC: 38 MG/DL (ref 40–60)
LDLC SERPL CALC-MCNC: 84 MG/DL (ref 0–100)
LDLC/HDLC SERPL: 2.15 {RATIO}
POTASSIUM SERPL-SCNC: 4.2 MMOL/L (ref 3.5–5.2)
PROT SERPL-MCNC: 7.3 G/DL (ref 6–8.5)
SODIUM SERPL-SCNC: 141 MMOL/L (ref 136–145)
T4 FREE SERPL-MCNC: 1.83 NG/DL (ref 0.93–1.7)
TRIGL SERPL-MCNC: 121 MG/DL (ref 0–150)
TSH SERPL DL<=0.05 MIU/L-ACNC: 2.21 UIU/ML (ref 0.27–4.2)
VLDLC SERPL-MCNC: 22 MG/DL (ref 5–40)

## 2023-03-21 PROCEDURE — 80053 COMPREHEN METABOLIC PANEL: CPT | Performed by: FAMILY MEDICINE

## 2023-03-21 PROCEDURE — 84443 ASSAY THYROID STIM HORMONE: CPT | Performed by: FAMILY MEDICINE

## 2023-03-21 PROCEDURE — 3079F DIAST BP 80-89 MM HG: CPT | Performed by: FAMILY MEDICINE

## 2023-03-21 PROCEDURE — 1160F RVW MEDS BY RX/DR IN RCRD: CPT | Performed by: FAMILY MEDICINE

## 2023-03-21 PROCEDURE — 84439 ASSAY OF FREE THYROXINE: CPT | Performed by: FAMILY MEDICINE

## 2023-03-21 PROCEDURE — 1159F MED LIST DOCD IN RCRD: CPT | Performed by: FAMILY MEDICINE

## 2023-03-21 PROCEDURE — G0439 PPPS, SUBSEQ VISIT: HCPCS | Performed by: FAMILY MEDICINE

## 2023-03-21 PROCEDURE — 80061 LIPID PANEL: CPT | Performed by: FAMILY MEDICINE

## 2023-03-21 PROCEDURE — 1170F FXNL STATUS ASSESSED: CPT | Performed by: FAMILY MEDICINE

## 2023-03-21 PROCEDURE — 3075F SYST BP GE 130 - 139MM HG: CPT | Performed by: FAMILY MEDICINE

## 2023-03-21 PROCEDURE — 36415 COLL VENOUS BLD VENIPUNCTURE: CPT | Performed by: FAMILY MEDICINE

## 2023-03-21 NOTE — PROGRESS NOTES
Subsequent Medicare Wellness Visit   The ABC's of the Annual Wellness Visit    Chief Complaint   Patient presents with   • Medicare Wellness-subsequent       HPI:  Nallely Figueroa, SERGIO-[id number], is a 68 y.o. female who presents for a Subsequent Medicare Wellness Visit.  She lives with her mom who she moved in with when her father passed away.  She is fully independent with her activities of daily living.  She is on Effexor for her depression and she believes that medication is working for her.  No issues with memory or balance issues are being reported.  No frequent falls.  She has had ongoing issues with knee osteoarthritis.  She previously had bilateral knee replacement and has problems with the left knee and she will have another surgery in near future.  She is keeping up with her eye exams and dental exams. Patient does not report any chest pain, shortness of breath, dizziness, nausea, vomiting, or diarrhea, visual issues, headaches, numbness or tingling. No urinary issues reported like urgency, frequency, or discomfort upon urination.  No significant weight changes reported.  No swelling reported.  No rashes or any other skin issues reported. No emotional issues or insomnia.    Recent Hospitalizations:  No hospitalization(s) within the last year..    Current Medical Providers:  Patient Care Team:  Erin Quintana MD as PCP - General (Family Medicine)  Kellen Muller MD as Consulting Physician (Obstetrics and Gynecology)  Kana Sneed MD as Consulting Physician (Orthopedic Surgery)  Emily Lr MD as Consulting Physician (Bariatrics)  Vivienne Clifford MD as Consulting Physician (Sleep Medicine)    Health Habits and Functional and Cognitive Screening and Depression Screening:  Functional & Cognitive Status 3/21/2023   Do you have difficulty preparing food and eating? No   Do you have difficulty bathing yourself, getting dressed or grooming yourself? No   Do you have difficulty using the toilet? No    Do you have difficulty moving around from place to place? No   Do you have trouble with steps or getting out of a bed or a chair? No   Current Diet Well Balanced Diet   Dental Exam Up to date   Eye Exam Up to date   Exercise (times per week) 0 times per week   Current Exercises Include No Regular Exercise   Do you need help using the phone?  No   Are you deaf or do you have serious difficulty hearing?  No   Do you need help with transportation? No   Do you need help shopping? No   Do you need help preparing meals?  No   Do you need help with housework?  No   Do you need help with laundry? No   Do you need help taking your medications? No   Do you need help managing money? No   Do you ever drive or ride in a car without wearing a seat belt? No   Have you felt unusual stress, anger or loneliness in the last month? No   Who do you live with? Other   If you need help, do you have trouble finding someone available to you? No   Do you have difficulty concentrating, remembering or making decisions? No       Compared to one year ago, the patient feels her physical health is the same and her mental health is the same.    Depression Screen:  PHQ-2/PHQ-9 Depression Screening 3/21/2023   Retired PHQ-9 Total Score -   Retired Total Score -   Little Interest or Pleasure in Doing Things 0-->not at all   Feeling Down, Depressed or Hopeless 0-->not at all   PHQ-9: Brief Depression Severity Measure Score 0         Past Medical/Family/Social History:  The following portions of the patient's history were reviewed and updated as appropriate: allergies, current medications, past family history, past medical history, past social history, past surgical history and problem list.    Allergies   Allergen Reactions   • Ace Inhibitors Cough         Current Outpatient Medications:   •  amLODIPine (NORVASC) 5 MG tablet, Take 1 tablet by mouth Daily., Disp: 90 tablet, Rfl: 1  •  aspirin 81 MG EC tablet, Take 81 mg by mouth Daily. LD 8/7, Disp: ,  Rfl:   •  atorvastatin (LIPITOR) 20 MG tablet, Take 1 tablet by mouth Daily., Disp: 90 tablet, Rfl: 1  •  calcium carbonate (OS-SONNY) 600 MG tablet, Take 600 mg by mouth 2 (Two) Times a Day With Meals., Disp: , Rfl:   •  CRANBERRY PO, Take 1 tablet by mouth Daily. LD 8/9, Disp: , Rfl:   •  levothyroxine (SYNTHROID, LEVOTHROID) 112 MCG tablet, Take 1 tablet by mouth Daily., Disp: 90 tablet, Rfl: 1  •  Multiple Vitamins-Minerals (DAILY MULTIVITAMIN) capsule, Take 1 tablet by mouth Daily. LD 8/7, Disp: , Rfl:   •  mupirocin (BACTROBAN) 2 % ointment, , Disp: , Rfl:   •  Omega-3 Fatty Acids ([employer] FISH OIL) 1200 MG capsule, Take 2,400 mg by mouth Daily. LD 8/7, Disp: , Rfl:   •  pantoprazole (PROTONIX) 40 MG EC tablet, Take 1 tablet by mouth Daily., Disp: 90 tablet, Rfl: 1  •  polyethylene glycol (MIRALAX) packet, Take 17 g by mouth Daily. Hold DOS, Disp: , Rfl:   •  Probiotic Product ([employer] PROBIOTIC PEARLS EX ST) capsule, Take 1 tablet by mouth Daily. Hold DOS, Disp: , Rfl:   •  venlafaxine XR (EFFEXOR-XR) 75 MG 24 hr capsule, Take 1 capsule by mouth Daily., Disp: 90 capsule, Rfl: 1  •  vitamin C (ASCORBIC ACID) 500 MG tablet, Take 1,000 mg by mouth Daily. Hold DOS, Disp: , Rfl:   •  vitamin d (DIALYVITE VITAMIN D 5000) 5000 units capsule, Take 1 tablet by mouth Daily. Hold DOS, Disp: , Rfl:     Aspirin use counseling: Taking ASA appropriately as indicated    Current medication list contains no high risk medications.  No harmful drug interactions have been identified.     Family History   Problem Relation Age of Onset   • Supraventricular tachycardia Mother    • Diabetes Mother         Type 2   • Thyroid disease Mother    • Arthritis Mother    • Heart attack Father         Massive heart attack age 51   • Early death Father         massive heart attack at 51   • Hyperlipidemia Paternal Grandmother    • Stroke Maternal Grandfather    • Alcohol abuse Maternal Uncle    • Arthritis Maternal Uncle    • Hearing loss Maternal Uncle     • Heart disease Maternal Uncle    • Hypertension Maternal Uncle    • Arthritis Brother         rheumitoid   • Alcohol abuse Brother    • Drug abuse Brother         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   • Cancer Brother         testicular   • Depression Brother    • Hypertension Brother    • Arthritis Brother    • Heart disease Maternal Uncle    • Hypertension Maternal Uncle        Social History     Tobacco Use   • Smoking status: Never   • Smokeless tobacco: Never   Substance Use Topics   • Alcohol use: No       Past Surgical History:   Procedure Laterality Date   • BARIATRIC SURGERY  06/2010    lap band removed 2015   • CARDIAC CATHETERIZATION N/A 09/18/2019    Procedure: Left Heart Cath;  Surgeon: Jeronimo Kuhn MD;  Location: [facility] CATH INVASIVE LOCATION;  Service: Cardiovascular   • CARDIAC CATHETERIZATION N/A 09/18/2019    Procedure: Coronary angiography;  Surgeon: Jeronimo Kuhn MD;  Location: [facility] CATH INVASIVE LOCATION;  Service: Cardiovascular   • COLON SURGERY  12/06/2019    sigmoid colectomy with placement of loop ileostomy   • COSMETIC SURGERY  2011    face lift after losing 100 lbs   • EXPLORATORY LAPAROTOMY N/A 12/13/2019    Procedure: LAPAROTOMY EXPLORATORY, DRAINAGE OF SUBCUTANEOUS AND SUBFASCIALABCESS;  Surgeon: Emily Lr MD;  Location: [facility] OR;  Service: General   • EXPLORATORY LAPAROTOMY N/A 12/02/2019    Procedure: sigmoid colectomy with placement of loop ileostomy;  Surgeon: Emily Lr MD;  Location: [facility] OR;  Service: General   • EYE SURGERY      lasix    • HERNIA REPAIR     • ILEOSTOMY CLOSURE N/A 02/06/2020    Procedure: ILEOSTOMY TAKEDOWN, WOUND DEBRIDEMENT WITH MESH REMOVAL;  Surgeon: Emily Lr MD;  Location: [facility] OR;  Service: General;  Laterality: N/A;   • REPLACEMENT TOTAL KNEE Left Total Left Knee Feb 7 2022       • SIGMOIDOSCOPY N/A 02/06/2020    Procedure: SIGMOIDOSCOPY RIGID;  Surgeon: Emily Lr MD;  Location: [facility]  "OR;  Service: General;  Laterality: N/A;   • SUBTOTAL HYSTERECTOMY  25 years   • TONSILLECTOMY     • VENTRAL/INCISIONAL HERNIA REPAIR N/A 08/12/2021    Procedure: VENTRAL HERNIA WITH COMPONENT SEPARATION with mesh;  Surgeon: Emily Lr MD;  Location: [facility] MAIN OR;  Service: General;  Laterality: N/A;       Patient Active Problem List   Diagnosis   • Essential hypertension   • Carpal tunnel syndrome   • Depression   • Medicare annual wellness visit, subsequent   • Hyperlipidemia   • Hypothyroidism   • Osteopenia   • FERNANDO on CPAP   • Ventral hernia   • Vitamin D deficiency   • Class 1 obesity   • Chronic pain of left knee   • Primary osteoarthritis of right knee   • Heart murmur       Review of Systems   Constitutional: Negative for activity change, fatigue and fever.   Respiratory: Negative for shortness of breath and wheezing.    Cardiovascular: Negative for chest pain, palpitations and leg swelling.   Musculoskeletal: Negative for arthralgias and back pain.   Skin: Negative for rash.   Neurological: Negative for tremors and headache.       Objective     Vitals:    03/21/23 0757   BP: 137/80   BP Location: Left arm   Patient Position: Sitting   Cuff Size: Adult   Pulse: 82   Resp: 16   Temp: 97.5 °F (36.4 °C)   TempSrc: Infrared   SpO2: 97%   Weight: 83.4 kg (183 lb 12.8 oz)   Height: 160 cm (62.99\")   PainSc: 0-No pain       BMI is >= 30 and <35. (Class 1 Obesity). The following options were offered after discussion;: exercise counseling/recommendations      No results found.    The patient has no evidence of cognitve impairment.     Physical Exam  Vitals and nursing note reviewed.   Constitutional:       General: She is not in acute distress.     Appearance: Normal appearance. She is well-developed. She is not ill-appearing.   HENT:      Head: Normocephalic and atraumatic.   Cardiovascular:      Rate and Rhythm: Normal rate and regular rhythm.      Heart sounds: Murmur heard.     No gallop.      Comments: " 2/6  Pulmonary:      Effort: Pulmonary effort is normal. No respiratory distress.      Breath sounds: Normal breath sounds. No wheezing, rhonchi or rales.   Chest:      Chest wall: No tenderness.   Musculoskeletal:      Cervical back: Normal range of motion and neck supple.   Neurological:      General: No focal deficit present.      Mental Status: She is alert and oriented to person, place, and time. Mental status is at baseline.   Psychiatric:         Mood and Affect: Mood normal.         Recent Lab Results:     Lab Results   Component Value Date    CHOL 156 03/22/2022    TRIG 86 03/22/2022    HDL 45 03/22/2022    VLDL 16 03/22/2022    LDLHDL 2.08 03/22/2022       Assessment & Plan   Age-appropriate Screening Schedule:  Refer to the list below for future screening recommendations based on patient's age, sex and/or medical conditions.      Health Maintenance   Topic Date Due   • TDAP/TD VACCINES (1 - Tdap) Never done   • ZOSTER VACCINE (1 of 2) Never done   • DXA SCAN  04/08/2023   • ANNUAL WELLNESS VISIT  03/21/2024   • MAMMOGRAM  04/20/2024   • COLORECTAL CANCER SCREENING  11/05/2029   • HEPATITIS C SCREENING  Completed   • COVID-19 Vaccine  Completed   • INFLUENZA VACCINE  Completed   • Pneumococcal Vaccine 65+  Completed   • LIPID PANEL  Discontinued       Medicare Risks and Personalized Health Plan:  Advance Directive Discussion  Breast Cancer/Mammogram Screening  Cardiovascular risk  Colon Cancer Screening  Dementia/Memory   Depression/Dysphoria  Diabetic Lab Screening   Fall Risk  Immunizations Discussed/Encouraged (specific immunizations; Shingrix )  Obesity/Overweight       CMS-Preventive Services Quick Reference  Medicare Preventive Services Addressed:  Annual Wellness Visit (AWV)  Bone Density Measurements  Cardiovascular Disease Screening Tests (may do this order every 5 years in beneficiaries without signs or symptoms of cardiovascular disease)  Colorectal Cancer Screening, Colonoscopy  Screening  Mammography     Advance Care Planning:  ACP discussion was held with the patient during this visit. Patient has an advance directive in EMR which is still valid.     Diagnoses and all orders for this visit:    1. Medicare annual wellness visit, subsequent (Primary)    2. Mixed hyperlipidemia  -     Comprehensive Metabolic Panel  -     TSH  -     T4, Free  -     Lipid Panel      Medicare wellness exam was done today.  I will be getting fasting blood work.  I reviewed her health maintenance.  Her last DEXA scan was in 4/2021.  Her last mammogram was in 4/2022.  She gets these tests done through her gynecology office and she reports to be scheduled for her follow-up testing in near future.  Her last colonoscopy was in 11/2019 and 5-year follow-up was recommended.  She is vaccinated and boosted against COVID-19.  She is up to speed with her flu shots and pneumonia shots.  Shingrix was discussed and recommended and she was advised to schedule it through her pharmacy.  Healthy lifestyle was reinforced.    An After Visit Summary and PPPS with all of these plans were given to the patient.      Follow Up:  Return in about 6 months (around 9/21/2023) for Next scheduled follow up.        Requested Prescriptions      No prescriptions requested or ordered in this encounter

## 2023-03-22 NOTE — PROGRESS NOTES
Patient notified and verbally understood. And she confirmed she is on Levothyroxine 112 mcg daily and will continue/ She will do blood work in 3 months

## 2023-04-18 ENCOUNTER — OFFICE VISIT (OUTPATIENT)
Dept: FAMILY MEDICINE CLINIC | Facility: CLINIC | Age: 68
End: 2023-04-18
Payer: MEDICARE

## 2023-04-18 VITALS
WEIGHT: 189.2 LBS | TEMPERATURE: 98.2 F | SYSTOLIC BLOOD PRESSURE: 152 MMHG | BODY MASS INDEX: 33.52 KG/M2 | HEIGHT: 63 IN | DIASTOLIC BLOOD PRESSURE: 85 MMHG | RESPIRATION RATE: 16 BRPM | OXYGEN SATURATION: 97 % | HEART RATE: 77 BPM

## 2023-04-18 DIAGNOSIS — I10 ESSENTIAL HYPERTENSION: ICD-10-CM

## 2023-04-18 DIAGNOSIS — E03.9 ACQUIRED HYPOTHYROIDISM: ICD-10-CM

## 2023-04-18 DIAGNOSIS — E55.9 VITAMIN D DEFICIENCY: ICD-10-CM

## 2023-04-18 DIAGNOSIS — Z01.818 PREOP EXAMINATION: Primary | ICD-10-CM

## 2023-04-18 DIAGNOSIS — M17.12 PRIMARY OSTEOARTHRITIS OF LEFT KNEE: ICD-10-CM

## 2023-04-18 PROCEDURE — 99214 OFFICE O/P EST MOD 30 MIN: CPT | Performed by: FAMILY MEDICINE

## 2023-04-18 PROCEDURE — 3077F SYST BP >= 140 MM HG: CPT | Performed by: FAMILY MEDICINE

## 2023-04-18 PROCEDURE — 1160F RVW MEDS BY RX/DR IN RCRD: CPT | Performed by: FAMILY MEDICINE

## 2023-04-18 PROCEDURE — 3079F DIAST BP 80-89 MM HG: CPT | Performed by: FAMILY MEDICINE

## 2023-04-18 PROCEDURE — 1159F MED LIST DOCD IN RCRD: CPT | Performed by: FAMILY MEDICINE

## 2023-04-18 NOTE — PROGRESS NOTES
Subjective   Chief Complaint   Patient presents with   • Surgery Clearance      Surgery 5/22/23   • Hypertension   • Hypothyroidism   • Osteoarthritis     Nallely Figueroa is a 68 y.o. female.     Patient Care Team:  Erin Quintana MD as PCP - General (Family Medicine)  Kellen Muller MD as Consulting Physician (Obstetrics and Gynecology)  Kana Sneed MD as Consulting Physician (Orthopedic Surgery)  Emily Lr MD as Consulting Physician (Bariatrics)  Vivienne Clifford MD as Consulting Physician (Sleep Medicine)    History of Present Illness  She is coming in today for the preop clearance.  Patient is scheduled for total left knee revision surgery on 5/22/2023.  She had a total left knee replacement in 2/2022.  She took a fall couple weeks after the surgery and she had to have a revision done on the same knee in 10/2022.  She has ongoing issues with that, the knee is unstable, she took a fall couple weeks ago due to knee being unstable.  She wears a brace.  She also had a right total knee replacement in 2/2023 and no issues with that knee are being reported.  She is currently being treated for hypertension, hypothyroidism, and GI issues.  She takes her medicines as directed.  She does not monitor her blood pressure at home.  She had preop labs and EKG done recently through the [facility]. Patient does not report any chest pain, shortness of breath, dizziness, nausea, vomiting, or diarrhea, visual issues, headaches, numbness or tingling. No urinary issues reported like urgency, frequency, or discomfort upon urination.  No significant weight changes reported.  No swelling reported.  No rashes or any other skin issues reported. No emotional issues or insomnia.       The following portions of the patient's history were reviewed and updated as appropriate: allergies, current medications, past family history, past medical history, past social history, past surgical history and problem list.  Past Medical  History:   Diagnosis Date   • Anxiety maybe 25 years   • Arthritis 2 years   • Carpal tunnel syndrome    • Cataract 1 year    had surgery 1 year ago   • Depression probably 20 years    take medication for mood disorders   • Gastroesophageal reflux disease    • Heart murmur 12/15/2022   • Hyperlipidemia 2021   • Hypertension    • Hypothyroidism maybe 40 years    take medication   • Obesity off and on most of life   • Osteopenia maybe 5 years    Osteopenia   • Osteopenia    • Sleep apnea     no machine    lost wt      Past Surgical History:   Procedure Laterality Date   • BARIATRIC SURGERY  06/2010    lap band removed 2015   • CARDIAC CATHETERIZATION N/A 09/18/2019    Procedure: Left Heart Cath;  Surgeon: Jeronimo Kuhn MD;  Location: [facility] CATH INVASIVE LOCATION;  Service: Cardiovascular   • CARDIAC CATHETERIZATION N/A 09/18/2019    Procedure: Coronary angiography;  Surgeon: Jeronimo Kuhn MD;  Location: [facility] CATH INVASIVE LOCATION;  Service: Cardiovascular   • COLON SURGERY  12/06/2019    sigmoid colectomy with placement of loop ileostomy   • COSMETIC SURGERY  2011    face lift after losing 100 lbs   • EXPLORATORY LAPAROTOMY N/A 12/13/2019    Procedure: LAPAROTOMY EXPLORATORY, DRAINAGE OF SUBCUTANEOUS AND SUBFASCIALABCESS;  Surgeon: Emily Lr MD;  Location: [facility] OR;  Service: General   • EXPLORATORY LAPAROTOMY N/A 12/02/2019    Procedure: sigmoid colectomy with placement of loop ileostomy;  Surgeon: Emily Lr MD;  Location: [facility] MAIN OR;  Service: General   • EYE SURGERY      lasix    • HERNIA REPAIR     • ILEOSTOMY CLOSURE N/A 02/06/2020    Procedure: ILEOSTOMY TAKEDOWN, WOUND DEBRIDEMENT WITH MESH REMOVAL;  Surgeon: Emily Lr MD;  Location: [facility] MAIN OR;  Service: General;  Laterality: N/A;   • REPLACEMENT TOTAL KNEE Left Total Left Knee Feb 7 2022       • REPLACEMENT TOTAL KNEE Right 02/2023    Dr. Sneed   • REVISION TOTAL KNEE ARTHROPLASTY Left 10/2022      Sneed   • SIGMOIDOSCOPY N/A 2020    Procedure: SIGMOIDOSCOPY RIGID;  Surgeon: Emily Lr MD;  Location: [facility] MAIN OR;  Service: General;  Laterality: N/A;   • SUBTOTAL HYSTERECTOMY  25 years   • TONSILLECTOMY     • VENTRAL/INCISIONAL HERNIA REPAIR N/A 2021    Procedure: VENTRAL HERNIA WITH COMPONENT SEPARATION with mesh;  Surgeon: Emily Lr MD;  Location: [facility] MAIN OR;  Service: General;  Laterality: N/A;     The patient has a family history of  Family History   Problem Relation Age of Onset   • Supraventricular tachycardia Mother    • Diabetes Mother         Type 2   • Thyroid disease Mother    • Arthritis Mother    • Heart attack Father         Massive heart attack age 51   • Early death Father         massive heart attack at 51   • Hyperlipidemia Paternal Grandmother    • Stroke Maternal Grandfather    • Alcohol abuse Maternal Uncle    • Arthritis Maternal Uncle    • Hearing loss Maternal Uncle    • Heart disease Maternal Uncle    • Hypertension Maternal Uncle    • Arthritis Brother         rheumitoid   • Alcohol abuse Brother    • Drug abuse Brother         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   • Cancer Brother         testicular   • Depression Brother    • Hypertension Brother    • Arthritis Brother    • Heart disease Maternal Uncle    • Hypertension Maternal Uncle      Social History     Socioeconomic History   • Marital status:    Tobacco Use   • Smoking status: Never   • Smokeless tobacco: Never   Vaping Use   • Vaping Use: Never used   Substance and Sexual Activity   • Alcohol use: No   • Drug use: No   • Sexual activity: Not Currently     Birth control/protection: None     Comment: Not sexually active since   2000       Review of Systems   Constitutional: Negative for activity change, fatigue and fever.   Respiratory: Negative for shortness of breath and wheezing.    Cardiovascular: Negative for chest pain, palpitations and leg swelling.   Musculoskeletal: Negative for  "arthralgias and back pain.   Skin: Negative for rash.   Neurological: Negative for tremors and headache.     Visit Vitals  /85 (BP Location: Left arm, Patient Position: Sitting, Cuff Size: Adult) Comment: Taking Meds for BP   Pulse 77   Temp 98.2 °F (36.8 °C) (Infrared)   Resp 16   Ht 160 cm (63\")   Wt 85.8 kg (189 lb 3.2 oz)   SpO2 97%   BMI 33.52 kg/m²       BMI is >= 30 and <35. (Class 1 Obesity). The following options were offered after discussion;: exercise counseling/recommendations      Current Outpatient Medications:   •  amLODIPine (NORVASC) 5 MG tablet, Take 1 tablet by mouth Daily., Disp: 90 tablet, Rfl: 1  •  aspirin 81 MG EC tablet, Take 1 tablet by mouth Daily. LD 8/7, Disp: , Rfl:   •  atorvastatin (LIPITOR) 20 MG tablet, Take 1 tablet by mouth Daily., Disp: 90 tablet, Rfl: 1  •  levothyroxine (SYNTHROID, LEVOTHROID) 112 MCG tablet, Take 1 tablet by mouth Daily., Disp: 90 tablet, Rfl: 1  •  pantoprazole (PROTONIX) 40 MG EC tablet, Take 1 tablet by mouth Daily., Disp: 90 tablet, Rfl: 1  •  polyethylene glycol (MIRALAX) packet, Take 17 g by mouth Daily. Hold DOS, Disp: , Rfl:   •  venlafaxine XR (EFFEXOR-XR) 75 MG 24 hr capsule, Take 1 capsule by mouth Daily., Disp: 90 capsule, Rfl: 1    Objective   Physical Exam  Vitals and nursing note reviewed.   Constitutional:       General: She is not in acute distress.     Appearance: Normal appearance. She is well-developed. She is not ill-appearing.   HENT:      Head: Normocephalic and atraumatic.   Cardiovascular:      Rate and Rhythm: Normal rate and regular rhythm.      Heart sounds: Murmur heard.     No gallop.   Pulmonary:      Effort: Pulmonary effort is normal. No respiratory distress.      Breath sounds: Normal breath sounds. No wheezing, rhonchi or rales.   Chest:      Chest wall: No tenderness.   Musculoskeletal:      Cervical back: Normal range of motion and neck supple.   Neurological:      General: No focal deficit present.      Mental " Status: She is alert and oriented to person, place, and time. Mental status is at baseline.   Psychiatric:         Mood and Affect: Mood normal.         FOLLOWING LABS WERE REVIEWED TODAY:  CMP        3/21/2023    08:19   CMP   Glucose 103     BUN 15     Creatinine 0.69     EGFR 94.7     Sodium 141     Potassium 4.2     Chloride 104     Calcium 9.3     Total Protein 7.3     Albumin 4.4     Globulin 2.9     Total Bilirubin 0.4     Alkaline Phosphatase 120     AST (SGOT) 17     ALT (SGPT) 12     Albumin/Globulin Ratio 1.5     BUN/Creatinine Ratio 21.7     Anion Gap 10.0       CBC        9/14/2022    11:28 12/14/2022    08:36 4/11/2023    08:55   CBC   WBC 7.17      6.68      4.70        RBC 5.22      4.90      4.83        Hemoglobin 13.2      12.7      12.6        Hematocrit 43.2      40.8      41.3        MCV 82.8      83.3      85.5        MCH 25.3      25.9      26.1        MCHC 30.6      31.1      30.5        RDW 16.2      14.9      14.3        Platelets 277      236      242            This result is from an external source.     TSH        9/16/2022    08:25 3/21/2023    08:19   TSH   TSH 2.500   2.210       Labs done in [facility] on 4/11/2023 including CBC, BMP, type and screen were reviewed today.  I also reviewed the EKG report which was done at [facility] the same day.    Assessment & Plan   Diagnoses and all orders for this visit:    1. Preop examination (Primary)    2. Primary osteoarthritis of left knee    3. Essential hypertension    4. Acquired hypothyroidism  -     T4, Free  -     TSH    5. Vitamin D deficiency  -     Vitamin D,25-Hydroxy      I reviewed her medical problems and her medications.  I also reviewed her previous labs done in our office and the preop labs done recently at the [facility].  I also reviewed her recent EKG.  Patient had echocardiogram done in 12/2022 due to heart murmur noted on exam, which was pretty much within normal limits.  Her blood  pressure is noted to be elevated today.  I reviewed her previous blood pressure readings in the office and these looked good.  Patient was advised to start monitoring her blood pressure at home and contact us if readings stay elevated.  Her blood work done in 3/2023 showed slightly elevated free T4 with normal TSH.  This is planned to be rechecked in couple of months.      Return in about 6 months (around 10/18/2023) for Next scheduled follow up.    Requested Prescriptions      No prescriptions requested or ordered in this encounter

## 2023-06-15 ENCOUNTER — LAB (OUTPATIENT)
Dept: FAMILY MEDICINE CLINIC | Facility: CLINIC | Age: 68
End: 2023-06-15
Payer: MEDICARE

## 2023-06-15 LAB
25(OH)D3 SERPL-MCNC: 47 NG/ML (ref 30–100)
T4 FREE SERPL-MCNC: 1.52 NG/DL (ref 0.93–1.7)
TSH SERPL DL<=0.05 MIU/L-ACNC: 4.21 UIU/ML (ref 0.27–4.2)

## 2023-06-15 PROCEDURE — 84443 ASSAY THYROID STIM HORMONE: CPT | Performed by: FAMILY MEDICINE

## 2023-06-15 PROCEDURE — 82306 VITAMIN D 25 HYDROXY: CPT | Performed by: FAMILY MEDICINE

## 2023-06-15 PROCEDURE — 84439 ASSAY OF FREE THYROXINE: CPT | Performed by: FAMILY MEDICINE

## 2023-08-19 DIAGNOSIS — E03.9 ACQUIRED HYPOTHYROIDISM: ICD-10-CM

## 2023-08-19 DIAGNOSIS — I10 ESSENTIAL HYPERTENSION: ICD-10-CM

## 2023-08-20 RX ORDER — PANTOPRAZOLE SODIUM 40 MG/1
TABLET, DELAYED RELEASE ORAL
Qty: 90 TABLET | Refills: 1 | Status: SHIPPED | OUTPATIENT
Start: 2023-08-20

## 2023-08-20 RX ORDER — AMLODIPINE BESYLATE 5 MG/1
TABLET ORAL
Qty: 90 TABLET | Refills: 1 | Status: SHIPPED | OUTPATIENT
Start: 2023-08-20

## 2023-08-20 RX ORDER — LEVOTHYROXINE SODIUM 112 MCG
TABLET ORAL
Qty: 90 TABLET | Refills: 1 | Status: SHIPPED | OUTPATIENT
Start: 2023-08-20

## 2023-08-20 RX ORDER — ATORVASTATIN CALCIUM 20 MG/1
TABLET, FILM COATED ORAL
Qty: 90 TABLET | Refills: 1 | Status: SHIPPED | OUTPATIENT
Start: 2023-08-20

## 2023-08-20 RX ORDER — VENLAFAXINE HYDROCHLORIDE 75 MG/1
CAPSULE, EXTENDED RELEASE ORAL
Qty: 90 CAPSULE | Refills: 1 | Status: SHIPPED | OUTPATIENT
Start: 2023-08-20

## 2023-09-21 ENCOUNTER — LAB (OUTPATIENT)
Dept: FAMILY MEDICINE CLINIC | Facility: CLINIC | Age: 68
End: 2023-09-21

## 2023-09-21 ENCOUNTER — OFFICE VISIT (OUTPATIENT)
Dept: FAMILY MEDICINE CLINIC | Facility: CLINIC | Age: 68
End: 2023-09-21
Payer: MEDICARE

## 2023-09-21 VITALS
RESPIRATION RATE: 16 BRPM | HEART RATE: 70 BPM | WEIGHT: 181.6 LBS | BODY MASS INDEX: 32.18 KG/M2 | DIASTOLIC BLOOD PRESSURE: 75 MMHG | HEIGHT: 63 IN | SYSTOLIC BLOOD PRESSURE: 112 MMHG | OXYGEN SATURATION: 97 % | TEMPERATURE: 97.5 F

## 2023-09-21 DIAGNOSIS — F33.41 RECURRENT MAJOR DEPRESSIVE DISORDER, IN PARTIAL REMISSION: ICD-10-CM

## 2023-09-21 DIAGNOSIS — I10 ESSENTIAL HYPERTENSION: Primary | ICD-10-CM

## 2023-09-21 DIAGNOSIS — E78.2 MIXED HYPERLIPIDEMIA: ICD-10-CM

## 2023-09-21 DIAGNOSIS — E03.9 ACQUIRED HYPOTHYROIDISM: ICD-10-CM

## 2023-09-21 PROBLEM — Z01.818 PREOP EXAMINATION: Status: RESOLVED | Noted: 2022-12-15 | Resolved: 2023-09-21

## 2023-09-21 LAB
CHOLEST SERPL-MCNC: 126 MG/DL (ref 0–200)
HDLC SERPL-MCNC: 42 MG/DL (ref 40–60)
LDLC SERPL CALC-MCNC: 73 MG/DL (ref 0–100)
LDLC/HDLC SERPL: 1.77 {RATIO}
TRIGL SERPL-MCNC: 48 MG/DL (ref 0–150)
TSH SERPL DL<=0.05 MIU/L-ACNC: 2.39 UIU/ML (ref 0.27–4.2)
VLDLC SERPL-MCNC: 11 MG/DL (ref 5–40)

## 2023-09-21 PROCEDURE — 80061 LIPID PANEL: CPT | Performed by: FAMILY MEDICINE

## 2023-09-21 PROCEDURE — 3078F DIAST BP <80 MM HG: CPT | Performed by: FAMILY MEDICINE

## 2023-09-21 PROCEDURE — 84443 ASSAY THYROID STIM HORMONE: CPT | Performed by: FAMILY MEDICINE

## 2023-09-21 PROCEDURE — 99214 OFFICE O/P EST MOD 30 MIN: CPT | Performed by: FAMILY MEDICINE

## 2023-09-21 PROCEDURE — 3074F SYST BP LT 130 MM HG: CPT | Performed by: FAMILY MEDICINE

## 2023-09-21 PROCEDURE — 36415 COLL VENOUS BLD VENIPUNCTURE: CPT | Performed by: FAMILY MEDICINE

## 2023-09-21 RX ORDER — CALCIUM CARBONATE 500 MG/1
1 TABLET, CHEWABLE ORAL DAILY
COMMUNITY

## 2023-09-21 RX ORDER — CHLORAL HYDRATE 500 MG
1200 CAPSULE ORAL
COMMUNITY

## 2023-09-21 RX ORDER — MELATONIN
2000 DAILY
COMMUNITY

## 2023-09-21 RX ORDER — AMLODIPINE BESYLATE 5 MG/1
5 TABLET ORAL DAILY
Qty: 90 TABLET | Refills: 1 | Status: SHIPPED | OUTPATIENT
Start: 2023-09-21

## 2023-09-21 RX ORDER — MULTIVIT WITH MINERALS/LUTEIN
250 TABLET ORAL DAILY
COMMUNITY

## 2023-09-21 RX ORDER — L.ACID,CASEI/B.ANIMAL/S.THERMO 16B CELL
1 CAPSULE ORAL DAILY
COMMUNITY

## 2023-09-21 RX ORDER — PYRIDOXINE HCL (VITAMIN B6) 100 MG
500 TABLET ORAL
COMMUNITY

## 2023-09-21 NOTE — PROGRESS NOTES
Subjective   Chief Complaint   Patient presents with    Hypothyroidism    Hyperlipidemia    Hypertension    Depression    Follow-up     Nallely Figueroa is a 68 y.o. female.     Patient Care Team:  Erin Quintana MD as PCP - General (Family Medicine)  Kellen Muller MD as Consulting Physician (Obstetrics and Gynecology)  Kana Sneed MD as Consulting Physician (Orthopedic Surgery)  Emily Lr MD as Consulting Physician (Bariatrics)  Vivienne Clifford MD as Consulting Physician (Sleep Medicine)    History of Present Illness  She is coming in today to follow-up on her chronic medical problems and her medications including hypertension, hyperlipidemia, hypothyroidism, and depression.  She takes all of her medicines as directed and denies any issues or side effects.  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  She has been doing it for a long time and its becoming more stressful as they need more care and attention.  She still works full-time.  She was diagnosed with hypothyroidism few decades ago.  She tells me that since then she has not very much on the same dose of Synthroid which is 112 mcg. Patient does not report any chest pain, shortness of breath, dizziness, nausea, vomiting, or diarrhea, visual issues, headaches, numbness or tingling. No urinary issues reported like urgency, frequency, or discomfort upon urination.  No significant weight changes reported.  No swelling reported.  No rashes or any other skin issues reported.      The following portions of the patient's history were reviewed and updated as appropriate: allergies, current medications, past family history, past medical history, past social history, past surgical history, and problem list.  Past Medical History:   Diagnosis Date    Anxiety maybe 25 years    Arthritis 2 years    Carpal tunnel syndrome     Cataract 1 year    had surgery 1 year ago    Depression probably 20 years    take medication for mood disorders     Gastroesophageal reflux disease     Heart murmur 12/15/2022    Hyperlipidemia 2021    Hypertension     Hypothyroidism maybe 40 years    take medication    Obesity off and on most of life    Osteopenia maybe 5 years    Osteopenia    Osteopenia     Sleep apnea     no machine    lost wt      Past Surgical History:   Procedure Laterality Date    BARIATRIC SURGERY  06/2010    lap band removed 2015    CARDIAC CATHETERIZATION N/A 09/18/2019    Procedure: Left Heart Cath;  Surgeon: Jeronimo Kuhn MD;  Location: [facility] CATH INVASIVE LOCATION;  Service: Cardiovascular    CARDIAC CATHETERIZATION N/A 09/18/2019    Procedure: Coronary angiography;  Surgeon: Jeronimo Kuhn MD;  Location: [facility] CATH INVASIVE LOCATION;  Service: Cardiovascular    COLON SURGERY  12/06/2019    sigmoid colectomy with placement of loop ileostomy    COSMETIC SURGERY  2011    face lift after losing 100 lbs    EXPLORATORY LAPAROTOMY N/A 12/13/2019    Procedure: LAPAROTOMY EXPLORATORY, DRAINAGE OF SUBCUTANEOUS AND SUBFASCIALABCESS;  Surgeon: Emily Lr MD;  Location: [facility] MAIN OR;  Service: General    EXPLORATORY LAPAROTOMY N/A 12/02/2019    Procedure: sigmoid colectomy with placement of loop ileostomy;  Surgeon: Emily Lr MD;  Location: [facility] MAIN OR;  Service: General    EYE SURGERY      lasix     HERNIA REPAIR      ILEOSTOMY CLOSURE N/A 02/06/2020    Procedure: ILEOSTOMY TAKEDOWN, WOUND DEBRIDEMENT WITH MESH REMOVAL;  Surgeon: Emily Lr MD;  Location: [facility] MAIN OR;  Service: General;  Laterality: N/A;    REPLACEMENT TOTAL KNEE Left Total Left Knee Feb 7 2022        REPLACEMENT TOTAL KNEE Right 02/2023    Dr. Sneed    REVISION TOTAL KNEE ARTHROPLASTY Left 10/2022    Dr. Sneed    SIGMOIDOSCOPY N/A 02/06/2020    Procedure: SIGMOIDOSCOPY RIGID;  Surgeon: Emily Lr MD;  Location: [facility] MAIN OR;  Service: General;  Laterality: N/A;    SUBTOTAL HYSTERECTOMY  25 years    TONSILLECTOMY      VENTRAL/INCISIONAL HERNIA  REPAIR N/A 2021    Procedure: VENTRAL HERNIA WITH COMPONENT SEPARATION with mesh;  Surgeon: Emily Lr MD;  Location: [facility] MAIN OR;  Service: General;  Laterality: N/A;     The patient has a family history of  Family History   Problem Relation Age of Onset    Supraventricular tachycardia Mother     Diabetes Mother         Type 2    Thyroid disease Mother     Arthritis Mother     Heart attack Father         Massive heart attack age 51    Early death Father         massive heart attack at 51    Hyperlipidemia Paternal Grandmother     Stroke Maternal Grandfather     Alcohol abuse Maternal Uncle     Arthritis Maternal Uncle     Hearing loss Maternal Uncle     Heart disease Maternal Uncle     Hypertension Maternal Uncle     Arthritis Brother         rheumitoid    Alcohol abuse Brother     Drug abuse Brother         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    Cancer Brother         testicular    Depression Brother     Hypertension Brother     Arthritis Brother     Heart disease Maternal Uncle     Hypertension Maternal Uncle      Social History     Socioeconomic History    Marital status:    Tobacco Use    Smoking status: Never    Smokeless tobacco: Never   Vaping Use    Vaping Use: Never used   Substance and Sexual Activity    Alcohol use: No    Drug use: No    Sexual activity: Not Currently     Birth control/protection: None     Comment: Not sexually active since   2000       Review of Systems   Constitutional:  Negative for activity change, fatigue and fever.   Respiratory:  Negative for shortness of breath and wheezing.    Cardiovascular:  Negative for chest pain, palpitations and leg swelling.   Endocrine: Negative for cold intolerance and heat intolerance.   Musculoskeletal:  Negative for arthralgias and back pain.   Skin:  Negative for rash.   Neurological:  Negative for tremors and headache.   Psychiatric/Behavioral:  Positive for stress.    Visit Vitals  /75 (BP Location: Left arm,  "Patient Position: Sitting, Cuff Size: Adult)   Pulse 70   Temp 97.5 °F (36.4 °C) (Infrared)   Resp 16   Ht 160 cm (62.99\")   Wt 82.4 kg (181 lb 9.6 oz)   SpO2 97%   BMI 32.18 kg/m²              Current Outpatient Medications:     amLODIPine (NORVASC) 5 MG tablet, Take 1 tablet by mouth Daily., Disp: 90 tablet, Rfl: 1    aspirin 81 MG EC tablet, Take 1 tablet by mouth Daily. LD 8/7, Disp: , Rfl:     atorvastatin (LIPITOR) 20 MG tablet, TAKE 1 TABLET DAILY, Disp: 90 tablet, Rfl: 1    calcium carbonate (TUMS) 500 MG chewable tablet, Chew 1 tablet Daily., Disp: , Rfl:     Cholecalciferol 25 MCG (1000 UT) tablet, Take 2 tablets by mouth Daily., Disp: , Rfl:     Cranberry 500 MG capsule, Take 500 mg by mouth., Disp: , Rfl:     Omega-3 Fatty Acids (fish oil) 1000 MG capsule capsule, Take 1,200 mg by mouth Daily With Breakfast., Disp: , Rfl:     pantoprazole (PROTONIX) 40 MG EC tablet, TAKE 1 TABLET DAILY, Disp: 90 tablet, Rfl: 1    polyethylene glycol (MIRALAX) packet, Take 17 g by mouth Daily. Hold DOS, Disp: , Rfl:     Probiotic Product (Risaquad-2) capsule capsule, Take 1 capsule by mouth Daily., Disp: , Rfl:     Synthroid 112 MCG tablet, TAKE 1 TABLET DAILY, Disp: 90 tablet, Rfl: 1    venlafaxine XR (EFFEXOR-XR) 75 MG 24 hr capsule, TAKE 1 CAPSULE DAILY, Disp: 90 capsule, Rfl: 1    vitamin C (ASCORBIC ACID) 250 MG tablet, Take 1 tablet by mouth Daily., Disp: , Rfl:     Objective   Physical Exam  Vitals and nursing note reviewed.   Constitutional:       General: She is not in acute distress.     Appearance: Normal appearance. She is well-developed. She is not ill-appearing.   HENT:      Head: Normocephalic and atraumatic.   Cardiovascular:      Rate and Rhythm: Normal rate and regular rhythm.      Heart sounds: Normal heart sounds. No murmur heard.    No gallop.   Pulmonary:      Effort: Pulmonary effort is normal. No respiratory distress.      Breath sounds: Normal breath sounds. No wheezing, rhonchi or rales.   Chest: "      Chest wall: No tenderness.   Musculoskeletal:      Cervical back: Normal range of motion and neck supple.   Neurological:      General: No focal deficit present.      Mental Status: She is alert and oriented to person, place, and time. Mental status is at baseline.   Psychiatric:         Mood and Affect: Mood normal.       FOLLOWING LABS WERE REVIEWED TODAY:  CMP          3/21/2023    08:19   CMP   Glucose 103    BUN 15    Creatinine 0.69    EGFR 94.7    Sodium 141    Potassium 4.2    Chloride 104    Calcium 9.3    Total Protein 7.3    Albumin 4.4    Globulin 2.9    Total Bilirubin 0.4    Alkaline Phosphatase 120    AST (SGOT) 17    ALT (SGPT) 12    Albumin/Globulin Ratio 1.5    BUN/Creatinine Ratio 21.7    Anion Gap 10.0      Lipid Panel          3/21/2023    08:19   Lipid Panel   Total Cholesterol 144    Triglycerides 121    HDL Cholesterol 38    VLDL Cholesterol 22    LDL Cholesterol  84    LDL/HDL Ratio 2.15      TSH          3/21/2023    08:19 6/15/2023    08:03   TSH   TSH 2.210  4.210          Assessment & Plan   Diagnoses and all orders for this visit:    1. Essential hypertension (Primary)  -     Comprehensive Metabolic Panel  -     Lipid Panel  -     amLODIPine (NORVASC) 5 MG tablet; Take 1 tablet by mouth Daily.  Dispense: 90 tablet; Refill: 1    2. Mixed hyperlipidemia  -     Comprehensive Metabolic Panel  -     Lipid Panel    3. Acquired hypothyroidism  -     TSH    4. Recurrent major depressive disorder, in partial remission        I reviewed her medical problems and her medications.  I also reviewed her previous labs and a repeat blood work is being done today.  From medical standpoint she is overall doing well and her chronic medical problems are stable and well-controlled.  We talked at length about her stress in life and coping skills.  Feedback was provided.      No follow-ups on file.    Requested Prescriptions     Signed Prescriptions Disp Refills    amLODIPine (NORVASC) 5 MG tablet 90  tablet 1     Sig: Take 1 tablet by mouth Daily.

## 2024-01-16 ENCOUNTER — OFFICE VISIT (OUTPATIENT)
Dept: SLEEP MEDICINE | Facility: CLINIC | Age: 69
End: 2024-01-16
Payer: MEDICARE

## 2024-01-16 VITALS
OXYGEN SATURATION: 98 % | HEIGHT: 63 IN | BODY MASS INDEX: 29.77 KG/M2 | HEART RATE: 77 BPM | SYSTOLIC BLOOD PRESSURE: 141 MMHG | DIASTOLIC BLOOD PRESSURE: 81 MMHG | WEIGHT: 168 LBS

## 2024-01-16 DIAGNOSIS — E66.9 CLASS 1 OBESITY: ICD-10-CM

## 2024-01-16 DIAGNOSIS — G47.33 OSA ON CPAP: Primary | ICD-10-CM

## 2024-01-16 PROCEDURE — G0463 HOSPITAL OUTPT CLINIC VISIT: HCPCS

## 2024-01-16 NOTE — PROGRESS NOTES
"  [id number] [facility], [address]  [place] [address]  Phone   Fax       SLEEP CLINIC FOLLOW UP PROGRESS NOTE.    Nallely Figueroa  [id number]   1955  68 y.o.  female      PCP: Erin Quintana MD      Date of visit: 1/16/2024    Chief Complaint   Patient presents with    Follow-up    Sleep Apnea       HPI:  This is a 68 y.o. years old patient is here for the management of obstructive sleep apnea.  Sleep apnea is severe in severity with a AHI of 48/hr. Patient is using positive airway pressure therapy with CPAP 12 and the symptoms of sleep apnea have improved significantly on the therapy. Normally patient goes to bed at 9 PM and wakes up at 5 AM .  The patient wakes up 3 or 4 time(s) during the night and has no problem going back to sleep.  Feels refreshed after waking up.  Complains of dry mouth    Medications and allergies are reviewed by me and documented in the encounter.     SOCIAL (habits pertaining to sleep medicine)  History tobacco use:No   History of alcohol use: 0 per week  Caffeine use: 3     REVIEW OF SYSTEMS:   Pertaining positive symptoms are:  [place] Sleepiness Scale :Total score: 1   Dry mouth      PHYSICAL EXAMINATION:  CONSTITUTIONAL:  Vitals:    01/16/24 0700   BP: 141/81   BP Location: Left arm   Patient Position: Sitting   Pulse: 77   SpO2: 98%   Weight: 76.2 kg (168 lb)   Height: 160 cm (63\")    Body mass index is 29.76 kg/m².   NOSE: nasal passages are clear, No deformities noted   RESP SYSTEM: Not in any respiratory distress, no chest deformities noted,   CARDIOVASULAR: No edema noted  NEURO: Oriented x 3, gait normal,  Mood and affect appeared appropriate      Data reviewed:  The Smart card downloaded on 1/16/2024 has been reviewed independently by me for compliance and discussed the data with the patient.   Compliance; 100%  More than 4 hr use, 100%  Average use of the device 8 hours and 24 per night  Residual AHI: 1 /hr (goal < 5.0 " /hr)  Mask type: Nasal mass  Device: ResMed  DME: [employer]      ASSESSMENT AND PLAN:  Obstructive sleep apnea ( G 47.33).  The symptoms of sleep apnea have improved with the device and the treatment.  Patient's compliance with the device is excellent for treatment of sleep apnea.  I have independently reviewed the smart card down load and discussed with the patient the download data and encouarged the patient to continue to use the device.The residual AHI is acceptable. The device is benefiting the patient and the device is medically necessary.  Without proper control of sleep apnea and good compliance there is a increased risk for hypertension, diabetes mellitus and nonrestorative sleep with hypersomnia which can increase risk for motor vehicle accidents.  Untreated sleep apnea is also a risk factor for development of atrial fibrillation, pulmonary hypertension, insulin resistance and stroke. The patient is also instructed to get the supplies from the DME company and and change them on a regular basis.  A prescription for supplies has been sent to the DME company.  I have also discussed the good sleep hygiene habits and adequate amount of sleep needed for good health.  Dry mouth.  Instructed to increase the humidity  Return in about 1 year (around 1/16/2025) for with smart card down load. . Patient's questions were answered.    1/16/2024  Vivienne Clifford MD  Sleep Medicine.  Medical Director,   [facility].

## 2024-01-30 DIAGNOSIS — E03.9 ACQUIRED HYPOTHYROIDISM: ICD-10-CM

## 2024-01-30 RX ORDER — PANTOPRAZOLE SODIUM 40 MG/1
40 TABLET, DELAYED RELEASE ORAL DAILY
Qty: 90 TABLET | Refills: 1 | Status: SHIPPED | OUTPATIENT
Start: 2024-01-30

## 2024-01-30 RX ORDER — VENLAFAXINE HYDROCHLORIDE 75 MG/1
75 CAPSULE, EXTENDED RELEASE ORAL DAILY
Qty: 90 CAPSULE | Refills: 1 | Status: SHIPPED | OUTPATIENT
Start: 2024-01-30

## 2024-01-30 RX ORDER — ATORVASTATIN CALCIUM 20 MG/1
20 TABLET, FILM COATED ORAL DAILY
Qty: 90 TABLET | Refills: 1 | Status: SHIPPED | OUTPATIENT
Start: 2024-01-30

## 2024-01-30 RX ORDER — LEVOTHYROXINE SODIUM 112 UG/1
112 TABLET ORAL DAILY
Qty: 90 TABLET | Refills: 1 | Status: SHIPPED | OUTPATIENT
Start: 2024-01-30

## 2024-03-24 DIAGNOSIS — I10 ESSENTIAL HYPERTENSION: ICD-10-CM

## 2024-03-24 RX ORDER — AMLODIPINE BESYLATE 5 MG/1
5 TABLET ORAL DAILY
Qty: 90 TABLET | Refills: 0 | Status: SHIPPED | OUTPATIENT
Start: 2024-03-24

## 2024-03-26 ENCOUNTER — OFFICE VISIT (OUTPATIENT)
Dept: FAMILY MEDICINE CLINIC | Facility: CLINIC | Age: 69
End: 2024-03-26
Payer: MEDICARE

## 2024-03-26 ENCOUNTER — LAB (OUTPATIENT)
Dept: LAB | Facility: HOSPITAL | Age: 69
End: 2024-03-26
Payer: MEDICARE

## 2024-03-26 VITALS
SYSTOLIC BLOOD PRESSURE: 127 MMHG | HEART RATE: 77 BPM | WEIGHT: 166.8 LBS | BODY MASS INDEX: 29.55 KG/M2 | OXYGEN SATURATION: 98 % | HEIGHT: 63 IN | TEMPERATURE: 98 F | DIASTOLIC BLOOD PRESSURE: 75 MMHG | RESPIRATION RATE: 16 BRPM

## 2024-03-26 DIAGNOSIS — E78.2 MIXED HYPERLIPIDEMIA: ICD-10-CM

## 2024-03-26 DIAGNOSIS — E03.9 ACQUIRED HYPOTHYROIDISM: ICD-10-CM

## 2024-03-26 DIAGNOSIS — I10 ESSENTIAL HYPERTENSION: ICD-10-CM

## 2024-03-26 DIAGNOSIS — Z12.11 COLON CANCER SCREENING: ICD-10-CM

## 2024-03-26 DIAGNOSIS — Z00.00 MEDICARE ANNUAL WELLNESS VISIT, SUBSEQUENT: Primary | ICD-10-CM

## 2024-03-26 LAB
ALBUMIN SERPL-MCNC: 4.3 G/DL (ref 3.5–5.2)
ALBUMIN/GLOB SERPL: 1.6 G/DL
ALP SERPL-CCNC: 80 U/L (ref 39–117)
ALT SERPL W P-5'-P-CCNC: 12 U/L (ref 1–33)
ANION GAP SERPL CALCULATED.3IONS-SCNC: 12.7 MMOL/L (ref 5–15)
AST SERPL-CCNC: 16 U/L (ref 1–32)
BASOPHILS # BLD AUTO: 0.04 10*3/MM3 (ref 0–0.2)
BASOPHILS NFR BLD AUTO: 0.8 % (ref 0–1.5)
BILIRUB SERPL-MCNC: 0.3 MG/DL (ref 0–1.2)
BUN SERPL-MCNC: 10 MG/DL (ref 8–23)
BUN/CREAT SERPL: 12.7 (ref 7–25)
CALCIUM SPEC-SCNC: 9.3 MG/DL (ref 8.6–10.5)
CHLORIDE SERPL-SCNC: 104 MMOL/L (ref 98–107)
CHOLEST SERPL-MCNC: 119 MG/DL (ref 0–200)
CO2 SERPL-SCNC: 24.3 MMOL/L (ref 22–29)
CREAT SERPL-MCNC: 0.79 MG/DL (ref 0.57–1)
DEPRECATED RDW RBC AUTO: 37.7 FL (ref 37–54)
EGFRCR SERPLBLD CKD-EPI 2021: 81.1 ML/MIN/1.73
EOSINOPHIL # BLD AUTO: 0.08 10*3/MM3 (ref 0–0.4)
EOSINOPHIL NFR BLD AUTO: 1.6 % (ref 0.3–6.2)
ERYTHROCYTE [DISTWIDTH] IN BLOOD BY AUTOMATED COUNT: 12.7 % (ref 12.3–15.4)
GLOBULIN UR ELPH-MCNC: 2.7 GM/DL
GLUCOSE SERPL-MCNC: 93 MG/DL (ref 65–99)
HCT VFR BLD AUTO: 42.3 % (ref 34–46.6)
HDLC SERPL-MCNC: 44 MG/DL (ref 40–60)
HGB BLD-MCNC: 14.4 G/DL (ref 12–15.9)
IMM GRANULOCYTES # BLD AUTO: 0.02 10*3/MM3 (ref 0–0.05)
IMM GRANULOCYTES NFR BLD AUTO: 0.4 % (ref 0–0.5)
LDLC SERPL CALC-MCNC: 64 MG/DL (ref 0–100)
LDLC/HDLC SERPL: 1.49 {RATIO}
LYMPHOCYTES # BLD AUTO: 1.13 10*3/MM3 (ref 0.7–3.1)
LYMPHOCYTES NFR BLD AUTO: 22.8 % (ref 19.6–45.3)
MCH RBC QN AUTO: 28.6 PG (ref 26.6–33)
MCHC RBC AUTO-ENTMCNC: 34 G/DL (ref 31.5–35.7)
MCV RBC AUTO: 83.9 FL (ref 79–97)
MONOCYTES # BLD AUTO: 0.48 10*3/MM3 (ref 0.1–0.9)
MONOCYTES NFR BLD AUTO: 9.7 % (ref 5–12)
NEUTROPHILS NFR BLD AUTO: 3.21 10*3/MM3 (ref 1.7–7)
NEUTROPHILS NFR BLD AUTO: 64.7 % (ref 42.7–76)
NRBC BLD AUTO-RTO: 0 /100 WBC (ref 0–0.2)
PLATELET # BLD AUTO: 271 10*3/MM3 (ref 140–450)
PMV BLD AUTO: 10.1 FL (ref 6–12)
POTASSIUM SERPL-SCNC: 4.7 MMOL/L (ref 3.5–5.2)
PROT SERPL-MCNC: 7 G/DL (ref 6–8.5)
RBC # BLD AUTO: 5.04 10*6/MM3 (ref 3.77–5.28)
SODIUM SERPL-SCNC: 141 MMOL/L (ref 136–145)
TRIGL SERPL-MCNC: 48 MG/DL (ref 0–150)
TSH SERPL DL<=0.05 MIU/L-ACNC: 3.16 UIU/ML (ref 0.27–4.2)
VLDLC SERPL-MCNC: 11 MG/DL (ref 5–40)
WBC NRBC COR # BLD AUTO: 4.96 10*3/MM3 (ref 3.4–10.8)

## 2024-03-26 PROCEDURE — 84443 ASSAY THYROID STIM HORMONE: CPT | Performed by: FAMILY MEDICINE

## 2024-03-26 PROCEDURE — 36415 COLL VENOUS BLD VENIPUNCTURE: CPT | Performed by: FAMILY MEDICINE

## 2024-03-26 PROCEDURE — 80053 COMPREHEN METABOLIC PANEL: CPT

## 2024-03-26 PROCEDURE — 85025 COMPLETE CBC W/AUTO DIFF WBC: CPT | Performed by: FAMILY MEDICINE

## 2024-03-26 PROCEDURE — 80061 LIPID PANEL: CPT | Performed by: FAMILY MEDICINE

## 2024-03-26 NOTE — PROGRESS NOTES
Subsequent Medicare Wellness Visit   The ABC's of the Annual Wellness Visit    Chief Complaint   Patient presents with    Medicare Wellness-subsequent       HPI:  Nallely Figueroa, -1955, is a 69 y.o. female who presents for a Subsequent Medicare Wellness Visit.  She lives with her mom, she moved in with her mom a while back to assist with her needs.  She is fully independent with her activities of daily living.  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  No issues with depression or memory problems are being reported.  No frequent falls.  She is followed by GYN doctors office for her annual mammograms and she also gets her bone densities done through that office. Patient does not report any chest pain, shortness of breath, dizziness, nausea, vomiting, or diarrhea, visual issues, headaches, numbness or tingling. No urinary issues reported like urgency, frequency, or discomfort upon urination.  No significant weight changes reported.  No swelling reported.  No rashes or any other skin issues reported. No emotional issues or insomnia.    Recent Hospitalizations:  No hospitalization(s) within the last year..    Current Medical Providers:  Patient Care Team:  Erin Quintana MD as PCP - General (Family Medicine)  Kellen Muller MD as Consulting Physician (Obstetrics and Gynecology)  Kana Sneed MD as Consulting Physician (Orthopedic Surgery)  Emily Lr MD as Consulting Physician (Bariatrics)  Vivienne Clifford MD as Consulting Physician (Sleep Medicine)    Health Habits and Functional and Cognitive Screening and Depression Screening:      3/26/2024     7:53 AM   Functional & Cognitive Status   Do you have difficulty preparing food and eating? No   Do you have difficulty bathing yourself, getting dressed or grooming yourself? No   Do you have difficulty using the toilet? No   Do you have difficulty moving around from place to place? No   Do you have trouble with steps  or getting out of a bed or a chair? No   Current Diet Well Balanced Diet   Dental Exam Up to date   Eye Exam Up to date   Exercise (times per week) 0 times per week   Current Exercises Include No Regular Exercise   Do you need help using the phone?  No   Are you deaf or do you have serious difficulty hearing?  No   Do you need help to go to places out of walking distance? No   Do you need help shopping? No   Do you need help preparing meals?  No   Do you need help with housework?  No   Do you need help with laundry? No   Do you need help taking your medications? No   Do you need help managing money? No   Do you ever drive or ride in a car without wearing a seat belt? No   Have you felt unusual stress, anger or loneliness in the last month? No   Who do you live with? Other   If you need help, do you have trouble finding someone available to you? No   Do you have difficulty concentrating, remembering or making decisions? No       Compared to one year ago, the patient feels her physical health is the same and her mental health is the same.    Depression Screen:      3/26/2024     7:52 AM   PHQ-2/PHQ-9 Depression Screening   Little Interest or Pleasure in Doing Things 0-->not at all   Feeling Down, Depressed or Hopeless 0-->not at all   PHQ-9: Brief Depression Severity Measure Score 0         Past Medical/Family/Social History:  The following portions of the patient's history were reviewed and updated as appropriate: allergies, current medications, past family history, past medical history, past social history, past surgical history, and problem list.    Allergies   Allergen Reactions    Ace Inhibitors Cough         Current Outpatient Medications:     amLODIPine (NORVASC) 5 MG tablet, TAKE 1 TABLET DAILY, Disp: 90 tablet, Rfl: 0    aspirin 81 MG EC tablet, Take 1 tablet by mouth Daily. LD 8/7, Disp: , Rfl:     atorvastatin (LIPITOR) 20 MG tablet, Take 1 tablet by mouth Daily., Disp: 90 tablet, Rfl: 1    calcium  carbonate (TUMS) 500 MG chewable tablet, Chew 1 tablet Daily., Disp: , Rfl:     Cholecalciferol 25 MCG (1000 UT) tablet, Take 2 tablets by mouth Daily., Disp: , Rfl:     Cranberry 500 MG capsule, Take 500 mg by mouth., Disp: , Rfl:     levothyroxine (Synthroid) 112 MCG tablet, Take 1 tablet by mouth Daily., Disp: 90 tablet, Rfl: 1    Omega-3 Fatty Acids (fish oil) 1000 MG capsule capsule, Take 1,200 mg by mouth Daily With Breakfast., Disp: , Rfl:     pantoprazole (PROTONIX) 40 MG EC tablet, Take 1 tablet by mouth Daily., Disp: 90 tablet, Rfl: 1    polyethylene glycol (MIRALAX) packet, Take 17 g by mouth Daily. Hold DOS, Disp: , Rfl:     Probiotic Product (Risaquad-2) capsule capsule, Take 1 capsule by mouth Daily., Disp: , Rfl:     venlafaxine XR (EFFEXOR-XR) 75 MG 24 hr capsule, Take 1 capsule by mouth Daily., Disp: 90 capsule, Rfl: 1    vitamin C (ASCORBIC ACID) 250 MG tablet, Take 1 tablet by mouth Daily., Disp: , Rfl:     Aspirin use counseling: Taking ASA appropriately as indicated    Current medication list contains no high risk medications.  No harmful drug interactions have been identified.     Family History   Problem Relation Age of Onset    Supraventricular tachycardia Mother     Diabetes Mother         Type 2    Thyroid disease Mother     Arthritis Mother     Hypertension Mother     Heart attack Father         Massive heart attack age 51    Early death Father         massive heart attack at 51    Hyperlipidemia Paternal Grandmother     Stroke Maternal Grandfather     Alcohol abuse Maternal Uncle     Arthritis Maternal Uncle     Hearing loss Maternal Uncle     Heart disease Maternal Uncle     Hypertension Maternal Uncle     Arthritis Brother         rheumitoid    Alcohol abuse Brother     Drug abuse Brother         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    Cancer Brother         testicular    Depression Brother     Hypertension Brother     Arthritis Brother     Obesity Brother     Sleep apnea Brother     Sleep  disorder Brother     Heart disease Maternal Uncle     Hypertension Maternal Uncle     Other Sister         Just diagnosed with Lupus       Social History     Tobacco Use    Smoking status: Never     Passive exposure: Never    Smokeless tobacco: Never   Substance Use Topics    Alcohol use: No       Past Surgical History:   Procedure Laterality Date    BARIATRIC SURGERY  06/2010    lap band removed 2015    CARDIAC CATHETERIZATION N/A 09/18/2019    Procedure: Left Heart Cath;  Surgeon: Jeronimo Kuhn MD;  Location: [facility] CATH INVASIVE LOCATION;  Service: Cardiovascular    CARDIAC CATHETERIZATION N/A 09/18/2019    Procedure: Coronary angiography;  Surgeon: Jeronimo Kuhn MD;  Location: [facility] CATH INVASIVE LOCATION;  Service: Cardiovascular    COLON SURGERY  12/06/2019    sigmoid colectomy with placement of loop ileostomy    COSMETIC SURGERY  2011    face lift after losing 100 lbs    EXPLORATORY LAPAROTOMY N/A 12/13/2019    Procedure: LAPAROTOMY EXPLORATORY, DRAINAGE OF SUBCUTANEOUS AND SUBFASCIALABCESS;  Surgeon: Emily Lr MD;  Location: [facility] MAIN OR;  Service: General    EXPLORATORY LAPAROTOMY N/A 12/02/2019    Procedure: sigmoid colectomy with placement of loop ileostomy;  Surgeon: Emily Lr MD;  Location: [facility] MAIN OR;  Service: General    EYE SURGERY      lasix     HERNIA REPAIR      ILEOSTOMY CLOSURE N/A 02/06/2020    Procedure: ILEOSTOMY TAKEDOWN, WOUND DEBRIDEMENT WITH MESH REMOVAL;  Surgeon: Emily Lr MD;  Location: [facility] MAIN OR;  Service: General;  Laterality: N/A;    REPLACEMENT TOTAL KNEE Left Total Left Knee Feb 7 2022        REPLACEMENT TOTAL KNEE Right 02/2023    Dr. Sneed    REVISION TOTAL KNEE ARTHROPLASTY Left 10/2022    Dr. Sneed    SIGMOIDOSCOPY N/A 02/06/2020    Procedure: SIGMOIDOSCOPY RIGID;  Surgeon: Emily Lr MD;  Location: [facility] MAIN OR;  Service: General;  Laterality: N/A;    SUBTOTAL HYSTERECTOMY  25 years    TONSILLECTOMY      VENTRAL/INCISIONAL  "HERNIA REPAIR N/A 08/12/2021    Procedure: VENTRAL HERNIA WITH COMPONENT SEPARATION with mesh;  Surgeon: Emily Lr MD;  Location: [facility] MAIN OR;  Service: General;  Laterality: N/A;       Patient Active Problem List   Diagnosis    Essential hypertension    Carpal tunnel syndrome    Depression    Medicare annual wellness visit, subsequent    Hyperlipidemia    Hypothyroidism    Osteopenia    FERNANDO on CPAP    Ventral hernia    Vitamin D deficiency    Class 1 obesity    Chronic pain of left knee    Primary osteoarthritis of right knee    Heart murmur    Primary osteoarthritis of left knee    Colon cancer screening       Review of Systems   Constitutional:  Negative for activity change, fatigue and fever.   Respiratory:  Negative for shortness of breath and wheezing.    Cardiovascular:  Negative for chest pain, palpitations and leg swelling.   Musculoskeletal:  Negative for arthralgias and back pain.   Skin:  Negative for rash.   Neurological:  Negative for tremors and headache.       Objective     Vitals:    03/26/24 0802   BP: 127/75   BP Location: Left arm   Patient Position: Sitting   Cuff Size: Adult   Pulse: 77   Resp: 16   Temp: 98 °F (36.7 °C)   TempSrc: Infrared   SpO2: 98%   Weight: 75.7 kg (166 lb 12.8 oz)   Height: 160 cm (63\")   PainSc: 0-No pain              No results found.    The patient has no evidence of cognitve impairment.     Physical Exam  Vitals and nursing note reviewed.   Constitutional:       General: She is not in acute distress.     Appearance: Normal appearance. She is well-developed. She is not ill-appearing.   HENT:      Head: Normocephalic and atraumatic.   Cardiovascular:      Rate and Rhythm: Normal rate and regular rhythm.      Heart sounds: Normal heart sounds. No murmur heard.     No gallop.   Pulmonary:      Effort: Pulmonary effort is normal. No respiratory distress.      Breath sounds: Normal breath sounds. No wheezing, rhonchi or rales.   Chest:      Chest wall: No tenderness. "   Musculoskeletal:      Cervical back: Normal range of motion and neck supple.   Neurological:      General: No focal deficit present.      Mental Status: She is alert and oriented to person, place, and time. Mental status is at baseline.   Psychiatric:         Mood and Affect: Mood normal.         Recent Lab Results:  Lab Results   Component Value Date    GLU 96 12/14/2022     Lab Results   Component Value Date    CHOL 126 09/21/2023    TRIG 48 09/21/2023    HDL 42 09/21/2023    VLDL 11 09/21/2023    LDLHDL 1.77 09/21/2023       Assessment & Plan   Age-appropriate Screening Schedule:  Refer to the list below for future screening recommendations based on patient's age, sex and/or medical conditions.      Health Maintenance   Topic Date Due    TDAP/TD VACCINES (1 - Tdap) Never done    BMI FOLLOWUP  04/18/2024    ANNUAL WELLNESS VISIT  03/26/2025    MAMMOGRAM  06/21/2025    DXA SCAN  06/21/2025    COLORECTAL CANCER SCREENING  11/05/2029    HEPATITIS C SCREENING  Completed    COVID-19 Vaccine  Completed    RSV Vaccine - Adults  Completed    INFLUENZA VACCINE  Completed    Pneumococcal Vaccine 65+  Completed    ZOSTER VACCINE  Completed    LIPID PANEL  Discontinued       Medicare Risks and Personalized Health Plan:  Advance Directive Discussion  Breast Cancer/Mammogram Screening  Cardiovascular risk  Colon Cancer Screening  Dementia/Memory   Depression/Dysphoria  Diabetic Lab Screening   Fall Risk  Immunizations Discussed/Encouraged (specific immunizations; Shingrix, COVID19, and RSV (Respiratory Syncytial Virus) )  Obesity/Overweight   Osteoporosis Risk      CMS-Preventive Services Quick Reference  Medicare Preventive Services Addressed:  Annual Wellness Visit (AWV)  Bone Density Measurements  Cardiovascular Disease Screening Tests (may do this order every 5 years in beneficiaries without signs or symptoms of cardiovascular disease)  Colorectal Cancer Screening, Colonoscopy  Screening Mammography     Advance Care  Planning:  ACP discussion was held with the patient during this visit. Patient has an advance directive in EMR which is still valid.     Diagnoses and all orders for this visit:    1. Medicare annual wellness visit, subsequent (Primary)    2. Colon cancer screening  -     Ambulatory Referral For Screening Colonoscopy    3. Mixed hyperlipidemia  -     Comprehensive Metabolic Panel  -     Lipid Panel  -     CBC Auto Differential    4. Acquired hypothyroidism  -     TSH  -     CBC Auto Differential      Medicare wellness exam was done today.  I will be getting fasting blood work.  Health maintenance was reviewed.  Her last colonoscopy was in 11/2019 and 5-year follow-up colonoscopy was recommended.  Her last mammogram and DEXA scan were done in 6/2023 and she gets these tests done through her GYN doctors office.  Immunization was also reviewed, she is up to speed with her COVID-19 vaccination, pneumonia shots, shingles shot, and she also had her RSV shot.  Healthy lifestyle was reinforced.    An After Visit Summary and PPPS with all of these plans were given to the patient.      Follow Up:  Return in about 6 months (around 9/26/2024) for Next scheduled follow up.        Requested Prescriptions      No prescriptions requested or ordered in this encounter

## 2024-04-15 DIAGNOSIS — E03.9 ACQUIRED HYPOTHYROIDISM: ICD-10-CM

## 2024-04-15 RX ORDER — LEVOTHYROXINE SODIUM 112 MCG
TABLET ORAL
Qty: 90 TABLET | Refills: 0 | Status: SHIPPED | OUTPATIENT
Start: 2024-04-15

## 2024-05-13 DIAGNOSIS — E03.9 ACQUIRED HYPOTHYROIDISM: ICD-10-CM

## 2024-05-13 RX ORDER — LEVOTHYROXINE SODIUM 112 MCG
TABLET ORAL
Qty: 90 TABLET | Refills: 1 | Status: SHIPPED | OUTPATIENT
Start: 2024-05-13

## 2024-06-18 DIAGNOSIS — I10 ESSENTIAL HYPERTENSION: ICD-10-CM

## 2024-06-18 RX ORDER — AMLODIPINE BESYLATE 5 MG/1
5 TABLET ORAL DAILY
Qty: 90 TABLET | Refills: 0 | Status: SHIPPED | OUTPATIENT
Start: 2024-06-18

## 2024-07-15 RX ORDER — ATORVASTATIN CALCIUM 20 MG/1
20 TABLET, FILM COATED ORAL DAILY
Qty: 90 TABLET | Refills: 1 | Status: SHIPPED | OUTPATIENT
Start: 2024-07-15

## 2024-07-15 RX ORDER — ATORVASTATIN CALCIUM 20 MG/1
TABLET, FILM COATED ORAL
Qty: 90 TABLET | Refills: 0 | OUTPATIENT
Start: 2024-07-15

## 2024-07-15 RX ORDER — PANTOPRAZOLE SODIUM 40 MG/1
40 TABLET, DELAYED RELEASE ORAL DAILY
Qty: 90 TABLET | Refills: 1 | Status: SHIPPED | OUTPATIENT
Start: 2024-07-15

## 2024-07-15 RX ORDER — VENLAFAXINE HYDROCHLORIDE 75 MG/1
75 CAPSULE, EXTENDED RELEASE ORAL DAILY
Qty: 90 CAPSULE | Refills: 1 | Status: SHIPPED | OUTPATIENT
Start: 2024-07-15

## 2024-08-14 DIAGNOSIS — E03.9 ACQUIRED HYPOTHYROIDISM: ICD-10-CM

## 2024-08-14 RX ORDER — LEVOTHYROXINE SODIUM 112 UG/1
112 TABLET ORAL
Qty: 90 TABLET | Refills: 1 | Status: SHIPPED | OUTPATIENT
Start: 2024-08-14

## 2024-08-19 PROBLEM — Z86.010 PERSONAL HISTORY OF COLONIC POLYPS: Status: ACTIVE | Noted: 2019-11-05

## 2024-09-10 DIAGNOSIS — I10 ESSENTIAL HYPERTENSION: ICD-10-CM

## 2024-09-10 RX ORDER — AMLODIPINE BESYLATE 5 MG/1
5 TABLET ORAL DAILY
Qty: 90 TABLET | Refills: 0 | Status: SHIPPED | OUTPATIENT
Start: 2024-09-10

## 2024-09-26 ENCOUNTER — LAB (OUTPATIENT)
Dept: FAMILY MEDICINE CLINIC | Facility: CLINIC | Age: 69
End: 2024-09-26
Payer: MEDICARE

## 2024-09-26 ENCOUNTER — OFFICE VISIT (OUTPATIENT)
Dept: FAMILY MEDICINE CLINIC | Facility: CLINIC | Age: 69
End: 2024-09-26
Payer: MEDICARE

## 2024-09-26 VITALS
SYSTOLIC BLOOD PRESSURE: 126 MMHG | OXYGEN SATURATION: 97 % | DIASTOLIC BLOOD PRESSURE: 79 MMHG | TEMPERATURE: 97.5 F | HEIGHT: 63 IN | RESPIRATION RATE: 16 BRPM | HEART RATE: 79 BPM | WEIGHT: 165 LBS | BODY MASS INDEX: 29.23 KG/M2

## 2024-09-26 DIAGNOSIS — E03.9 ACQUIRED HYPOTHYROIDISM: ICD-10-CM

## 2024-09-26 DIAGNOSIS — F33.41 RECURRENT MAJOR DEPRESSIVE DISORDER, IN PARTIAL REMISSION: ICD-10-CM

## 2024-09-26 DIAGNOSIS — E78.2 MIXED HYPERLIPIDEMIA: ICD-10-CM

## 2024-09-26 DIAGNOSIS — I10 ESSENTIAL HYPERTENSION: Primary | ICD-10-CM

## 2024-09-26 LAB
ALBUMIN SERPL-MCNC: 4.5 G/DL (ref 3.5–5.2)
ALBUMIN/GLOB SERPL: 2 G/DL
ALP SERPL-CCNC: 58 U/L (ref 39–117)
ALT SERPL W P-5'-P-CCNC: 12 U/L (ref 1–33)
ANION GAP SERPL CALCULATED.3IONS-SCNC: 9.1 MMOL/L (ref 5–15)
AST SERPL-CCNC: 15 U/L (ref 1–32)
BILIRUB SERPL-MCNC: 0.4 MG/DL (ref 0–1.2)
BUN SERPL-MCNC: 9 MG/DL (ref 8–23)
BUN/CREAT SERPL: 10.8 (ref 7–25)
CALCIUM SPEC-SCNC: 9.5 MG/DL (ref 8.6–10.5)
CHLORIDE SERPL-SCNC: 104 MMOL/L (ref 98–107)
CHOLEST SERPL-MCNC: 149 MG/DL (ref 0–200)
CO2 SERPL-SCNC: 26.9 MMOL/L (ref 22–29)
CREAT SERPL-MCNC: 0.83 MG/DL (ref 0.57–1)
EGFRCR SERPLBLD CKD-EPI 2021: 76.4 ML/MIN/1.73
GLOBULIN UR ELPH-MCNC: 2.2 GM/DL
GLUCOSE SERPL-MCNC: 92 MG/DL (ref 65–99)
HDLC SERPL-MCNC: 50 MG/DL (ref 40–60)
LDLC SERPL CALC-MCNC: 86 MG/DL (ref 0–100)
LDLC/HDLC SERPL: 1.73 {RATIO}
POTASSIUM SERPL-SCNC: 4.5 MMOL/L (ref 3.5–5.2)
PROT SERPL-MCNC: 6.7 G/DL (ref 6–8.5)
SODIUM SERPL-SCNC: 140 MMOL/L (ref 136–145)
TRIGL SERPL-MCNC: 62 MG/DL (ref 0–150)
TSH SERPL DL<=0.05 MIU/L-ACNC: 2.16 UIU/ML (ref 0.27–4.2)
VLDLC SERPL-MCNC: 13 MG/DL (ref 5–40)

## 2024-09-26 PROCEDURE — 80053 COMPREHEN METABOLIC PANEL: CPT | Performed by: FAMILY MEDICINE

## 2024-09-26 PROCEDURE — 36415 COLL VENOUS BLD VENIPUNCTURE: CPT | Performed by: FAMILY MEDICINE

## 2024-09-26 PROCEDURE — 84443 ASSAY THYROID STIM HORMONE: CPT | Performed by: FAMILY MEDICINE

## 2024-09-26 PROCEDURE — 3078F DIAST BP <80 MM HG: CPT | Performed by: FAMILY MEDICINE

## 2024-09-26 PROCEDURE — 3074F SYST BP LT 130 MM HG: CPT | Performed by: FAMILY MEDICINE

## 2024-09-26 PROCEDURE — G2211 COMPLEX E/M VISIT ADD ON: HCPCS | Performed by: FAMILY MEDICINE

## 2024-09-26 PROCEDURE — 80061 LIPID PANEL: CPT | Performed by: FAMILY MEDICINE

## 2024-09-26 PROCEDURE — 99214 OFFICE O/P EST MOD 30 MIN: CPT | Performed by: FAMILY MEDICINE

## 2024-09-26 PROCEDURE — 1126F AMNT PAIN NOTED NONE PRSNT: CPT | Performed by: FAMILY MEDICINE

## 2024-09-26 RX ORDER — AMLODIPINE BESYLATE 5 MG/1
5 TABLET ORAL DAILY
Qty: 90 TABLET | Refills: 1 | Status: SHIPPED | OUTPATIENT
Start: 2024-09-26

## 2024-09-26 RX ORDER — VENLAFAXINE HYDROCHLORIDE 75 MG/1
75 CAPSULE, EXTENDED RELEASE ORAL DAILY
Qty: 90 CAPSULE | Refills: 1 | Status: SHIPPED | OUTPATIENT
Start: 2024-09-26

## 2024-11-18 ENCOUNTER — ON CAMPUS - OUTPATIENT (AMBULATORY)
Age: 69
End: 2024-11-18
Payer: COMMERCIAL

## 2024-11-18 ENCOUNTER — OFFICE (AMBULATORY)
Age: 69
End: 2024-11-18
Payer: COMMERCIAL

## 2024-11-18 ENCOUNTER — OFFICE (AMBULATORY)
Dept: URBAN - METROPOLITAN AREA PATHOLOGY 19 | Facility: PATHOLOGY | Age: 69
End: 2024-11-18
Payer: COMMERCIAL

## 2024-11-18 ENCOUNTER — ON CAMPUS - OUTPATIENT (AMBULATORY)
Dept: URBAN - METROPOLITAN AREA HOSPITAL 2 | Facility: HOSPITAL | Age: 69
End: 2024-11-18
Payer: COMMERCIAL

## 2024-11-18 VITALS
SYSTOLIC BLOOD PRESSURE: 135 MMHG | DIASTOLIC BLOOD PRESSURE: 48 MMHG | DIASTOLIC BLOOD PRESSURE: 72 MMHG | OXYGEN SATURATION: 96 % | HEART RATE: 89 BPM | HEART RATE: 88 BPM | TEMPERATURE: 98.5 F | DIASTOLIC BLOOD PRESSURE: 72 MMHG | SYSTOLIC BLOOD PRESSURE: 129 MMHG | HEIGHT: 63 IN | WEIGHT: 147 LBS | SYSTOLIC BLOOD PRESSURE: 91 MMHG | SYSTOLIC BLOOD PRESSURE: 137 MMHG | OXYGEN SATURATION: 96 % | SYSTOLIC BLOOD PRESSURE: 129 MMHG | HEART RATE: 77 BPM | DIASTOLIC BLOOD PRESSURE: 67 MMHG | SYSTOLIC BLOOD PRESSURE: 91 MMHG | WEIGHT: 147 LBS | DIASTOLIC BLOOD PRESSURE: 70 MMHG | HEART RATE: 88 BPM | SYSTOLIC BLOOD PRESSURE: 88 MMHG | SYSTOLIC BLOOD PRESSURE: 137 MMHG | DIASTOLIC BLOOD PRESSURE: 85 MMHG | HEART RATE: 90 BPM | DIASTOLIC BLOOD PRESSURE: 72 MMHG | HEART RATE: 83 BPM | HEART RATE: 77 BPM | OXYGEN SATURATION: 98 % | DIASTOLIC BLOOD PRESSURE: 82 MMHG | RESPIRATION RATE: 18 BRPM | SYSTOLIC BLOOD PRESSURE: 93 MMHG | HEART RATE: 88 BPM | HEART RATE: 90 BPM | OXYGEN SATURATION: 96 % | DIASTOLIC BLOOD PRESSURE: 72 MMHG | DIASTOLIC BLOOD PRESSURE: 38 MMHG | HEART RATE: 85 BPM | HEART RATE: 76 BPM | DIASTOLIC BLOOD PRESSURE: 62 MMHG | OXYGEN SATURATION: 98 % | DIASTOLIC BLOOD PRESSURE: 45 MMHG | SYSTOLIC BLOOD PRESSURE: 140 MMHG | HEART RATE: 88 BPM | DIASTOLIC BLOOD PRESSURE: 62 MMHG | OXYGEN SATURATION: 98 % | RESPIRATION RATE: 14 BRPM | HEART RATE: 83 BPM | DIASTOLIC BLOOD PRESSURE: 67 MMHG | RESPIRATION RATE: 14 BRPM | RESPIRATION RATE: 16 BRPM | DIASTOLIC BLOOD PRESSURE: 82 MMHG | DIASTOLIC BLOOD PRESSURE: 48 MMHG | HEART RATE: 90 BPM | RESPIRATION RATE: 18 BRPM | DIASTOLIC BLOOD PRESSURE: 45 MMHG | SYSTOLIC BLOOD PRESSURE: 140 MMHG | SYSTOLIC BLOOD PRESSURE: 105 MMHG | SYSTOLIC BLOOD PRESSURE: 135 MMHG | HEART RATE: 83 BPM | DIASTOLIC BLOOD PRESSURE: 68 MMHG | HEART RATE: 77 BPM | SYSTOLIC BLOOD PRESSURE: 93 MMHG | DIASTOLIC BLOOD PRESSURE: 70 MMHG | SYSTOLIC BLOOD PRESSURE: 105 MMHG | SYSTOLIC BLOOD PRESSURE: 140 MMHG | RESPIRATION RATE: 14 BRPM | DIASTOLIC BLOOD PRESSURE: 68 MMHG | SYSTOLIC BLOOD PRESSURE: 129 MMHG | DIASTOLIC BLOOD PRESSURE: 85 MMHG | HEART RATE: 76 BPM | DIASTOLIC BLOOD PRESSURE: 62 MMHG | DIASTOLIC BLOOD PRESSURE: 82 MMHG | WEIGHT: 147 LBS | HEART RATE: 88 BPM | HEIGHT: 63 IN | DIASTOLIC BLOOD PRESSURE: 38 MMHG | DIASTOLIC BLOOD PRESSURE: 45 MMHG | RESPIRATION RATE: 16 BRPM | DIASTOLIC BLOOD PRESSURE: 82 MMHG | SYSTOLIC BLOOD PRESSURE: 88 MMHG | OXYGEN SATURATION: 100 % | OXYGEN SATURATION: 100 % | DIASTOLIC BLOOD PRESSURE: 68 MMHG | DIASTOLIC BLOOD PRESSURE: 82 MMHG | RESPIRATION RATE: 18 BRPM | HEART RATE: 90 BPM | DIASTOLIC BLOOD PRESSURE: 72 MMHG | DIASTOLIC BLOOD PRESSURE: 68 MMHG | HEART RATE: 76 BPM | DIASTOLIC BLOOD PRESSURE: 85 MMHG | TEMPERATURE: 98.5 F | SYSTOLIC BLOOD PRESSURE: 93 MMHG | DIASTOLIC BLOOD PRESSURE: 48 MMHG | OXYGEN SATURATION: 100 % | DIASTOLIC BLOOD PRESSURE: 70 MMHG | SYSTOLIC BLOOD PRESSURE: 88 MMHG | OXYGEN SATURATION: 96 % | RESPIRATION RATE: 14 BRPM | SYSTOLIC BLOOD PRESSURE: 93 MMHG | OXYGEN SATURATION: 98 % | SYSTOLIC BLOOD PRESSURE: 93 MMHG | DIASTOLIC BLOOD PRESSURE: 62 MMHG | SYSTOLIC BLOOD PRESSURE: 135 MMHG | HEIGHT: 63 IN | HEART RATE: 83 BPM | DIASTOLIC BLOOD PRESSURE: 67 MMHG | SYSTOLIC BLOOD PRESSURE: 88 MMHG | OXYGEN SATURATION: 100 % | RESPIRATION RATE: 16 BRPM | DIASTOLIC BLOOD PRESSURE: 62 MMHG | DIASTOLIC BLOOD PRESSURE: 68 MMHG | DIASTOLIC BLOOD PRESSURE: 45 MMHG | SYSTOLIC BLOOD PRESSURE: 137 MMHG | DIASTOLIC BLOOD PRESSURE: 48 MMHG | SYSTOLIC BLOOD PRESSURE: 105 MMHG | SYSTOLIC BLOOD PRESSURE: 140 MMHG | OXYGEN SATURATION: 96 % | DIASTOLIC BLOOD PRESSURE: 85 MMHG | DIASTOLIC BLOOD PRESSURE: 45 MMHG | SYSTOLIC BLOOD PRESSURE: 105 MMHG | HEART RATE: 94 BPM | HEART RATE: 77 BPM | WEIGHT: 147 LBS | SYSTOLIC BLOOD PRESSURE: 129 MMHG | HEART RATE: 90 BPM | HEART RATE: 88 BPM | HEIGHT: 63 IN | DIASTOLIC BLOOD PRESSURE: 82 MMHG | HEIGHT: 63 IN | DIASTOLIC BLOOD PRESSURE: 45 MMHG | DIASTOLIC BLOOD PRESSURE: 67 MMHG | SYSTOLIC BLOOD PRESSURE: 105 MMHG | RESPIRATION RATE: 18 BRPM | SYSTOLIC BLOOD PRESSURE: 88 MMHG | HEART RATE: 94 BPM | SYSTOLIC BLOOD PRESSURE: 93 MMHG | DIASTOLIC BLOOD PRESSURE: 38 MMHG | HEART RATE: 76 BPM | RESPIRATION RATE: 14 BRPM | SYSTOLIC BLOOD PRESSURE: 137 MMHG | OXYGEN SATURATION: 98 % | DIASTOLIC BLOOD PRESSURE: 38 MMHG | DIASTOLIC BLOOD PRESSURE: 70 MMHG | DIASTOLIC BLOOD PRESSURE: 38 MMHG | DIASTOLIC BLOOD PRESSURE: 70 MMHG | SYSTOLIC BLOOD PRESSURE: 137 MMHG | HEART RATE: 94 BPM | SYSTOLIC BLOOD PRESSURE: 140 MMHG | RESPIRATION RATE: 18 BRPM | HEART RATE: 94 BPM | HEART RATE: 89 BPM | TEMPERATURE: 98.5 F | HEART RATE: 83 BPM | WEIGHT: 147 LBS | HEART RATE: 85 BPM | SYSTOLIC BLOOD PRESSURE: 140 MMHG | SYSTOLIC BLOOD PRESSURE: 105 MMHG | DIASTOLIC BLOOD PRESSURE: 48 MMHG | DIASTOLIC BLOOD PRESSURE: 72 MMHG | HEART RATE: 94 BPM | HEART RATE: 76 BPM | HEART RATE: 76 BPM | SYSTOLIC BLOOD PRESSURE: 137 MMHG | HEART RATE: 77 BPM | SYSTOLIC BLOOD PRESSURE: 140 MMHG | RESPIRATION RATE: 16 BRPM | OXYGEN SATURATION: 100 % | DIASTOLIC BLOOD PRESSURE: 38 MMHG | TEMPERATURE: 98.5 F | HEART RATE: 76 BPM | DIASTOLIC BLOOD PRESSURE: 68 MMHG | SYSTOLIC BLOOD PRESSURE: 91 MMHG | OXYGEN SATURATION: 98 % | SYSTOLIC BLOOD PRESSURE: 135 MMHG | SYSTOLIC BLOOD PRESSURE: 91 MMHG | DIASTOLIC BLOOD PRESSURE: 67 MMHG | HEART RATE: 94 BPM | DIASTOLIC BLOOD PRESSURE: 70 MMHG | OXYGEN SATURATION: 100 % | HEART RATE: 85 BPM | HEART RATE: 89 BPM | OXYGEN SATURATION: 96 % | HEART RATE: 90 BPM | SYSTOLIC BLOOD PRESSURE: 91 MMHG | HEART RATE: 85 BPM | OXYGEN SATURATION: 100 % | RESPIRATION RATE: 16 BRPM | HEART RATE: 88 BPM | HEART RATE: 89 BPM | DIASTOLIC BLOOD PRESSURE: 62 MMHG | DIASTOLIC BLOOD PRESSURE: 67 MMHG | TEMPERATURE: 98.5 F | OXYGEN SATURATION: 96 % | DIASTOLIC BLOOD PRESSURE: 45 MMHG | SYSTOLIC BLOOD PRESSURE: 91 MMHG | SYSTOLIC BLOOD PRESSURE: 137 MMHG | OXYGEN SATURATION: 98 % | SYSTOLIC BLOOD PRESSURE: 135 MMHG | HEIGHT: 63 IN | RESPIRATION RATE: 18 BRPM | RESPIRATION RATE: 16 BRPM | WEIGHT: 147 LBS | HEART RATE: 77 BPM | HEART RATE: 90 BPM | RESPIRATION RATE: 14 BRPM | HEART RATE: 89 BPM | DIASTOLIC BLOOD PRESSURE: 62 MMHG | SYSTOLIC BLOOD PRESSURE: 129 MMHG | HEART RATE: 83 BPM | HEART RATE: 85 BPM | SYSTOLIC BLOOD PRESSURE: 129 MMHG | WEIGHT: 147 LBS | HEIGHT: 63 IN | SYSTOLIC BLOOD PRESSURE: 91 MMHG | DIASTOLIC BLOOD PRESSURE: 38 MMHG | HEART RATE: 85 BPM | HEART RATE: 89 BPM | RESPIRATION RATE: 16 BRPM | DIASTOLIC BLOOD PRESSURE: 48 MMHG | HEART RATE: 94 BPM | RESPIRATION RATE: 14 BRPM | SYSTOLIC BLOOD PRESSURE: 88 MMHG | DIASTOLIC BLOOD PRESSURE: 48 MMHG | DIASTOLIC BLOOD PRESSURE: 70 MMHG | TEMPERATURE: 98.5 F | HEART RATE: 83 BPM | DIASTOLIC BLOOD PRESSURE: 85 MMHG | SYSTOLIC BLOOD PRESSURE: 93 MMHG | DIASTOLIC BLOOD PRESSURE: 72 MMHG | SYSTOLIC BLOOD PRESSURE: 135 MMHG | TEMPERATURE: 98.5 F | HEART RATE: 77 BPM | RESPIRATION RATE: 18 BRPM | HEART RATE: 85 BPM | SYSTOLIC BLOOD PRESSURE: 88 MMHG | DIASTOLIC BLOOD PRESSURE: 67 MMHG | SYSTOLIC BLOOD PRESSURE: 135 MMHG | HEART RATE: 89 BPM | DIASTOLIC BLOOD PRESSURE: 85 MMHG | DIASTOLIC BLOOD PRESSURE: 85 MMHG | DIASTOLIC BLOOD PRESSURE: 82 MMHG | SYSTOLIC BLOOD PRESSURE: 129 MMHG | SYSTOLIC BLOOD PRESSURE: 105 MMHG | DIASTOLIC BLOOD PRESSURE: 68 MMHG

## 2024-11-18 DIAGNOSIS — D12.2 BENIGN NEOPLASM OF ASCENDING COLON: ICD-10-CM

## 2024-11-18 DIAGNOSIS — D12.0 BENIGN NEOPLASM OF CECUM: ICD-10-CM

## 2024-11-18 DIAGNOSIS — Z09 ENCOUNTER FOR FOLLOW-UP EXAMINATION AFTER COMPLETED TREATMEN: ICD-10-CM

## 2024-11-18 DIAGNOSIS — Z86.0101 PERSONAL HISTORY OF ADENOMATOUS AND SERRATED COLON POLYPS: ICD-10-CM

## 2024-11-18 PROBLEM — K63.5 POLYP OF COLON: Status: ACTIVE | Noted: 2024-11-18

## 2024-11-18 LAB
GI HISTOLOGY: A. CECUM: (no result)
GI HISTOLOGY: B. ASCENDING COLON: (no result)
GI HISTOLOGY: PDF REPORT: (no result)

## 2024-11-18 PROCEDURE — 45385 COLONOSCOPY W/LESION REMOVAL: CPT | Mod: PT | Performed by: INTERNAL MEDICINE

## 2024-11-18 PROCEDURE — 88305 TISSUE EXAM BY PATHOLOGIST: CPT | Mod: 26 | Performed by: PATHOLOGY

## 2024-11-20 PROCEDURE — 87186 SC STD MICRODIL/AGAR DIL: CPT | Performed by: PHYSICIAN ASSISTANT

## 2024-11-20 PROCEDURE — 87077 CULTURE AEROBIC IDENTIFY: CPT | Performed by: PHYSICIAN ASSISTANT

## 2024-11-20 PROCEDURE — 87086 URINE CULTURE/COLONY COUNT: CPT | Performed by: PHYSICIAN ASSISTANT

## 2024-11-22 ENCOUNTER — TELEPHONE (OUTPATIENT)
Dept: URGENT CARE | Facility: CLINIC | Age: 69
End: 2024-11-22
Payer: MEDICARE

## 2024-11-22 ENCOUNTER — LAB (OUTPATIENT)
Dept: FAMILY MEDICINE CLINIC | Facility: CLINIC | Age: 69
End: 2024-11-22
Payer: MEDICARE

## 2024-11-22 ENCOUNTER — OFFICE VISIT (OUTPATIENT)
Dept: FAMILY MEDICINE CLINIC | Facility: CLINIC | Age: 69
End: 2024-11-22
Payer: MEDICARE

## 2024-11-22 VITALS
RESPIRATION RATE: 16 BRPM | OXYGEN SATURATION: 96 % | TEMPERATURE: 97.8 F | DIASTOLIC BLOOD PRESSURE: 71 MMHG | BODY MASS INDEX: 26.05 KG/M2 | SYSTOLIC BLOOD PRESSURE: 117 MMHG | HEIGHT: 63 IN | WEIGHT: 147 LBS | HEART RATE: 87 BPM

## 2024-11-22 DIAGNOSIS — N39.0 URINARY TRACT INFECTION WITHOUT HEMATURIA, SITE UNSPECIFIED: Primary | ICD-10-CM

## 2024-11-22 DIAGNOSIS — R50.9 FEVER, UNSPECIFIED FEVER CAUSE: ICD-10-CM

## 2024-11-22 LAB
ALBUMIN SERPL-MCNC: 3.3 G/DL (ref 3.5–5.2)
ALBUMIN/GLOB SERPL: 1.1 G/DL
ALP SERPL-CCNC: 73 U/L (ref 39–117)
ALT SERPL W P-5'-P-CCNC: 41 U/L (ref 1–33)
ANION GAP SERPL CALCULATED.3IONS-SCNC: 15.3 MMOL/L (ref 5–15)
AST SERPL-CCNC: 29 U/L (ref 1–32)
BACTERIA UR QL AUTO: ABNORMAL /HPF
BASOPHILS # BLD AUTO: 0.02 10*3/MM3 (ref 0–0.2)
BASOPHILS NFR BLD AUTO: 0.3 % (ref 0–1.5)
BILIRUB SERPL-MCNC: 0.3 MG/DL (ref 0–1.2)
BILIRUB UR QL STRIP: NEGATIVE
BUN SERPL-MCNC: 9 MG/DL (ref 8–23)
BUN/CREAT SERPL: 12.7 (ref 7–25)
CALCIUM SPEC-SCNC: 8.2 MG/DL (ref 8.6–10.5)
CHLORIDE SERPL-SCNC: 100 MMOL/L (ref 98–107)
CLARITY UR: CLEAR
CO2 SERPL-SCNC: 21.7 MMOL/L (ref 22–29)
COLOR UR: YELLOW
CREAT SERPL-MCNC: 0.71 MG/DL (ref 0.57–1)
DEPRECATED RDW RBC AUTO: 38.6 FL (ref 37–54)
EGFRCR SERPLBLD CKD-EPI 2021: 92.2 ML/MIN/1.73
EOSINOPHIL # BLD AUTO: 0.04 10*3/MM3 (ref 0–0.4)
EOSINOPHIL NFR BLD AUTO: 0.5 % (ref 0.3–6.2)
ERYTHROCYTE [DISTWIDTH] IN BLOOD BY AUTOMATED COUNT: 12.8 % (ref 12.3–15.4)
GLOBULIN UR ELPH-MCNC: 3 GM/DL
GLUCOSE SERPL-MCNC: 99 MG/DL (ref 65–99)
GLUCOSE UR STRIP-MCNC: NEGATIVE MG/DL
HCT VFR BLD AUTO: 33 % (ref 34–46.6)
HGB BLD-MCNC: 11 G/DL (ref 12–15.9)
HGB UR QL STRIP.AUTO: ABNORMAL
HOLD SPECIMEN: NORMAL
HYALINE CASTS UR QL AUTO: ABNORMAL /LPF
IMM GRANULOCYTES # BLD AUTO: 0.04 10*3/MM3 (ref 0–0.05)
IMM GRANULOCYTES NFR BLD AUTO: 0.5 % (ref 0–0.5)
KETONES UR QL STRIP: NEGATIVE
LEUKOCYTE ESTERASE UR QL STRIP.AUTO: ABNORMAL
LYMPHOCYTES # BLD AUTO: 0.62 10*3/MM3 (ref 0.7–3.1)
LYMPHOCYTES NFR BLD AUTO: 7.9 % (ref 19.6–45.3)
MCH RBC QN AUTO: 28.1 PG (ref 26.6–33)
MCHC RBC AUTO-ENTMCNC: 33.3 G/DL (ref 31.5–35.7)
MCV RBC AUTO: 84.4 FL (ref 79–97)
MONOCYTES # BLD AUTO: 0.61 10*3/MM3 (ref 0.1–0.9)
MONOCYTES NFR BLD AUTO: 7.8 % (ref 5–12)
NEUTROPHILS NFR BLD AUTO: 6.53 10*3/MM3 (ref 1.7–7)
NEUTROPHILS NFR BLD AUTO: 83 % (ref 42.7–76)
NITRITE UR QL STRIP: NEGATIVE
NRBC BLD AUTO-RTO: 0 /100 WBC (ref 0–0.2)
PH UR STRIP.AUTO: 7 [PH] (ref 5–8)
PLATELET # BLD AUTO: 295 10*3/MM3 (ref 140–450)
PMV BLD AUTO: 10.7 FL (ref 6–12)
POTASSIUM SERPL-SCNC: 3.8 MMOL/L (ref 3.5–5.2)
PROT SERPL-MCNC: 6.3 G/DL (ref 6–8.5)
PROT UR QL STRIP: ABNORMAL
RBC # BLD AUTO: 3.91 10*6/MM3 (ref 3.77–5.28)
RBC # UR STRIP: ABNORMAL /HPF
REF LAB TEST METHOD: ABNORMAL
SODIUM SERPL-SCNC: 137 MMOL/L (ref 136–145)
SP GR UR STRIP: 1.01 (ref 1–1.03)
SQUAMOUS #/AREA URNS HPF: ABNORMAL /HPF
UROBILINOGEN UR QL STRIP: ABNORMAL
WBC # UR STRIP: ABNORMAL /HPF
WBC NRBC COR # BLD AUTO: 7.86 10*3/MM3 (ref 3.4–10.8)

## 2024-11-22 PROCEDURE — 3074F SYST BP LT 130 MM HG: CPT | Performed by: FAMILY MEDICINE

## 2024-11-22 PROCEDURE — 99214 OFFICE O/P EST MOD 30 MIN: CPT | Performed by: FAMILY MEDICINE

## 2024-11-22 PROCEDURE — 3078F DIAST BP <80 MM HG: CPT | Performed by: FAMILY MEDICINE

## 2024-11-22 PROCEDURE — 36415 COLL VENOUS BLD VENIPUNCTURE: CPT | Performed by: FAMILY MEDICINE

## 2024-11-22 PROCEDURE — 87086 URINE CULTURE/COLONY COUNT: CPT | Performed by: FAMILY MEDICINE

## 2024-11-22 PROCEDURE — 85025 COMPLETE CBC W/AUTO DIFF WBC: CPT | Performed by: FAMILY MEDICINE

## 2024-11-22 PROCEDURE — 1126F AMNT PAIN NOTED NONE PRSNT: CPT | Performed by: FAMILY MEDICINE

## 2024-11-22 PROCEDURE — 80053 COMPREHEN METABOLIC PANEL: CPT | Performed by: FAMILY MEDICINE

## 2024-11-22 PROCEDURE — 81001 URINALYSIS AUTO W/SCOPE: CPT | Performed by: FAMILY MEDICINE

## 2024-11-22 RX ORDER — CIPROFLOXACIN 500 MG/1
500 TABLET, FILM COATED ORAL 2 TIMES DAILY
Qty: 14 TABLET | Refills: 0 | Status: SHIPPED | OUTPATIENT
Start: 2024-11-22

## 2024-11-22 NOTE — TELEPHONE ENCOUNTER
Called patient about her urine culture results, resistant to bactrim she was called in at urgent care. Her PCP has changed her antibiotic already.

## 2024-11-22 NOTE — PROGRESS NOTES
Subjective   Chief Complaint   Patient presents with    Fever    Follow-up     Urgent care follow-up, urinary tract infection     Nallely Figueroa is a 69 y.o. female.     Patient Care Team:  Erin Quintana MD as PCP - General (Family Medicine)  Kellen Muller MD as Consulting Physician (Obstetrics and Gynecology)  Kana Sneed MD as Consulting Physician (Orthopedic Surgery)  Vivienne Clifford MD as Consulting Physician (Sleep Medicine)    History of Present Illness  He is coming in today as she has not been feeling well for about 10 days or so.  It for started with overall fatigue and rundown feeling and urinary frequency, later on she started having chills and even fever up to 101.  Few days after her symptoms started she started prepping for colonoscopy and she ended up having the colonoscopy done on 11/18/2024.  Since her symptoms were not improving she went to the urgent care on 11/20/2024.  Her urine dip was checked and it was abnormal, patient was given a shot of Rocephin and started on Bactrim.  She however tells me that she still does not feel good, she still has on and off fever, she feels fatigued.  She had 1 episode of vomiting few days ago, but no vomiting since then.  No diarrhea reported.  She has been pushing for fluids and staying hydrated.  She denies any abdominal pain, nausea, or vomiting.       The following portions of the patient's history were reviewed and updated as appropriate: allergies, current medications, past family history, past medical history, past social history, past surgical history, and problem list.  Past Medical History:   Diagnosis Date    Anxiety maybe 25 years    Arthritis 2 years    Carpal tunnel syndrome     Cataract 1 year    had surgery 1 year ago    Depression probably 20 years    take medication for mood disorders    Gastroesophageal reflux disease     Heart murmur 12/15/2022    Hyperlipidemia 2021    Hypertension     Hypothyroidism maybe 40 years    take  medication    Obesity off and on most of life    Osteopenia maybe 5 years    Osteopenia    Osteopenia     Sleep apnea     no machine    lost wt      Past Surgical History:   Procedure Laterality Date    BARIATRIC SURGERY  06/2010    lap band removed 2015    CARDIAC CATHETERIZATION N/A 09/18/2019    Procedure: Left Heart Cath;  Surgeon: Jeronimo Kuhn MD;  Location: [facility] CATH INVASIVE LOCATION;  Service: Cardiovascular    CARDIAC CATHETERIZATION N/A 09/18/2019    Procedure: Coronary angiography;  Surgeon: Jeronimo Kuhn MD;  Location: [facility] CATH INVASIVE LOCATION;  Service: Cardiovascular    COLON SURGERY  12/06/2019    sigmoid colectomy with placement of loop ileostomy    COSMETIC SURGERY  2011    face lift after losing 100 lbs    EXPLORATORY LAPAROTOMY N/A 12/13/2019    Procedure: LAPAROTOMY EXPLORATORY, DRAINAGE OF SUBCUTANEOUS AND SUBFASCIALABCESS;  Surgeon: Emily Lr MD;  Location: [facility] MAIN OR;  Service: General    EXPLORATORY LAPAROTOMY N/A 12/02/2019    Procedure: sigmoid colectomy with placement of loop ileostomy;  Surgeon: Emily rL MD;  Location: [facility] OR;  Service: General    EYE SURGERY      lasix     HERNIA REPAIR      ILEOSTOMY CLOSURE N/A 02/06/2020    Procedure: ILEOSTOMY TAKEDOWN, WOUND DEBRIDEMENT WITH MESH REMOVAL;  Surgeon: Emily Lr MD;  Location: [facility] MAIN OR;  Service: General;  Laterality: N/A;    REPLACEMENT TOTAL KNEE Left Total Left Knee Feb 7 2022        REPLACEMENT TOTAL KNEE Right 02/2023    Dr. Sneed    REVISION TOTAL KNEE ARTHROPLASTY Left 10/2022    Dr. Sneed    SIGMOIDOSCOPY N/A 02/06/2020    Procedure: SIGMOIDOSCOPY RIGID;  Surgeon: Emily Lr MD;  Location: [facility] MAIN OR;  Service: General;  Laterality: N/A;    SUBTOTAL HYSTERECTOMY  25 years    TONSILLECTOMY      VENTRAL/INCISIONAL HERNIA REPAIR N/A 08/12/2021    Procedure: VENTRAL HERNIA WITH COMPONENT SEPARATION with mesh;  Surgeon: Emily Lr MD;  Location: [facility] OR;   Service: General;  Laterality: N/A;     The patient has a family history of  Family History   Problem Relation Age of Onset    Supraventricular tachycardia Mother     Diabetes Mother         Type 2    Thyroid disease Mother     Arthritis Mother     Hypertension Mother     Heart attack Father         Massive heart attack age 51    Early death Father         massive heart attack at 51    Hyperlipidemia Paternal Grandmother     Stroke Maternal Grandfather     Alcohol abuse Maternal Uncle     Arthritis Maternal Uncle     Hearing loss Maternal Uncle     Heart disease Maternal Uncle     Hypertension Maternal Uncle     Arthritis Brother         rheumitoid    Alcohol abuse Brother     Drug abuse Brother         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    Cancer Brother         testicular    Depression Brother     Hypertension Brother     Arthritis Brother     Obesity Brother     Sleep apnea Brother     Sleep disorder Brother     Heart disease Maternal Uncle     Hypertension Maternal Uncle     Other Sister         Just diagnosed with Lupus     Social History     Socioeconomic History    Marital status:    Tobacco Use    Smoking status: Never     Passive exposure: Never    Smokeless tobacco: Never   Vaping Use    Vaping status: Never Used   Substance and Sexual Activity    Alcohol use: No    Drug use: No    Sexual activity: Not Currently     Birth control/protection: None     Comment: Not sexually active since   2000       Review of Systems   Constitutional:  Positive for chills, diaphoresis, fatigue and fever.   HENT:  Negative for congestion, sore throat and swollen glands.    Respiratory:  Negative for cough.    Cardiovascular:  Negative for chest pain.   Gastrointestinal:  Negative for abdominal pain, nausea and vomiting.   Genitourinary:  Negative for dysuria.   Musculoskeletal:  Negative for myalgias and neck pain.   Skin:  Negative for rash.   Neurological:  Positive for weakness. Negative for numbness.  "    Visit Vitals  /71 (BP Location: Left arm, Patient Position: Sitting, Cuff Size: Adult)   Pulse 87   Temp 97.8 °F (36.6 °C) (Oral)   Resp 16   Ht 160 cm (62.99\")   Wt 66.7 kg (147 lb)   SpO2 96%   BMI 26.05 kg/m²            Current Outpatient Medications:     amLODIPine (NORVASC) 5 MG tablet, Take 1 tablet by mouth Daily., Disp: 90 tablet, Rfl: 1    aspirin 81 MG EC tablet, Take 1 tablet by mouth Daily. LD 8/7, Disp: , Rfl:     atorvastatin (LIPITOR) 20 MG tablet, TAKE ONE TABLET BY MOUTH ONCE DAILY, Disp: 90 tablet, Rfl: 1    Cholecalciferol 25 MCG (1000 UT) tablet, Take 2 tablets by mouth Daily., Disp: , Rfl:     Cranberry 500 MG capsule, Take 500 mg by mouth., Disp: , Rfl:     levothyroxine (Synthroid) 112 MCG tablet, Take 1 tablet by mouth Every Morning., Disp: 90 tablet, Rfl: 1    Omega-3 Fatty Acids (fish oil) 1000 MG capsule capsule, Take 1,200 mg by mouth Daily With Breakfast., Disp: , Rfl:     pantoprazole (PROTONIX) 40 MG EC tablet, TAKE ONE TABLET BY MOUTH EVERY DAY, Disp: 90 tablet, Rfl: 1    polyethylene glycol (MIRALAX) 17 GM/SCOOP powder, TAKE AS DIRECTED PER INSTRUCTIONS FOR BOWEL PREP, Disp: , Rfl:     Probiotic Product (Risaquad-2) capsule capsule, Take 1 capsule by mouth Daily., Disp: , Rfl:     sulfamethoxazole-trimethoprim (BACTRIM DS,SEPTRA DS) 800-160 MG per tablet, Take 1 tablet by mouth 2 (Two) Times a Day for 10 days., Disp: 20 tablet, Rfl: 0    valACYclovir (VALTREX) 1000 MG tablet, TAKE 1 TABLET BY MOUTH TWICE DAILY START THE DAY BEFORE PROCEDURE, Disp: , Rfl:     venlafaxine XR (EFFEXOR-XR) 75 MG 24 hr capsule, Take 1 capsule by mouth Daily., Disp: 90 capsule, Rfl: 1    vitamin C (ASCORBIC ACID) 250 MG tablet, Take 1 tablet by mouth Daily., Disp: , Rfl:     ciprofloxacin (Cipro) 500 MG tablet, Take 1 tablet by mouth 2 (Two) Times a Day., Disp: 14 tablet, Rfl: 0    Objective   Physical Exam  Constitutional:       General: She is not in acute distress.     Appearance: Normal " appearance. She is well-developed. She is not ill-appearing or diaphoretic.      Comments: Patient is in no distress, patient has normal voice and speech.  Normal respiratory effort.   HENT:      Head: Normocephalic and atraumatic.   Pulmonary:      Effort: Pulmonary effort is normal.   Abdominal:      General: There is no distension.      Palpations: There is no mass.      Tenderness: There is no abdominal tenderness. There is no guarding or rebound.      Hernia: No hernia is present.   Musculoskeletal:      Cervical back: Normal range of motion and neck supple.   Neurological:      General: No focal deficit present.      Mental Status: She is alert and oriented to person, place, and time. Mental status is at baseline.   Psychiatric:         Mood and Affect: Mood normal.         FOLLOWING LABS WERE REVIEWED TODAY:  UA          11/20/2024    13:13 11/22/2024    11:00   Urinalysis   Squamous Epithelial Cells, UA  0-2    Specific Gravity, UA  1.013    Ketones, UA Negative  Negative    Blood, UA  Moderate (2+)    Leukocytes, UA Large (3+)  Trace    Nitrite, UA  Negative    RBC, UA  Too Numerous to Count    WBC, UA  21-50    Bacteria, UA  None Seen      Urine Culture          11/20/2024    13:28   Urine Culture   Urine Culture >100,000 CFU/mL Escherichia coli          Assessment & Plan   Diagnoses and all orders for this visit:    1. Urinary tract infection without hematuria, site unspecified (Primary)  -     CBC Auto Differential  -     Comprehensive Metabolic Panel  -     Urinalysis With Culture If Indicated - Urine, Clean Catch  -     ciprofloxacin (Cipro) 500 MG tablet; Take 1 tablet by mouth 2 (Two) Times a Day.  Dispense: 14 tablet; Refill: 0  -     [place] Urine Culture Tube - Urine, Clean Catch  -     Urinalysis, Microscopic Only - Urine, Clean Catch  -     Urine Culture - Urine, Urine, Clean Catch    2. Fever, unspecified fever cause  -     CBC Auto Differential      I reviewed her available urgent care records  including the urine culture.  Patient was previously given Rocephin shot through the urgent care earlier this week and she is now on Bactrim.  Sensitivities of the recent urine culture were reviewed and I will be switching her antibiotic to Cipro.  I am getting also some labs and repeat urinalysis.  The importance of good hydration was also discussed and stressed and patient has been doing well on that part.  She was also advised to closely monitor the symptoms and if there is any worsening in meantime she was advised to go to the ER.        Return if symptoms worsen or fail to improve, for Recheck.    Requested Prescriptions     Signed Prescriptions Disp Refills    ciprofloxacin (Cipro) 500 MG tablet 14 tablet 0     Sig: Take 1 tablet by mouth 2 (Two) Times a Day.       Erin Quintana MD  11/22/2024

## 2024-11-23 PROBLEM — N39.0 URINARY TRACT INFECTION WITHOUT HEMATURIA: Status: ACTIVE | Noted: 2024-11-23

## 2024-11-23 PROBLEM — R50.9 FEVER: Status: ACTIVE | Noted: 2024-11-23

## 2024-11-24 LAB — BACTERIA SPEC AEROBE CULT: NO GROWTH

## 2024-11-25 DIAGNOSIS — N30.01 ACUTE CYSTITIS WITH HEMATURIA: ICD-10-CM

## 2024-11-25 DIAGNOSIS — D64.9 ANEMIA, UNSPECIFIED TYPE: Primary | ICD-10-CM

## 2024-11-25 NOTE — PROGRESS NOTES
Patient has started her antibiotic today. Patient stated she is feeling better   Patient notified and verbalized understanding

## 2024-11-29 RX ORDER — PANTOPRAZOLE SODIUM 40 MG/1
40 TABLET, DELAYED RELEASE ORAL DAILY
Qty: 90 TABLET | Refills: 1 | Status: SHIPPED | OUTPATIENT
Start: 2024-11-29

## 2024-11-29 RX ORDER — ATORVASTATIN CALCIUM 20 MG/1
20 TABLET, FILM COATED ORAL DAILY
Qty: 90 TABLET | Refills: 1 | Status: SHIPPED | OUTPATIENT
Start: 2024-11-29

## 2024-12-10 ENCOUNTER — LAB (OUTPATIENT)
Dept: FAMILY MEDICINE CLINIC | Facility: CLINIC | Age: 69
End: 2024-12-10
Payer: MEDICARE

## 2024-12-10 LAB
ALBUMIN SERPL-MCNC: 4.3 G/DL (ref 3.5–5.2)
ALBUMIN/GLOB SERPL: 1.4 G/DL
ALP SERPL-CCNC: 68 U/L (ref 39–117)
ALT SERPL W P-5'-P-CCNC: 12 U/L (ref 1–33)
ANION GAP SERPL CALCULATED.3IONS-SCNC: 12 MMOL/L (ref 5–15)
AST SERPL-CCNC: 16 U/L (ref 1–32)
BASOPHILS # BLD AUTO: 0.07 10*3/MM3 (ref 0–0.2)
BASOPHILS NFR BLD AUTO: 1.4 % (ref 0–1.5)
BILIRUB SERPL-MCNC: 0.4 MG/DL (ref 0–1.2)
BILIRUB UR QL STRIP: NEGATIVE
BUN SERPL-MCNC: 8 MG/DL (ref 8–23)
BUN/CREAT SERPL: 11.4 (ref 7–25)
CALCIUM SPEC-SCNC: 9.2 MG/DL (ref 8.6–10.5)
CHLORIDE SERPL-SCNC: 104 MMOL/L (ref 98–107)
CLARITY UR: CLEAR
CO2 SERPL-SCNC: 25 MMOL/L (ref 22–29)
COLOR UR: YELLOW
CREAT SERPL-MCNC: 0.7 MG/DL (ref 0.57–1)
DEPRECATED RDW RBC AUTO: 41.8 FL (ref 37–54)
EGFRCR SERPLBLD CKD-EPI 2021: 93.8 ML/MIN/1.73
EOSINOPHIL # BLD AUTO: 0.09 10*3/MM3 (ref 0–0.4)
EOSINOPHIL NFR BLD AUTO: 1.8 % (ref 0.3–6.2)
ERYTHROCYTE [DISTWIDTH] IN BLOOD BY AUTOMATED COUNT: 13.1 % (ref 12.3–15.4)
GLOBULIN UR ELPH-MCNC: 3 GM/DL
GLUCOSE SERPL-MCNC: 106 MG/DL (ref 65–99)
GLUCOSE UR STRIP-MCNC: NEGATIVE MG/DL
HCT VFR BLD AUTO: 37.7 % (ref 34–46.6)
HGB BLD-MCNC: 12.5 G/DL (ref 12–15.9)
HGB UR QL STRIP.AUTO: NEGATIVE
HOLD SPECIMEN: NORMAL
IMM GRANULOCYTES # BLD AUTO: 0.01 10*3/MM3 (ref 0–0.05)
IMM GRANULOCYTES NFR BLD AUTO: 0.2 % (ref 0–0.5)
KETONES UR QL STRIP: NEGATIVE
LEUKOCYTE ESTERASE UR QL STRIP.AUTO: NEGATIVE
LYMPHOCYTES # BLD AUTO: 1.59 10*3/MM3 (ref 0.7–3.1)
LYMPHOCYTES NFR BLD AUTO: 30.9 % (ref 19.6–45.3)
MCH RBC QN AUTO: 29.2 PG (ref 26.6–33)
MCHC RBC AUTO-ENTMCNC: 33.2 G/DL (ref 31.5–35.7)
MCV RBC AUTO: 88.1 FL (ref 79–97)
MONOCYTES # BLD AUTO: 0.51 10*3/MM3 (ref 0.1–0.9)
MONOCYTES NFR BLD AUTO: 9.9 % (ref 5–12)
NEUTROPHILS NFR BLD AUTO: 2.87 10*3/MM3 (ref 1.7–7)
NEUTROPHILS NFR BLD AUTO: 55.8 % (ref 42.7–76)
NITRITE UR QL STRIP: NEGATIVE
NRBC BLD AUTO-RTO: 0 /100 WBC (ref 0–0.2)
PH UR STRIP.AUTO: 7.5 [PH] (ref 5–8)
PLATELET # BLD AUTO: 415 10*3/MM3 (ref 140–450)
PMV BLD AUTO: 10.6 FL (ref 6–12)
POTASSIUM SERPL-SCNC: 4.3 MMOL/L (ref 3.5–5.2)
PROT SERPL-MCNC: 7.3 G/DL (ref 6–8.5)
PROT UR QL STRIP: NEGATIVE
RBC # BLD AUTO: 4.28 10*6/MM3 (ref 3.77–5.28)
SODIUM SERPL-SCNC: 141 MMOL/L (ref 136–145)
SP GR UR STRIP: <=1.005 (ref 1–1.03)
UROBILINOGEN UR QL STRIP: NORMAL
WBC NRBC COR # BLD AUTO: 5.14 10*3/MM3 (ref 3.4–10.8)

## 2024-12-10 PROCEDURE — 85025 COMPLETE CBC W/AUTO DIFF WBC: CPT | Performed by: FAMILY MEDICINE

## 2024-12-10 PROCEDURE — 81003 URINALYSIS AUTO W/O SCOPE: CPT | Performed by: FAMILY MEDICINE

## 2024-12-10 PROCEDURE — 80053 COMPREHEN METABOLIC PANEL: CPT | Performed by: FAMILY MEDICINE

## 2025-01-03 DIAGNOSIS — E03.9 ACQUIRED HYPOTHYROIDISM: ICD-10-CM

## 2025-01-03 RX ORDER — LEVOTHYROXINE SODIUM 112 MCG
112 TABLET ORAL EVERY MORNING
Qty: 90 TABLET | Refills: 1 | Status: SHIPPED | OUTPATIENT
Start: 2025-01-03

## 2025-01-21 ENCOUNTER — OFFICE VISIT (OUTPATIENT)
Dept: SLEEP MEDICINE | Facility: CLINIC | Age: 70
End: 2025-01-21
Payer: MEDICARE

## 2025-01-21 VITALS
HEART RATE: 73 BPM | WEIGHT: 142 LBS | HEIGHT: 63 IN | BODY MASS INDEX: 25.16 KG/M2 | DIASTOLIC BLOOD PRESSURE: 87 MMHG | SYSTOLIC BLOOD PRESSURE: 146 MMHG | OXYGEN SATURATION: 99 %

## 2025-01-21 DIAGNOSIS — G47.33 OSA ON CPAP: Primary | ICD-10-CM

## 2025-01-21 PROCEDURE — G0463 HOSPITAL OUTPT CLINIC VISIT: HCPCS

## 2025-01-21 NOTE — PROGRESS NOTES
"  [id number] [facility], [address]  [place] [address]  Phone   Fax       SLEEP CLINIC FOLLOW UP PROGRESS NOTE.    Nallely Figueroa  [id number]   1955  69 y.o.  female      PCP: Erin Quintana MD      Date of visit: 1/21/2025    Chief Complaint   Patient presents with    Sleep Apnea       HPI:  This is a 69 y.o. years old patient is here for the management of obstructive sleep apnea.  Sleep apnea is severe in severity with a AHI of 48/hr. Patient is using positive airway pressure therapy with CPAP 12 and the symptoms of sleep apnea have improved significantly on the therapy. Normally patient goes to bed at 9 PM and wakes up at 5 AM .  The patient wakes up 3 or 4 time(s) during the night and has no problem going back to sleep.  Feels refreshed after waking up.      Medications and allergies are reviewed by me and documented in the encounter.     SOCIAL (habits pertaining to sleep medicine)  History tobacco use:No   History of alcohol use: 0 per week  Caffeine use: 3     REVIEW OF SYSTEMS:   Pertaining positive symptoms are:  Vi[place] Sleepiness Scale :Total score: 2   Dry mouth      PHYSICAL EXAMINATION:  CONSTITUTIONAL:  Vitals:    01/21/25 0700   BP: 146/87   BP Location: Left arm   Patient Position: Sitting   Pulse: 73   SpO2: 99%   Weight: 64.4 kg (142 lb)   Height: 160 cm (63\")    Body mass index is 25.15 kg/m².   NOSE: nasal passages are clear, No deformities noted   RESP SYSTEM: Not in any respiratory distress, no chest deformities noted,   CARDIOVASULAR: No edema noted  NEURO: Oriented x 3, gait normal,  Mood and affect appeared appropriate      Data reviewed:  The Smart card downloaded on 1/21/2025 has been reviewed independently by me for compliance and discussed the data with the patient.   Compliance; 100%  More than 4 hr use, 100%  Average use of the device 8 hours and 32 minutes per night  Residual AHI: 0.7 /hr (goal < 5.0 /hr)  Mask type: Nasal " mask  Device: ResMed  DME: [employer]      ASSESSMENT AND PLAN:  Obstructive sleep apnea ( G 47.33).  The symptoms of sleep apnea have improved with the device and the treatment.  Patient's compliance with the device is excellent for treatment of sleep apnea.  I have independently reviewed the smart card down load and discussed with the patient the download data and encouarged the patient to continue to use the device.The residual AHI is acceptable. The device is benefiting the patient and the device is medically necessary.  Without proper control of sleep apnea and good compliance there is a increased risk for hypertension, diabetes mellitus and nonrestorative sleep with hypersomnia which can increase risk for motor vehicle accidents.  Untreated sleep apnea is also a risk factor for development of atrial fibrillation, pulmonary hypertension, insulin resistance and stroke. The patient is also instructed to get the supplies from the DME company and and change them on a regular basis.  A prescription for supplies has been sent to the DME company.  I have also discussed the good sleep hygiene habits and adequate amount of sleep needed for good health.  Return in about 1 year (around 1/21/2026) for with smart card down load. . Patient's questions were answered.    1/21/2025  Vivienne Clifford MD  Sleep Medicine.  Medical Director,   [facility], [facility] sleep centers.

## 2025-02-21 DIAGNOSIS — F33.41 RECURRENT MAJOR DEPRESSIVE DISORDER, IN PARTIAL REMISSION: ICD-10-CM

## 2025-02-21 RX ORDER — VENLAFAXINE HYDROCHLORIDE 75 MG/1
75 CAPSULE, EXTENDED RELEASE ORAL DAILY
Qty: 90 CAPSULE | Refills: 0 | Status: SHIPPED | OUTPATIENT
Start: 2025-02-21

## 2025-04-23 ENCOUNTER — OFFICE VISIT (OUTPATIENT)
Dept: FAMILY MEDICINE CLINIC | Facility: CLINIC | Age: 70
End: 2025-04-23
Payer: MEDICARE

## 2025-04-23 ENCOUNTER — LAB (OUTPATIENT)
Dept: FAMILY MEDICINE CLINIC | Facility: CLINIC | Age: 70
End: 2025-04-23
Payer: MEDICARE

## 2025-04-23 VITALS
DIASTOLIC BLOOD PRESSURE: 88 MMHG | SYSTOLIC BLOOD PRESSURE: 154 MMHG | TEMPERATURE: 98.4 F | HEIGHT: 63 IN | OXYGEN SATURATION: 97 % | HEART RATE: 78 BPM | BODY MASS INDEX: 26.36 KG/M2 | WEIGHT: 148.8 LBS | RESPIRATION RATE: 16 BRPM

## 2025-04-23 DIAGNOSIS — Z00.00 MEDICARE ANNUAL WELLNESS VISIT, SUBSEQUENT: Primary | ICD-10-CM

## 2025-04-23 DIAGNOSIS — E55.9 VITAMIN D DEFICIENCY: ICD-10-CM

## 2025-04-23 DIAGNOSIS — E78.2 MIXED HYPERLIPIDEMIA: ICD-10-CM

## 2025-04-23 DIAGNOSIS — E03.9 ACQUIRED HYPOTHYROIDISM: ICD-10-CM

## 2025-04-23 DIAGNOSIS — I10 ESSENTIAL HYPERTENSION: ICD-10-CM

## 2025-04-23 LAB
25(OH)D3 SERPL-MCNC: 95.1 NG/ML (ref 30–100)
ALBUMIN SERPL-MCNC: 4.2 G/DL (ref 3.5–5.2)
ALBUMIN/GLOB SERPL: 1.8 G/DL
ALP SERPL-CCNC: 62 U/L (ref 39–117)
ALT SERPL W P-5'-P-CCNC: 14 U/L (ref 1–33)
ANION GAP SERPL CALCULATED.3IONS-SCNC: 12 MMOL/L (ref 5–15)
AST SERPL-CCNC: 14 U/L (ref 1–32)
BILIRUB SERPL-MCNC: 0.4 MG/DL (ref 0–1.2)
BUN SERPL-MCNC: 13 MG/DL (ref 8–23)
BUN/CREAT SERPL: 18.1 (ref 7–25)
CALCIUM SPEC-SCNC: 9.3 MG/DL (ref 8.6–10.5)
CHLORIDE SERPL-SCNC: 105 MMOL/L (ref 98–107)
CHOLEST SERPL-MCNC: 156 MG/DL (ref 0–200)
CO2 SERPL-SCNC: 25 MMOL/L (ref 22–29)
CREAT SERPL-MCNC: 0.72 MG/DL (ref 0.57–1)
EGFRCR SERPLBLD CKD-EPI 2021: 90.1 ML/MIN/1.73
GLOBULIN UR ELPH-MCNC: 2.4 GM/DL
GLUCOSE SERPL-MCNC: 86 MG/DL (ref 65–99)
HDLC SERPL-MCNC: 53 MG/DL (ref 40–60)
LDLC SERPL CALC-MCNC: 91 MG/DL (ref 0–100)
LDLC/HDLC SERPL: 1.72 {RATIO}
POTASSIUM SERPL-SCNC: 4.4 MMOL/L (ref 3.5–5.2)
PROT SERPL-MCNC: 6.6 G/DL (ref 6–8.5)
SODIUM SERPL-SCNC: 142 MMOL/L (ref 136–145)
TRIGL SERPL-MCNC: 59 MG/DL (ref 0–150)
TSH SERPL DL<=0.05 MIU/L-ACNC: 2.45 UIU/ML (ref 0.27–4.2)
VLDLC SERPL-MCNC: 12 MG/DL (ref 5–40)

## 2025-04-23 PROCEDURE — 80053 COMPREHEN METABOLIC PANEL: CPT | Performed by: FAMILY MEDICINE

## 2025-04-23 PROCEDURE — 82306 VITAMIN D 25 HYDROXY: CPT | Performed by: FAMILY MEDICINE

## 2025-04-23 PROCEDURE — 84443 ASSAY THYROID STIM HORMONE: CPT | Performed by: FAMILY MEDICINE

## 2025-04-23 PROCEDURE — 36415 COLL VENOUS BLD VENIPUNCTURE: CPT | Performed by: FAMILY MEDICINE

## 2025-04-23 PROCEDURE — 80061 LIPID PANEL: CPT | Performed by: FAMILY MEDICINE

## 2025-04-23 RX ORDER — AMLODIPINE BESYLATE 5 MG/1
5 TABLET ORAL DAILY
Qty: 90 TABLET | Refills: 1 | Status: SHIPPED | OUTPATIENT
Start: 2025-04-23

## 2025-04-23 RX ORDER — ATORVASTATIN CALCIUM 10 MG/1
10 TABLET, FILM COATED ORAL DAILY
Qty: 90 TABLET | Refills: 1 | Status: SHIPPED | OUTPATIENT
Start: 2025-04-23

## 2025-04-23 RX ORDER — MULTIPLE VITAMINS W/ MINERALS TAB 9MG-400MCG
1 TAB ORAL DAILY
COMMUNITY

## 2025-04-23 RX ORDER — AMOXICILLIN 500 MG/1
CAPSULE ORAL
COMMUNITY
Start: 2025-03-24 | End: 2025-04-23

## 2025-04-23 NOTE — PROGRESS NOTES
Subjective   The ABCs of the Annual Wellness Visit  Medicare Wellness Visit    Chief Complaint   Patient presents with    Medicare Wellness-subsequent       Nallely Figueroa is a 70 y.o. patient who presents for a Medicare Wellness Visit.  She lives by herself and she is fully independent with her activities of daily living.  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  She is already venlafaxine for the treatment of her depression.  No significant memory problems are being reported.  No balance issues or frequent falls are being reported. Patient does not report any chest pain, shortness of breath, dizziness, nausea, vomiting, or diarrhea, visual issues, headaches, numbness or tingling. No urinary issues reported like urgency, frequency, or discomfort upon urination.  No significant weight changes reported.  No swelling reported.  No rashes or any other skin issues reported. No emotional issues or insomnia.    The following portions of the patient's history were reviewed and   updated as appropriate: allergies, current medications, past family history, past medical history, past social history, past surgical history, and problem list.    Compared to one year ago, the patient's physical   health is the same.  Compared to one year ago, the patient's mental   health is the same.    Recent Hospitalizations:  She was not admitted to the hospital during the last year.     Current Medical Providers:  Patient Care Team:  Erin Quintana MD as PCP - General (Family Medicine)  Kellen Muller MD as Consulting Physician (Obstetrics and Gynecology)  Kana Sneed MD as Consulting Physician (Orthopedic Surgery)  Vivienne Clifford MD as Consulting Physician (Sleep Medicine)    Outpatient Medications Prior to Visit   Medication Sig Dispense Refill    aspirin 81 MG EC tablet Take 1 tablet by mouth  Daily. LD 8/7      Calcium Carbonate (CALCIUM 500 PO) Take  by mouth.      Cholecalciferol 25 MCG (1000 UT) tablet Take 2 tablets by mouth Daily.      Cranberry 500 MG capsule Take 500 mg by mouth.      multivitamin with minerals tablet tablet Take 1 tablet by mouth Daily.      Omega-3 Fatty Acids (fish oil) 1000 MG capsule capsule Take 1,200 mg by mouth Daily With Breakfast.      pantoprazole (PROTONIX) 40 MG EC tablet TAKE ONE TABLET BY MOUTH EVERY DAY 90 tablet 1    Probiotic Product (Risaquad-2) capsule capsule Take 1 capsule by mouth Daily.      Synthroid 112 MCG tablet TAKE ONE TABLET BY MOUTH EVERY MORNING 90 tablet 1    venlafaxine XR (EFFEXOR-XR) 75 MG 24 hr capsule TAKE ONE CAPSULE BY MOUTH EVERY DAY 90 capsule 0    vitamin C (ASCORBIC ACID) 250 MG tablet Take 1 tablet by mouth Daily.      amLODIPine (NORVASC) 5 MG tablet Take 1 tablet by mouth Daily. 90 tablet 1    atorvastatin (LIPITOR) 20 MG tablet TAKE ONE TABLET BY MOUTH ONCE DAILY (Patient taking differently: Take 0.5 tablets by mouth Daily.) 90 tablet 1    amoxicillin (AMOXIL) 500 MG capsule TAKE 4 CAPSULES BY MOUTH 1 HOUR PRIOR TO APPOINTMENT (Patient not taking: Reported on 4/23/2025)      ciprofloxacin (Cipro) 500 MG tablet Take 1 tablet by mouth 2 (Two) Times a Day. (Patient not taking: Reported on 4/23/2025) 14 tablet 0    polyethylene glycol (MIRALAX) 17 GM/SCOOP powder TAKE AS DIRECTED PER INSTRUCTIONS FOR BOWEL PREP (Patient not taking: Reported on 4/23/2025)      valACYclovir (VALTREX) 1000 MG tablet TAKE 1 TABLET BY MOUTH TWICE DAILY START THE DAY BEFORE PROCEDURE (Patient not taking: Reported on 4/23/2025)       No facility-administered medications prior to visit.     No opioid medication identified on active medication list. I have reviewed chart for other potential  high risk medication/s and harmful drug interactions in the elderly.      Aspirin is on active medication list. Aspirin use is indicated based on review of current medical  "condition/s. Pros and cons of this therapy have been discussed today. Benefits of this medication outweigh potential harm.  Patient has been encouraged to continue taking this medication.  .      Patient Active Problem List   Diagnosis    Essential hypertension    Carpal tunnel syndrome    Depression    Medicare annual wellness visit, subsequent    Hyperlipidemia    Hypothyroidism    Osteopenia    FERNANDO on CPAP    Ventral hernia    Vitamin D deficiency    Class 1 obesity    Chronic pain of left knee    Primary osteoarthritis of right knee    Heart murmur    Primary osteoarthritis of left knee    Colon cancer screening    Urinary tract infection without hematuria    Fever     Advance Care Planning Advance Directive is not on file.  ACP discussion was held with the patient during this visit. Patient has an advance directive (not in EMR), copy requested.        Review of Systems   Constitutional:  Negative for activity change, fatigue and fever.   Respiratory:  Negative for cough, shortness of breath and wheezing.    Cardiovascular:  Negative for chest pain, palpitations and leg swelling.   Gastrointestinal:  Negative for constipation, diarrhea and indigestion.   Skin:  Negative for color change, dry skin and rash.   Neurological:  Negative for tremors and headache.         Objective   Vitals:    04/23/25 0811 04/23/25 0822   BP: 163/87 154/88   BP Location: Left arm Left arm   Patient Position: Sitting Sitting   Cuff Size: Adult Adult   Pulse: 78    Resp: 16    Temp: 98.4 °F (36.9 °C)    TempSrc: Infrared    SpO2: 97%    Weight: 67.5 kg (148 lb 12.8 oz)    Height: 160 cm (62.99\")    PainSc: 0-No pain        Estimated body mass index is 26.37 kg/m² as calculated from the following:    Height as of this encounter: 160 cm (62.99\").    Weight as of this encounter: 67.5 kg (148 lb 12.8 oz).       Physical Exam  Vitals and nursing note reviewed.   Constitutional:       General: She is not in acute distress.     Appearance: " Normal appearance. She is well-developed. She is not ill-appearing.   HENT:      Head: Normocephalic and atraumatic.   Cardiovascular:      Rate and Rhythm: Normal rate and regular rhythm.      Heart sounds: Normal heart sounds. No murmur heard.     No gallop.   Pulmonary:      Effort: Pulmonary effort is normal. No respiratory distress.      Breath sounds: Normal breath sounds. No wheezing, rhonchi or rales.   Chest:      Chest wall: No tenderness.   Musculoskeletal:      Cervical back: Normal range of motion and neck supple.   Neurological:      General: No focal deficit present.      Mental Status: She is alert and oriented to person, place, and time. Mental status is at baseline.   Psychiatric:         Mood and Affect: Mood normal.           Does the patient have evidence of cognitive impairment? No                                                                                               Health  Risk Assessment    Smoking Status:  Social History     Tobacco Use   Smoking Status Never    Passive exposure: Never   Smokeless Tobacco Never     Alcohol Consumption:  Social History     Substance and Sexual Activity   Alcohol Use No       Fall Risk Screen  STEADI Fall Risk Assessment was completed, and patient is at LOW risk for falls.Assessment completed on:2025    Depression Screening   Little interest or pleasure in doing things? Not at all   Feeling down, depressed, or hopeless? Not at all   PHQ-2 Total Score 0      Health Habits and Functional and Cognitive Screenin/21/2025     9:45 AM   Functional & Cognitive Status   Do you have difficulty preparing food and eating? No   Do you have difficulty bathing yourself, getting dressed or grooming yourself? No   Do you have difficulty using the toilet? No   Do you have difficulty moving around from place to place? No   Do you have trouble with steps or getting out of a bed or a chair? No   Current Diet Limited Junk Food   Dental Exam Up to date   Eye  Exam Up to date   Exercise (times per week) 0 times per week   Current Exercises Include No Regular Exercise   Do you need help using the phone?  No   Are you deaf or do you have serious difficulty hearing?  No   Do you need help to go to places out of walking distance? No   Do you need help shopping? No   Do you need help preparing meals?  No   Do you need help with housework?  No   Do you need help with laundry? No   Do you need help taking your medications? No   Do you need help managing money? No   Do you ever drive or ride in a car without wearing a seat belt? No   Have you felt unusual stress, anger or loneliness in the last month? No   Who do you live with? Other   If you need help, do you have trouble finding someone available to you? No   Have you been bothered in the last four weeks by sexual problems? No   Do you have difficulty concentrating, remembering or making decisions? No           Age-appropriate Screening Schedule:  Refer to the list below for future screening recommendations based on patient's age, sex and/or medical conditions. Orders for these recommended tests are listed in the plan section. The patient has been provided with a written plan.    Health Maintenance List  Health Maintenance   Topic Date Due    TDAP/TD VACCINES (1 - Tdap) Never done    COVID-19 Vaccine (10 - 2024-25 season) 04/07/2025    DXA SCAN  06/21/2025    INFLUENZA VACCINE  07/01/2025    ANNUAL WELLNESS VISIT  04/23/2026    MAMMOGRAM  06/25/2026    COLORECTAL CANCER SCREENING  11/18/2034    HEPATITIS C SCREENING  Completed    Pneumococcal Vaccine 50+  Completed    ZOSTER VACCINE  Completed    LIPID PANEL  Discontinued                                                                                                                                                CMS Preventative Services Quick Reference  Risk Factors Identified During Encounter  Fall Risk-High or Moderate: Discussed Fall Prevention in the home  Immunizations  Discussed/Encouraged: COVID19 and RSV (Respiratory Syncytial Virus)  Polypharmacy: Medication List reviewed  Dental Screening Recommended  Vision Screening Recommended    The above risks/problems have been discussed with the patient.  Pertinent information has been shared with the patient in the After Visit Summary.  An After Visit Summary and PPPS were made available to the patient.    Follow Up:   Next Medicare Wellness visit to be scheduled in 1 year.     Assessment & Plan  Medicare annual wellness visit, subsequent    Acquired hypothyroidism    Orders:    TSH    Mixed hyperlipidemia       Orders:    Comprehensive Metabolic Panel    Lipid Panel    atorvastatin (LIPITOR) 10 MG tablet; Take 1 tablet by mouth Daily.    Vitamin D deficiency    Orders:    Vitamin D,25-Hydroxy    Essential hypertension      Orders:    amLODIPine (NORVASC) 5 MG tablet; Take 1 tablet by mouth Daily.       Medicare wellness exam was done today.  I will be getting fasting blood work.  Her last colonoscopy was in 11/2024 and adenomatous polyps were removed and 7-year follow-up colonoscopy was recommended..  Her last mammogram was in 6/2024.  Her last DEXA scan was in 6/2023.  Patient gets her mammograms and DEXA scans done through her gynecologist.  Immunization was also reviewed and recommended.  Her blood pressure is noted to be elevated today, patient was advised to start monitoring her blood pressure at home and contact us back if the readings stay elevated.  Healthy lifestyle was reinforced.    Follow Up:     Return in about 6 months (around 10/23/2025) for Next scheduled follow up.    Requested Prescriptions     Signed Prescriptions Disp Refills    amLODIPine (NORVASC) 5 MG tablet 90 tablet 1     Sig: Take 1 tablet by mouth Daily.    atorvastatin (LIPITOR) 10 MG tablet 90 tablet 1     Sig: Take 1 tablet by mouth Daily.     Erin Quintana MD  04/23/2025  07:36 EDT

## 2025-04-23 NOTE — ASSESSMENT & PLAN NOTE
Orders:    Comprehensive Metabolic Panel    Lipid Panel    atorvastatin (LIPITOR) 10 MG tablet; Take 1 tablet by mouth Daily.

## 2025-05-17 RX ORDER — PANTOPRAZOLE SODIUM 40 MG/1
40 TABLET, DELAYED RELEASE ORAL DAILY
Qty: 90 TABLET | Refills: 1 | Status: SHIPPED | OUTPATIENT
Start: 2025-05-17

## 2025-06-18 DIAGNOSIS — F33.41 RECURRENT MAJOR DEPRESSIVE DISORDER, IN PARTIAL REMISSION: ICD-10-CM

## 2025-06-18 RX ORDER — VENLAFAXINE HYDROCHLORIDE 75 MG/1
75 CAPSULE, EXTENDED RELEASE ORAL DAILY
Qty: 90 CAPSULE | Refills: 1 | Status: SHIPPED | OUTPATIENT
Start: 2025-06-18

## 2025-06-22 DIAGNOSIS — E03.9 ACQUIRED HYPOTHYROIDISM: ICD-10-CM

## 2025-06-22 RX ORDER — LEVOTHYROXINE SODIUM 112 MCG
112 TABLET ORAL EVERY MORNING
Qty: 90 TABLET | Refills: 1 | Status: SHIPPED | OUTPATIENT
Start: 2025-06-22

## 2025-07-15 ENCOUNTER — APPOINTMENT (OUTPATIENT)
Dept: WOMENS IMAGING | Facility: HOSPITAL | Age: 70
End: 2025-07-15
Payer: MEDICARE

## 2025-07-15 PROCEDURE — G0279 TOMOSYNTHESIS, MAMMO: HCPCS | Performed by: RADIOLOGY

## 2025-07-15 PROCEDURE — 77066 DX MAMMO INCL CAD BI: CPT | Performed by: RADIOLOGY

## 2025-07-15 PROCEDURE — 76642 ULTRASOUND BREAST LIMITED: CPT | Performed by: RADIOLOGY

## 2025-08-21 ENCOUNTER — OFFICE VISIT (OUTPATIENT)
Dept: FAMILY MEDICINE CLINIC | Facility: CLINIC | Age: 70
End: 2025-08-21
Payer: MEDICARE

## 2025-08-21 VITALS
OXYGEN SATURATION: 96 % | HEIGHT: 63 IN | SYSTOLIC BLOOD PRESSURE: 158 MMHG | WEIGHT: 162.4 LBS | DIASTOLIC BLOOD PRESSURE: 91 MMHG | TEMPERATURE: 98.4 F | HEART RATE: 79 BPM | BODY MASS INDEX: 28.77 KG/M2 | RESPIRATION RATE: 16 BRPM

## 2025-08-21 DIAGNOSIS — K43.2 INCISIONAL HERNIA, WITHOUT OBSTRUCTION OR GANGRENE: Primary | ICD-10-CM

## 2025-08-21 PROCEDURE — 99212 OFFICE O/P EST SF 10 MIN: CPT | Performed by: FAMILY MEDICINE

## 2025-08-21 PROCEDURE — 1159F MED LIST DOCD IN RCRD: CPT | Performed by: FAMILY MEDICINE

## 2025-08-21 PROCEDURE — 1126F AMNT PAIN NOTED NONE PRSNT: CPT | Performed by: FAMILY MEDICINE

## 2025-08-21 PROCEDURE — 3080F DIAST BP >= 90 MM HG: CPT | Performed by: FAMILY MEDICINE

## 2025-08-21 PROCEDURE — 1160F RVW MEDS BY RX/DR IN RCRD: CPT | Performed by: FAMILY MEDICINE

## 2025-08-21 PROCEDURE — 3077F SYST BP >= 140 MM HG: CPT | Performed by: FAMILY MEDICINE

## 2025-08-25 ENCOUNTER — OFFICE VISIT (OUTPATIENT)
Dept: SURGERY | Facility: CLINIC | Age: 70
End: 2025-08-25
Payer: MEDICARE

## 2025-08-25 VITALS
HEART RATE: 82 BPM | DIASTOLIC BLOOD PRESSURE: 87 MMHG | TEMPERATURE: 100.5 F | RESPIRATION RATE: 18 BRPM | SYSTOLIC BLOOD PRESSURE: 139 MMHG | WEIGHT: 165 LBS | HEIGHT: 63 IN | BODY MASS INDEX: 29.23 KG/M2 | OXYGEN SATURATION: 96 %

## 2025-08-25 DIAGNOSIS — K43.2 INCISIONAL HERNIA, WITHOUT OBSTRUCTION OR GANGRENE: Primary | ICD-10-CM

## (undated) DEVICE — STRIP PACKING W IODOFORM 1/4

## (undated) DEVICE — SUT VIC 2/0 SH 27IN

## (undated) DEVICE — DECANTER: Brand: UNBRANDED

## (undated) DEVICE — PINNACLE INTRODUCER SHEATH: Brand: PINNACLE

## (undated) DEVICE — CATH DIAG IMPULSE FL4 6F 100CM

## (undated) DEVICE — GOWN,REINFORCE,POLY,SIRUS,BREATH SLV,XLG: Brand: MEDLINE

## (undated) DEVICE — SUT SILK 2/0 FS BLK 18IN 685G

## (undated) DEVICE — TOWEL,OR,DSP,ST,WHITE,DLX,4/PK,20PK/CS: Brand: MEDLINE

## (undated) DEVICE — SOL IRRIG NACL 1000ML

## (undated) DEVICE — ST ACC MICROPUNCTURE STFF/CANN PLAT/TP 4F 21G 40CM

## (undated) DEVICE — COVER,MAYO STAND,STERILE: Brand: MEDLINE

## (undated) DEVICE — 3M™ PATIENT PLATE, CORDED, SPLIT, LARGE, 40 PER CASE, 1179: Brand: 3M™

## (undated) DEVICE — DRSNG WND BORDR/ADHS NONADHR/GZ LF 4X10IN STRL

## (undated) DEVICE — DRSNG WND BORDR/ADHS NONADHR/GZ LF 4X4IN STRL

## (undated) DEVICE — PK PROC TURNOVER

## (undated) DEVICE — CVR HNDL LT CAM LB54

## (undated) DEVICE — TP SXN YANKR BULB STRL

## (undated) DEVICE — GLV SURG TRIUMPH LT PF LTX 7.5 STRL

## (undated) DEVICE — CUFF SCD HEMOFORCE SEQ CALF STD MD

## (undated) DEVICE — SUT PDS 1 TP1 48IN Z880G BX/12

## (undated) DEVICE — CATH DIAG IMPULSE PIG .056 6F 110CM

## (undated) DEVICE — GLV SURG SIGNATURE ESSENTIAL PF LTX SZ7.5

## (undated) DEVICE — TOWEL,OR,DSP,ST,NATURAL,DLX,4/PK,20PK/CS: Brand: MEDLINE

## (undated) DEVICE — SOLUTION,WATER,IRRIGATION,1000ML,STERILE: Brand: MEDLINE

## (undated) DEVICE — SPNG LAP PREWSH SFTPK 18X18IN STRL PK/5

## (undated) DEVICE — PENCL HND ROCKRSWTCH HOLSTR EZ CLEAN TP CRD 10FT

## (undated) DEVICE — GOWN,REINFRCE,POLY,SIRUS,BREATH SLV,XXLG: Brand: MEDLINE

## (undated) DEVICE — SOL IRRIG H2O 1000ML STRL

## (undated) DEVICE — UNDERGLV SURG BIOGEL INDICAT PF 8 GRN

## (undated) DEVICE — SUT ETHIB 0/0 MO6 I8IN CX45D

## (undated) DEVICE — 450 ML BOTTLE OF 0.05% CHLORHEXIDINE GLUCONATE IN 99.95% STERILE WATER FOR IRRIGATION, USP AND APPLICATOR.: Brand: IRRISEPT ANTIMICROBIAL WOUND LAVAGE

## (undated) DEVICE — SYR LL TP 10ML STRL

## (undated) DEVICE — SUT VIC 3/0 SH 27IN J416H

## (undated) DEVICE — SLV SCD CALF HEMOFORCE DVT THERP REPROC MD

## (undated) DEVICE — PK MINOR LAPAROTOMY 50

## (undated) DEVICE — PK MAJ LAPAROTOMY 50

## (undated) DEVICE — SUT PDS2 CTX 60IN

## (undated) DEVICE — ENSEAL 20 CM SHAFT, LARGE JAW: Brand: ENSEAL X1

## (undated) DEVICE — INTENDED FOR TISSUE SEPARATION, AND OTHER PROCEDURES THAT REQUIRE A SHARP SURGICAL BLADE TO PUNCTURE OR CUT.: Brand: BARD-PARKER ® CARBON RIB-BACK BLADES

## (undated) DEVICE — SUT SILK 2/0 SH CR8 18IN CR8 C012D

## (undated) DEVICE — SUT VIC 3/0 RB1 27IN J215H

## (undated) DEVICE — GW DIAG EMERALD HEPCOAT MOVE JTIP STD .035 3MM 150CM

## (undated) DEVICE — CATH DIAG IMPULSE FR4 6F 100CM

## (undated) DEVICE — KT SURG TURNOVER 050

## (undated) DEVICE — PK ENDO GI 50

## (undated) DEVICE — SUT SILK 3/0 SH CR8 30IN C017D

## (undated) DEVICE — TUBING, SUCTION, 1/4" X 12', STRAIGHT: Brand: MEDLINE

## (undated) DEVICE — GLOW 'N TELL 30CM TAPE (20 STRIPS): Brand: VASCUTAPE RADIOPAQUE TAPE

## (undated) DEVICE — ESWAB LQ AMIES  REG. NYLON FLOCKED  APPLICATOR: Brand: ESWAB™

## (undated) DEVICE — TOWEL,OR,DSP,ST,WHITE,DLX,XR,4/PK,20PK/C: Brand: MEDLINE

## (undated) DEVICE — PROXIMATE RH ROTATING HEAD SKIN STAPLERS (35 WIDE) CONTAINS 35 STAINLESS STEEL STAPLES: Brand: PROXIMATE

## (undated) DEVICE — CAUTERY LOW TEMPERATURE FINE TIP: Brand: BOVIE

## (undated) DEVICE — ROD OS LP SURFIT 2 1/2IN

## (undated) DEVICE — GOWN,REINF,POLY,ECL,PP SLV,XXL: Brand: MEDLINE

## (undated) DEVICE — SUT VIC 2/0 CT2 27IN J269H

## (undated) DEVICE — SOL IRRIG NACL 9PCT 1000ML BTL

## (undated) DEVICE — SUT SILK 3/0 SH CR8 18IN C013D

## (undated) DEVICE — COVADERM: Brand: DEROYAL

## (undated) DEVICE — DRN PENRS 18 5/8IN LTX STRL

## (undated) DEVICE — SUT ETHIB 0 CT1 CR8 18IN CX21D

## (undated) DEVICE — DRN WND EVAC BULB 100CC

## (undated) DEVICE — PAD,ABDOMINAL,5"X9",STERILE,LF,1/PK: Brand: MEDLINE INDUSTRIES, INC.

## (undated) DEVICE — REFLEX ONE, SKIN STAPLER, 35 WIDE: Brand: REFLEX

## (undated) DEVICE — PK TRY HEART CATH 50

## (undated) DEVICE — CVR HNDL LT SURG ACCSSRY BLU STRL

## (undated) DEVICE — ANGIO-SEAL VIP VASCULAR CLOSURE DEVICE: Brand: ANGIO-SEAL

## (undated) DEVICE — BLAKE SILICONE DRAIN, 19 FR ROUND, HUBLESS WITH 1/4" BENDABLE TROCAR: Brand: BLAKE

## (undated) DEVICE — PENCL EVAC ULTRAVAC SMOKE W/BLD

## (undated) DEVICE — BANDAGE,GAUZE,BULKEE II,4.5"X4.1YD,STRL: Brand: MEDLINE

## (undated) DEVICE — GAUZE,SPONGE,FLUFF,6"X6.75",STRL,5/TRAY: Brand: MEDLINE

## (undated) DEVICE — SUT SILK 3/0 TIES 18IN A184H

## (undated) DEVICE — TOTAL TRAY, DB, 100% SILI FOLEY, 16FR 10: Brand: MEDLINE

## (undated) DEVICE — 3M™ STERI-STRIP™ REINFORCED ADHESIVE SKIN CLOSURES, R1547, 1/2 IN X 4 IN (12 MM X 100 MM), 6 STRIPS/ENVELOPE: Brand: 3M™ STERI-STRIP™

## (undated) DEVICE — UNDRGLV SURG BIOGEL PIMICROINDICATOR SYNTH SZ8 LF STRL

## (undated) DEVICE — IRRIGATOR BULB ASEPTO 60CC STRL

## (undated) DEVICE — SUT VIC 0 CT2 CR8 18IN DYED J727D

## (undated) DEVICE — ADHS SKIN PREMIERPRO EXOFIN TOPICAL HI/VISC .5ML

## (undated) DEVICE — DRN WND RND END PERF W/TROC 10IN HL 1/8IN

## (undated) DEVICE — BINDER: Brand: DEROYAL